# Patient Record
Sex: FEMALE | Race: WHITE | NOT HISPANIC OR LATINO | Employment: OTHER | URBAN - METROPOLITAN AREA
[De-identification: names, ages, dates, MRNs, and addresses within clinical notes are randomized per-mention and may not be internally consistent; named-entity substitution may affect disease eponyms.]

---

## 2017-01-07 ENCOUNTER — NURSE TRIAGE (OUTPATIENT)
Dept: ADMINISTRATIVE | Facility: CLINIC | Age: 82
End: 2017-01-07

## 2017-01-08 NOTE — TELEPHONE ENCOUNTER
Reason for Disposition   Caller has medication question only, adult not sick, and triager answers question    Protocols used: ST MEDICATION QUESTION CALL-A-  pt states she missed dose of coumadin and other meds last evening. When she woke up at 8am she took all her missed meds. Wants to know if she can return to regular schedule at this time. Advised yes. Advised to monitor for signs of bleeding . Next scheduled INR on 1/12/2017.

## 2017-01-08 NOTE — TELEPHONE ENCOUNTER
Did not take medication last night and she is on warfarin and took a dose this morning along with other medications. Wants to go back to regular routine and is wanting to know if she should. Advised yes to get back on regular schedule.

## 2017-01-11 ENCOUNTER — TELEPHONE (OUTPATIENT)
Dept: FAMILY MEDICINE | Facility: CLINIC | Age: 82
End: 2017-01-11

## 2017-01-11 NOTE — TELEPHONE ENCOUNTER
i spoke with pt she said she continues to have pain from her head to her neck and into shoulder. She will come in and see nakul tomorrow. This is just patricia

## 2017-01-11 NOTE — TELEPHONE ENCOUNTER
----- Message from Alondra Zavala sent at 1/11/2017 11:29 AM CST -----  Contact: self  Patient [phone] is calling to speak with Nurse about scheduling an appt with Dr Negro Finnegan/patient would not tell me what it was about and would not let me help her/wants to speak with Nurse/please call

## 2017-01-12 ENCOUNTER — ANTI-COAG VISIT (OUTPATIENT)
Dept: CARDIOLOGY | Facility: CLINIC | Age: 82
End: 2017-01-12
Payer: MEDICARE

## 2017-01-12 ENCOUNTER — OFFICE VISIT (OUTPATIENT)
Dept: FAMILY MEDICINE | Facility: CLINIC | Age: 82
End: 2017-01-12
Payer: MEDICARE

## 2017-01-12 VITALS
HEART RATE: 69 BPM | OXYGEN SATURATION: 97 % | SYSTOLIC BLOOD PRESSURE: 120 MMHG | DIASTOLIC BLOOD PRESSURE: 74 MMHG | WEIGHT: 170 LBS | HEIGHT: 65 IN | BODY MASS INDEX: 28.32 KG/M2

## 2017-01-12 DIAGNOSIS — Z79.01 LONG TERM (CURRENT) USE OF ANTICOAGULANTS: Primary | ICD-10-CM

## 2017-01-12 DIAGNOSIS — G45.9 TRANSIENT CEREBRAL ISCHEMIA, UNSPECIFIED TYPE: ICD-10-CM

## 2017-01-12 DIAGNOSIS — Z86.73 HISTORY OF TIA (TRANSIENT ISCHEMIC ATTACK): ICD-10-CM

## 2017-01-12 DIAGNOSIS — R42 LIGHTHEADEDNESS: Primary | ICD-10-CM

## 2017-01-12 DIAGNOSIS — M54.2 NECK PAIN: ICD-10-CM

## 2017-01-12 DIAGNOSIS — I48.0 PAROXYSMAL ATRIAL FIBRILLATION: ICD-10-CM

## 2017-01-12 LAB
CTP QC/QA: NORMAL
INR PPP: 2.3 (ref 2–3)

## 2017-01-12 PROCEDURE — 85610 PROTHROMBIN TIME: CPT | Mod: QW,S$GLB,, | Performed by: PHARMACIST

## 2017-01-12 PROCEDURE — 99999 PR PBB SHADOW E&M-EST. PATIENT-LVL IV: CPT | Mod: PBBFAC,,, | Performed by: NURSE PRACTITIONER

## 2017-01-12 PROCEDURE — 1159F MED LIST DOCD IN RCRD: CPT | Mod: S$GLB,,, | Performed by: NURSE PRACTITIONER

## 2017-01-12 PROCEDURE — 1157F ADVNC CARE PLAN IN RCRD: CPT | Mod: S$GLB,,, | Performed by: NURSE PRACTITIONER

## 2017-01-12 PROCEDURE — 1160F RVW MEDS BY RX/DR IN RCRD: CPT | Mod: S$GLB,,, | Performed by: NURSE PRACTITIONER

## 2017-01-12 PROCEDURE — 93005 ELECTROCARDIOGRAM TRACING: CPT | Mod: S$GLB,,, | Performed by: NURSE PRACTITIONER

## 2017-01-12 PROCEDURE — 99214 OFFICE O/P EST MOD 30 MIN: CPT | Mod: S$GLB,,, | Performed by: NURSE PRACTITIONER

## 2017-01-12 PROCEDURE — 3078F DIAST BP <80 MM HG: CPT | Mod: S$GLB,,, | Performed by: NURSE PRACTITIONER

## 2017-01-12 PROCEDURE — 1125F AMNT PAIN NOTED PAIN PRSNT: CPT | Mod: S$GLB,,, | Performed by: NURSE PRACTITIONER

## 2017-01-12 PROCEDURE — 3074F SYST BP LT 130 MM HG: CPT | Mod: S$GLB,,, | Performed by: NURSE PRACTITIONER

## 2017-01-12 PROCEDURE — 93010 ELECTROCARDIOGRAM REPORT: CPT | Mod: S$GLB,,, | Performed by: INTERNAL MEDICINE

## 2017-01-12 RX ORDER — RAMIPRIL 2.5 MG/1
CAPSULE ORAL
Refills: 4 | COMMUNITY
Start: 2016-11-07 | End: 2017-08-01

## 2017-01-12 NOTE — PROGRESS NOTES
Subjective:       Patient ID: Maggi Fontaine is a 84 y.o. female.    Chief Complaint: Shoulder Pain and Neck Pain    HPI Comments: Patient is having pain in her right neck radiating into her right should for a couple of weeks. Pain is waxing and waning, sometimes very painful. 4-8/10. Has not taken anything for it.  Episode of near syncope Monday, lasted for about 60 seconds, did not lose consciousness, but felt clammy and lightheaded and like she was about to pass out. Has not reoccurred.  Has been taking coumadin daily, Lab Results       Component                Value               Date                       INR                      3.3                 12/14/2016                 INR                      2.7                 10/17/2016                 INR                      2.7                 08/24/2016            Has INR today.     There are no preventive care reminders to display for this patient.    Past Medical History:  Past Medical History:    Allergy                                                       Arthritis                                                     Atrial fibrillation                                           Gout                                                          Hay fever                                                     Headache(784.0)                                               HEARING LOSS                                                  Hyperlipidemia                                                Hypertension                                                  Hypothyroidism                                                Polymyalgia rheumatica                                          Comment:remission now    Renal insufficiency                                           Sinusitis                                                     Stroke                                                          Comment:history of TIA    Urinary incontinence                                           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 , Disp: , Rfl:   furosemide (LASIX) 20 MG tablet, Take 20 mg by mouth daily as needed., Disp: , Rfl:   levothyroxine (SYNTHROID) 88 MCG tablet, TAKE 1 TABLET EVERY DAY BEFORE BREAKFAST, Disp: 90 tablet, Rfl: 1  meclizine (ANTIVERT) 25 mg tablet, Take 1 tablet (25 mg total) by mouth 3 (three) times daily as needed., Disp: 30 tablet, Rfl: 11  pravastatin (PRAVACHOL) 40 MG tablet, TAKE 1 TABLET EVERY DAY, Disp: 90 tablet, Rfl: 3  sotalol (BETAPACE) 80 MG tablet, TAKE 1 TABLET ONE TIME  DAILY., Disp: 90 tablet, Rfl: 3  VITAMIN D2 50,000 unit capsule, TAKE 1 CAPSULE EVERY 7 DAYS, Disp: 12 capsule, Rfl: 3  warfarin (COUMADIN) 5 MG tablet, TAKE 1 TABLET EVERY DAY, Disp: 90 tablet, Rfl: 1    No current facility-administered medications on file prior to visit.     Social History    Marital status:              Spouse name:                       Years of education:                 Number of children:               Occupational History  Occupation          Employer            Comment               homemaker                                   Social History Main Topics    Smoking status: Never Smoker                                                                Smokeless status: Never Used                        Alcohol use: Yes           0.5 oz/week       1 Standard drinks or equivalent per week       Comment: less than 1 drink weekly    Drug use: No               Sexual activity: Not on file          Other Topics            Concern  Financial Status: Other Yes  Home situation: lives * Yes  Leisure: Time with fam* Yes  Childhood History: [place]* Yes  Home situation: lives * Yes  Childhood History: Une* Yes  Leisure: Shopping       Yes    Social History Narrative    Born and raised in [place], [place].  HS graduate.  Came to [facility] at 17 to work.  Met and   at 19 - 3 children, 59 years .  He will probably be going to a  for alzheimer dx next week.  He is currently hospitalized for medical clearance.        Review of patient's family history indicates:    Anxiety disorder               Sister                      Comment: 1/2 sister with driving phobia onset in                60s    Diabetes                       Mother                          Review of Systems   Constitutional: Negative.  Negative for fatigue and fever.   Respiratory: Negative.  Negative for cough, choking, chest tightness and wheezing.    Cardiovascular: Negative.  Negative for chest pain, palpitations and leg swelling.   Musculoskeletal: Positive for arthralgias and neck pain.   Neurological: Negative for dizziness, tremors, syncope, weakness, light-headedness and headaches.       Objective:      Physical Exam   Constitutional: She is oriented to person, place, and time.   HENT:   Head: Normocephalic and atraumatic.   Eyes: Pupils are equal, round, and reactive to light.   Cardiovascular: Normal rate and regular rhythm.  Exam reveals no friction rub.    No murmur heard.  Pulmonary/Chest: Effort normal and breath sounds normal. No respiratory distress. She has no wheezes.   Abdominal: Soft. Bowel sounds are normal. She exhibits no distension. There is no tenderness.   Musculoskeletal:        Right shoulder: She exhibits tenderness and pain. She exhibits normal range of motion and no bony tenderness.        Cervical back: She exhibits tenderness and pain. She exhibits normal range of motion.         Arms:  Neurological: She is alert and oriented to person, place, and time. She displays normal reflexes. No cranial nerve deficit. She exhibits normal muscle tone. Coordination normal.   Skin: No rash noted. No erythema.   Psychiatric: She has a normal mood and affect. Her behavior is normal.       Assessment:       1. Lightheadedness    2. Neck pain    3. Transient cerebral ischemia, unspecified type         Plan:     Lightheadedness has completely resolved after one brief episode, neurological exam normal, patient does have a history of TIA in the past, she is on Coumadin, has not missed any doses. The pain sounds musculoskeletal, no known injury. Will evaluate, tylenol prn. Go to er immediately for any new or worsening symptoms.   1. Lightheadedness    - IN OFFICE EKG 12-LEAD (to [facility])  - X-Ray Cervical Spine AP And Lateral; Future  - X-Ray Thoracic Spine AP Lateral; Future  - US Carotid Bilateral; Future    2. Neck pain    - IN OFFICE EKG 12-LEAD (to [facility])  - X-Ray Cervical Spine AP And Lateral; Future  - X-Ray Thoracic Spine AP Lateral; Future  - US Carotid Bilateral; Future    3. Transient cerebral ischemia, unspecified type     - US Carotid Bilateral; Future

## 2017-01-12 NOTE — MR AVS SNAPSHOT
[facility]  [address]  [facility] [address]  Phone: [phone]  Fax: [phone]                  Maggi Fontaine   2017 9:40 AM   Office Visit    Description:  Female : 1932   Provider:  Petra Castle NP   Department:  [facility]           Reason for Visit     Shoulder Pain     Neck Pain           Diagnoses this Visit        Comments    Lightheadedness    -  Primary     Neck pain         Transient cerebral ischemia, unspecified type                To Do List           Future Appointments        Provider Department Dept Phone    2017 7:45 AM [facility] XR2 [facility] [phone]    2017 8:00 AM [facility] XR2 [facility] [phone]    2017 12:30 PM [facility] US1 [facility] [phone]    2017 10:20 AM Chance Reed DPM [place] [facility] [phone]    2017 11:00 AM Dinah Silver, PharmD [facility] [phone]      Goals (5 Years of Data)     None      [facility]     [facility] -  Assistance  Registered nurses in the [facility] provide clinical advisement, health education, appointment booking, and other advisory services.  Call for this free service at [phone].             Medications           Message regarding Medications     Verify the changes and/or additions to your medication regime listed below are the same as discussed with your clinician today.  If any of these changes or additions are incorrect, please notify your healthcare provider.             Verify that the below list of medications is an accurate representation of the medications you are currently taking.  If none reported, the list may be blank. If incorrect, please contact your healthcare provider. Carry this list with you in case of emergency.           Current Medications     aspirin (ENTERIC COATED ASPIRIN) 81 MG EC tablet Take 1 tablet by mouth  "once daily.     furosemide (LASIX) 20 MG tablet Take 20 mg by mouth daily as needed.    levothyroxine (SYNTHROID) 88 MCG tablet TAKE 1 TABLET EVERY DAY BEFORE BREAKFAST    meclizine (ANTIVERT) 25 mg tablet Take 1 tablet (25 mg total) by mouth 3 (three) times daily as needed.    pravastatin (PRAVACHOL) 40 MG tablet TAKE 1 TABLET EVERY DAY    ramipril (ALTACE) 2.5 MG capsule TK ONE C PO QD    sotalol (BETAPACE) 80 MG tablet TAKE 1 TABLET ONE TIME  DAILY.    VITAMIN D2 50,000 unit capsule TAKE 1 CAPSULE EVERY 7 DAYS    warfarin (COUMADIN) 5 MG tablet TAKE 1 TABLET EVERY DAY           Clinical Reference Information           Vital Signs - Last Recorded  Most recent update: 1/12/2017  9:48 AM by Diana Spicer LPN    BP Pulse Ht Wt SpO2 BMI    120/74 69 5' 5" (1.651 m) 77.1 kg (169 lb 15.6 oz) 97% 28.29 kg/m2      Blood Pressure          Most Recent Value    BP  120/74      Allergies as of 1/12/2017     Nsaids (Non-steroidal Anti-inflammatory Drug)    Penicillins      Immunizations Administered on Date of Encounter - 1/12/2017     None      Orders Placed During Today's Visit      Normal Orders This Visit    Ambulatory Referral to Physical/Occupational Therapy     IN OFFICE EKG 12-LEAD (to [place])     Future Labs/Procedures Expected by Expires    US Carotid Bilateral  1/12/2017 1/12/2018    X-Ray Cervical Spine AP And Lateral  1/12/2017 1/12/2018    X-Ray Thoracic Spine AP Lateral  1/12/2017 1/12/2018      "

## 2017-01-12 NOTE — TELEPHONE ENCOUNTER
Informed Marilu of pt's current sx(upper back and neck pain) and that Petra Castle NP would like for Dr Nguyen to review pt's EKG. EKG to be faxed today. Marilu verbalized understanding and stated Dr Nguyen is out of clinic today, but she will have him look at it tomorrow.

## 2017-01-12 NOTE — PROGRESS NOTES
"INR in range.  Pt denies any changes in meds/diet.  She does report she is having some neck pain, sounds possibly a pinched nerve.  She is seeing JARRED Castle for this and to have some tests run.  Will continue dose and recheck in 6 weeks "This note will not be shared with the patient."  "

## 2017-01-12 NOTE — MR AVS SNAPSHOT
[place] [facility]  [address]  [place] [address]  Phone: [phone]                  Maggi Fontaine   2017 10:30 AM   Anti-coag visit    Description:  Female : 1932   Provider:  Dinah Silver, Kyle   Department:  [place] [facility]           Diagnoses this Visit        Comments    Long term (current) use of anticoagulants    -  Primary     History of TIA (transient ischemic attack)         Paroxysmal atrial fibrillation                To Do List           Future Appointments        Provider Department Dept Phone    2017 10:20 AM Chance Reed DPM [place] [facility] [phone]    2017 11:00 AM [facility] [phone]      Goals (5 Years of Data)     None      [facility]     [facility] On Call Nurse Care Line -  Assistance  Registered nurses in the [facility] provide clinical advisement, health education, appointment booking, and other advisory services.  Call for this free service at [phone].             Medications           Message regarding Medications     Verify the changes and/or additions to your medication regime listed below are the same as discussed with your clinician today.  If any of these changes or additions are incorrect, please notify your healthcare provider.             Verify that the below list of medications is an accurate representation of the medications you are currently taking.  If none reported, the list may be blank. If incorrect, please contact your healthcare provider. Carry this list with you in case of emergency.           Current Medications     aspirin (ENTERIC COATED ASPIRIN) 81 MG EC tablet Take 1 tablet by mouth once daily.     furosemide (LASIX) 20 MG tablet Take 20 mg by mouth daily as needed.    levothyroxine (SYNTHROID) 88 MCG tablet TAKE 1 TABLET EVERY DAY BEFORE BREAKFAST    meclizine (ANTIVERT) 25 mg tablet Take 1 tablet (25 mg total) by mouth 3 (three) times  daily as needed.    pravastatin (PRAVACHOL) 40 MG tablet TAKE 1 TABLET EVERY DAY    ramipril (ALTACE) 2.5 MG capsule TK ONE C PO QD    sotalol (BETAPACE) 80 MG tablet TAKE 1 TABLET ONE TIME  DAILY.    VITAMIN D2 50,000 unit capsule TAKE 1 CAPSULE EVERY 7 DAYS    warfarin (COUMADIN) 5 MG tablet TAKE 1 TABLET EVERY DAY           Clinical Reference Information           Allergies as of 1/12/2017     Nsaids (Non-steroidal Anti-inflammatory Drug)    Penicillins      Immunizations Administered on Date of Encounter - 1/12/2017     None      Orders Placed During Today's Visit      Normal Orders This Visit    POCT PT/INR          1/12/2017 10:54 AM - Dinah Silver, PharmD      Component Results     Component    INR    2.3     Acceptable      December 2016 Details    Sun Mon Tue Wed Thu Fri Sat         1               2               3                 4               5               6               7               8               9               10                 11               12               13      5 mg         14   3.3   Hold   See details      15      5 mg         16      2.5 mg         17      5 mg           18      5 mg         19      2.5 mg         20      5 mg         21      2.5 mg         22      5 mg         23      2.5 mg         24      5 mg           25      5 mg         26      2.5 mg         27      5 mg         28      2.5 mg         29      5 mg         30      2.5 mg         31      5 mg          Date Details   12/14 Last INR check   INR: 3.3                 January 2017 Details    Sun Mon Tue Wed Thu Fri Sat     1      5 mg         2      2.5 mg         3      5 mg         4      2.5 mg         5      5 mg         6      2.5 mg         7      5 mg           8      5 mg         9      2.5 mg         10      5 mg         11      2.5 mg         12   2.3   5 mg   See details      13      2.5 mg         14      5 mg           15      5 mg         16      2.5 mg         17      5 mg          18      2.5 mg         19      5 mg         20      2.5 mg         21      5 mg           22      5 mg         23      2.5 mg         24      5 mg         25      2.5 mg         26      5 mg         27      2.5 mg         28      5 mg           29      5 mg         30      2.5 mg         31      5 mg              Date Details   01/12 This INR check   INR: 2.3                     How to take your warfarin dose     To take:  2.5 mg Take 0.5 of a 5 mg tablet.    To take:  5 mg Take 1 of the 5 mg tablets.           February 2017 Details    Sun Mon Tue Wed Thu Fri Sat        1      2.5 mg         2      5 mg         3      2.5 mg         4      5 mg           5      5 mg         6      2.5 mg         7      5 mg         8      2.5 mg         9      5 mg         10      2.5 mg         11      5 mg           12      5 mg         13      2.5 mg         14      5 mg         15      2.5 mg         16      5 mg         17      2.5 mg         18      5 mg           19      5 mg         20      2.5 mg         21      5 mg         22      2.5 mg         23            24               25                 26               27               28                    Date Details   No additional details    Date of next INR:  2/23/2017         How to take your warfarin dose     To take:  2.5 mg Take 0.5 of a 5 mg tablet.    To take:  5 mg Take 1 of the 5 mg tablets.           Anticoagulation Summary as of 1/12/2017     Maintenance plan 2.5 mg (5 mg x 0.5) on Mon, Wed, Fri; 5 mg (5 mg x 1) all other days    Full instructions 2.5 mg on Mon, Wed, Fri; 5 mg all other days    Next INR check 2/23/2017      Anticoagulation Episode Summary     Comments [facility] VITK 1X/WK

## 2017-01-25 ENCOUNTER — OFFICE VISIT (OUTPATIENT)
Dept: PODIATRY | Facility: CLINIC | Age: 82
End: 2017-01-25
Payer: MEDICARE

## 2017-01-25 VITALS — WEIGHT: 170.19 LBS | HEIGHT: 65 IN | BODY MASS INDEX: 28.36 KG/M2

## 2017-01-25 DIAGNOSIS — G60.9 IDIOPATHIC PERIPHERAL NEUROPATHY: Primary | ICD-10-CM

## 2017-01-25 DIAGNOSIS — L84 CORN OR CALLUS: ICD-10-CM

## 2017-01-25 DIAGNOSIS — B35.1 ONYCHOMYCOSIS DUE TO DERMATOPHYTE: ICD-10-CM

## 2017-01-25 PROCEDURE — 11055 PARING/CUTG B9 HYPRKER LES 1: CPT | Mod: Q9,S$GLB,, | Performed by: PODIATRIST

## 2017-01-25 PROCEDURE — 99499 UNLISTED E&M SERVICE: CPT | Mod: S$GLB,,, | Performed by: PODIATRIST

## 2017-01-25 PROCEDURE — 1160F RVW MEDS BY RX/DR IN RCRD: CPT | Mod: S$GLB,,, | Performed by: PODIATRIST

## 2017-01-25 PROCEDURE — 99999 PR PBB SHADOW E&M-EST. PATIENT-LVL II: CPT | Mod: PBBFAC,,, | Performed by: PODIATRIST

## 2017-01-25 PROCEDURE — 99214 OFFICE O/P EST MOD 30 MIN: CPT | Mod: 25,S$GLB,, | Performed by: PODIATRIST

## 2017-01-25 PROCEDURE — 1159F MED LIST DOCD IN RCRD: CPT | Mod: S$GLB,,, | Performed by: PODIATRIST

## 2017-01-25 PROCEDURE — 1126F AMNT PAIN NOTED NONE PRSNT: CPT | Mod: S$GLB,,, | Performed by: PODIATRIST

## 2017-01-25 PROCEDURE — 1157F ADVNC CARE PLAN IN RCRD: CPT | Mod: S$GLB,,, | Performed by: PODIATRIST

## 2017-01-25 PROCEDURE — 11721 DEBRIDE NAIL 6 OR MORE: CPT | Mod: Q9,59,S$GLB, | Performed by: PODIATRIST

## 2017-01-25 NOTE — MR AVS SNAPSHOT
[place] [facility]  [address]  [place] [address]  Phone: [phone]                  Maggi Fontaine   2017 10:20 AM   Office Visit    Description:  Female : 1932   Provider:  Chance Reed DPM   Department:  [place] [facility]           Reason for Visit     Peripheral Neuropathy     Callouses                To Do List           Future Appointments        Provider Department Dept Phone    2017 11:00 AM Dinah Silver, PharmD [place] [facility] [phone]    2017 10:20 AM Chance Reed DPM [facility] [phone]      Goals (5 Years of Data)     None      [facility]     [facility] On Call Nurse Care Line -  Assistance  Registered nurses in the [facility] provide clinical advisement, health education, appointment booking, and other advisory services.  Call for this free service at [phone].             Medications           Message regarding Medications     Verify the changes and/or additions to your medication regime listed below are the same as discussed with your clinician today.  If any of these changes or additions are incorrect, please notify your healthcare provider.             Verify that the below list of medications is an accurate representation of the medications you are currently taking.  If none reported, the list may be blank. If incorrect, please contact your healthcare provider. Carry this list with you in case of emergency.           Current Medications     aspirin (ENTERIC COATED ASPIRIN) 81 MG EC tablet Take 1 tablet by mouth once daily.     furosemide (LASIX) 20 MG tablet Take 20 mg by mouth daily as needed.    levothyroxine (SYNTHROID) 88 MCG tablet TAKE 1 TABLET EVERY DAY BEFORE BREAKFAST    pravastatin (PRAVACHOL) 40 MG tablet TAKE 1 TABLET EVERY DAY    sotalol (BETAPACE) 80 MG tablet TAKE 1 TABLET ONE TIME  DAILY.    VITAMIN D2 50,000 unit capsule TAKE 1 CAPSULE EVERY 7 DAYS    warfarin (COUMADIN) 5 MG  "tablet TAKE 1 TABLET EVERY DAY    meclizine (ANTIVERT) 25 mg tablet Take 1 tablet (25 mg total) by mouth 3 (three) times daily as needed.    ramipril (ALTACE) 2.5 MG capsule TK ONE C PO QD           Clinical Reference Information           Vital Signs - Last Recorded  Most recent update: 1/25/2017 10:55 AM by Ken Portillo MA    Ht Wt BMI          5' 5" (1.651 m) 77.2 kg (170 lb 3.1 oz) 28.32 kg/m2        Allergies as of 1/25/2017     Nsaids (Non-steroidal Anti-inflammatory Drug)    Penicillins      Immunizations Administered on Date of Encounter - 1/25/2017     None      "

## 2017-01-26 NOTE — PROGRESS NOTES
Subjective:      Patient ID: Maggi Fontaine is a 84 y.o. female.    Chief Complaint: Peripheral Neuropathy and Callouses (rt heel)    Maggi is a 84 y.o. female who presents to the clinic for evaluation and treatment of high risk feet. Maggi has a past medical history of Allergy; Arthritis; Atrial fibrillation; Gout; Hay fever; Headache(784.0); HEARING LOSS; Hyperlipidemia; Hypertension; Hypothyroidism; Polymyalgia rheumatica; Renal insufficiency; Sinusitis; Stroke; Urinary incontinence; and West Nile encephalitis. The patient's chief complaint concerns the recurrent formation of a callus of the Rt. Plantar heel.  Relates continued application of moisturizer to the lesion in conjunction with filing the site regularly.  States this seems to have reduced the amount of build up in comparison to the previous exam.  Has also been wearing a toe spacer on the Rt. 2nd digit to prevent a pressure sore.  Denies any additional pedal complaints.  This patient has documented high risk feet requiring routine maintenance secondary to peripheral neuropathy.    PCP: Negro Finnegan MD    Date Last Seen by PCP: 10/19/16    Last encounter in this department: 10/26/2016    No results found for: HGBA1C      Past Medical History   Diagnosis Date    Allergy     Arthritis     Atrial fibrillation     Gout     Hay fever     Headache(784.0)     HEARING LOSS     Hyperlipidemia     Hypertension     Hypothyroidism     Polymyalgia rheumatica      remission now    Renal insufficiency     Sinusitis     Stroke      history of TIA    Urinary incontinence     West Nile encephalitis      2002       Past Surgical History   Procedure Laterality Date    Knee surgery      Partial hysterectomy         Family History   Problem Relation Age of Onset    Anxiety disorder Sister      1/2 sister with driving phobia onset in 60s    Diabetes Mother        Social History     Social History    Marital status:      Spouse name: N/A     Number of children: N/A    Years of education: N/A     Occupational History    homemaker      Social History Main Topics    Smoking status: Never Smoker    Smokeless tobacco: Never Used    Alcohol use 0.5 oz/week     1 drink(s) per week      Comment: less than 1 drink weekly    Drug use: No    Sexual activity: Not Asked     Other Topics Concern    Financial Status: Other Yes    Home Situation: Lives With Family Yes    Leisure: Time With Family Yes    Childhood History: Raised By Parents Yes    Home Situation: Lives With Significant Other Yes    Childhood History: Uneventful Yes    Leisure: Shopping Yes     Social History Narrative    Born and raised in [place], [place].  HS graduate.  Came to [facility] at 17 to work.  Met and   at 19 - 3 children, 59 years .  He will probably be going to a  for alzheimer dx next week.  He is currently hospitalized for medical clearance.         Current Outpatient Prescriptions   Medication Sig Dispense Refill    aspirin (ENTERIC COATED ASPIRIN) 81 MG EC tablet Take 1 tablet by mouth once daily.       furosemide (LASIX) 20 MG tablet Take 20 mg by mouth daily as needed.      levothyroxine (SYNTHROID) 88 MCG tablet TAKE 1 TABLET EVERY DAY BEFORE BREAKFAST 90 tablet 1    pravastatin (PRAVACHOL) 40 MG tablet TAKE 1 TABLET EVERY DAY 90 tablet 3    sotalol (BETAPACE) 80 MG tablet TAKE 1 TABLET ONE TIME  DAILY. 90 tablet 3    VITAMIN D2 50,000 unit capsule TAKE 1 CAPSULE EVERY 7 DAYS 12 capsule 3    warfarin (COUMADIN) 5 MG tablet TAKE 1 TABLET EVERY DAY 90 tablet 1    meclizine (ANTIVERT) 25 mg tablet Take 1 tablet (25 mg total) by mouth 3 (three) times daily as needed. 30 tablet 11    ramipril (ALTACE) 2.5 MG capsule TK ONE C PO QD  4     No current facility-administered medications for this visit.        Review of patient's allergies indicates:   Allergen Reactions    Nsaids (non-steroidal anti-inflammatory drug) Other (See Comments)     Other  reaction(s): Chronic Renal Insufficiency    Penicillins Swelling         Review of Systems   Constitution: Negative for chills and fever.   Cardiovascular: Positive for leg swelling. Negative for claudication.   Skin: Positive for color change and nail changes.   Musculoskeletal: Positive for arthritis and joint swelling.   Neurological: Positive for numbness. Negative for paresthesias.   Psychiatric/Behavioral: Negative for altered mental status.           Objective:      Physical Exam   Constitutional: She is oriented to person, place, and time. She appears well-developed and well-nourished. No distress.   Cardiovascular:   Pulses:       Dorsalis pedis pulses are 2+ on the right side, and 2+ on the left side.        Posterior tibial pulses are 1+ on the right side, and 1+ on the left side.   CFT <3 seconds bilateral.  Pedal hair growth decreased bilateral.  Varicosities noted bilateral.  1+ pitting edema noted bilateral.  Toes are cool to touch bilateral.     Musculoskeletal: She exhibits edema. She exhibits no tenderness.   Muscle strength 5/5 in all muscle groups bilateral.  No tenderness nor crepitation with ROM of foot/ankle joints bilateral.  No pain with palpation of bilateral foot and ankle.  Bilateral pes planus foot type.  Bilateral hallux abducto valgus.  Bilateral semi-rigid contracture of toes 2-5.     Neurological: She is alert and oriented to person, place, and time. She has normal strength. A sensory deficit is present.   Protective sensation per Hamilton-Shannen monofilament absent bilateral.    Vibratory sensation absent bilateral.    Light touch intact bilateral.   Skin: Skin is warm, dry and intact. Lesion noted. No abrasion, no bruising, no burn, no ecchymosis, no laceration, no petechiae and no rash noted. She is not diaphoretic. No cyanosis or erythema. No pallor. Nails show no clubbing.   Pedal skin appears edematous bilateral.  Toenails x 10 appear thickened by 2 mm's, elongated by 2 mm's,  and discolored with subungual debris.  Focal hyperkeratotic lesion noted to the Rt. plantar central heel.  Examination of the skin reveals no evidence of significant maceration, rashes, open lesions, suspicious appearing nevi or other concerning lesions.              Assessment:       Encounter Diagnoses   Name Primary?    Idiopathic peripheral neuropathy Yes    Onychomycosis due to dermatophyte     Corn or callus          Plan:       Maggi was seen today for peripheral neuropathy and callouses.    Diagnoses and all orders for this visit:    Idiopathic peripheral neuropathy    Onychomycosis due to dermatophyte    Corn or callus      I counseled the patient on her conditions, their implications and medical management.    Shoe inspection. Diabetic Foot Education. Patient reminded of the importance of good nutrition and blood sugar control to help prevent podiatric complications of diabetes. Patient instructed on proper foot hygeine. We discussed wearing proper shoe gear, daily foot inspections, never walking without protective shoe gear, never putting sharp instruments to feet    With patient's permission, nails were aggressively reduced and debrided x 10 to their soft tissue attachment mechanically and with electric , removing all offending nail and debris.  Also, a sterile #15 scalpel was used to trim the lesion of the Rt. Plantar central heel down to smooth appearing skin without incident.  Patient relates relief following the procedure. She will continue to monitor the areas daily, inspect her feet, wear protective shoe gear when ambulatory, moisturizer to maintain skin integrity and follow in this office in approximately 2-3 months, sooner p.r.n.      Return in about 3 months (around 4/25/2017).    Chance Reed DPM

## 2017-01-31 DIAGNOSIS — I48.91 ATRIAL FIBRILLATION: ICD-10-CM

## 2017-01-31 RX ORDER — ERGOCALCIFEROL 1.25 MG/1
CAPSULE ORAL
Qty: 12 CAPSULE | Refills: 3 | Status: SHIPPED | OUTPATIENT
Start: 2017-01-31 | End: 2018-05-06 | Stop reason: SDUPTHER

## 2017-02-01 RX ORDER — SOTALOL HYDROCHLORIDE 80 MG/1
TABLET ORAL
Qty: 90 TABLET | Refills: 3 | Status: SHIPPED | OUTPATIENT
Start: 2017-02-01 | End: 2017-11-20 | Stop reason: SDUPTHER

## 2017-02-22 ENCOUNTER — ANTI-COAG VISIT (OUTPATIENT)
Dept: CARDIOLOGY | Facility: CLINIC | Age: 82
End: 2017-02-22
Payer: MEDICARE

## 2017-02-22 DIAGNOSIS — Z79.01 LONG TERM (CURRENT) USE OF ANTICOAGULANTS: Primary | ICD-10-CM

## 2017-02-22 DIAGNOSIS — I48.0 PAROXYSMAL ATRIAL FIBRILLATION: ICD-10-CM

## 2017-02-22 DIAGNOSIS — Z86.73 HISTORY OF TIA (TRANSIENT ISCHEMIC ATTACK): ICD-10-CM

## 2017-02-22 LAB — INR PPP: 2.3 (ref 2–3)

## 2017-02-22 PROCEDURE — 85610 PROTHROMBIN TIME: CPT | Mod: QW,S$GLB,,

## 2017-02-22 NOTE — MR AVS SNAPSHOT
[facility]  [address] [facility] [address]  [place] [address]  Phone: [phone]                  Maggi Fontaine   2017 1:30 PM   Anti-coag visit    Description:  Female : 1932   Provider:  Leslie Hensley PharmD   Department:  [facility]           Diagnoses this Visit        Comments    Long term (current) use of anticoagulants    -  Primary     History of TIA (transient ischemic attack)         Paroxysmal atrial fibrillation                To Do List           Future Appointments        Provider Department Dept Phone    2017 1:30 PM [facility] [phone]    2017 1:15 PM Dinah Silver, [facility] [place] [facility] [phone]    2017 10:20 AM Chance Reed DPM [facility] [phone]      Goals (5 Years of Data)     None      [facility]     [facility] -  Assistance  Registered nurses in the [facility] provide clinical advisement, health education, appointment booking, and other advisory services.  Call for this free service at [phone].             Medications           Message regarding Medications     Verify the changes and/or additions to your medication regime listed below are the same as discussed with your clinician today.  If any of these changes or additions are incorrect, please notify your healthcare provider.             Verify that the below list of medications is an accurate representation of the medications you are currently taking.  If none reported, the list may be blank. If incorrect, please contact your healthcare provider. Carry this list with you in case of emergency.           Current Medications     aspirin (ENTERIC COATED ASPIRIN) 81 MG EC tablet Take 1 tablet by mouth once daily.     furosemide (LASIX) 20 MG tablet Take 20 mg by mouth daily as needed.    levothyroxine (SYNTHROID) 88 MCG tablet TAKE 1 TABLET EVERY DAY BEFORE BREAKFAST    meclizine  (ANTIVERT) 25 mg tablet Take 1 tablet (25 mg total) by mouth 3 (three) times daily as needed.    pravastatin (PRAVACHOL) 40 MG tablet TAKE 1 TABLET EVERY DAY    ramipril (ALTACE) 2.5 MG capsule TK ONE C PO QD    sotalol (BETAPACE) 80 MG tablet TAKE 1 TABLET ONE TIME  DAILY.    VITAMIN D2 50,000 unit capsule TAKE 1 CAPSULE EVERY WEEK    warfarin (COUMADIN) 5 MG tablet TAKE 1 TABLET EVERY DAY           Clinical Reference Information           Allergies as of 2/22/2017     Nsaids (Non-steroidal Anti-inflammatory Drug)    Penicillins      Immunizations Administered on Date of Encounter - 2/22/2017     None      Orders Placed During Today's Visit      Normal Orders This Visit    POCT INR          2/22/2017  1:12 PM - Riki EnriquezD      Component Results     Component Value Flag Ref Range Units Status    INR 2.3  2.0 - 3.0  Final      January 2017 Details    Sun Mon Tue Wed Thu Fri Sat     1               2               3               4               5               6               7                 8               9               10               11               12   2.3   5 mg   See details      13      2.5 mg         14      5 mg           15      5 mg         16      2.5 mg         17      5 mg         18      2.5 mg         19      5 mg         20      2.5 mg         21      5 mg           22      5 mg         23      2.5 mg         24      5 mg         25      2.5 mg         26      5 mg         27      2.5 mg         28      5 mg           29      5 mg         30      2.5 mg         31      5 mg              Date Details   01/12 Last INR check   INR: 2.3                 February 2017 Details    Sun Mon Tue Wed Thu Fri Sat        1      2.5 mg         2      5 mg         3      2.5 mg         4      5 mg           5      5 mg         6      2.5 mg         7      5 mg         8      2.5 mg         9      5 mg         10      2.5 mg         11      5 mg           12      5 mg         13      2.5 mg          14      5 mg         15      2.5 mg         16      5 mg         17      2.5 mg         18      5 mg           19      5 mg         20      2.5 mg         21      5 mg         22   2.3   2.5 mg   See details      23      5 mg         24      2.5 mg         25      5 mg           26      5 mg         27      2.5 mg         28      5 mg              Date Details   02/22 This INR check   INR: 2.3                     How to take your warfarin dose     To take:  2.5 mg Take 0.5 of a 5 mg tablet.    To take:  5 mg Take 1 of the 5 mg tablets.           March 2017 Details    Sun Mon Tue Wed Thu Fri Sat        1      2.5 mg         2      5 mg         3      2.5 mg         4      5 mg           5      5 mg         6      2.5 mg         7      5 mg         8      2.5 mg         9      5 mg         10      2.5 mg         11      5 mg           12      5 mg         13      2.5 mg         14      5 mg         15      2.5 mg         16      5 mg         17      2.5 mg         18      5 mg           19      5 mg         20      2.5 mg         21      5 mg         22      2.5 mg         23      5 mg         24      2.5 mg         25      5 mg           26      5 mg         27      2.5 mg         28      5 mg         29      2.5 mg         30      5 mg         31      2.5 mg           Date Details   No additional details            How to take your warfarin dose     To take:  2.5 mg Take 0.5 of a 5 mg tablet.    To take:  5 mg Take 1 of the 5 mg tablets.           April 2017 Details    Sun Mon Tue Wed Thu Fri Sat           1      5 mg           2      5 mg         3      2.5 mg         4      5 mg         5      2.5 mg         6      5 mg         7      2.5 mg         8      5 mg           9      5 mg         10      2.5 mg         11      5 mg         12            13               14               15                 16               17               18               19               20               21               22                  23               24               25               26               27               28               29                 30                      Date Details   No additional details    Date of next INR:  4/12/2017         How to take your warfarin dose     To take:  2.5 mg Take 0.5 of a 5 mg tablet.    To take:  5 mg Take 1 of the 5 mg tablets.           Anticoagulation Summary as of 2/22/2017     Maintenance plan 2.5 mg (5 mg x 0.5) on Mon, Wed, Fri; 5 mg (5 mg x 1) all other days    Full instructions 2.5 mg on Mon, Wed, Fri; 5 mg all other days    Next INR check 4/12/2017      Anticoagulation Episode Summary     Comments [facility] VITK 1X/WK      Language Assistance Services     ATTENTION: Language assistance services are available, free of charge. Please call [phone].      ATENCIÓN: Si dereje chaudhry, tiene a flynn disposición servicios gratuitos de asistencia lingüística. Llame al [phone].     CHÚ Ý: N?u b?n nói Ti?ng Vi?t, có các d?ch v? h? tr? ngôn ng? mi?n phí dành cho b?n. G?i s? [phone].         [facility] complies with applicable Federal civil rights laws and does not discriminate on the basis of race, color, national origin, age, disability, or sex.

## 2017-02-22 NOTE — PROGRESS NOTES
Patient confirms dose and compliance. No changes/problems reported. INR is in range and stable, recheck INR in 7 weeks.

## 2017-04-05 ENCOUNTER — ANTI-COAG VISIT (OUTPATIENT)
Dept: CARDIOLOGY | Facility: CLINIC | Age: 82
End: 2017-04-05

## 2017-04-05 DIAGNOSIS — I48.0 PAROXYSMAL ATRIAL FIBRILLATION: ICD-10-CM

## 2017-04-05 DIAGNOSIS — Z92.29 HX: LONG TERM ANTICOAGULANT USE: Primary | ICD-10-CM

## 2017-04-05 DIAGNOSIS — Z79.01 LONG TERM (CURRENT) USE OF ANTICOAGULANTS: ICD-10-CM

## 2017-04-05 DIAGNOSIS — Z86.73 HISTORY OF TIA (TRANSIENT ISCHEMIC ATTACK): ICD-10-CM

## 2017-04-11 RX ORDER — WARFARIN SODIUM 5 MG/1
TABLET ORAL
Qty: 90 TABLET | Refills: 1 | Status: SHIPPED | OUTPATIENT
Start: 2017-04-11 | End: 2017-05-06 | Stop reason: SDUPTHER

## 2017-04-11 NOTE — TELEPHONE ENCOUNTER
----- Message from Petra Glover sent at 4/11/2017 10:35 AM CDT -----  Contact: self  Patient request a new Rx for warfarin      Please send to    [facility] - [place], [place] - [address]  [address]  [facility] [address]  Phone: [phone] Fax: [phone]

## 2017-05-08 RX ORDER — WARFARIN SODIUM 5 MG/1
TABLET ORAL
Qty: 90 TABLET | Refills: 1 | Status: SHIPPED | OUTPATIENT
Start: 2017-05-08 | End: 2017-09-18 | Stop reason: SDUPTHER

## 2017-05-31 ENCOUNTER — LAB VISIT (OUTPATIENT)
Dept: LAB | Facility: HOSPITAL | Age: 82
End: 2017-05-31
Attending: FAMILY MEDICINE
Payer: MEDICARE

## 2017-05-31 ENCOUNTER — OFFICE VISIT (OUTPATIENT)
Dept: ALLERGY | Facility: CLINIC | Age: 82
End: 2017-05-31
Payer: MEDICARE

## 2017-05-31 ENCOUNTER — OFFICE VISIT (OUTPATIENT)
Dept: FAMILY MEDICINE | Facility: CLINIC | Age: 82
End: 2017-05-31
Payer: MEDICARE

## 2017-05-31 ENCOUNTER — ANTI-COAG VISIT (OUTPATIENT)
Dept: CARDIOLOGY | Facility: CLINIC | Age: 82
End: 2017-05-31
Payer: MEDICARE

## 2017-05-31 VITALS
RESPIRATION RATE: 16 BRPM | HEIGHT: 65 IN | HEART RATE: 72 BPM | DIASTOLIC BLOOD PRESSURE: 64 MMHG | WEIGHT: 169.06 LBS | SYSTOLIC BLOOD PRESSURE: 112 MMHG | BODY MASS INDEX: 28.17 KG/M2

## 2017-05-31 VITALS
SYSTOLIC BLOOD PRESSURE: 108 MMHG | HEIGHT: 65 IN | DIASTOLIC BLOOD PRESSURE: 58 MMHG | BODY MASS INDEX: 28.69 KG/M2 | WEIGHT: 172.19 LBS | HEART RATE: 70 BPM

## 2017-05-31 DIAGNOSIS — J32.0 CHRONIC MAXILLARY SINUSITIS: ICD-10-CM

## 2017-05-31 DIAGNOSIS — Z91.09 ENVIRONMENTAL ALLERGIES: ICD-10-CM

## 2017-05-31 DIAGNOSIS — J32.9 SINUSITIS, UNSPECIFIED CHRONICITY, UNSPECIFIED LOCATION: Primary | ICD-10-CM

## 2017-05-31 DIAGNOSIS — Z86.73 HISTORY OF TIA (TRANSIENT ISCHEMIC ATTACK): ICD-10-CM

## 2017-05-31 DIAGNOSIS — I48.0 PAROXYSMAL ATRIAL FIBRILLATION: ICD-10-CM

## 2017-05-31 DIAGNOSIS — R51.9 ACUTE NONINTRACTABLE HEADACHE, UNSPECIFIED HEADACHE TYPE: ICD-10-CM

## 2017-05-31 DIAGNOSIS — Z79.01 LONG TERM (CURRENT) USE OF ANTICOAGULANTS: Primary | ICD-10-CM

## 2017-05-31 DIAGNOSIS — J31.0 CHRONIC RHINITIS: Primary | ICD-10-CM

## 2017-05-31 LAB
ERYTHROCYTE [SEDIMENTATION RATE] IN BLOOD BY WESTERGREN METHOD: 10 MM/HR
INR PPP: 2.2 (ref 2–3)

## 2017-05-31 PROCEDURE — 99214 OFFICE O/P EST MOD 30 MIN: CPT | Mod: S$GLB,,, | Performed by: FAMILY MEDICINE

## 2017-05-31 PROCEDURE — 99211 OFF/OP EST MAY X REQ PHY/QHP: CPT | Mod: 25,S$GLB,, | Performed by: PHARMACIST

## 2017-05-31 PROCEDURE — 99999 PR PBB SHADOW E&M-EST. PATIENT-LVL III: CPT | Mod: PBBFAC,,, | Performed by: FAMILY MEDICINE

## 2017-05-31 PROCEDURE — 99999 PR PBB SHADOW E&M-EST. PATIENT-LVL III: CPT | Mod: PBBFAC,,, | Performed by: ALLERGY & IMMUNOLOGY

## 2017-05-31 PROCEDURE — 1159F MED LIST DOCD IN RCRD: CPT | Mod: S$GLB,,, | Performed by: FAMILY MEDICINE

## 2017-05-31 PROCEDURE — 1126F AMNT PAIN NOTED NONE PRSNT: CPT | Mod: S$GLB,,, | Performed by: FAMILY MEDICINE

## 2017-05-31 PROCEDURE — 99499 UNLISTED E&M SERVICE: CPT | Mod: S$GLB,,, | Performed by: FAMILY MEDICINE

## 2017-05-31 PROCEDURE — 99204 OFFICE O/P NEW MOD 45 MIN: CPT | Mod: S$GLB,,, | Performed by: ALLERGY & IMMUNOLOGY

## 2017-05-31 PROCEDURE — 1159F MED LIST DOCD IN RCRD: CPT | Mod: S$GLB,,, | Performed by: ALLERGY & IMMUNOLOGY

## 2017-05-31 PROCEDURE — 85610 PROTHROMBIN TIME: CPT | Mod: QW,S$GLB,, | Performed by: PHARMACIST

## 2017-05-31 RX ORDER — DOXYCYCLINE 100 MG/1
100 CAPSULE ORAL 2 TIMES DAILY
Qty: 20 CAPSULE | Refills: 0 | Status: SHIPPED | OUTPATIENT
Start: 2017-05-31 | End: 2017-06-10

## 2017-05-31 RX ORDER — FLUTICASONE PROPIONATE 50 MCG
2 SPRAY, SUSPENSION (ML) NASAL DAILY
Qty: 16 G | Refills: 3 | Status: SHIPPED | OUTPATIENT
Start: 2017-05-31 | End: 2017-05-31 | Stop reason: SDUPTHER

## 2017-05-31 RX ORDER — AZELASTINE 1 MG/ML
2 SPRAY, METERED NASAL 2 TIMES DAILY
Qty: 30 ML | Refills: 12 | Status: SHIPPED | OUTPATIENT
Start: 2017-05-31 | End: 2017-07-24

## 2017-05-31 RX ORDER — FLUTICASONE PROPIONATE 50 MCG
2 SPRAY, SUSPENSION (ML) NASAL DAILY
Qty: 48 G | Refills: 3 | Status: SHIPPED | OUTPATIENT
Start: 2017-05-31 | End: 2017-07-24

## 2017-05-31 NOTE — PROGRESS NOTES
Subjective:     THIS DOCUMENT WAS MADE IN PART WITH [employer] DICTATION SOFTWARE.  OCCASIONALLY THIS SOFTWARE WILL MISINTERPRET WORDS OR PHRASES.     Patient ID: Maggi Fontaine is a 84 y.o. female.    Chief Complaint: Follow-up (sinus congestion ); Sinus congestion (bend over and nose drips ); Laryngitis (in the evening; feels like a lump in throat ); Headache; Leg Pain (both at night when resting ); Foot Pain (both at rest ); and dexa scan (requesting a scan. Last done in 2016)    HPI     Bad allergies, headache, neck pain,   Mucous in throat, worse at night, thick  Congestion, headache, frontal and over right eye    Went to UC, prednisone and zpac, mild help    HTN stable and well controlled    Afib, remains on warfarin    Active Ambulatory Problems     Diagnosis Date Noted    Hyperlipidemia     Hypothyroidism     Cardiac pacemaker in situ 01/22/2015    Sinoatrial node dysfunction 01/22/2015    Hyperuricemia 07/06/2015    Long term (current) use of anticoagulants [V58.61] 08/11/2015    Urinary incontinence 09/13/2016    Urinary complication 09/13/2016    Essential hypertension 09/13/2016    Paroxysmal atrial fibrillation 09/13/2016    Idiopathic gout 09/13/2016    Chronic kidney disease, stage III (moderate) 09/13/2016    History of TIA (transient ischemic attack) 09/13/2016    Osteopenia 09/13/2016    Benign paroxysmal positional vertigo of left ear 09/13/2016    SNHL (sensorineural hearing loss) 09/13/2016     Resolved Ambulatory Problems     Diagnosis Date Noted    Palpitations 08/27/2013     Past Medical History:   Diagnosis Date    Allergy     Arthritis     Atrial fibrillation     Gout     Hay fever     Headache     HEARING LOSS     Hyperlipidemia     Hypertension     Hypothyroidism     Polymyalgia rheumatica     Renal insufficiency     Sinusitis     Stroke     Urinary incontinence     West Nile encephalitis          Review of Systems   Constitutional: Positive for fatigue and  fever (subjective).   HENT: Positive for congestion, postnasal drip, rhinorrhea and sinus pressure.    Eyes: Negative for pain, discharge and visual disturbance.   Respiratory: Positive for cough. Negative for shortness of breath and wheezing.    Cardiovascular: Negative for chest pain and leg swelling.   Endocrine: Negative for polydipsia.   Genitourinary: Negative.    Musculoskeletal: Positive for arthralgias.       Objective:      Physical Exam   Constitutional: She is oriented to person, place, and time. She appears well-developed and well-nourished.   HENT:   Head: Normocephalic and atraumatic. Head is without right periorbital erythema and without left periorbital erythema.   Right Ear: Tympanic membrane, external ear and ear canal normal. No drainage.   Left Ear: Tympanic membrane, external ear and ear canal normal. No drainage.   Nose: No mucosal edema (Boggy swollen turbinates) or rhinorrhea (Clear).   Mouth/Throat: Uvula is midline and oropharynx is clear and moist. No oropharyngeal exudate or posterior oropharyngeal erythema.   Yellow/green drainage in left nostril  Not tender over temporal arteries   Eyes: Conjunctivae and EOM are normal. Pupils are equal, round, and reactive to light. Right eye exhibits no discharge. Left eye exhibits no discharge. No scleral icterus.   Neck: Normal range of motion. Neck supple. No tracheal deviation present. No thyromegaly present.   Cardiovascular: Normal rate, regular rhythm and normal heart sounds.  Exam reveals no gallop and no friction rub.    No murmur heard.  Pulmonary/Chest: Effort normal and breath sounds normal. No stridor. No respiratory distress. She has no wheezes. She has no rales.   Lymphadenopathy:     She has no cervical adenopathy.   Neurological: She is alert and oriented to person, place, and time. She has normal reflexes. No cranial nerve deficit.   Skin: Skin is warm and dry. She is not diaphoretic.   Psychiatric: She has a normal mood and affect.  Her behavior is normal.   Vitals reviewed.      Assessment:       1. Sinusitis, unspecified chronicity, unspecified location    2. Environmental allergies    3. Acute nonintractable headache, unspecified headache type        Plan:       Maggi was seen today for follow-up, sinus congestion, laryngitis, headache, leg pain, foot pain and dexa scan.    Diagnoses and all orders for this visit:    Sinusitis, unspecified chronicity, unspecified location  -     doxycycline (VIBRAMYCIN) 100 MG Cap; Take 1 capsule (100 mg total) by mouth 2 (two) times daily.  -     fluticasone (FLONASE) 50 mcg/actuation nasal spray; 2 sprays by Each Nare route once daily.  Notify coumadin clinic, seeing them today    Environmental allergies  -     Ambulatory referral to Allergy    Acute nonintractable headache, unspecified headache type  -     Sedimentation rate, manual; Future    Other orders

## 2017-05-31 NOTE — PROGRESS NOTES
Subjective:       Patient ID: Maggi Fontaine is a 84 y.o. female.    Chief Complaint:  Allergies (sinus pain, pnd, congestion, went to urgent care gave zpack that didn't help, pred gave temp relief)      83 yo woman presents for new patient evaluation of sinus pressure. She states for months she has pain in bridge of nose, congestion, and sinus pressure over her right eye and forehead with pulsating pain. She has runny nose if she leans over and PND causing sore throat and now hoarseness. She does not sneeze but has itchy nose. No itchy eyes but do water some. No chest tightness but has cough from drop. She feels like has something in her throat. She used to have more seasonal allergies but now wont go away. night is little worse. No triggers. She went to  last week and had z pack and steroid. She thinks steroid helped but does not feel infection cleared. She saw Dr Finnegan this morning who has added Flonase and doxycycline. She dd try Claritin and zyrtec and no help, never tried azelastine. She has no asthma or eczema. No known food, insect or latex allergy.         Environmental History: see history section for home environment  Review of Systems   Constitutional: Positive for fatigue. Negative for appetite change, chills and fever.   HENT: Positive for postnasal drip, rhinorrhea, sinus pressure, sore throat and voice change. Negative for congestion, ear discharge, ear pain, facial swelling, nosebleeds, sneezing and trouble swallowing.    Eyes: Positive for discharge. Negative for redness, itching and visual disturbance.   Respiratory: Positive for cough and shortness of breath. Negative for choking, chest tightness and wheezing.    Cardiovascular: Negative for chest pain, palpitations and leg swelling.   Gastrointestinal: Negative for abdominal distention, abdominal pain, constipation, diarrhea, nausea and vomiting.   Genitourinary: Negative for difficulty urinating.   Musculoskeletal: Negative for  arthralgias, gait problem, joint swelling and myalgias.   Skin: Negative for color change and rash.   Neurological: Positive for headaches. Negative for dizziness, syncope, weakness and light-headedness.   Hematological: Negative for adenopathy. Does not bruise/bleed easily.   Psychiatric/Behavioral: Negative for agitation, behavioral problems, confusion and sleep disturbance. The patient is not nervous/anxious.         Objective:    Physical Exam   Constitutional: She is oriented to person, place, and time. She appears well-developed and well-nourished. No distress.   HENT:   Head: Normocephalic and atraumatic.   Right Ear: Hearing, tympanic membrane, external ear and ear canal normal.   Left Ear: Hearing, tympanic membrane, external ear and ear canal normal.   Nose: No mucosal edema, rhinorrhea, sinus tenderness or septal deviation. No epistaxis. Right sinus exhibits no maxillary sinus tenderness and no frontal sinus tenderness. Left sinus exhibits no maxillary sinus tenderness and no frontal sinus tenderness.   Mouth/Throat: Uvula is midline, oropharynx is clear and moist and mucous membranes are normal. No uvula swelling.   Eyes: Conjunctivae are normal. Right eye exhibits no discharge. Left eye exhibits no discharge.   Neck: Normal range of motion. No thyromegaly present.   Cardiovascular: Normal rate, regular rhythm and normal heart sounds.    No murmur heard.  Pulmonary/Chest: Effort normal and breath sounds normal. No respiratory distress. She has no wheezes.   Abdominal: Soft. She exhibits no distension. There is no tenderness.   Musculoskeletal: Normal range of motion. She exhibits no edema or tenderness.   Lymphadenopathy:     She has no cervical adenopathy.   Neurological: She is alert and oriented to person, place, and time.   Skin: Skin is warm and dry. No rash noted. No erythema.   Psychiatric: She has a normal mood and affect. Her behavior is normal. Judgment and thought content normal.   Nursing  note and vitals reviewed.      Laboratory:   none performed   Assessment:       1. Chronic rhinitis    2. Chronic maxillary sinusitis         Plan:       1. Immunocaps today to identify any allergy triggers  2. Take doxycycline as prescribed by Dr Finnegan  3. Fluticasone 1 SEN BID and azelastine 2 SEN BID  4. Phone review

## 2017-05-31 NOTE — PROGRESS NOTES
"INr in range, stable. Pt reports she has had some allergy issues, seen by pcp today and started on doxy & flonase.  She has been running low grade fever.  Reducing dose and will recheck next week to determine appropriateness of dose with DDI. "This note will not be shared with the patient."  "

## 2017-05-31 NOTE — PATIENT INSTRUCTIONS
Tae the doxycycline antibiotic    Start Flonase 1 spray each nostril twice daily and azelastine nose spray 2 sprays each nostril twice daily

## 2017-05-31 NOTE — LETTER
May 31, 2017      Negro Finnegan MD  [address] [facility] [address] [place] [address]           [facility]  [address] [facility] [address] [place] [address]  Phone: [phone]          Patient: Maggi Fontaine   MR Number: [id number]   YOB: 1932   Date of Visit: 5/31/2017       Dear Dr. Negro Finnegan:    Thank you for referring Maggi Fontaine to me for evaluation. Attached you will find relevant portions of my assessment and plan of care.    If you have questions, please do not hesitate to call me. I look forward to following Maggi Fontaine along with you.    Sincerely,    Fabi Willard MD    Enclosure  CC:  No Recipients    If you would like to receive this communication electronically, please contact [email or web address] or [phone] to request more information on [facility] access.    For providers and/or their staff who would like to refer a patient to [facility], please contact us through our one-stop-shop provider referral line, [facility], at [phone].    If you feel you have received this communication in error or would no longer like to receive these types of communications, please e-mail [email or web address]

## 2017-06-02 ENCOUNTER — TELEPHONE (OUTPATIENT)
Dept: ALLERGY | Facility: CLINIC | Age: 82
End: 2017-06-02

## 2017-06-02 NOTE — TELEPHONE ENCOUNTER
Please let her know allergy tests are al negative so no specific triggers for her symptoms. She should finish the antibiotic and continue the 2 nasals prays daily

## 2017-06-07 ENCOUNTER — ANTI-COAG VISIT (OUTPATIENT)
Dept: CARDIOLOGY | Facility: CLINIC | Age: 82
End: 2017-06-07
Payer: MEDICARE

## 2017-06-07 DIAGNOSIS — Z86.73 HISTORY OF TIA (TRANSIENT ISCHEMIC ATTACK): ICD-10-CM

## 2017-06-07 DIAGNOSIS — I48.0 PAROXYSMAL ATRIAL FIBRILLATION: ICD-10-CM

## 2017-06-07 DIAGNOSIS — Z79.01 LONG TERM (CURRENT) USE OF ANTICOAGULANTS: Primary | ICD-10-CM

## 2017-06-07 LAB — INR PPP: 2.2 (ref 2–3)

## 2017-06-07 PROCEDURE — 85610 PROTHROMBIN TIME: CPT | Mod: QW,S$GLB,, | Performed by: PHARMACIST

## 2017-06-07 PROCEDURE — 99211 OFF/OP EST MAY X REQ PHY/QHP: CPT | Mod: 25,S$GLB,, | Performed by: PHARMACIST

## 2017-06-07 NOTE — PROGRESS NOTES
"INr in range with the lowered dose while on doxy.  She has 3 days left.  Will resume old dose and plan to recheck in 8 weeks "This note will not be shared with the patient."  "

## 2017-06-19 RX ORDER — LEVOTHYROXINE SODIUM 88 UG/1
TABLET ORAL
Qty: 90 TABLET | Refills: 1 | Status: SHIPPED | OUTPATIENT
Start: 2017-06-19 | End: 2017-08-01 | Stop reason: SDUPTHER

## 2017-06-19 RX ORDER — LEVOTHYROXINE SODIUM 88 UG/1
TABLET ORAL
Qty: 90 TABLET | Refills: 1 | Status: SHIPPED | OUTPATIENT
Start: 2017-06-19 | End: 2018-05-06 | Stop reason: SDUPTHER

## 2017-06-19 RX ORDER — LEVOTHYROXINE SODIUM 88 UG/1
88 TABLET ORAL
Qty: 14 TABLET | Refills: 1 | Status: SHIPPED | OUTPATIENT
Start: 2017-06-19 | End: 2017-06-19 | Stop reason: SDUPTHER

## 2017-06-19 NOTE — TELEPHONE ENCOUNTER
----- Message from Mick Soto sent at 6/19/2017  8:36 AM CDT -----  Contact: same  Patient called in and needs a Rx sent over to [facility] because mail orders is going to take another 10 days to receive it.    Rx is for Levothyroxine 88 mgs.  Patient needs 10-12 days worth and patient takes 1 x day.      [facility] [address] [place] [address] AT SEC of [facility] & [address] [address]  Phone: [phone] Fax: [phone]    Patient call back number is [phone]

## 2017-06-19 NOTE — TELEPHONE ENCOUNTER
Attempted to call pt no answer.  we have her request and will be sending 14 pills to her local pharmacy

## 2017-07-10 RX ORDER — PRAVASTATIN SODIUM 40 MG/1
TABLET ORAL
Qty: 90 TABLET | Refills: 3 | Status: SHIPPED | OUTPATIENT
Start: 2017-07-10 | End: 2018-04-13 | Stop reason: SDUPTHER

## 2017-07-12 ENCOUNTER — OFFICE VISIT (OUTPATIENT)
Dept: ALLERGY | Facility: CLINIC | Age: 82
End: 2017-07-12
Payer: MEDICARE

## 2017-07-12 VITALS
DIASTOLIC BLOOD PRESSURE: 80 MMHG | BODY MASS INDEX: 29.5 KG/M2 | SYSTOLIC BLOOD PRESSURE: 142 MMHG | OXYGEN SATURATION: 97 % | HEART RATE: 70 BPM | HEIGHT: 64 IN | WEIGHT: 172.81 LBS

## 2017-07-12 DIAGNOSIS — J31.0 OTHER CHRONIC RHINITIS: Primary | ICD-10-CM

## 2017-07-12 DIAGNOSIS — H10.423 SIMPLE CHRONIC CONJUNCTIVITIS OF BOTH EYES: ICD-10-CM

## 2017-07-12 PROCEDURE — 95004 PERQ TESTS W/ALRGNC XTRCS: CPT | Mod: S$GLB,,, | Performed by: ALLERGY & IMMUNOLOGY

## 2017-07-12 PROCEDURE — 99214 OFFICE O/P EST MOD 30 MIN: CPT | Mod: 25,S$GLB,, | Performed by: ALLERGY & IMMUNOLOGY

## 2017-07-12 PROCEDURE — 99999 PR PBB SHADOW E&M-EST. PATIENT-LVL III: CPT | Mod: PBBFAC,,, | Performed by: ALLERGY & IMMUNOLOGY

## 2017-07-12 PROCEDURE — 1159F MED LIST DOCD IN RCRD: CPT | Mod: S$GLB,,, | Performed by: ALLERGY & IMMUNOLOGY

## 2017-07-12 RX ORDER — IPRATROPIUM BROMIDE 42 UG/1
SPRAY, METERED NASAL
Qty: 135 ML | Refills: 2 | Status: SHIPPED | OUTPATIENT
Start: 2017-07-12 | End: 2017-07-24

## 2017-07-12 RX ORDER — IPRATROPIUM BROMIDE 42 UG/1
2 SPRAY, METERED NASAL 4 TIMES DAILY
Qty: 15 ML | Refills: 6 | Status: SHIPPED | OUTPATIENT
Start: 2017-07-12 | End: 2017-07-12 | Stop reason: SDUPTHER

## 2017-07-12 NOTE — PROGRESS NOTES
Subjective:       Patient ID: Maggi Fontaine is a 84 y.o. female.    Chief Complaint:  Follow-up      85 yo woman presents for continued evaluation of chronic rhinitis and conjunctivitis. She was last seen 5/21/17. She had immunocaps drawn then which were all negative. She was advised to anthony fluticasone 2 SEN daily and azelastine 2 SEN BID. She used both and did not help. she has clear runny nose if she bends over. She feels like she has PND, sore throat and hoarse voice. She feels like has glob on mucus in throat. She coughs some trying to get rid of mucus. She was headache after being at friend's home with mold but she feels like something in her home or yard is affecting her.     Prior History taken 5/31/17: new patient evaluation of sinus pressure. She states for months she has pain in bridge of nose, congestion, and sinus pressure over her right eye and forehead with pulsating pain. She has runny nose if she leans over and PND causing sore throat and now hoarseness. She does not sneeze but has itchy nose. No itchy eyes but do water some. No chest tightness but has cough from drop. She feels like has something in her throat. She used to have more seasonal allergies but now wont go away. night is little worse. No triggers. She went to  last week and had z pack and steroid. She thinks steroid helped but does not feel infection cleared. She saw Dr Finnegan this morning who has added Flonase and doxycycline. She dd try Claritin and zyrtec and no help, never tried azelastine. She has no asthma or eczema. No known food, insect or latex allergy.         Environmental History: see history section for home environment  Review of Systems   Constitutional: Positive for fatigue. Negative for appetite change, chills and fever.   HENT: Positive for postnasal drip, rhinorrhea, sinus pressure, sore throat and voice change. Negative for congestion, ear discharge, ear pain, facial swelling, nosebleeds, sneezing and trouble  swallowing.    Eyes: Positive for discharge. Negative for redness, itching and visual disturbance.   Respiratory: Positive for cough and shortness of breath. Negative for choking, chest tightness and wheezing.    Cardiovascular: Negative for chest pain, palpitations and leg swelling.   Gastrointestinal: Negative for abdominal distention, abdominal pain, constipation, diarrhea, nausea and vomiting.   Genitourinary: Negative for difficulty urinating.   Musculoskeletal: Negative for arthralgias, gait problem, joint swelling and myalgias.   Skin: Negative for color change and rash.   Neurological: Positive for headaches. Negative for dizziness, syncope, weakness and light-headedness.   Hematological: Negative for adenopathy. Does not bruise/bleed easily.   Psychiatric/Behavioral: Negative for agitation, behavioral problems, confusion and sleep disturbance. The patient is not nervous/anxious.         Objective:    Physical Exam   Constitutional: She is oriented to person, place, and time. She appears well-developed and well-nourished. No distress.   HENT:   Head: Normocephalic and atraumatic.   Right Ear: Hearing, tympanic membrane, external ear and ear canal normal.   Left Ear: Hearing, tympanic membrane, external ear and ear canal normal.   Nose: No mucosal edema, rhinorrhea, sinus tenderness or septal deviation. No epistaxis. Right sinus exhibits no maxillary sinus tenderness and no frontal sinus tenderness. Left sinus exhibits no maxillary sinus tenderness and no frontal sinus tenderness.   Mouth/Throat: Uvula is midline, oropharynx is clear and moist and mucous membranes are normal. No uvula swelling.   Eyes: Conjunctivae are normal. Right eye exhibits no discharge. Left eye exhibits no discharge.   Neck: Normal range of motion. No thyromegaly present.   Cardiovascular: Normal rate, regular rhythm and normal heart sounds.    No murmur heard.  Pulmonary/Chest: Effort normal and breath sounds normal. No respiratory  distress. She has no wheezes.   Abdominal: Soft. She exhibits no distension. There is no tenderness.   Musculoskeletal: Normal range of motion. She exhibits no edema or tenderness.   Lymphadenopathy:     She has no cervical adenopathy.   Neurological: She is alert and oriented to person, place, and time.   Skin: Skin is warm and dry. No rash noted. No erythema.   Psychiatric: She has a normal mood and affect. Her behavior is normal. Judgment and thought content normal.   Nursing note and vitals reviewed.      Laboratory:   Percutaneous Skin Testing: prick skin test #60, inhalants, 7/12/17:  2+ histamine control and remainder all negative, see flow sheet  Assessment:       1. Other chronic rhinitis    2. Simple chronic conjunctivitis of both eyes         Plan:       1. Advised she has no evidence of IgE mediated allergy on blood or skin test. Her symptoms could be from vasomotor rhinitis vs reflux vs other sinus issues. Will trial ipratropium 2 SEN BID and can increase to QID as needed and if not better needs to see ENT

## 2017-07-24 ENCOUNTER — OFFICE VISIT (OUTPATIENT)
Dept: OTOLARYNGOLOGY | Facility: CLINIC | Age: 82
End: 2017-07-24
Payer: MEDICARE

## 2017-07-24 ENCOUNTER — HOSPITAL ENCOUNTER (OUTPATIENT)
Dept: RADIOLOGY | Facility: HOSPITAL | Age: 82
Discharge: HOME | End: 2017-07-24
Attending: NURSE PRACTITIONER
Payer: MEDICARE

## 2017-07-24 ENCOUNTER — TELEPHONE (OUTPATIENT)
Dept: OTOLARYNGOLOGY | Facility: CLINIC | Age: 82
End: 2017-07-24

## 2017-07-24 VITALS
SYSTOLIC BLOOD PRESSURE: 139 MMHG | HEART RATE: 55 BPM | HEIGHT: 64 IN | DIASTOLIC BLOOD PRESSURE: 68 MMHG | WEIGHT: 170.88 LBS | BODY MASS INDEX: 29.17 KG/M2

## 2017-07-24 DIAGNOSIS — K21.9 LPRD (LARYNGOPHARYNGEAL REFLUX DISEASE): ICD-10-CM

## 2017-07-24 DIAGNOSIS — R09.A2 GLOBUS SENSATION: ICD-10-CM

## 2017-07-24 DIAGNOSIS — R13.10 DYSPHAGIA, UNSPECIFIED TYPE: ICD-10-CM

## 2017-07-24 DIAGNOSIS — R09.89 CHRONIC SINUS COMPLAINTS: Primary | ICD-10-CM

## 2017-07-24 DIAGNOSIS — R49.0 DYSPHONIA: ICD-10-CM

## 2017-07-24 DIAGNOSIS — R09.89 CHRONIC SINUS COMPLAINTS: ICD-10-CM

## 2017-07-24 DIAGNOSIS — J30.0 VASOMOTOR RHINITIS, UNSPECIFIED CHRONICITY: ICD-10-CM

## 2017-07-24 PROCEDURE — 1126F AMNT PAIN NOTED NONE PRSNT: CPT | Mod: S$GLB,,, | Performed by: NURSE PRACTITIONER

## 2017-07-24 PROCEDURE — 1159F MED LIST DOCD IN RCRD: CPT | Mod: S$GLB,,, | Performed by: NURSE PRACTITIONER

## 2017-07-24 PROCEDURE — 70220 X-RAY EXAM OF SINUSES: CPT | Mod: 26,,, | Performed by: RADIOLOGY

## 2017-07-24 PROCEDURE — 99999 PR PBB SHADOW E&M-EST. PATIENT-LVL IV: CPT | Mod: PBBFAC,,, | Performed by: NURSE PRACTITIONER

## 2017-07-24 PROCEDURE — 70220 X-RAY EXAM OF SINUSES: CPT | Mod: TC,PO

## 2017-07-24 PROCEDURE — 99499 UNLISTED E&M SERVICE: CPT | Mod: S$GLB,,, | Performed by: NURSE PRACTITIONER

## 2017-07-24 PROCEDURE — 99214 OFFICE O/P EST MOD 30 MIN: CPT | Mod: 25,S$GLB,, | Performed by: NURSE PRACTITIONER

## 2017-07-24 PROCEDURE — 31575 DIAGNOSTIC LARYNGOSCOPY: CPT | Mod: S$GLB,,, | Performed by: NURSE PRACTITIONER

## 2017-07-24 RX ORDER — IPRATROPIUM BROMIDE 42 UG/1
2 SPRAY, METERED NASAL 3 TIMES DAILY
Qty: 15 ML | Refills: 12 | Status: SHIPPED | OUTPATIENT
Start: 2017-07-24 | End: 2018-07-24

## 2017-07-24 NOTE — TELEPHONE ENCOUNTER
----- Message from Rita Parra NP sent at 7/24/2017 12:37 PM CDT -----  Sinuses are all clear. No infection.

## 2017-07-24 NOTE — PROGRESS NOTES
Subjective:       Patient ID: Maggi Fontaine is a 84 y.o. female.    Chief Complaint: Constant throat clearing; Hoarse; Raspy voice; feels like something in throat; and Cough    HPI   Patient returns for recurrent throat discomfort and phlegm on vocal cords. She states she saw allergist and was told no allergies. She continues to experience watery nasal drip like a faucet at inopportune times. She reports constant post-nasal drip causing excessive throat clearing and throat discomfort. She suspects she has a sinus infection.     Review of Systems   Constitutional: Negative.    HENT: Positive for congestion, postnasal drip, rhinorrhea (drips constant clear watery), sneezing, sore throat, trouble swallowing and voice change. Negative for facial swelling and sinus pressure.         Sensation of thick or too much mucus in the back of her throat  Sensation of lump in the back of her throat  Constant throat clearing   Eyes: Negative.    Respiratory: Positive for choking. Negative for cough.    Cardiovascular: Negative.    Gastrointestinal: Negative.    Musculoskeletal: Negative.    Skin: Negative.    Neurological: Negative.    Hematological: Negative.    Psychiatric/Behavioral: Negative.        Objective:      Physical Exam   Constitutional: She is oriented to person, place, and time. Vital signs are normal. She appears well-developed and well-nourished. She is cooperative. She does not appear ill. No distress.   HENT:   Head: Normocephalic and atraumatic.   Right Ear: Hearing, tympanic membrane, external ear and ear canal normal. Tympanic membrane is not erythematous. No middle ear effusion.   Left Ear: Hearing, tympanic membrane, external ear and ear canal normal. Tympanic membrane is not erythematous.  No middle ear effusion.   Nose: Nose normal. No mucosal edema or rhinorrhea. Right sinus exhibits no maxillary sinus tenderness and no frontal sinus tenderness. Left sinus exhibits no maxillary sinus tenderness and no  frontal sinus tenderness.   Mouth/Throat: Uvula is midline, oropharynx is clear and moist and mucous membranes are normal. Mucous membranes are not pale, not dry and not cyanotic. No oral lesions. No oropharyngeal exudate, posterior oropharyngeal edema or posterior oropharyngeal erythema.   Eyes: Conjunctivae, EOM and lids are normal. Pupils are equal, round, and reactive to light. Right eye exhibits no discharge. Left eye exhibits no discharge. No scleral icterus.   Neck: Trachea normal and normal range of motion. Neck supple. No tracheal deviation present. No thyroid mass and no thyromegaly present.   Cardiovascular: Normal rate.    Pulmonary/Chest: Effort normal. No stridor. No respiratory distress. She has no wheezes.   Musculoskeletal: Normal range of motion.   Lymphadenopathy:        Head (right side): No submental, no submandibular, no tonsillar, no preauricular and no posterior auricular adenopathy present.        Head (left side): No submental, no submandibular, no tonsillar, no preauricular and no posterior auricular adenopathy present.     She has no cervical adenopathy.        Right cervical: No superficial cervical and no posterior cervical adenopathy present.       Left cervical: No superficial cervical and no posterior cervical adenopathy present.   Neurological: She is alert and oriented to person, place, and time. She has normal strength. Coordination and gait normal.   Skin: Skin is warm, dry and intact. No lesion and no rash noted. She is not diaphoretic. No cyanosis. No pallor.   Psychiatric: She has a normal mood and affect. Her speech is normal and behavior is normal. Judgment and thought content normal. Cognition and memory are normal.   Nursing note and vitals reviewed.      Procedure: Flexible laryngoscopy    In order to fully examine the upper aerodigestive tract, including the larynx, in a patient with a hyperactive gag reflex, and suboptimal visualization with indirect mirror exam,   flexible endoscopy is required.   After explaining the procedure and obtaining verbal consent, a timeout was performed with the patient's participation according to the universal protocol. Both nasal cavities were anesthetized with 4% Xylocaine spray mixed with Robert-Synephrine. The flexible laryngoscope  was inserted into the nasal cavity and advanced to visualize the nasal cavity, nasopharynx, the posterior oropharynx, hypopharynx, and the endolarynx with the  findings noted. The scope was removed and the procedure terminated. The patient tolerated this procedure well without apparent complication.     OVERALL FINDINGS  Nasopharynx - the torus is clear. There are no lesions of the posterior wall.   Oropharynx - no lesions of the tongue base. There is no obvious fullness or asymmetry.  Hypopharynx - there are no lesions of the pyriform sinuses or postcricoid region   Larynx - there are no lesions of the supraglottic or glottic larynx.  Vocal fold mobility is normal.     SPECIFIC FINDINGS  Adenoid tissue - normal   Nasopharynx & eustachian tube orifices - normal   Posterior pharyngeal wall - normal   Base of tongue - normal   Epiglottis - normal   Valleculae - normal   Pyriform sinuses - normal   False vocal cords - normal   True vocal cords - normal  Arytenoids - normal   Interarytenoid space - erythema, edema   Larynx    Larynx    Assessment:     LPRD    VMR  Suspected sinus infection (per patient)  Plan:     Laryngoscope photos given and reviewed in detail with patient. Recommend omeprazole QAM on an empty stomach, ranitidine QHS, and follow up with GI for refractory symptoms and continued management. Handouts given on LPRD and GERD, and discussed at length with patient.     Atrovent nasal spray. All other nasal sprays discontinued.  Sinus x-rays today -- will call patient with results as soon as available

## 2017-07-24 NOTE — PATIENT INSTRUCTIONS
"DIFFERENT TYPES OF NASAL SPRAYS:  · Flonase / fluticasone (steroid spray) is best for stuffy, pressure, fullness.  · Astelin / azelastine (antihistamine spray) is best for itchy, drippy, sneezy.  · Atrovent / ipratropium is best for chronic watery nasal drip ("leaky faucet" nose), which may worsen with eating, for non-allergic rhinitis.     Nasal spray instructions:  Blow nose first gently to clean. Keep chin level with the floor (do not tilt head forward or back). Insert nasal spray taking caution to direct it AWAY from the middle wall inside the nose (septum) to avoid irritating nasal septum which could cause nosebleed.  Do not tilt spray up but rather flat and out along the roof of your mouth to spray. Angle the tip of the spray out slightly toward the direction of the ears; then spray. Do not take quick vigorous sniff but rather slow gentle inhalation while waiting for medication to absorb into nasal passages. Then administer second spray in same way.     Ponaris Nasal Emollient is used for the relief of: nasal congestion due to colds, nasal irritation, allergy exacerbations, nasal crusting. Specifically prepared iodized organic oils of pine, eucalyptus, peppermint, cajeput, and cottonseed. To order Ponaris: ask your pharmacist to order it for you or we carry it in our pharmacy downstairs on the first floor.       How Acid Reflux Affects Your Throat    Do you have to clear your throat or cough often? Are you hoarse? Do you have trouble swallowing? If you have these or other throat symptoms, you may have acid reflux. This occurs when stomach acid flows back up and irritates your throat.  Why you have throat symptoms  There are muscles (esophageal sphincters) at both ends of the tube that carries food to your stomach (the esophagus). These muscles relax to let food pass. Then they tighten to keep stomach acid down. When the lower esophageal sphincter (LES) doesnt tighten enough, acid can flow back (reflux) from " "your stomach into your esophagus. This may cause heartburn. In some cases the upper esophageal sphincter (UES) also doesnt work well. Then acid can travel higher and enter your throat (pharynx). In many cases, this causes throat symptoms.  Common throat symptoms  · Need to clear your throat often  · Feeling like youre choking  · Long-term (chronic) cough  · Hoarseness  · Trouble swallowing  · Feel like you have a lump in your throat  · Sour or acid taste  · Sore throat that keeps coming back     LARYNGOPHARYNGEAL REFLUX  (SILENT OR ATYPICAL REFLUX)    If you have any of the following symptoms you may have laryngopharyngeal reflux (LPR):  hoarseness, thick or too much mucus, chronic throat pain/irritation, chronic throat clearing, chronic cough, especially cough that wake you up from sleep, chronic "postnasal drip" without the need to blow your nose.     Many people with LPR do not have symptoms of heartburn. Compared to the esophagus, the voice box and the back of the throat are significantly more sensitive to the effects of acid on surrounding tissue. Acid passing quickly through the esophagus does not have a chance to irritate the area for too long.  However acid that pools in the throat or voice box can cause prolonged irritation resulting in the symptoms of LPR.    The symptoms of LPR can consist of a dry cough and the sensation of something being stuck in the throat.  Some people will complain of heartburn while others may have intermittent hoarseness or loss of voice.  Another major symptom of LPR is "postnasal drip."  Patients are often told symptoms are due to abnormal nasal drainage or sinus infection; however this is rarely the cause of chronic throat irritation. For post nasal drip to cause the complaints described, signs and symptoms of an active nasal infection should be present.     Treatments for LPR include:  postural changes, weight reduction, diet modification, medication to reduce stomach acid " and promote normal motility, and surgery to prevent reflux. Most patients will begin to notice some relief in her symptoms about 2 weeks after starting the medication; however it is generally recommended the medication should be continued for 2 months. If the symptoms completely resolve, the medication can then be tapered.  Some people will remain symptom free while others may have relapses which required treatment again.    Things you can do to prevent reflux include:  Do not smoke.  Smoking will cause reflux.  Avoid tight fitting clothes or belts around the waist.  Avoid eating at least 2 hours prior to bedtime.  In fact avoid eating your largest meal at night.  Weight loss.  For patient's with recent weight gain, shedding a few pounds is all that is required to improve reflux.  Avoid caffeine, cola beverages, citrus beverages, mints, alcoholic beverages, particularly at night, cheese, fried foods, spicy foods, eggs, and chocolate.  Sleep with the head of bed elevated at least 6 inches.    Recommendations:  Take Nexium / Prilosec (PPI) every morning on an empty stomach (30-60 minutes before eating) 20-40 MG. At bedtime take Zantac (H2-blocker) 150-300 mg.  All are available over-the-counter without a prescription. See a Gastro doctor (GI) for refractory symptoms and continued management.

## 2017-07-24 NOTE — TELEPHONE ENCOUNTER
----- Message from Tiffani Waddell sent at 7/24/2017  2:14 PM CDT -----  Contact: self    [phone]  Patient is calling for x-ray test results.Thanks!

## 2017-08-02 ENCOUNTER — ANTI-COAG VISIT (OUTPATIENT)
Dept: CARDIOLOGY | Facility: CLINIC | Age: 82
End: 2017-08-02
Payer: MEDICARE

## 2017-08-02 DIAGNOSIS — Z86.73 HISTORY OF TIA (TRANSIENT ISCHEMIC ATTACK): ICD-10-CM

## 2017-08-02 DIAGNOSIS — Z79.01 LONG TERM (CURRENT) USE OF ANTICOAGULANTS: Primary | ICD-10-CM

## 2017-08-02 DIAGNOSIS — I48.0 PAROXYSMAL ATRIAL FIBRILLATION: ICD-10-CM

## 2017-08-02 LAB — INR PPP: 1.7 (ref 2–3)

## 2017-08-02 PROCEDURE — 85610 PROTHROMBIN TIME: CPT | Mod: QW,S$GLB,, | Performed by: PHARMACIST

## 2017-08-02 PROCEDURE — 99211 OFF/OP EST MAY X REQ PHY/QHP: CPT | Mod: 25,S$GLB,, | Performed by: PHARMACIST

## 2017-08-02 NOTE — PROGRESS NOTES
"INR subtherapeutic today.  Pt missed dose on sat. Pt having angiogram with Dr Nguyen's on fri, she has been told to hold coumadin 2 days.  CC not notified of this.  Pt denies any med changes, but will call should nay take place after angio.  Will restart with bossted dose on fri and recheck after 10 days."This note will not be shared with the patient."  "

## 2017-08-03 ENCOUNTER — OFFICE VISIT (OUTPATIENT)
Dept: FAMILY MEDICINE | Facility: CLINIC | Age: 82
End: 2017-08-03
Payer: MEDICARE

## 2017-08-03 VITALS
HEART RATE: 55 BPM | BODY MASS INDEX: 29.24 KG/M2 | SYSTOLIC BLOOD PRESSURE: 153 MMHG | WEIGHT: 171.31 LBS | DIASTOLIC BLOOD PRESSURE: 66 MMHG | HEIGHT: 64 IN

## 2017-08-03 DIAGNOSIS — I70.0 AORTIC ATHEROSCLEROSIS: ICD-10-CM

## 2017-08-03 DIAGNOSIS — N18.30 CHRONIC KIDNEY DISEASE, STAGE III (MODERATE): ICD-10-CM

## 2017-08-03 DIAGNOSIS — E03.4 HYPOTHYROIDISM DUE TO ACQUIRED ATROPHY OF THYROID: ICD-10-CM

## 2017-08-03 DIAGNOSIS — H90.3 SENSORINEURAL HEARING LOSS (SNHL) OF BOTH EARS: ICD-10-CM

## 2017-08-03 DIAGNOSIS — K21.9 LARYNGOPHARYNGEAL REFLUX (LPR): ICD-10-CM

## 2017-08-03 DIAGNOSIS — Z00.00 ENCOUNTER FOR PREVENTIVE HEALTH EXAMINATION: Primary | ICD-10-CM

## 2017-08-03 DIAGNOSIS — I49.5 SINOATRIAL NODE DYSFUNCTION: ICD-10-CM

## 2017-08-03 DIAGNOSIS — R32 URINARY INCONTINENCE, UNSPECIFIED TYPE: ICD-10-CM

## 2017-08-03 DIAGNOSIS — Z95.0 CARDIAC PACEMAKER IN SITU: ICD-10-CM

## 2017-08-03 DIAGNOSIS — M85.80 OSTEOPENIA, UNSPECIFIED LOCATION: ICD-10-CM

## 2017-08-03 DIAGNOSIS — M10.00 IDIOPATHIC GOUT, UNSPECIFIED CHRONICITY, UNSPECIFIED SITE: ICD-10-CM

## 2017-08-03 DIAGNOSIS — E79.0 HYPERURICEMIA: ICD-10-CM

## 2017-08-03 DIAGNOSIS — Z79.01 LONG TERM (CURRENT) USE OF ANTICOAGULANTS: ICD-10-CM

## 2017-08-03 DIAGNOSIS — I10 ESSENTIAL HYPERTENSION: ICD-10-CM

## 2017-08-03 DIAGNOSIS — I77.819 ECTATIC AORTA: ICD-10-CM

## 2017-08-03 DIAGNOSIS — I48.0 PAROXYSMAL ATRIAL FIBRILLATION: ICD-10-CM

## 2017-08-03 DIAGNOSIS — E78.5 HYPERLIPIDEMIA, UNSPECIFIED HYPERLIPIDEMIA TYPE: ICD-10-CM

## 2017-08-03 DIAGNOSIS — Z86.73 HISTORY OF TIA (TRANSIENT ISCHEMIC ATTACK): ICD-10-CM

## 2017-08-03 PROCEDURE — G0439 PPPS, SUBSEQ VISIT: HCPCS | Mod: S$GLB,,, | Performed by: NURSE PRACTITIONER

## 2017-08-03 PROCEDURE — 99499 UNLISTED E&M SERVICE: CPT | Mod: S$GLB,,, | Performed by: NURSE PRACTITIONER

## 2017-08-03 PROCEDURE — 99999 PR PBB SHADOW E&M-EST. PATIENT-LVL IV: CPT | Mod: PBBFAC,,, | Performed by: NURSE PRACTITIONER

## 2017-08-03 NOTE — PATIENT INSTRUCTIONS
Counseling and Referral of Other Preventative  (Italic type indicates deductible and co-insurance are waived)    Patient Name: Maggi Fontaine  Today's Date: 8/3/2017      SERVICE LIMITATIONS RECOMMENDATION    Vaccines    · Pneumococcal (once after 65)    · Influenza (annually)    · Hepatitis B (if medium/high risk)    · Prevnar 13      Hepatitis B medium/high risk factors:       - End-stage renal disease       - Hemophiliacs who received Factor VII or         IX concentrates       - Clients of institutions for the mentally             retarded       - Persons who live in the same house as          a HepB carrier       - Homosexual men       - Illicit injectable drug abusers     Pneumococcal: Scheduled - see appointments     Influenza: N/A     Hepatitis B: N/A     Prevnar 13: Done, no repeat necessary    Mammogram (biennial age 50-74)  Annually (age 40 or over)  N/A    Pap (up to age 70 and after 70 if unknown history or abnormal study last 10 years)    N/A     The USPSTF recommends against screening for cervical cancer in women older than age 65 years who have had adequate prior screening and are not otherwise at high risk for cervical cancer.      Colorectal cancer screening (to age 75)    · Fecal occult blood test (annual)  · Flexible sigmoidoscopy (5y)  · Screening colonoscopy (10y)  · Barium enema   N/A    Diabetes self-management training (no USPSTF recommendations)  Requires referral by treating physician for patient with diabetes or renal disease. 10 hours of initial DSMT sessions of no less than 30 minutes each in a continuous 12-month period. 2 hours of follow-up DSMT in subsequent years.  N/A    Bone mass measurements (age 65 & older, biennial)  Requires diagnosis related to osteoporosis or estrogen deficiency. Biennial benefit unless patient has history of long-term glucocorticoid  Last done 2016, recommend to repeat every 3  years    Glaucoma screening (no USPSTF recommendation)  Diabetes mellitus, family  history   , age 50 or over    American, age 65 or over  Done this year, repeat every year    Medical nutrition therapy for diabetes or renal disease (no recommended schedule)  Requires referral by treating physician for patient with diabetes or renal disease or kidney transplant within the past 3 years.  Can be provided in same year as diabetes self-management training (DSMT), and CMS recommends medical nutrition therapy take place after DSMT. Up to 3 hours for initial year and 2 hours in subsequent years.  N/A    Cardiovascular screening blood tests (every 5 years)  · Fasting lipid panel  Order as a panel if possible  Scheduled, see appointments    Diabetes screening tests (at least every 3 years, Medicare covers annually or at 6-month intervals for prediabetic patients)  · Fasting blood sugar (FBS) or glucose tolerance test (GTT)  Patient must be diagnosed with one of the following:       - Hypertension       - Dyslipidemia       - Obesity (BMI 30kg/m2)       - Previous elevated impaired FBS or GTT       ... or any two of the following:       - Overweight (BMI 25 but <30)       - Family history of diabetes       - Age 65 or older       - History of gestational diabetes or birth of baby weighing more than 9 pounds  Done this year, repeat every year    HIV screening (annually for increased risk patients)  · HIV-1 and HIV-2 by EIA, or ROSS, rapid antibody test or oral mucosa transudate  Patients must be at increased risk for HIV infection per USPSTF guidelines or pregnant. Tests covered annually for patient at increased risk or as requested by the patient. Pregnant patients may receive up to 3 tests during pregnancy.  Risks discussed, screening is not recommended    Smoking cessation counseling (up to 8 sessions per year)  Patients must be asymptomatic of tobacco-related conditions to receive as a preventative service.  Non-smoker    Subsequent annual wellness visit  At least 12 months since  last AWV  Return in one year     The following information is provided to all patients.  This information is to help you find resources for any of the problems found today that may be affecting your health:                Living healthy guide: www.[facility].[place].[facility]      Understanding Diabetes: www.diabetes.org      Eating healthy: www.cdc.gov/healthyweight      CDC home safety checklist: www.cdc.gov/steadi/patient.html      Agency on Aging: www.goea.[place].[facility]      Alcoholics anonymous (AA): www.aa.org      Physical Activity: www.vy.nih.gov/fh4iqkh      Tobacco use: www.quitwithusla.org

## 2017-08-04 NOTE — PROGRESS NOTES
"Maggi Fontaine presented for a  Medicare AWV and comprehensive Health Risk Assessment today. The following components were reviewed and updated:    · Medical history  · Family History  · Social history  · Allergies and Current Medications  · Health Risk Assessment  · Health Maintenance  · Care Team     ** See Completed Assessments for Annual Wellness Visit within the encounter summary.**       The following assessments were completed:  · Living Situation  · CAGE  · Depression Screening  · Timed Get Up and Go  · Whisper Test  · Cognitive Function Screening  · Nutrition Screening  · ADL Screening  · PAQ Screening    Vitals:    08/03/17 1454   BP: (!) 153/66   BP Location: Left arm   Patient Position: Sitting   BP Method: Automatic   Pulse: (!) 55   Weight: 77.7 kg (171 lb 4.8 oz)   Height: 5' 4" (1.626 m)     Body mass index is 29.4 kg/m².  Physical Exam   Constitutional: She is oriented to person, place, and time. She appears well-developed and well-nourished. No distress.   HENT:   Head: Normocephalic and atraumatic.   Cardiovascular: Normal rate, regular rhythm and normal heart sounds.    Pulmonary/Chest: Effort normal and breath sounds normal. No respiratory distress. She has no wheezes.   Neurological: She is alert and oriented to person, place, and time.   Skin: Skin is warm and dry.   Psychiatric: She has a normal mood and affect. Her behavior is normal. Judgment and thought content normal.         Diagnoses and health risks identified today and associated recommendations/orders:    1. Encounter for preventive health examination  Recommend yearly HRA visit    2. Aortic atherosclerosis  Stable.     Followed by Kim.   CT chest 7/14/15    3. Ectatic aorta  Stable.     Followed by Kim.   CT chest 7/14/15    4. Paroxysmal atrial fibrillation  Stable.   Taking sotalol  Followed by Kim.       5. Sinoatrial node dysfunction  Stable.   Has pacemaker  Followed by Negro Finnegan MD .       6. Idiopathic gout, " unspecified chronicity, unspecified site  Stable.     Followed by MD Denise Angulo       7. Hyperuricemia  Stable.     Followed by MD Denise Angulo       8. Hyperlipidemia, unspecified hyperlipidemia type  Stable.   Continue to monitor   Followed by taking statin.       9. Hypothyroidism due to acquired atrophy of thyroid  Stable.   .  Followed by Negro Finnegan MD .       10. History of TIA (transient ischemic attack)  Stable.     Followed by Negro Finnegan MD .       11. Long term (current) use of anticoagulants [V58.61]  Stable.     Followed by Kim.       12. Osteopenia, unspecified location  Continue to monitor   Followed by Negro Finnegan MD .       13. Cardiac pacemaker in situ  Stable.     Followed by Kim.       14. Urinary incontinence, unspecified type  Stable.     Followed by Negro Finnegan MD .       15. Chronic kidney disease, stage III (moderate)  Stable.   Encourage adequate water intake  Followed by [place].       16. Essential hypertension  Stable.   Taking lasix  Followed by Negro Finnegan MD .       17. Sensorineural hearing loss (SNHL) of both ears  Stable.     Followed by MD Denise Angulo       18. Laryngopharyngeal reflux (LPR)  Stable.     Followed by michael hilliard.       Has heart cath scheduled for tomorrow.  Provided written rx for ppsv23  Provided Maggi with a 5-10 year written screening schedule and personal prevention plan. Recommendations were developed using the USPSTF age appropriate recommendations. Education, counseling, and referrals were provided as needed. After Visit Summary printed and given to patient which includes a list of additional screenings\tests needed.    Return in about 1 year (around 8/3/2018).    Elke Grey NP

## 2017-08-08 RX ORDER — FLUOXETINE 10 MG/1
10 CAPSULE ORAL DAILY
Qty: 90 CAPSULE | Refills: 1 | Status: SHIPPED | OUTPATIENT
Start: 2017-08-08 | End: 2018-08-31

## 2017-08-08 NOTE — PROGRESS NOTES
Patient call concerned that her INR may be off due to recent missed dose. She is having some head pain. I advised her to also call her physician as the pain she is having is likely not coumadin related.

## 2017-08-08 NOTE — TELEPHONE ENCOUNTER
----- Message from Gomez Ko sent at 8/8/2017  1:47 PM CDT -----  Contact: pt  Pt is requesting a refill on Fluoxetine   Call Back#[phone]  Thanks    [facility] [employer] [address] [place] [address] AT SEC of [facility] & [address] [address]  Phone: [phone] Fax: [phone]

## 2017-08-14 PROBLEM — R94.30 ELEVATED LEFT VENTRICULAR END-DIASTOLIC PRESSURE (LVEDP): Status: ACTIVE | Noted: 2017-08-14

## 2017-09-19 RX ORDER — WARFARIN SODIUM 5 MG/1
TABLET ORAL
Qty: 90 TABLET | Refills: 1 | Status: SHIPPED | OUTPATIENT
Start: 2017-09-19 | End: 2018-01-30 | Stop reason: SDUPTHER

## 2017-10-03 RX ORDER — LEVOTHYROXINE SODIUM 88 UG/1
TABLET ORAL
Qty: 90 TABLET | Refills: 2 | Status: SHIPPED | OUTPATIENT
Start: 2017-10-03 | End: 2017-11-15

## 2017-11-07 RX ORDER — FUROSEMIDE 20 MG/1
20 TABLET ORAL DAILY PRN
Qty: 30 TABLET | Refills: 5 | Status: SHIPPED | OUTPATIENT
Start: 2017-11-07 | End: 2017-11-08 | Stop reason: SDUPTHER

## 2017-11-07 NOTE — TELEPHONE ENCOUNTER
This refill request was sent by a different department. However I am not the prescribing physician, my name is not on the prescription. The patient should contact the correct physcian, this medication requires monitoring.

## 2017-11-11 ENCOUNTER — NURSE TRIAGE (OUTPATIENT)
Dept: ADMINISTRATIVE | Facility: CLINIC | Age: 82
End: 2017-11-11

## 2017-11-11 NOTE — TELEPHONE ENCOUNTER
Reason for Disposition   [1] Leg pain which occurs after walking a certain distance AND [2] disappears with rest AND [3] age > 50    Protocols used: ST LEG PAIN-A-    Patient started taking lasix on 11/8/17 and she is having leg cramps and back pain. She thinks this is the lasix and wants to stop it. Dr. Rhodes advised patient should stop and aggressively hydrate with water and half strength gatorade or powerade. If symptoms don't improve, or worsen, she should go to ED. If stopping the med and hydrating helps, she can wait and call Dr. Alvarez on Monday. Patient verbalized understanding of instructions given.

## 2017-11-20 DIAGNOSIS — I48.91 ATRIAL FIBRILLATION: ICD-10-CM

## 2017-11-22 RX ORDER — SOTALOL HYDROCHLORIDE 80 MG/1
TABLET ORAL
Qty: 90 TABLET | Refills: 3 | Status: SHIPPED | OUTPATIENT
Start: 2017-11-22 | End: 2018-08-31 | Stop reason: SDUPTHER

## 2017-11-30 ENCOUNTER — TELEPHONE (OUTPATIENT)
Dept: FAMILY MEDICINE | Facility: CLINIC | Age: 82
End: 2017-11-30

## 2017-11-30 RX ORDER — FLUTICASONE PROPIONATE 50 MCG
2 SPRAY, SUSPENSION (ML) NASAL DAILY
Qty: 48 G | Refills: 1 | Status: SHIPPED | OUTPATIENT
Start: 2017-11-30 | End: 2018-05-14 | Stop reason: SDUPTHER

## 2017-11-30 NOTE — TELEPHONE ENCOUNTER
Pt requesting refill on furosemide (lasix)    Did not see this medication on current medication list.  Please advise/send refill    [facility]

## 2017-11-30 NOTE — TELEPHONE ENCOUNTER
----- Message from Blossom Paulson sent at 11/30/2017  1:34 PM CST -----  Contact: Patient  Patient is requesting a refill on Furosemide.  The pharmacy tried sending the requests over several times and haven't heard back.  Call Back#[phone]  Thanks     [facility] - [facility] [address] [facility]  [address] [facility] [address]  Phone: [phone] Fax: [phone]

## 2017-11-30 NOTE — TELEPHONE ENCOUNTER
That medication is being prescribed by her cardiologist,   Dr. Cadet.  It looks like he filled this for 90 days just a few weeks ago.  If she wants it sent to a different pharmacy she should contact the doctor who prescribed it

## 2017-12-26 ENCOUNTER — NURSE TRIAGE (OUTPATIENT)
Dept: ADMINISTRATIVE | Facility: CLINIC | Age: 82
End: 2017-12-26

## 2017-12-26 NOTE — TELEPHONE ENCOUNTER
Reason for Disposition   General information question, no triage required and triager able to answer question    Protocols used: ST INFORMATION ONLY CALL-A-AH    Maggi is calling to ask if it is ok to use and electric toothbrush as she has a pacemaker.  Please contact Maggi to advise.  She can be reached at [phone]

## 2018-01-15 RX ORDER — LEVOTHYROXINE SODIUM 88 UG/1
TABLET ORAL
Qty: 90 TABLET | Refills: 0 | Status: SHIPPED | OUTPATIENT
Start: 2018-01-15 | End: 2018-04-03 | Stop reason: CLARIF

## 2018-01-15 RX ORDER — LEVOTHYROXINE SODIUM 88 UG/1
TABLET ORAL
Qty: 30 TABLET | Refills: 1 | Status: SHIPPED | OUTPATIENT
Start: 2018-01-15 | End: 2018-01-15 | Stop reason: SDUPTHER

## 2018-01-30 RX ORDER — WARFARIN SODIUM 5 MG/1
TABLET ORAL
Qty: 90 TABLET | Refills: 1 | Status: SHIPPED | OUTPATIENT
Start: 2018-01-30 | End: 2018-06-18 | Stop reason: SDUPTHER

## 2018-01-31 ENCOUNTER — LAB VISIT (OUTPATIENT)
Dept: LAB | Facility: HOSPITAL | Age: 83
End: 2018-01-31
Attending: INTERNAL MEDICINE
Payer: MEDICARE

## 2018-01-31 DIAGNOSIS — N18.30 CHRONIC KIDNEY DISEASE, STAGE III (MODERATE): Primary | ICD-10-CM

## 2018-01-31 DIAGNOSIS — M10.9 GOUT, UNSPECIFIED: ICD-10-CM

## 2018-01-31 DIAGNOSIS — I10 ESSENTIAL HYPERTENSION, MALIGNANT: ICD-10-CM

## 2018-01-31 DIAGNOSIS — M15.9 GENERALIZED OSTEOARTHROSIS, INVOLVING MULTIPLE SITES: ICD-10-CM

## 2018-01-31 DIAGNOSIS — R80.9 PROTEINURIA: ICD-10-CM

## 2018-01-31 LAB
ALBUMIN SERPL BCP-MCNC: 3.4 G/DL
ANION GAP SERPL CALC-SCNC: 7 MMOL/L
BILIRUB UR QL STRIP: NEGATIVE
BUN SERPL-MCNC: 24 MG/DL
CALCIUM SERPL-MCNC: 9.6 MG/DL
CHLORIDE SERPL-SCNC: 107 MMOL/L
CLARITY UR: CLEAR
CO2 SERPL-SCNC: 27 MMOL/L
COLOR UR: YELLOW
CREAT SERPL-MCNC: 1.3 MG/DL
CREAT UR-MCNC: 47 MG/DL
EST. GFR  (AFRICAN AMERICAN): 43.2 ML/MIN/1.73 M^2
EST. GFR  (NON AFRICAN AMERICAN): 37.5 ML/MIN/1.73 M^2
GLUCOSE SERPL-MCNC: 103 MG/DL
GLUCOSE UR QL STRIP: NEGATIVE
HGB UR QL STRIP: NEGATIVE
KETONES UR QL STRIP: NEGATIVE
LEUKOCYTE ESTERASE UR QL STRIP: NEGATIVE
MAGNESIUM SERPL-MCNC: 2 MG/DL
NITRITE UR QL STRIP: NEGATIVE
PH UR STRIP: 6 [PH] (ref 5–8)
PHOSPHATE SERPL-MCNC: 3.1 MG/DL
POTASSIUM SERPL-SCNC: 5.2 MMOL/L
PROT UR QL STRIP: NEGATIVE
PROT UR-MCNC: 25 MG/DL
PROT/CREAT RATIO, UR: 0.53
SODIUM SERPL-SCNC: 141 MMOL/L
SP GR UR STRIP: 1.02 (ref 1–1.03)
URATE SERPL-MCNC: 6.3 MG/DL
URN SPEC COLLECT METH UR: NORMAL

## 2018-01-31 PROCEDURE — 84156 ASSAY OF PROTEIN URINE: CPT

## 2018-01-31 PROCEDURE — 80069 RENAL FUNCTION PANEL: CPT

## 2018-01-31 PROCEDURE — 84550 ASSAY OF BLOOD/URIC ACID: CPT

## 2018-01-31 PROCEDURE — 36415 COLL VENOUS BLD VENIPUNCTURE: CPT | Mod: PO

## 2018-01-31 PROCEDURE — 81003 URINALYSIS AUTO W/O SCOPE: CPT | Mod: PO

## 2018-01-31 PROCEDURE — 83735 ASSAY OF MAGNESIUM: CPT

## 2018-02-20 ENCOUNTER — OFFICE VISIT (OUTPATIENT)
Dept: FAMILY MEDICINE | Facility: CLINIC | Age: 83
End: 2018-02-20
Payer: MEDICARE

## 2018-02-20 VITALS
SYSTOLIC BLOOD PRESSURE: 138 MMHG | HEIGHT: 64 IN | BODY MASS INDEX: 28.76 KG/M2 | WEIGHT: 168.44 LBS | DIASTOLIC BLOOD PRESSURE: 60 MMHG | HEART RATE: 60 BPM | TEMPERATURE: 99 F

## 2018-02-20 DIAGNOSIS — I48.0 PAROXYSMAL ATRIAL FIBRILLATION: ICD-10-CM

## 2018-02-20 DIAGNOSIS — I49.5 SINOATRIAL NODE DYSFUNCTION: ICD-10-CM

## 2018-02-20 DIAGNOSIS — N18.30 CHRONIC KIDNEY DISEASE, STAGE III (MODERATE): ICD-10-CM

## 2018-02-20 DIAGNOSIS — E03.4 HYPOTHYROIDISM DUE TO ACQUIRED ATROPHY OF THYROID: ICD-10-CM

## 2018-02-20 DIAGNOSIS — J32.9 SINUSITIS, UNSPECIFIED CHRONICITY, UNSPECIFIED LOCATION: Primary | ICD-10-CM

## 2018-02-20 DIAGNOSIS — I70.0 AORTIC ATHEROSCLEROSIS: ICD-10-CM

## 2018-02-20 DIAGNOSIS — M10.00 IDIOPATHIC GOUT, UNSPECIFIED CHRONICITY, UNSPECIFIED SITE: ICD-10-CM

## 2018-02-20 PROCEDURE — 3008F BODY MASS INDEX DOCD: CPT | Mod: S$GLB,,, | Performed by: FAMILY MEDICINE

## 2018-02-20 PROCEDURE — 99499 UNLISTED E&M SERVICE: CPT | Mod: S$GLB,,, | Performed by: FAMILY MEDICINE

## 2018-02-20 PROCEDURE — 1126F AMNT PAIN NOTED NONE PRSNT: CPT | Mod: S$GLB,,, | Performed by: FAMILY MEDICINE

## 2018-02-20 PROCEDURE — 99214 OFFICE O/P EST MOD 30 MIN: CPT | Mod: S$GLB,,, | Performed by: FAMILY MEDICINE

## 2018-02-20 PROCEDURE — 99999 PR PBB SHADOW E&M-EST. PATIENT-LVL III: CPT | Mod: PBBFAC,,, | Performed by: FAMILY MEDICINE

## 2018-02-20 PROCEDURE — 1159F MED LIST DOCD IN RCRD: CPT | Mod: S$GLB,,, | Performed by: FAMILY MEDICINE

## 2018-02-20 RX ORDER — DOXYCYCLINE 100 MG/1
100 CAPSULE ORAL 2 TIMES DAILY
Qty: 20 CAPSULE | Refills: 0 | Status: SHIPPED | OUTPATIENT
Start: 2018-02-20 | End: 2018-03-26

## 2018-02-20 NOTE — PROGRESS NOTES
"Subjective:     THIS DOCUMENT WAS MADE IN PART WITH [employer] DICTATION SOFTWARE. OCCASIONALLY THIS SOFTWARE MAY MISINTERPRET WORDS OR PHRASES.     Patient ID: Maggi Fontaine is a 85 y.o. female.    Chief Complaint: Influenza (pt states she has had the flu about a few weeks ago.  States she is having thick and heavy mucous that is even "in the back of eyes." )    HPI      She's here today because of a persistent cough. Although it took a while to get to this because she was quite perturbed by the fact that pediatrics is in the same building as "older folks" and that they had to share a similar location for check-in.     She states she richly had the flu about a month ago, she went to an urgent care clinic but I can't find any results. She said it was our urgent care but I just don't see that in the record. Regardless she was told she had the flu. She remembered being placed on prednisone. She doesn't recall being prescribed any antibiotics. She's doing a little better but complains of persistent pressure behind the eyes in the frontal region some pressure over the right maxillary region. No fever or chills but there is nasal congestion post nasal drainage and intermittent white and yellow sputum.   No wheezing or shortness of breath. Currently she's not taking any over-the-counter cough or cold medications.     She does have a history of chronic kidney disease and follows with nephrology.    Other pertinent past history significant for aortic atherosclerosis. She remains on pravastatin. She has a history of  Sinoatrial node dysfunction and atrial fibrillation. She is on sotalol and Coumadin and this is managed by cardiology.    Active Ambulatory Problems     Diagnosis Date Noted    Hyperlipidemia     Hypothyroidism     Cardiac pacemaker in situ 01/22/2015    Sinoatrial node dysfunction 01/22/2015    Hyperuricemia 07/06/2015    Long term current use of anticoagulant therapy 08/11/2015    Urinary incontinence " 09/13/2016    Essential hypertension 09/13/2016    Paroxysmal atrial fibrillation 09/13/2016    Idiopathic gout 09/13/2016    Chronic kidney disease, stage III (moderate) 09/13/2016    History of TIA (transient ischemic attack) 09/13/2016    Osteopenia 09/13/2016    SNHL (sensorineural hearing loss) 09/13/2016    Aortic atherosclerosis 08/03/2017    Ectatic aorta 08/03/2017    Laryngopharyngeal reflux (LPR) 08/03/2017    Elevated left ventricular end-diastolic pressure (LVEDP) 08/14/2017     Resolved Ambulatory Problems     Diagnosis Date Noted    Palpitations 08/27/2013    Benign paroxysmal positional vertigo of left ear 09/13/2016     Past Medical History:   Diagnosis Date    Acid reflux     Arthritis     Atrial fibrillation     Cardiac pacemaker     Gout     Headache(784.0)     HEARING LOSS     Hyperlipidemia     Hypertension     Hypothyroidism     Polymyalgia rheumatica     Renal insufficiency     Sinusitis     SOB (shortness of breath)     Stroke     Urinary incontinence     West Nile encephalitis          Review of Systems   Constitutional: Positive for fatigue. Negative for chills, fever and unexpected weight change.   HENT: Positive for congestion, postnasal drip, sinus pain and sinus pressure. Negative for ear pain, facial swelling, sneezing, sore throat and trouble swallowing.    Respiratory: Negative for shortness of breath.    Cardiovascular: Negative for chest pain and leg swelling.   Gastrointestinal: Negative.        Objective:      Physical Exam   Constitutional: She is oriented to person, place, and time. She appears well-developed and well-nourished. No distress.   HENT:   Head: Normocephalic and atraumatic.   Right Ear: Tympanic membrane, external ear and ear canal normal.   Left Ear: Tympanic membrane, external ear and ear canal normal.   Nose: Mucosal edema present.   Mouth/Throat: Oropharynx is clear and moist. No oropharyngeal exudate, posterior oropharyngeal  edema, posterior oropharyngeal erythema or tonsillar abscesses.   Eyes: Conjunctivae are normal. Pupils are equal, round, and reactive to light. Right eye exhibits no discharge. Left eye exhibits no discharge. No scleral icterus.   Neck: Normal range of motion. Neck supple.   Cardiovascular: Normal rate, regular rhythm and normal heart sounds.    No murmur heard.  Pulmonary/Chest: Effort normal and breath sounds normal. No stridor. No respiratory distress. She has no wheezes.   Lymphadenopathy:     She has no cervical adenopathy.   Neurological: She is alert and oriented to person, place, and time.   Skin: Skin is dry. No rash noted. She is not diaphoretic.   Psychiatric: She has a normal mood and affect. Her behavior is normal.   Vitals reviewed.      Assessment:       1. Sinusitis, unspecified chronicity, unspecified location    2. Aortic atherosclerosis    3. Sinoatrial node dysfunction    4. Paroxysmal atrial fibrillation    5. Idiopathic gout, unspecified chronicity, unspecified site    6. Chronic kidney disease, stage III (moderate)    7. Hypothyroidism due to acquired atrophy of thyroid        Plan:       Maggi was seen today for influenza.    Diagnoses and all orders for this visit:    Sinusitis, unspecified chronicity, unspecified location  -     doxycycline (MONODOX) 100 MG capsule; Take 1 capsule (100 mg total) by mouth 2 (two) times daily.    Aortic atherosclerosis   stable continue pravastatin.  Sinoatrial node dysfunction  Paroxysmal atrial fibrillation   Stable, rhythm today is normal, rate normal. She remains on Coumadin for now, per cardiology, but age and risk must be continued consideration  Idiopathic gout, unspecified chronicity, unspecified site   stable managed by her nephrologist no recent attacks  Chronic kidney disease, stage III (moderate)   stable  Hypothyroidism due to acquired atrophy of thyroid   stable unsecured

## 2018-03-07 ENCOUNTER — TELEPHONE (OUTPATIENT)
Dept: FAMILY MEDICINE | Facility: CLINIC | Age: 83
End: 2018-03-07

## 2018-03-07 NOTE — TELEPHONE ENCOUNTER
----- Message from Arely Yepez sent at 3/7/2018  8:54 AM CST -----  Contact: patietn   Patient calling to schedule an appointment. She states Dr. Finnegan advised her to schedule an appointment once she finished her antibiotics. No avail appt. Please advise.   Call back ; Leave a detailed message if no one answers.   Thanks!

## 2018-03-09 ENCOUNTER — TELEPHONE (OUTPATIENT)
Dept: FAMILY MEDICINE | Facility: CLINIC | Age: 83
End: 2018-03-09

## 2018-03-09 NOTE — TELEPHONE ENCOUNTER
Spoke with pt, pt was very apologetic that she had missed appt yesterday with Dr. Finnegan.  Scheduled a follow up for Thursday 3/15/18.  Pt verbalized appt confirmation.

## 2018-03-09 NOTE — TELEPHONE ENCOUNTER
----- Message from Antoinette Weston sent at 3/9/2018  9:32 AM CST -----  Contact: Pt  Pt is calling requesting appointment access. Pt is calling states she totally forgot about yesterday's appointment. And would like to be seen today after 4 if possible. Pt can be reached at [phone] (dczn)

## 2018-03-15 ENCOUNTER — OFFICE VISIT (OUTPATIENT)
Dept: FAMILY MEDICINE | Facility: CLINIC | Age: 83
End: 2018-03-15
Payer: MEDICARE

## 2018-03-15 VITALS
BODY MASS INDEX: 28.76 KG/M2 | HEART RATE: 75 BPM | DIASTOLIC BLOOD PRESSURE: 70 MMHG | WEIGHT: 168.44 LBS | SYSTOLIC BLOOD PRESSURE: 136 MMHG | OXYGEN SATURATION: 96 % | TEMPERATURE: 98 F | HEIGHT: 64 IN

## 2018-03-15 DIAGNOSIS — J32.9 SINUSITIS, UNSPECIFIED CHRONICITY, UNSPECIFIED LOCATION: Primary | ICD-10-CM

## 2018-03-15 DIAGNOSIS — R05.3 PERSISTENT COUGH FOR 3 WEEKS OR LONGER: ICD-10-CM

## 2018-03-15 DIAGNOSIS — R09.81 NASAL CONGESTION: ICD-10-CM

## 2018-03-15 PROCEDURE — 99999 PR PBB SHADOW E&M-EST. PATIENT-LVL IV: CPT | Mod: PBBFAC,,, | Performed by: FAMILY MEDICINE

## 2018-03-15 PROCEDURE — 99213 OFFICE O/P EST LOW 20 MIN: CPT | Mod: S$GLB,,, | Performed by: FAMILY MEDICINE

## 2018-03-15 PROCEDURE — 3075F SYST BP GE 130 - 139MM HG: CPT | Mod: CPTII,S$GLB,, | Performed by: FAMILY MEDICINE

## 2018-03-15 PROCEDURE — 3078F DIAST BP <80 MM HG: CPT | Mod: CPTII,S$GLB,, | Performed by: FAMILY MEDICINE

## 2018-03-15 RX ORDER — PREDNISONE 20 MG/1
20 TABLET ORAL DAILY
Qty: 10 TABLET | Refills: 0 | Status: SHIPPED | OUTPATIENT
Start: 2018-03-15 | End: 2018-03-25

## 2018-03-15 NOTE — PROGRESS NOTES
"Subjective:     THIS DOCUMENT WAS MADE IN PART WITH [employer] DICTATION SOFTWARE. OCCASIONALLY THIS SOFTWARE MAY MISINTERPRET WORDS OR PHRASES.     Patient ID: Maggi Fontaine is a 85 y.o. female.    Chief Complaint: Follow-up    HPI      She returns for follow-up of sinusitis and persistent cough. She was seen about three weeks ago. She missed her follow-up last week.  Still congested, states phlegm in throat is thick and difficult to clear.  Nose is clear and runny.  No fever or chills but she still convinced she has "infection in my sinuses".  She does have some pressure between the eyes and in the frontal region.  No blurred vision.  Diminished sense of smell for several months, she reports occasionally seems to return on days when she is less congested      Active Ambulatory Problems     Diagnosis Date Noted    Hyperlipidemia     Hypothyroidism     Cardiac pacemaker in situ 01/22/2015    Sinoatrial node dysfunction 01/22/2015    Hyperuricemia 07/06/2015    Long term current use of anticoagulant therapy 08/11/2015    Urinary incontinence 09/13/2016    Essential hypertension 09/13/2016    Paroxysmal atrial fibrillation 09/13/2016    Idiopathic gout 09/13/2016    Chronic kidney disease, stage III (moderate) 09/13/2016    History of TIA (transient ischemic attack) 09/13/2016    Osteopenia 09/13/2016    SNHL (sensorineural hearing loss) 09/13/2016    Aortic atherosclerosis 08/03/2017    Ectatic aorta 08/03/2017    Laryngopharyngeal reflux (LPR) 08/03/2017    Elevated left ventricular end-diastolic pressure (LVEDP) 08/14/2017     Resolved Ambulatory Problems     Diagnosis Date Noted    Palpitations 08/27/2013    Benign paroxysmal positional vertigo of left ear 09/13/2016     Past Medical History:   Diagnosis Date    Acid reflux     Arthritis     Atrial fibrillation     Cardiac pacemaker     Gout     Headache(784.0)     HEARING LOSS     Hyperlipidemia     Hypertension     Hypothyroidism     " "Polymyalgia rheumatica     Renal insufficiency     Sinusitis     SOB (shortness of breath)     Stroke     Urinary incontinence     West Nile encephalitis        Review of Systems    Objective:      Physical Exam   Constitutional: She is oriented to person, place, and time. She appears well-developed and well-nourished. No distress.   HENT:   Head: Normocephalic. Head is with raccoon's eyes.   Right Ear: Tympanic membrane, external ear and ear canal normal.   Left Ear: Tympanic membrane, external ear and ear canal normal.   Nose: Mucosal edema present.   Mouth/Throat: Oropharynx is clear and moist. No oropharyngeal exudate, posterior oropharyngeal edema, posterior oropharyngeal erythema or tonsillar abscesses.   Nasal mucosa edematous with areas that are boggy and other areas that are mildly erythematous.  Clear rhinorrhea   Eyes: Conjunctivae are normal. Pupils are equal, round, and reactive to light. Right eye exhibits no discharge. Left eye exhibits no discharge. No scleral icterus.   Neck: Normal range of motion. Neck supple.   Cardiovascular: Normal rate, regular rhythm and normal heart sounds.    No murmur heard.  Pulmonary/Chest: Effort normal and breath sounds normal. No stridor. No respiratory distress. She has no wheezes.   Lymphadenopathy:     She has no cervical adenopathy.   Neurological: She is alert and oriented to person, place, and time.   Skin: Skin is dry. No rash noted. She is not diaphoretic.   Psychiatric: She has a normal mood and affect. Her behavior is normal.   Vitals reviewed.      Vitals:    03/15/18 1002   BP: 136/70   BP Location: Left arm   Patient Position: Sitting   BP Method: Medium (Manual)   Pulse: 75   Temp: 97.7 °F (36.5 °C)   TempSrc: Oral   SpO2: 96%   Weight: 76.4 kg (168 lb 6.9 oz)   Height: 5' 4" (1.626 m)       Assessment:       1. Sinusitis, unspecified chronicity, unspecified location    2. Persistent cough for 3 weeks or longer    3. Nasal congestion        Plan: "       Maggi was seen today for follow-up.    Diagnoses and all orders for this visit:    Sinusitis, unspecified chronicity, unspecified location  -     predniSONE (DELTASONE) 20 MG tablet; Take 1 tablet (20 mg total) by mouth once daily.  -     Ambulatory referral to ENT    Persistent cough for 3 weeks or longer  Improved    Nasal congestion  -     predniSONE (DELTASONE) 20 MG tablet; Take 1 tablet (20 mg total) by mouth once daily.  -     Ambulatory referral to ENT

## 2018-03-19 RX ORDER — LEVOTHYROXINE SODIUM 88 UG/1
TABLET ORAL
Qty: 90 TABLET | Refills: 0 | Status: SHIPPED | OUTPATIENT
Start: 2018-03-19 | End: 2018-04-03 | Stop reason: CLARIF

## 2018-03-23 ENCOUNTER — TELEPHONE (OUTPATIENT)
Dept: FAMILY MEDICINE | Facility: CLINIC | Age: 83
End: 2018-03-23

## 2018-03-23 NOTE — TELEPHONE ENCOUNTER
----- Message from Antoinette Weston sent at 3/23/2018 10:56 AM CDT -----  Contact: Pt  Pt is retuning a call. Pt states she thinks it was to reschedule an appointment, but she's not sure which appointment. Please give pt a call back at [phone] (home)

## 2018-03-26 ENCOUNTER — OFFICE VISIT (OUTPATIENT)
Dept: OTOLARYNGOLOGY | Facility: CLINIC | Age: 83
End: 2018-03-26
Payer: MEDICARE

## 2018-03-26 VITALS
WEIGHT: 168.88 LBS | HEIGHT: 64 IN | BODY MASS INDEX: 28.83 KG/M2 | SYSTOLIC BLOOD PRESSURE: 140 MMHG | DIASTOLIC BLOOD PRESSURE: 72 MMHG

## 2018-03-26 DIAGNOSIS — J30.0 CHRONIC VASOMOTOR RHINITIS: ICD-10-CM

## 2018-03-26 DIAGNOSIS — J32.4 CHRONIC PANSINUSITIS: ICD-10-CM

## 2018-03-26 PROCEDURE — 99214 OFFICE O/P EST MOD 30 MIN: CPT | Mod: S$GLB,,, | Performed by: OTOLARYNGOLOGY

## 2018-03-26 PROCEDURE — 99999 PR PBB SHADOW E&M-EST. PATIENT-LVL III: CPT | Mod: PBBFAC,,, | Performed by: OTOLARYNGOLOGY

## 2018-03-26 PROCEDURE — 3078F DIAST BP <80 MM HG: CPT | Mod: CPTII,S$GLB,, | Performed by: OTOLARYNGOLOGY

## 2018-03-26 PROCEDURE — 3077F SYST BP >= 140 MM HG: CPT | Mod: CPTII,S$GLB,, | Performed by: OTOLARYNGOLOGY

## 2018-03-26 RX ORDER — GLUCOSAMINE/CHONDR SU A SOD 750-600 MG
1 TABLET ORAL DAILY
COMMUNITY
End: 2018-08-31

## 2018-03-26 NOTE — PROGRESS NOTES
Subjective:       Patient ID: Maggi Fontaine is a 85 y.o. female.    Chief Complaint: sinus blockage; Sinus Problem; Ear Fullness; and eye feels like its floating    Maggi is here for follow-up of nasal obstruction and sinus issues. She feels sinus issues have been much worse for 8 months. She feels this mainly because she has periorbital swelling when she lays down, and this is dependent in nature. She feels this is from congestion in the sinuses.  She has taken oral antibiotics, Prednisone, Flonase, Astelin, and Atrovent without improvement. She has a lot of nasal drainage and sprays have not helped.     Allergu testing negative  She has seen MB in the past.   SNOT-22 =45    Review of Systems   Constitutional: Negative for activity change and appetite change.   Respiratory: Negative for difficulty breathing and wheezing   Cardiovascular: Negative for chest pain.      Objective:        Constitutional:   She is oriented to person, place, and time. She appears well-developed and well-nourished. She appears alert. She is active. Normal speech.      Head:  Normocephalic and atraumatic. Head is without TMJ tenderness. No scars. Salivary glands normal.  Facial strength is normal.      Ears:    Right Ear: No drainage or swelling. No middle ear effusion.   Left Ear: No drainage or swelling.  No middle ear effusion.     Nose:  Septal deviation (mod high left) present. No mucosal edema, rhinorrhea (clear, thin bilateral) or sinus tenderness. No turbinate hypertrophy.      Mouth/Throat  Oropharynx clear and moist without lesions or asymmetry, normal uvula midline and mirror exam normal. Normal dentition. No uvula swelling, lacerations or trismus. No oropharyngeal exudate. Tonsillar erythema, tonsillar exudate.      Neck:  Full range of motion with neck supple and no adenopathy. Thyroid tenderness is present. No tracheal deviation, no edema, no erythema, normal range of motion, no stridor, no crepitus and no neck rigidity  present. No thyroid mass present.     Cardiovascular:   Intact distal pulses and normal pulses.      Pulmonary/Chest:   Effort normal and breath sounds normal. No stridor.     Psychiatric:   Her speech is normal and behavior is normal. Her mood appears not anxious. Her affect is not labile.     Neurological:   She is alert and oriented to person, place, and time. No sensory deficit.     Skin:   No abrasions, lacerations, lesions, or rashes. No abrasion and no bruising noted.         Tests / Results:  I personally reviewed the CT Sinu and my findings reveal:   Mild patchy ethmoid thickening bilaterally but no significant air fluid level and remainder of sinuses clear. Bilateral lynette, non obstruction R>L.     Assessment:       1. Chronic vasomotor rhinitis    2. Chronic pansinusitis          Plan:       Today her nose looks good other than evidence of vasomotor rhinitis.   She has tried (and failed) multiple medicines. She feels symptoms are worse than in years past. Because it has been a few years since her most recent CT imaging, we discussed repeating to make sure nothing has changed. If negative, can consider posterior nasal nerve cryoptherapy if she is still symptomatic.

## 2018-03-26 NOTE — LETTER
March 26, 2018      Negro Finnegan MD  [address] [facility] [address] [place] [address]           [facility]  [address] [facility] [address] [place] [address]  Phone: [phone]  Fax: [phone]          Patient: Maggi Fontaine   MR Number: [id number]   YOB: 1932   Date of Visit: 3/26/2018       Dear Dr. Negro Finnegan:    Thank you for referring Maggi Fontaine to me for evaluation. Attached you will find relevant portions of my assessment and plan of care.    If you have questions, please do not hesitate to call me. I look forward to following Maggi Fontaine along with you.    Sincerely,    Gabe Graahm MD    Enclosure  CC:  No Recipients    If you would like to receive this communication electronically, please contact [email or web address] or [phone] to request more information on [employer] Link access.    For providers and/or their staff who would like to refer a patient to [facility], please contact us through our one-stop-shop provider referral line, [facility], at [phone].    If you feel you have received this communication in error or would no longer like to receive these types of communications, please e-mail [email or web address]

## 2018-04-05 PROBLEM — I48.0 PAF (PAROXYSMAL ATRIAL FIBRILLATION): Status: ACTIVE | Noted: 2018-04-05

## 2018-04-09 ENCOUNTER — HOSPITAL ENCOUNTER (OUTPATIENT)
Dept: RADIOLOGY | Facility: HOSPITAL | Age: 83
Discharge: HOME | End: 2018-04-09
Attending: OTOLARYNGOLOGY
Payer: MEDICARE

## 2018-04-09 ENCOUNTER — OFFICE VISIT (OUTPATIENT)
Dept: OTOLARYNGOLOGY | Facility: CLINIC | Age: 83
End: 2018-04-09
Payer: MEDICARE

## 2018-04-09 VITALS
SYSTOLIC BLOOD PRESSURE: 162 MMHG | HEIGHT: 64 IN | DIASTOLIC BLOOD PRESSURE: 66 MMHG | BODY MASS INDEX: 28.86 KG/M2 | WEIGHT: 169.06 LBS

## 2018-04-09 DIAGNOSIS — J32.4 CHRONIC PANSINUSITIS: ICD-10-CM

## 2018-04-09 DIAGNOSIS — J30.0 CHRONIC VASOMOTOR RHINITIS: Primary | ICD-10-CM

## 2018-04-09 DIAGNOSIS — J30.0 CHRONIC VASOMOTOR RHINITIS: ICD-10-CM

## 2018-04-09 PROCEDURE — 3078F DIAST BP <80 MM HG: CPT | Mod: CPTII,S$GLB,, | Performed by: OTOLARYNGOLOGY

## 2018-04-09 PROCEDURE — 99999 PR PBB SHADOW E&M-EST. PATIENT-LVL III: CPT | Mod: PBBFAC,,, | Performed by: OTOLARYNGOLOGY

## 2018-04-09 PROCEDURE — 70486 CT MAXILLOFACIAL W/O DYE: CPT | Mod: TC,PO

## 2018-04-09 PROCEDURE — 70486 CT MAXILLOFACIAL W/O DYE: CPT | Mod: 26,,, | Performed by: RADIOLOGY

## 2018-04-09 PROCEDURE — 3077F SYST BP >= 140 MM HG: CPT | Mod: CPTII,S$GLB,, | Performed by: OTOLARYNGOLOGY

## 2018-04-09 PROCEDURE — 99213 OFFICE O/P EST LOW 20 MIN: CPT | Mod: S$GLB,,, | Performed by: OTOLARYNGOLOGY

## 2018-04-09 NOTE — PROGRESS NOTES
Subjective:       Patient ID: Maggi Fontaine is a 85 y.o. female.    Chief Complaint: CT results    Maggi is here for follow-up of chronic rhinitis. Still c/o PND and mucous from eyes. Feels pocket of mucous behind eye.  Denies heartburn issues.It has been a while she has tried nasal sprays.   Breathing Ok.     Review of Systems   Constitutional: Negative for activity change and appetite change.   Respiratory: Negative for difficulty breathing and wheezing   Cardiovascular: Negative for chest pain.      Objective:        Constitutional:   Vital signs are normal. She appears well-developed and well-nourished.     Head:  Normocephalic and atraumatic.     Ears:  Hearing normal to normal and whispered voice; external ear normal without scars, lesions, or masses; ear canal, tympanic membrane, and middle ear normal..     Nose:  Rhinorrhea (thin, clear, scant) and septal deviation present. No mucosal edema. Turbinate hypertrophy.          Tests / Results:  I personally reviewed the CT Sinus and my findings reveal: normal sinuses with no air fluid or mucosal thickening.     Assessment:       1. Chronic vasomotor rhinitis          Plan:         CT clear, suspect chronic rhinitis as a the issue. Discussed re-trial of Atrovent  Instructions given on mucous management for throat.   Low suspicion for infection or chronic throat issue. Defer scope today but can consider in the future if persistent.  FU 6-8 wk

## 2018-04-09 NOTE — PATIENT INSTRUCTIONS
Ipratropium spray    Atrovent (ipratropium bromide)   This is a nasal spray that primarily treats nasal drainage (rhinorrhea).    You may use this medication 2-3 times per day depending on leakage. This works fairly quickly after onset and can be used as needed (does not have to be used as a scheduled medication)    Spray the nose with the opposite hand    Please use caution when spraying nose if on anticoagulants (coumadin, aspirin, plavix) as a common side effect is nasal dryness and possible nosebleed.      Mucous Management    Several factors can cause the sensation of increased mucous in the throat, including dryness, acid reflux, and increased mucous production from allergies or chronic sinus drainage. There is also some evidence that added sugars or processed sugars in the diet (not the kind that occur naturally in honey or ripe fruit) can increase mucus, as well as too much dairy. To avoid these refined carbohydrates, on food labels, watch out for wheat flour (also called white, refined or enriched flour) on the ingredients list. The below website has several good options for mucous management.    Http://[email or web address].pdf    Some recommendations:    -Water, water, water  -Cut back on caffeine  -Steam treatments during the day (Personal Steam Inhalers are available at most pharmacies and drugstores. [employer] sells one called [employer]'s steam inhaler that people like. A cheaper alternative is a bowl of warm water with a towel over your head. You can breathe in the steam for a couple of minutes, especially if you are about to use your voice a lot, or when you are feeling particularly dry in your throat or mouth.)  -Humidifier at night. Put this right next to your head so that the mist falls on your face.  -Prevention of acid reflux by avoiding late-night eating (nothing 3 hours before laying down at night), greasy and spicy foods, and acidic foods  -Mucinex (over-the- counter,  "generic name guaifenesin. Buy the kind without a cough  suppressant or decongestant added). This medicine doesn't work well if you are not well  hydrated, so drink plenty of water on days when you take it.  -Nasal saline irrigations with NeilMed rinse kit, Neti Pot, Active Sinus, or Nasopure irrigation bottles (available in any pharmacy or grocery store, and all available on amazon)  -Avoid the things you are allergic to if you have allergies (use dust mite covers on your bed, wash your hair at night instead of morning if you have pollen allergies)  -Holder's Breezers (sugar-free), Grether's black currant pastilles, and Entertainer's Secret Throat Relief can all help dry mouth and thickened mucous. See website above for details.  -If you smoke, stop.  -Low- sugar diet. The FDA is changing the way they label foods soon so that it's easier to tell when sugars occur naturally in the food (which is not harmful to our health) vs when processed sugars or syrups have been added to the food (which may increase inflammation in the body).  In the meantime, you can google the "Playhem diet" to get an idea of foods that you might want to avoid.  -Dairy - some patients find that eating a lot of dairy products worsens their mucus.  -Sleep apnea - if you have sleep apnea and don';t treat it, consider starting up your CPAP again (and be sure to use the humidification). Snoring and struggling to keep your airway open all night long is traumatizing to the lining of your throat and can increase irritation.    "

## 2018-04-13 RX ORDER — PRAVASTATIN SODIUM 40 MG/1
TABLET ORAL
Qty: 90 TABLET | Refills: 3 | Status: SHIPPED | OUTPATIENT
Start: 2018-04-13 | End: 2019-06-15 | Stop reason: SDUPTHER

## 2018-04-16 ENCOUNTER — OFFICE VISIT (OUTPATIENT)
Dept: FAMILY MEDICINE | Facility: CLINIC | Age: 83
End: 2018-04-16
Payer: MEDICARE

## 2018-04-16 VITALS
OXYGEN SATURATION: 98 % | DIASTOLIC BLOOD PRESSURE: 64 MMHG | SYSTOLIC BLOOD PRESSURE: 122 MMHG | BODY MASS INDEX: 28.24 KG/M2 | WEIGHT: 165.38 LBS | HEIGHT: 64 IN | HEART RATE: 74 BPM

## 2018-04-16 DIAGNOSIS — Z79.01 LONG TERM CURRENT USE OF ANTICOAGULANT THERAPY: ICD-10-CM

## 2018-04-16 DIAGNOSIS — I48.0 PAF (PAROXYSMAL ATRIAL FIBRILLATION): ICD-10-CM

## 2018-04-16 DIAGNOSIS — I49.5 SINOATRIAL NODE DYSFUNCTION: ICD-10-CM

## 2018-04-16 DIAGNOSIS — I10 ESSENTIAL HYPERTENSION: ICD-10-CM

## 2018-04-16 DIAGNOSIS — Z00.00 ENCOUNTER FOR PREVENTIVE HEALTH EXAMINATION: Primary | ICD-10-CM

## 2018-04-16 DIAGNOSIS — I48.0 PAROXYSMAL ATRIAL FIBRILLATION: ICD-10-CM

## 2018-04-16 DIAGNOSIS — Z95.0 CARDIAC PACEMAKER IN SITU: ICD-10-CM

## 2018-04-16 DIAGNOSIS — N18.30 CHRONIC KIDNEY DISEASE, STAGE III (MODERATE): ICD-10-CM

## 2018-04-16 DIAGNOSIS — I70.0 AORTIC ATHEROSCLEROSIS: ICD-10-CM

## 2018-04-16 DIAGNOSIS — I77.819 ECTATIC AORTA: ICD-10-CM

## 2018-04-16 DIAGNOSIS — E03.4 HYPOTHYROIDISM DUE TO ACQUIRED ATROPHY OF THYROID: ICD-10-CM

## 2018-04-16 DIAGNOSIS — R94.30 ELEVATED LEFT VENTRICULAR END-DIASTOLIC PRESSURE (LVEDP): ICD-10-CM

## 2018-04-16 DIAGNOSIS — E78.2 MIXED HYPERLIPIDEMIA: ICD-10-CM

## 2018-04-16 PROCEDURE — 99999 PR PBB SHADOW E&M-EST. PATIENT-LVL V: CPT | Mod: PBBFAC,,, | Performed by: NURSE PRACTITIONER

## 2018-04-16 PROCEDURE — 3074F SYST BP LT 130 MM HG: CPT | Mod: CPTII,S$GLB,, | Performed by: NURSE PRACTITIONER

## 2018-04-16 PROCEDURE — G0439 PPPS, SUBSEQ VISIT: HCPCS | Mod: S$GLB,,, | Performed by: NURSE PRACTITIONER

## 2018-04-16 PROCEDURE — 3078F DIAST BP <80 MM HG: CPT | Mod: CPTII,S$GLB,, | Performed by: NURSE PRACTITIONER

## 2018-04-16 PROCEDURE — 99499 UNLISTED E&M SERVICE: CPT | Mod: S$GLB,,, | Performed by: NURSE PRACTITIONER

## 2018-04-16 NOTE — PROGRESS NOTES
"Maggi Fontaine presented for a  Medicare AWV and comprehensive Health Risk Assessment today. The following components were reviewed and updated:    · Medical history  · Family History  · Social history  · Allergies and Current Medications  · Health Risk Assessment  · Health Maintenance  · Care Team     ** See Completed Assessments for Annual Wellness Visit within the encounter summary.**     The following assessments were completed:  · Living Situation  · CAGE  · Depression Screening  · Timed Get Up and Go  · Whisper Test  · Cognitive Function Screening      · Nutrition Screening  · ADL Screening  · PAQ Screening    Vitals:    04/16/18 1140   BP: 122/64   BP Location: Left arm   Patient Position: Sitting   BP Method: Medium (Manual)   Pulse: 74   SpO2: 98%   Weight: 75 kg (165 lb 5.5 oz)   Height: 5' 4" (1.626 m)     Body mass index is 28.38 kg/m².  Physical Exam   Constitutional: She is oriented to person, place, and time. She appears well-nourished.   Cardiovascular: Normal rate, regular rhythm, normal heart sounds and intact distal pulses.    Pulmonary/Chest: Effort normal and breath sounds normal. She has no wheezes. She has no rales.   Neurological: She is alert and oriented to person, place, and time.   Skin: Skin is warm and dry. No rash noted.   Vitals reviewed.        Diagnoses and health risks identified today and associated recommendations/orders:    1. Encounter for preventive health examination  Reviewed and discussed health maintenance.    Written rx given to patient to update PPSV-23 today    2. Mixed hyperlipidemia  Stable- continue current treatment and follow up routinely with PCP and cardiology ()    3. Sinoatrial node dysfunction  Stable- continue current treatment and follow up routinely with PCP and cardiology ()    4. Cardiac pacemaker in situ  Stable- continue current treatment and follow up routinely with PCP and cardiology ()    5. PAF (paroxysmal atrial " fibrillation)  Stable- continue current treatment and follow up routinely with PCP and cardiology ()    6. Elevated left ventricular end-diastolic pressure (LVEDP)  Stable- continue current treatment and follow up routinely with PCP and cardiology ()    7. Ectatic aorta  Stable- continue current treatment and follow up routinely with PCP and cardiology ()    8. Aortic atherosclerosis  Stable- continue current treatment and follow up routinely with PCP and cardiology ()    9. Paroxysmal atrial fibrillation  Stable- continue current treatment and follow up routinely with PCP and cardiology ()    10. Essential hypertension  Stable- continue current treatment and follow up routinely with PCP and cardiology ()    11. Hypothyroidism due to acquired atrophy of thyroid  Stable- continue current treatment and follow up routinely with PCP     12. Long term current use of anticoagulant therapy  Stable- continue current treatment and follow up routinely with PCP and cardiology ()    13. Chronic kidney disease, stage III (moderate)  Stable- continue current treatment and follow up routinely with PCP and nephrology ()    I offered to discuss end of life issues, including information on how to make advance directives that the patient could use to name someone who would make medical decisions on their behalf if they became too ill to make themselves.    ___Patient declined  _X_Patient is interested, I provided paper work and offered to discuss.    Provided Maggi with a 5-10 year written screening schedule and personal prevention plan. Recommendations were developed using the USPSTF age appropriate recommendations. Education, counseling, and referrals were provided as needed. After Visit Summary printed and given to patient which includes a list of additional screenings\tests needed.    Nay Allison NP

## 2018-04-16 NOTE — PATIENT INSTRUCTIONS
Counseling and Referral of Other Preventative  (Italic type indicates deductible and co-insurance are waived)    Patient Name: Maggi Fontaine  Today's Date: 4/16/2018    Health Maintenance       Date Due Completion Date    Pneumococcal (65+) (2 of 2 - PPSV23) 05/11/2017 5/11/2016    Influenza Vaccine 08/01/2017 9/13/2016    Lipid Panel 08/04/2018 8/4/2017    DEXA SCAN 06/09/2019 6/9/2016    TETANUS VACCINE 06/13/2026 6/13/2016        No orders of the defined types were placed in this encounter.    The following information is provided to all patients.  This information is to help you find resources for any of the problems found today that may be affecting your health:                Living healthy guide: www.[facility].[place].gov      Understanding Diabetes: www.diabetes.org      Eating healthy: www.cdc.gov/healthyweight      [facility] home safety checklist: www.cdc.gov/steadi/patient.html      Agency on Aging: www.goea.[place].gov      Alcoholics anonymous (AA): www.aa.org      Physical Activity: www.vy.nih.gov/qa2mmum      Tobacco use: www.quitwithusla.org

## 2018-04-21 ENCOUNTER — NURSE TRIAGE (OUTPATIENT)
Dept: ADMINISTRATIVE | Facility: CLINIC | Age: 83
End: 2018-04-21

## 2018-04-21 RX ORDER — LIDOCAINE 50 MG/G
1 PATCH TOPICAL ONCE
Qty: 5 PATCH | Refills: 0 | Status: SHIPPED | OUTPATIENT
Start: 2018-04-21 | End: 2018-04-21

## 2018-04-21 NOTE — TELEPHONE ENCOUNTER
"have hives was given meds for it ..but having additional break out lips seems to be swelling  On R arm too   Seen at  in [place] today   Reason for Disposition   [1] Shingles rash AND [2] spots start appearing other places on body    Answer Assessment - Initial Assessment Questions  1. APPEARANCE of RASH: "Describe the rash."       Hives? , no difficulty breathing or swallowing. had [facility]/5 at [facility] had CON[facility], left scars on back- started hurting. Seen in  yest 4/20- dx'd with hives. Given dalacyclovir  2. LOCATION: "Where is the rash located?"      Upper back on R side going under R breast - more came out this am   3. ONSET: "When did the rash start?"      4/7-8  4. ITCHING: "Does the rash itch?" If so, ask: "How bad is the itch?"  (Scale 1-10; or mild, moderate, severe)     Yes   5. PAIN: "Does the rash hurt?" If so, ask: "How bad is the pain?"  (Scale 1-10; or mild, moderate, severe)     8-10   6. OTHER SYMPTOMS: "Do you have any other symptoms?" (e.g., fever)    afeb    Protocols used:  [facility]  saw dr jacquie malin at   Pharm: on file   [phone]     Pt requesting cream lodicaine ? Hx pacer.  Spoke with Demetria at [facility] - MD already spoke with pt re treatment. MD already called in meds. Pt notified. Pt wants PCP called about whether pt needs additional meds. Spoke with Dr Villagran. Ok for lidoderm patch #5 apply once for 12 hours then leave off for 12 hours. Apply to places that hurt. call office on Monday.  LM on pharm VM at 153pm. Pt notified. Call back with questions.   "

## 2018-04-23 NOTE — TELEPHONE ENCOUNTER
Pt states she feels a little bit better. Pt was shocked that she had shingles again. Medication given by UC was valcycovir and it made pt feel better. Pt states the pain is better and will stay out of sun until she feels 100 %. Pt has had this for 3 weeks. Pt thought it was an allergic reaction, from the electrodes from the cardioversion on 4-5-18. Pt has gotten treatment started and wanted provider to know as an FYI

## 2018-05-06 DIAGNOSIS — E55.9 HYPOVITAMINOSIS D: ICD-10-CM

## 2018-05-06 DIAGNOSIS — E03.9 HYPOTHYROIDISM, UNSPECIFIED TYPE: Primary | ICD-10-CM

## 2018-05-08 ENCOUNTER — CLINICAL SUPPORT (OUTPATIENT)
Dept: FAMILY MEDICINE | Facility: CLINIC | Age: 83
End: 2018-05-08
Payer: MEDICARE

## 2018-05-08 ENCOUNTER — TELEPHONE (OUTPATIENT)
Dept: FAMILY MEDICINE | Facility: CLINIC | Age: 83
End: 2018-05-08

## 2018-05-08 DIAGNOSIS — J02.9 SORE THROAT: ICD-10-CM

## 2018-05-08 DIAGNOSIS — J02.9 SORE THROAT: Primary | ICD-10-CM

## 2018-05-08 LAB
CTP QC/QA: YES
S PYO RRNA THROAT QL PROBE: NEGATIVE

## 2018-05-08 PROCEDURE — 87880 STREP A ASSAY W/OPTIC: CPT | Mod: QW,S$GLB,, | Performed by: FAMILY MEDICINE

## 2018-05-08 PROCEDURE — 99999 PR PBB SHADOW E&M-EST. PATIENT-LVL II: CPT | Mod: PBBFAC,,,

## 2018-05-08 PROCEDURE — 87081 CULTURE SCREEN ONLY: CPT

## 2018-05-08 RX ORDER — LEVOTHYROXINE SODIUM 88 UG/1
TABLET ORAL
Qty: 90 TABLET | Refills: 0 | Status: SHIPPED | OUTPATIENT
Start: 2018-05-08 | End: 2018-07-24 | Stop reason: SDUPTHER

## 2018-05-08 RX ORDER — ERGOCALCIFEROL 1.25 MG/1
CAPSULE ORAL
Qty: 12 CAPSULE | Refills: 0 | Status: SHIPPED | OUTPATIENT
Start: 2018-05-08 | End: 2018-07-06 | Stop reason: SDUPTHER

## 2018-05-08 NOTE — PROGRESS NOTES
Refill Authorization Note     is requesting a refill authorization.    Brief assessment and rationale for refill: APPROVE; needs labs  Amount/Quantity of medication ordered: 90d         Refills Authorized: Yes  If authorized number of refills: 0        Medication-related problems identified: Requires labs  Medication Therapy Plan: Will order vitamin D; approve both for 3 more mo  Name and strength of medication: LEVOTHYROXINE SODIUM 88 MCG Tablet / ergocal  How patient will take medication: UTD   Medication reconciliation completed: No  Comments:   Lab Results   Component Value Date    TSH 1.180 08/04/2017    FREET4 1.48 12/12/2008      Lab Results   Component Value Date    RNOHFXTR26NG 26 01/05/2007     Lab Results   Component Value Date    CALCIUM 9.5 04/24/2018

## 2018-05-08 NOTE — TELEPHONE ENCOUNTER
Tried to reach pt. No answer, left msg to call back.    [facility]

## 2018-05-09 ENCOUNTER — TELEPHONE (OUTPATIENT)
Dept: FAMILY MEDICINE | Facility: CLINIC | Age: 83
End: 2018-05-09

## 2018-05-09 NOTE — TELEPHONE ENCOUNTER
----- Message from Antoinette Weston sent at 5/9/2018  4:30 PM CDT -----  Contact: Pt  Name of Who is Calling: Maggi      What is the request in detail: pt is calling requesting to speak with nurse Gabriela.      Can the clinic reply by [facility]: no      [facility]: [phone] (home)

## 2018-05-09 NOTE — TELEPHONE ENCOUNTER
LM with pt to return our call.  Need to ask pt, per Dr. Finnegan, how she is feeling today.  Also to advise that he has not yet received culture, but we will be in touch when results come in.

## 2018-05-10 NOTE — TELEPHONE ENCOUNTER
Pt is feeling better. Spoke w/ pt. Informed pt about results and recommendations per provider. pt verbalized understanding.

## 2018-05-10 NOTE — TELEPHONE ENCOUNTER
The strep culture was negative, if feeling better then she should continue to improve.  If worsening please let me know

## 2018-05-10 NOTE — TELEPHONE ENCOUNTER
----- Message from Lynda Myers sent at 5/10/2018  8:10 AM CDT -----  Contact: self [phone]  Call placed to pod. Patient returned your call, please call back before 10 am.  Thank you!

## 2018-05-10 NOTE — TELEPHONE ENCOUNTER
Spoke with pt, states she has been doing alright.  Pt staes she is anxious.  Pt states she has congestion, is coughing up yellow sputum.  Pt states she is glad she is able to get sputum up.  Advised pt qwe wanted tp see how she was doing, pt denies fever or any worsening symptoms.  Advised pt we will call her with results of culture when we get them, anticipating results sometime this morning, if not, this afternoon.  Pt verbalized understanding and states she will not be home from 10-1 and would like a call before or after that time.

## 2018-05-11 LAB — BACTERIA THROAT CULT: NORMAL

## 2018-05-11 NOTE — TELEPHONE ENCOUNTER
Tried to reach pt. No answer, left msg to call back.    [facility]

## 2018-05-14 ENCOUNTER — TELEPHONE (OUTPATIENT)
Dept: FAMILY MEDICINE | Facility: CLINIC | Age: 83
End: 2018-05-14

## 2018-05-14 NOTE — TELEPHONE ENCOUNTER
Attempted to contact pt to schedule labs prior to her follow up on 05/28/2018.    No answer; left message to return call to schedule.

## 2018-05-14 NOTE — TELEPHONE ENCOUNTER
----- Message from Yina Aguilera sent at 5/14/2018  2:21 PM CDT -----  Contact: self  Returning missed call. Please call back [phone]

## 2018-05-14 NOTE — TELEPHONE ENCOUNTER
Spoke with pt informed of normal INS lab results. Informed pt that she needed labs prior to her follow up appt and scheduled the labs that were ordered.    Pt verbally understood.

## 2018-05-16 RX ORDER — FLUTICASONE PROPIONATE 50 MCG
SPRAY, SUSPENSION (ML) NASAL
Qty: 48 G | Refills: 2 | Status: SHIPPED | OUTPATIENT
Start: 2018-05-16 | End: 2019-01-28

## 2018-05-16 NOTE — PROGRESS NOTES
Refill Authorization Note     is requesting a refill authorization.    Brief assessment and rationale for refill: APPROVE: prr  Amount/Quantity of medication ordered: 90d         Refills Authorized: Yes  If authorized number of refills: 2           Medication Therapy Plan: Chronic hx of rhinitis; alexus 9 more  Name and strength of medication: fluticasone nasal   How patient will take medication: UTD   Medication reconciliation completed: No  Comments: .

## 2018-05-23 ENCOUNTER — LAB VISIT (OUTPATIENT)
Dept: LAB | Facility: HOSPITAL | Age: 83
End: 2018-05-23
Attending: FAMILY MEDICINE
Payer: MEDICARE

## 2018-05-23 DIAGNOSIS — E03.9 HYPOTHYROIDISM, UNSPECIFIED TYPE: ICD-10-CM

## 2018-05-23 DIAGNOSIS — E55.9 HYPOVITAMINOSIS D: ICD-10-CM

## 2018-05-23 LAB
25(OH)D3+25(OH)D2 SERPL-MCNC: 41 NG/ML
TSH SERPL DL<=0.005 MIU/L-ACNC: 0.74 UIU/ML

## 2018-05-23 PROCEDURE — 82306 VITAMIN D 25 HYDROXY: CPT

## 2018-05-23 PROCEDURE — 36415 COLL VENOUS BLD VENIPUNCTURE: CPT | Mod: PN

## 2018-05-23 PROCEDURE — 84443 ASSAY THYROID STIM HORMONE: CPT

## 2018-05-28 ENCOUNTER — OFFICE VISIT (OUTPATIENT)
Dept: FAMILY MEDICINE | Facility: CLINIC | Age: 83
End: 2018-05-28
Payer: MEDICARE

## 2018-05-28 VITALS
SYSTOLIC BLOOD PRESSURE: 110 MMHG | TEMPERATURE: 99 F | HEART RATE: 79 BPM | BODY MASS INDEX: 27.88 KG/M2 | DIASTOLIC BLOOD PRESSURE: 78 MMHG | OXYGEN SATURATION: 98 % | HEIGHT: 65 IN | WEIGHT: 167.31 LBS

## 2018-05-28 DIAGNOSIS — E03.9 HYPOTHYROIDISM, UNSPECIFIED TYPE: ICD-10-CM

## 2018-05-28 DIAGNOSIS — B02.9 HERPES ZOSTER WITHOUT COMPLICATION: ICD-10-CM

## 2018-05-28 DIAGNOSIS — I10 ESSENTIAL HYPERTENSION: Primary | ICD-10-CM

## 2018-05-28 DIAGNOSIS — Z88.9 HISTORY OF SEASONAL ALLERGIES: ICD-10-CM

## 2018-05-28 DIAGNOSIS — E55.9 VITAMIN D DEFICIENCY: ICD-10-CM

## 2018-05-28 PROCEDURE — 3074F SYST BP LT 130 MM HG: CPT | Mod: CPTII,S$GLB,, | Performed by: FAMILY MEDICINE

## 2018-05-28 PROCEDURE — 99499 UNLISTED E&M SERVICE: CPT | Mod: S$GLB,,, | Performed by: FAMILY MEDICINE

## 2018-05-28 PROCEDURE — 99214 OFFICE O/P EST MOD 30 MIN: CPT | Mod: S$GLB,,, | Performed by: FAMILY MEDICINE

## 2018-05-28 PROCEDURE — 3078F DIAST BP <80 MM HG: CPT | Mod: CPTII,S$GLB,, | Performed by: FAMILY MEDICINE

## 2018-05-28 PROCEDURE — 99999 PR PBB SHADOW E&M-EST. PATIENT-LVL III: CPT | Mod: PBBFAC,,, | Performed by: FAMILY MEDICINE

## 2018-05-28 NOTE — PROGRESS NOTES
"Subjective:       Patient ID: Maggi Fontaine is a 85 y.o. female.    Chief Complaint: Follow-up (3 month follow up )    HPI     States episode of shingles.  "I traced it back to my shock", referring to cardioversion.  Did go to , does not recall what she was given, but states she was given a shot.  On April 20th  Wants new vaccine  Worried next 'shock' would trigger an episodes    Fall and hit back on head on door, , she did not report this to anybody at the time.  She states that was a lump on the back of her head for a few days but better.  No neck pain, she reports no change in mental status no headaches no blurred vision.    Congestion, sore throat, somewhat better but now she is convinced she had  Not taking flonase, states she felt she was too congested for the medicine to get an at work.  Allergies.    Hypertension this is chronic stable and very well controlled.    Atrial fibrillation, stable mildly irregular rhythm today.  Rate is controlled.  She is on Coumadin.    Hypothyroidism improved and satisfactory, TSH normal    Vitamin-D deficiency resolved, vitamin-D greater than 40, remains on supplementation    Active Ambulatory Problems     Diagnosis Date Noted    Hyperlipidemia     Hypothyroidism     Cardiac pacemaker in situ 01/22/2015    Sinoatrial node dysfunction 01/22/2015    Hyperuricemia 07/06/2015    Long term current use of anticoagulant therapy 08/11/2015    Urinary incontinence 09/13/2016    Essential hypertension 09/13/2016    Paroxysmal atrial fibrillation 09/13/2016    Idiopathic gout 09/13/2016    Chronic kidney disease, stage III (moderate) 09/13/2016    History of TIA (transient ischemic attack) 09/13/2016    Osteopenia 09/13/2016    SNHL (sensorineural hearing loss) 09/13/2016    Aortic atherosclerosis 08/03/2017    Ectatic aorta 08/03/2017    Laryngopharyngeal reflux (LPR) 08/03/2017    Elevated left ventricular end-diastolic pressure (LVEDP) 08/14/2017    Chronic " vasomotor rhinitis 03/26/2018    Chronic pansinusitis 03/26/2018    PAF (paroxysmal atrial fibrillation) 04/05/2018    Vitamin D deficiency 05/28/2018     Resolved Ambulatory Problems     Diagnosis Date Noted    Palpitations 08/27/2013    Benign paroxysmal positional vertigo of left ear 09/13/2016     Past Medical History:   Diagnosis Date    Acid reflux     Arthritis     Atrial fibrillation     Cardiac pacemaker     Gout     Headache(784.0)     HEARING LOSS     Hyperlipidemia     Hypertension     Hypothyroidism     Polymyalgia rheumatica     Renal insufficiency     Sinusitis     SOB (shortness of breath)     Stroke     Urinary incontinence     West Nile encephalitis            Review of Systems   Constitutional: Positive for fatigue. Negative for chills, fever and unexpected weight change.   HENT: Positive for congestion, postnasal drip, sinus pain and sinus pressure. Negative for ear pain, facial swelling, sneezing, sore throat and trouble swallowing.    Respiratory: Negative for shortness of breath.    Cardiovascular: Negative for chest pain and leg swelling.   Gastrointestinal: Negative.    Musculoskeletal: Positive for arthralgias.   Skin: Positive for rash (resolved now).   Psychiatric/Behavioral: Negative for dysphoric mood and sleep disturbance.       Objective:      Physical Exam   Constitutional: She is oriented to person, place, and time. She appears well-developed and well-nourished. No distress.   HENT:   Head: Normocephalic. Head is with raccoon's eyes.   Right Ear: Tympanic membrane, external ear and ear canal normal.   Left Ear: Tympanic membrane, external ear and ear canal normal.   Nose: Mucosal edema present.   Mouth/Throat: Oropharynx is clear and moist. No oropharyngeal exudate, posterior oropharyngeal edema, posterior oropharyngeal erythema or tonsillar abscesses.   Nasal edema, boggy turbinates.  Tympanic membranes are normal   Eyes: Conjunctivae are normal. Pupils are  equal, round, and reactive to light. Right eye exhibits no discharge. Left eye exhibits no discharge. No scleral icterus.   Neck: Normal range of motion. Neck supple.   Cardiovascular: Normal rate, regular rhythm and normal heart sounds.    No murmur heard.  Pulmonary/Chest: Effort normal and breath sounds normal. No stridor. No respiratory distress. She has no wheezes.   Lymphadenopathy:     She has no cervical adenopathy.   Neurological: She is alert and oriented to person, place, and time.   Skin: Skin is dry. No rash noted. She is not diaphoretic.   Mild hyperpigmentation on the back right T8 dermatome possibly consistent with recent or subacute herpes zoster.   Psychiatric: She has a normal mood and affect. Her behavior is normal.   Vitals reviewed.      Assessment:       1. Essential hypertension    2. Herpes zoster without complication    3. History of seasonal allergies    4. Hypothyroidism, unspecified type    5. Vitamin D deficiency        Plan:       Maggi was seen today for follow-up.    Diagnoses and all orders for this visit:    Essential hypertension  Stable and controlled  Herpes zoster without complication  Presumed by history.  She inquired about the vaccination.  I advised her that there really is no evidence that having the vaccination so soon after an episode will provide any additional benefit.  It is optional if she wants it that my preference would be to wait a few years.    History of seasonal allergies  Try Flonase.  Recommend other techniques to relieve congestion prior to using such as Vicks, or nasal saline, if not helping consider Singulair and rescheduling with ENT    Hypothyroidism, unspecified type  Stable and well controlled  Vitamin D deficiency  Resolved continue supplementation           Try flonase, ? singulair ot ENT,

## 2018-06-19 RX ORDER — WARFARIN SODIUM 5 MG/1
TABLET ORAL
Qty: 90 TABLET | Refills: 1 | Status: SHIPPED | OUTPATIENT
Start: 2018-06-19 | End: 2018-11-05 | Stop reason: SDUPTHER

## 2018-07-06 DIAGNOSIS — E55.9 HYPOVITAMINOSIS D: ICD-10-CM

## 2018-07-09 ENCOUNTER — LAB VISIT (OUTPATIENT)
Dept: LAB | Facility: HOSPITAL | Age: 83
End: 2018-07-09
Attending: INTERNAL MEDICINE
Payer: MEDICARE

## 2018-07-09 DIAGNOSIS — E11.29 TYPE II DIABETES MELLITUS WITH RENAL MANIFESTATIONS: ICD-10-CM

## 2018-07-09 DIAGNOSIS — N18.30 CHRONIC KIDNEY DISEASE, STAGE III (MODERATE): Primary | ICD-10-CM

## 2018-07-09 DIAGNOSIS — R80.9 PROTEINURIA: ICD-10-CM

## 2018-07-09 DIAGNOSIS — E87.5 HYPERPOTASSEMIA: ICD-10-CM

## 2018-07-09 DIAGNOSIS — I10 ESSENTIAL HYPERTENSION, MALIGNANT: ICD-10-CM

## 2018-07-09 LAB
ALBUMIN SERPL BCP-MCNC: 3.4 G/DL
ANION GAP SERPL CALC-SCNC: 7 MMOL/L
BACTERIA #/AREA URNS HPF: ABNORMAL /HPF
BASOPHILS # BLD AUTO: 0.06 K/UL
BASOPHILS NFR BLD: 1.4 %
BILIRUB UR QL STRIP: NEGATIVE
BUN SERPL-MCNC: 24 MG/DL
CALCIUM SERPL-MCNC: 9.7 MG/DL
CHLORIDE SERPL-SCNC: 108 MMOL/L
CLARITY UR: CLEAR
CO2 SERPL-SCNC: 25 MMOL/L
COLOR UR: YELLOW
CREAT SERPL-MCNC: 1.5 MG/DL
CREAT UR-MCNC: 177 MG/DL
DIFFERENTIAL METHOD: ABNORMAL
EOSINOPHIL # BLD AUTO: 0.3 K/UL
EOSINOPHIL NFR BLD: 6.5 %
ERYTHROCYTE [DISTWIDTH] IN BLOOD BY AUTOMATED COUNT: 15.9 %
EST. GFR  (AFRICAN AMERICAN): 36.4 ML/MIN/1.73 M^2
EST. GFR  (NON AFRICAN AMERICAN): 31.5 ML/MIN/1.73 M^2
GLUCOSE SERPL-MCNC: 84 MG/DL
GLUCOSE UR QL STRIP: NEGATIVE
HCT VFR BLD AUTO: 39.1 %
HGB BLD-MCNC: 13.3 G/DL
HGB UR QL STRIP: NEGATIVE
HYALINE CASTS #/AREA URNS LPF: 4 /LPF
IMM GRANULOCYTES # BLD AUTO: 0.01 K/UL
IMM GRANULOCYTES NFR BLD AUTO: 0.2 %
KETONES UR QL STRIP: ABNORMAL
LEUKOCYTE ESTERASE UR QL STRIP: NEGATIVE
LYMPHOCYTES # BLD AUTO: 2.2 K/UL
LYMPHOCYTES NFR BLD: 51.4 %
MAGNESIUM SERPL-MCNC: 2 MG/DL
MCH RBC QN AUTO: 27.4 PG
MCHC RBC AUTO-ENTMCNC: 34 G/DL
MCV RBC AUTO: 81 FL
MICROSCOPIC COMMENT: ABNORMAL
MONOCYTES # BLD AUTO: 0.6 K/UL
MONOCYTES NFR BLD: 14.3 %
NEUTROPHILS # BLD AUTO: 1.1 K/UL
NEUTROPHILS NFR BLD: 26.2 %
NITRITE UR QL STRIP: NEGATIVE
NRBC BLD-RTO: 0 /100 WBC
PH UR STRIP: 6 [PH] (ref 5–8)
PHOSPHATE SERPL-MCNC: 3.3 MG/DL
PLATELET # BLD AUTO: 212 K/UL
PMV BLD AUTO: 11.8 FL
POTASSIUM SERPL-SCNC: 5.1 MMOL/L
PROT UR QL STRIP: ABNORMAL
PROT UR-MCNC: 42 MG/DL
PROT/CREAT RATIO, UR: 0.24
RBC # BLD AUTO: 4.86 M/UL
SODIUM SERPL-SCNC: 140 MMOL/L
SP GR UR STRIP: 1.01 (ref 1–1.03)
SQUAMOUS #/AREA URNS HPF: 5 /HPF
URATE SERPL-MCNC: 7.5 MG/DL
URN SPEC COLLECT METH UR: ABNORMAL
WBC # BLD AUTO: 4.34 K/UL
WBC #/AREA URNS HPF: 4 /HPF (ref 0–5)

## 2018-07-09 PROCEDURE — 87086 URINE CULTURE/COLONY COUNT: CPT

## 2018-07-09 PROCEDURE — 80069 RENAL FUNCTION PANEL: CPT

## 2018-07-09 PROCEDURE — 36415 COLL VENOUS BLD VENIPUNCTURE: CPT | Mod: PO

## 2018-07-09 PROCEDURE — 82570 ASSAY OF URINE CREATININE: CPT

## 2018-07-09 PROCEDURE — 83735 ASSAY OF MAGNESIUM: CPT

## 2018-07-09 PROCEDURE — 81000 URINALYSIS NONAUTO W/SCOPE: CPT | Mod: PO

## 2018-07-09 PROCEDURE — 84550 ASSAY OF BLOOD/URIC ACID: CPT

## 2018-07-09 PROCEDURE — 85025 COMPLETE CBC W/AUTO DIFF WBC: CPT

## 2018-07-09 RX ORDER — ERGOCALCIFEROL 1.25 MG/1
CAPSULE ORAL
Qty: 12 CAPSULE | Refills: 2 | Status: SHIPPED | OUTPATIENT
Start: 2018-07-09 | End: 2019-02-15 | Stop reason: SDUPTHER

## 2018-07-09 NOTE — PROGRESS NOTES
Refill Authorization Note     is requesting a refill authorization.    Brief assessment and rationale for refill: APPROVE; prr  Amount/Quantity of medication ordered: 90d        Refills Authorized: Yes  If authorized number of refills: 2           Medication Therapy Plan: Labs WNL; approve 9 more  Name and strength of medication: VITAMIN D 98309 UNIT Capsule  How patient will take medication: t1t po q week  Medication reconciliation completed: No  Comments:   Lab Results   Component Value Date    YYTJLYJI30RN 41 05/23/2018     Lab Results   Component Value Date    CALCIUM 9.5 04/24/2018

## 2018-07-10 LAB — BACTERIA UR CULT: NO GROWTH

## 2018-07-24 DIAGNOSIS — E03.9 HYPOTHYROIDISM, UNSPECIFIED TYPE: ICD-10-CM

## 2018-07-25 RX ORDER — LEVOTHYROXINE SODIUM 88 UG/1
TABLET ORAL
Qty: 90 TABLET | Refills: 2 | Status: SHIPPED | OUTPATIENT
Start: 2018-07-25 | End: 2019-06-15 | Stop reason: SDUPTHER

## 2018-07-25 NOTE — PROGRESS NOTES
Refill Authorization Note     is requesting a refill authorization.    Brief assessment and rationale for refill: APPROVE; prr  Amount/Quantity of medication ordered: 90  Date of last appointment: 5/28/2018     Refills Authorized: Yes  If authorized number of refills: 2           Medication Therapy Plan: Labs-wnl; nicolas commented as improved and satisfactory at lov; Approve 9 more months   Name and strength of medication: levothyroxine (SYNTHROID) 88 MCG tablet  How patient will take medication: t1t po qd  Medication reconciliation completed: No  Comments:     Lab Results   Component Value Date    TSH 0.739 05/23/2018    FREET4 1.48 12/12/2008   \

## 2018-08-10 RX ORDER — IPRATROPIUM BROMIDE 42 UG/1
2 SPRAY, METERED NASAL EVERY 6 HOURS PRN
Qty: 15 ML | Refills: 1 | Status: SHIPPED | OUTPATIENT
Start: 2018-08-10 | End: 2018-08-31

## 2018-08-10 NOTE — TELEPHONE ENCOUNTER
----- Message from Abeba Miramontes sent at 8/10/2018 10:35 AM CDT -----  Contact: self  Type:  RX Refill Request    Who Called:  patient  Refill or New Rx:  New RX  RX Name and Strength:  Sinus congestion and would like Ifratropium promide spray .06% (Atrovent)  How is the patient currently taking it? (ex. 1XDay): as directed  Is this a 30 day or 90 day RX:  90  Preferred Pharmacy with phone number:    [facility] [address] [place] [address] AT [facility] & [address] [address]  Phone: [phone] Fax: [phone]  Local or Mail Order:  local  Ordering Provider:  Dr Kain Bustamante Call Back Number:  [phone]   Additional Information: Please advise the patient if you think this has any effect on her kidneys. Please leave a message!  Thank you!

## 2018-08-10 NOTE — TELEPHONE ENCOUNTER
Patient requesting refill of medication. Also, patient would like to know if this would effect her kidney function. Med pended. Please advise.

## 2018-08-31 ENCOUNTER — OFFICE VISIT (OUTPATIENT)
Dept: FAMILY MEDICINE | Facility: CLINIC | Age: 83
End: 2018-08-31
Payer: MEDICARE

## 2018-08-31 VITALS
HEIGHT: 65 IN | WEIGHT: 166.31 LBS | BODY MASS INDEX: 27.71 KG/M2 | DIASTOLIC BLOOD PRESSURE: 58 MMHG | HEART RATE: 62 BPM | SYSTOLIC BLOOD PRESSURE: 120 MMHG | TEMPERATURE: 98 F

## 2018-08-31 DIAGNOSIS — I48.91 ATRIAL FIBRILLATION: ICD-10-CM

## 2018-08-31 DIAGNOSIS — I10 ESSENTIAL HYPERTENSION: ICD-10-CM

## 2018-08-31 DIAGNOSIS — E78.2 MIXED HYPERLIPIDEMIA: Primary | ICD-10-CM

## 2018-08-31 DIAGNOSIS — L98.9 SKIN LESIONS: ICD-10-CM

## 2018-08-31 DIAGNOSIS — Z23 NEED FOR VACCINATION: ICD-10-CM

## 2018-08-31 DIAGNOSIS — H57.89 REDNESS AND DISCHARGE OF EYE: ICD-10-CM

## 2018-08-31 DIAGNOSIS — E03.9 ACQUIRED HYPOTHYROIDISM: ICD-10-CM

## 2018-08-31 DIAGNOSIS — N18.30 CHRONIC KIDNEY DISEASE, STAGE III (MODERATE): ICD-10-CM

## 2018-08-31 DIAGNOSIS — I48.0 PAF (PAROXYSMAL ATRIAL FIBRILLATION): ICD-10-CM

## 2018-08-31 PROCEDURE — 99214 OFFICE O/P EST MOD 30 MIN: CPT | Mod: 25,S$GLB,, | Performed by: INTERNAL MEDICINE

## 2018-08-31 PROCEDURE — 99999 PR PBB SHADOW E&M-EST. PATIENT-LVL III: CPT | Mod: PBBFAC,,, | Performed by: INTERNAL MEDICINE

## 2018-08-31 PROCEDURE — 3078F DIAST BP <80 MM HG: CPT | Mod: CPTII,S$GLB,, | Performed by: INTERNAL MEDICINE

## 2018-08-31 PROCEDURE — 3074F SYST BP LT 130 MM HG: CPT | Mod: CPTII,S$GLB,, | Performed by: INTERNAL MEDICINE

## 2018-08-31 PROCEDURE — 90662 IIV NO PRSV INCREASED AG IM: CPT | Mod: S$GLB,,, | Performed by: INTERNAL MEDICINE

## 2018-08-31 PROCEDURE — G0008 ADMIN INFLUENZA VIRUS VAC: HCPCS | Mod: S$GLB,,, | Performed by: INTERNAL MEDICINE

## 2018-08-31 NOTE — PROGRESS NOTES
Assessment and Plan:    1. Mixed hyperlipidemia  Continue pravastatin, would repeat lipids with next labs.    2. Essential hypertension  Controlled BP without medications specifically prescribed for HTN.     3. PAF (paroxysmal atrial fibrillation)  Continue management per Cardiology.     4. Chronic kidney disease, stage III (moderate)  Slightly worsened renal function on recent labs. Advised avoiding NSAIDs, staying well hydrated.     5. Acquired hypothyroidism  Last TSH WNL, continue same dose of levothyroxine.     6. Redness and discharge of eye  Suspect some degree of possible allergic conjunctivitis vs chronic dry eye. She is scheduled to see her Ophthalmologist (Dr. Chávez) in 1 month. Trial of ketotifen eye drops until then.     7. Skin lesions  Several lesions on face with benign appearance, concerning to patient. She is interested in seeing Dermatology for a full skin check.   - Ambulatory referral to Dermatology    8. Need for vaccination  - Influenza - High Dose (65+) (PF) (IM)        ______________________________________________________________________  Subjective:    Chief Complaint:  Follow up chronic medical conditions    HPI:  Maggi is a 85 y.o. year old woman here to follow up chronic medical conditions. She is new to me, she is an established patient of Dr. Finnegan.     HTN- Well controlled historically. Not currently on medications specifically for this, but takes sotalol for A-fib.     Hypothyroidism- Takes levothyroxine 88 mcg daily. TSH from May within normal limits.     HLD and history of TIA- Takes pravastatin 40 mg daily.     Vitamin D deficiency- Currently using high dose supplementation.     A-fib- On AC with warfarin, sotalol per cardiology.     CKD- Cr 1.5 on last labs with eGFR 31.5, this is slightly worse than typical baseline.    She notes that in the morning she has had some difficulty opening her eyes in the morning. She notes that his will alternate which eye is stuck. She has  some thick mucus occasionally from her eyes. Eyes are itchy. She does have a history of seasonal allergies.     Medications:  Current Outpatient Medications on File Prior to Visit   Medication Sig Dispense Refill    aspirin (ENTERIC COATED ASPIRIN) 81 MG EC tablet Take 1 tablet by mouth nightly.       fluticasone (FLONASE) 50 mcg/actuation nasal spray USE 2 SPRAYS IN EACH NOSTRIL ONE TIME DAILY 48 g 2    levothyroxine (SYNTHROID) 88 MCG tablet TAKE 1 TABLET EVERY DAY BEFORE BREAKFAST 90 tablet 2    pravastatin (PRAVACHOL) 40 MG tablet TAKE 1 TABLET EVERY DAY 90 tablet 3    sotalol (BETAPACE) 80 MG tablet TAKE 1 TABLET EVERY DAY 90 tablet 3    VITAMIN D2 50,000 unit capsule TAKE 1 CAPSULE EVERY WEEK 12 capsule 2    warfarin (COUMADIN) 2.5 MG tablet Take 2.5-5 mg by mouth. This medication is managed by [facility]. Please contact [phone]. See most recent anticoag visit for current coumadin dosing. Current dose:  2.5 mg Mon Wed Fri, 5 mg all other days or as directed by Coumadin Clinic      warfarin (COUMADIN) 5 MG tablet TAKE 1 TABLET EVERY DAY 90 tablet 1    [DISCONTINUED] biotin 2,500 mcg Cap Take 1 tablet by mouth once daily.       [DISCONTINUED] fluoxetine (PROZAC) 10 MG capsule Take 1 capsule (10 mg total) by mouth once daily. 90 capsule 1    [DISCONTINUED] ipratropium (ATROVENT) 42 mcg (0.06 %) nasal spray 2 sprays by Nasal route every 6 (six) hours as needed for Rhinitis. 15 mL 1     No current facility-administered medications on file prior to visit.        Review of Systems:  Review of Systems   Constitutional: Negative for chills and fever.   Respiratory: Negative for shortness of breath and wheezing.    Cardiovascular: Negative for chest pain and leg swelling.   Gastrointestinal: Negative for constipation and diarrhea.   Skin: Negative for rash and wound.        several spots she is concerned about   Neurological: Negative for seizures and syncope.       Past Medical History:  Past  "Medical History:   Diagnosis Date    Acid reflux     Arthritis     Atrial fibrillation     Cardiac pacemaker     Gout     Headache(784.0)     HEARING LOSS     Hyperlipidemia     Hypertension     Hypothyroidism     Polymyalgia rheumatica     remission now    Renal insufficiency     Sinusitis     SOB (shortness of breath)     Stroke     history of TIA    Urinary incontinence     West Nile encephalitis     2002       Objective:    Vitals:  Vitals:    08/31/18 1542   BP: (!) 120/58   Pulse: 62   Temp: 97.9 °F (36.6 °C)   TempSrc: Oral   Weight: 75.4 kg (166 lb 5.4 oz)   Height: 5' 5" (1.651 m)       Physical Exam   Constitutional: She is oriented to person, place, and time. She appears well-developed and well-nourished. No distress.   HENT:   Head:       Mouth/Throat: Oropharynx is clear and moist.   Eyes: No scleral icterus.   Cardiovascular: Normal rate and regular rhythm.   No murmur heard.  regular rhythm today with one irregular beat   Pulmonary/Chest: Effort normal and breath sounds normal. No respiratory distress. She has no wheezes.   Musculoskeletal: She exhibits no edema.   Neurological: She is alert and oriented to person, place, and time.   Skin: Skin is warm and dry.   Psychiatric: She has a normal mood and affect. Her behavior is normal.   Vitals reviewed.      Data:  Previous labs reviewed and pertinent for Cr 1.5.      Neli Perdomo MD  Internal Medicine  "

## 2018-09-03 RX ORDER — SOTALOL HYDROCHLORIDE 80 MG/1
TABLET ORAL
Qty: 90 TABLET | Refills: 3 | Status: ON HOLD | OUTPATIENT
Start: 2018-09-03 | End: 2019-01-04 | Stop reason: HOSPADM

## 2018-10-22 ENCOUNTER — TELEPHONE (OUTPATIENT)
Dept: FAMILY MEDICINE | Facility: CLINIC | Age: 83
End: 2018-10-22

## 2018-10-22 NOTE — TELEPHONE ENCOUNTER
Spoke to patient and scheduled for 11/1/18 at 10 am as patient requested to see only Dr. Finnegan. Patient would also like Dr. Finnegan to be aware as she is on coumadin and states she has poor circulation in legs. Patient has two circular bruises on front of right leg which has been present for over 2 months. Patient is concerned she may need to be seen sooner. Please advise.

## 2018-10-22 NOTE — TELEPHONE ENCOUNTER
----- Message from Lavinia Oh sent at 10/22/2018  9:49 AM CDT -----  Contact: pt  Pt is requesting to speak with a nurse in regards to scheduling an ppt to be seen ASAP.  Pt did not want first available appt on 11-30-18, want to be seen sooner.  Please call to advise  Call back  [phone]  Thanks

## 2018-10-24 ENCOUNTER — OFFICE VISIT (OUTPATIENT)
Dept: FAMILY MEDICINE | Facility: CLINIC | Age: 83
End: 2018-10-24
Payer: MEDICARE

## 2018-10-24 VITALS
DIASTOLIC BLOOD PRESSURE: 70 MMHG | HEIGHT: 65 IN | WEIGHT: 167.44 LBS | BODY MASS INDEX: 27.9 KG/M2 | SYSTOLIC BLOOD PRESSURE: 120 MMHG | HEART RATE: 76 BPM

## 2018-10-24 DIAGNOSIS — I87.2 VENOUS INSUFFICIENCY: Primary | ICD-10-CM

## 2018-10-24 DIAGNOSIS — D48.5 NEOPLASM OF UNCERTAIN BEHAVIOR OF SKIN OF FACE: ICD-10-CM

## 2018-10-24 DIAGNOSIS — R23.3 EASY BRUISING: ICD-10-CM

## 2018-10-24 DIAGNOSIS — Z79.01 CHRONIC ANTICOAGULATION: ICD-10-CM

## 2018-10-24 PROCEDURE — 99213 OFFICE O/P EST LOW 20 MIN: CPT | Mod: PBBFAC,HCWC,PN | Performed by: FAMILY MEDICINE

## 2018-10-24 PROCEDURE — 1101F PT FALLS ASSESS-DOCD LE1/YR: CPT | Mod: CPTII,HCWC,, | Performed by: FAMILY MEDICINE

## 2018-10-24 PROCEDURE — 99999 PR PBB SHADOW E&M-EST. PATIENT-LVL III: CPT | Mod: PBBFAC,HCWC,, | Performed by: FAMILY MEDICINE

## 2018-10-24 PROCEDURE — 99214 OFFICE O/P EST MOD 30 MIN: CPT | Mod: S$PBB,HCWC,, | Performed by: FAMILY MEDICINE

## 2018-10-24 NOTE — PROGRESS NOTES
THIS DOCUMENT WAS MADE IN PART WITH VOICE RECOGNITION SOFTWARE.  OCCASIONALLY THIS SOFTWARE WILL MISINTERPRET WORDS OR PHRASES.      Maggi Fontaine  9/24/1932    Maggi was seen today for bleeding/bruising.    Diagnoses and all orders for this visit:    Venous insufficiency  Recommend compression stockings, low-salt diet, regular light activity.  The patient remains on Coumadin and I have looked to her levels which have been normal.  So there is no acute concern for DVT.    Easy bruising  Again she is on Coumadin which can explain this.  But she should monitor for excessive bleeding or bruising    Chronic anticoagulation  .  As above    Neoplasm of uncertain behavior of skin of face  -     Ambulatory referral to Dermatology    HTN  Stable and controlled      Subjective     Chief Complaint   Patient presents with    Bleeding/Bruising     2 spots on her legs that she has had for 2 months that will not heal.        HPI    Two bruises on her shins, 1 on each side.  About 4 weeks seemed to be slow to healing which is her concern.  She does not remember any trauma but thinks she must have bumped them on something.  Not painful currently.  She is concerned about circulation.  She does have occasional bruising elsewhere but does not have any known bruises elsewhere right now.  She remains on Coumadin for atrial fibrillation    Hypertension is chronic stable and well controlled    Few lesions on her face that seem to be getting bigger.    HPI elements addressed above in the assessment and plan including problems, diagnosis, stability/instability,  improving/worsening, and chronicity will not be duplicated in this section. Any important additional HPI topics will be discussed here if needed.    Active Ambulatory Problems     Diagnosis Date Noted    Hyperlipidemia     Hypothyroidism     Cardiac pacemaker in situ 01/22/2015    Sinoatrial node dysfunction 01/22/2015    Hyperuricemia 07/06/2015    Long term current use of  anticoagulant therapy 08/11/2015    Urinary incontinence 09/13/2016    Essential hypertension 09/13/2016    Paroxysmal atrial fibrillation 09/13/2016    Idiopathic gout 09/13/2016    Chronic kidney disease, stage III (moderate) 09/13/2016    History of TIA (transient ischemic attack) 09/13/2016    Osteopenia 09/13/2016    SNHL (sensorineural hearing loss) 09/13/2016    Aortic atherosclerosis 08/03/2017    Ectatic aorta 08/03/2017    Laryngopharyngeal reflux (LPR) 08/03/2017    Elevated left ventricular end-diastolic pressure (LVEDP) 08/14/2017    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/2018    Chronic pansinusitis 03/26/2018    PAF (paroxysmal atrial fibrillation) 04/05/2018    Vitamin D deficiency 05/28/2018     Resolved Ambulatory Problems     Diagnosis Date Noted    Palpitations 08/27/2013    Benign paroxysmal positional vertigo of left ear 09/13/2016     Past Medical History:   Diagnosis Date    Acid reflux     Arthritis     Atrial fibrillation     Cardiac pacemaker     Gout     Headache(784.0)     HEARING LOSS     Hyperlipidemia     Hypertension     Hypothyroidism     Polymyalgia rheumatica     Renal insufficiency     Sinusitis     SOB (shortness of breath)     Stroke     Urinary incontinence     West Nile encephalitis          Review of Systems   Constitutional: Negative for chills and fever.   Respiratory: Negative for shortness of breath and wheezing.    Cardiovascular: Positive for leg swelling. Negative for chest pain.   Gastrointestinal: Negative for constipation and diarrhea.   Skin: Negative for rash and wound.   Neurological: Negative for seizures and syncope.   Hematological: Bruises/bleeds easily.       Objective     Physical Exam   Constitutional: She is oriented to person, place, and time. She appears well-developed and well-nourished.   HENT:   Head: Normocephalic and atraumatic.   Eyes: No scleral icterus.   Cardiovascular: Normal rate, regular rhythm and normal heart  "sounds.   No murmur heard.  Pulmonary/Chest: Effort normal and breath sounds normal. No respiratory distress.   Neurological: She is alert and oriented to person, place, and time.   Skin: Skin is dry. No rash noted. She is not diaphoretic.   Psychiatric: She has a normal mood and affect. Her behavior is normal.   Vitals reviewed.   Few lesions on her face, 2 were discussed last time, there is also developing lesion on her the left side just lateral to the eyebrow that is non pigmented scaly/stuck on appearance, consistent with an inflamed SK.    There is a bruise on her left shin that looks about 1 to 2-week-old.  Similar 1 on the right shin at about the same height.  There is no visible bruising elsewhere.  No petechiae.  Skin color and tone look normal.  There are mild venous insufficiency changes and she has about 1+ edema of both ankles.  Vitals:    10/24/18 1337   BP: 120/70   Pulse: 76   Weight: 76 kg (167 lb 7 oz)   Height: 5' 5" (1.651 m)       MOST RECENT LABS IN OUR ELECTRONIC MEDICAL RECORD:     Results for orders placed or performed in visit on 09/17/18   POCT INR ([facility] ONLY)   Result Value Ref Range    INR 2.5 (A) 2.0 - 3.0     Acceptable Yes     Protime  2 - 3         "

## 2018-11-05 RX ORDER — WARFARIN SODIUM 5 MG/1
TABLET ORAL
Qty: 90 TABLET | Refills: 1 | Status: SHIPPED | OUTPATIENT
Start: 2018-11-05 | End: 2019-03-25 | Stop reason: SDUPTHER

## 2018-11-30 ENCOUNTER — LAB VISIT (OUTPATIENT)
Dept: LAB | Facility: HOSPITAL | Age: 83
End: 2018-11-30
Attending: INTERNAL MEDICINE
Payer: MEDICARE

## 2018-11-30 DIAGNOSIS — I12.9 PARENCHYMAL RENAL HYPERTENSION: ICD-10-CM

## 2018-11-30 DIAGNOSIS — M15.9 GENERALIZED OSTEOARTHROSIS, INVOLVING MULTIPLE SITES: ICD-10-CM

## 2018-11-30 DIAGNOSIS — M35.00 SJOGREN'S DISEASE: ICD-10-CM

## 2018-11-30 DIAGNOSIS — N18.30 CHRONIC KIDNEY DISEASE, STAGE III (MODERATE): Primary | ICD-10-CM

## 2018-11-30 DIAGNOSIS — R80.9 PROTEINURIA: ICD-10-CM

## 2018-11-30 LAB
ALBUMIN SERPL BCP-MCNC: 3.4 G/DL
ANION GAP SERPL CALC-SCNC: 5 MMOL/L
BACTERIA #/AREA URNS HPF: ABNORMAL /HPF
BASOPHILS # BLD AUTO: 0.03 K/UL
BASOPHILS NFR BLD: 0.6 %
BILIRUB UR QL STRIP: NEGATIVE
BUN SERPL-MCNC: 30 MG/DL
CALCIUM SERPL-MCNC: 9.3 MG/DL
CHLORIDE SERPL-SCNC: 103 MMOL/L
CLARITY UR: CLEAR
CO2 SERPL-SCNC: 24 MMOL/L
COLOR UR: YELLOW
CREAT SERPL-MCNC: 1.4 MG/DL
CREAT UR-MCNC: 204 MG/DL
DIFFERENTIAL METHOD: ABNORMAL
EOSINOPHIL # BLD AUTO: 0.2 K/UL
EOSINOPHIL NFR BLD: 4.7 %
ERYTHROCYTE [DISTWIDTH] IN BLOOD BY AUTOMATED COUNT: 15.7 %
EST. GFR  (AFRICAN AMERICAN): 39.2 ML/MIN/1.73 M^2
EST. GFR  (NON AFRICAN AMERICAN): 34 ML/MIN/1.73 M^2
GLUCOSE SERPL-MCNC: 109 MG/DL
GLUCOSE UR QL STRIP: NEGATIVE
HCT VFR BLD AUTO: 37.2 %
HGB BLD-MCNC: 12.9 G/DL
HGB UR QL STRIP: NEGATIVE
HYALINE CASTS #/AREA URNS LPF: 2 /LPF
IMM GRANULOCYTES # BLD AUTO: 0 K/UL
IMM GRANULOCYTES NFR BLD AUTO: 0 %
KETONES UR QL STRIP: NEGATIVE
LEUKOCYTE ESTERASE UR QL STRIP: NEGATIVE
LYMPHOCYTES # BLD AUTO: 2.5 K/UL
LYMPHOCYTES NFR BLD: 48.9 %
MAGNESIUM SERPL-MCNC: 2.1 MG/DL
MCH RBC QN AUTO: 27.6 PG
MCHC RBC AUTO-ENTMCNC: 34.7 G/DL
MCV RBC AUTO: 80 FL
MICROSCOPIC COMMENT: ABNORMAL
MONOCYTES # BLD AUTO: 0.7 K/UL
MONOCYTES NFR BLD: 13.2 %
NEUTROPHILS # BLD AUTO: 1.7 K/UL
NEUTROPHILS NFR BLD: 32.6 %
NITRITE UR QL STRIP: NEGATIVE
NRBC BLD-RTO: 0 /100 WBC
PH UR STRIP: 6 [PH] (ref 5–8)
PHOSPHATE SERPL-MCNC: 3.4 MG/DL
PLATELET # BLD AUTO: 230 K/UL
PMV BLD AUTO: 12.3 FL
POTASSIUM SERPL-SCNC: 4.4 MMOL/L
PROT UR QL STRIP: ABNORMAL
PROT UR-MCNC: 47 MG/DL
PROT/CREAT UR: 0.23 MG/G{CREAT}
RBC # BLD AUTO: 4.68 M/UL
RBC #/AREA URNS HPF: 0 /HPF (ref 0–4)
SODIUM SERPL-SCNC: 132 MMOL/L
SP GR UR STRIP: >=1.03 (ref 1–1.03)
SQUAMOUS #/AREA URNS HPF: 4 /HPF
URATE SERPL-MCNC: 8.3 MG/DL
URN SPEC COLLECT METH UR: ABNORMAL
WBC # BLD AUTO: 5.09 K/UL
WBC #/AREA URNS HPF: 2 /HPF (ref 0–5)

## 2018-11-30 PROCEDURE — 81000 URINALYSIS NONAUTO W/SCOPE: CPT | Mod: HCWC,PO

## 2018-11-30 PROCEDURE — 84550 ASSAY OF BLOOD/URIC ACID: CPT | Mod: HCWC

## 2018-11-30 PROCEDURE — 84156 ASSAY OF PROTEIN URINE: CPT | Mod: HCWC

## 2018-11-30 PROCEDURE — 85025 COMPLETE CBC W/AUTO DIFF WBC: CPT | Mod: HCWC

## 2018-11-30 PROCEDURE — 80069 RENAL FUNCTION PANEL: CPT | Mod: HCWC

## 2018-11-30 PROCEDURE — 36415 COLL VENOUS BLD VENIPUNCTURE: CPT | Mod: HCWC,PO

## 2018-11-30 PROCEDURE — 83735 ASSAY OF MAGNESIUM: CPT | Mod: HCWC

## 2018-12-13 ENCOUNTER — NURSE TRIAGE (OUTPATIENT)
Dept: ADMINISTRATIVE | Facility: CLINIC | Age: 83
End: 2018-12-13

## 2018-12-13 NOTE — TELEPHONE ENCOUNTER
Spoke with pt says that after 1 hour of cutting her finger with scissors she is still bleeding. I advised for pt to go to ER or urgent care. She says she will go to urgent care.  Pt verbally understood.

## 2018-12-13 NOTE — TELEPHONE ENCOUNTER
Reason for Disposition   Skin is split open or gaping  (or length > 1/2 inch or 12 mm on the skin, 1/4 inch or 6 mm on the face)    Protocols used: ST CUTS AND [id number]    Pt states she cut her L 5th finger with a siccsor and finger will not stop bleeding. Pt states that she has held direct pressure and has raised finger up. Pt states finger is currently wrapped and cannot tell if finger is continuing to bleed. Pt unwrapped finger and states bleeding has stopped. Pt advised per protocol and pt verbalizes understanding.

## 2019-01-14 ENCOUNTER — PATIENT OUTREACH (OUTPATIENT)
Dept: ADMINISTRATIVE | Facility: HOSPITAL | Age: 84
End: 2019-01-14

## 2019-01-14 NOTE — LETTER
January 22, 2019    Maggi Fontaine  [address] [facility] [address]             [facility]  [address]  [facility] [address]  Phone: [phone] Dear Ms. Fontaine:    We have tried to reach you by [facility] email unsuccessfully.      [facility] is committed to your overall health.  To help you get the most out of each of your visits, we will review your information to make sure you are up to date on all of your recommended tests and or procedures.       Dr. Negro Finnegan MD has found that you may be due for:     Lipid panel     If you have had any of the above done at another facility, please bring the records or information with you so that your record at [facility] will be complete and up to date.     If you have not had any of these tests or procedures done recently and would like to complete this testing before your appointment on 1/28/19 at 11:20 am with Negro Finnegan MD please call [phone] or send a message through your [facility] portal to your provider's office.     If you are currently taking medication, please bring it with you to your appointment for review.     Also, if you have any type of Advanced Directives, please bring them with you to your office visit so we may scan them into your chart.     Thanks,     Boy Cabrera LPN Clinical Care Coordinator   [facility]   [address] [facility] [address]   [place] [id number]   [phone] (p)   [phone] (f)

## 2019-01-22 NOTE — PROGRESS NOTES
Portal outreach un-read by patient.  Outreach mailed today  Health Maintenance Due   Topic Date Due    Lipid Panel  08/04/2018

## 2019-01-28 ENCOUNTER — OFFICE VISIT (OUTPATIENT)
Dept: FAMILY MEDICINE | Facility: CLINIC | Age: 84
End: 2019-01-28
Payer: MEDICARE

## 2019-01-28 VITALS
DIASTOLIC BLOOD PRESSURE: 66 MMHG | WEIGHT: 167.44 LBS | HEART RATE: 68 BPM | TEMPERATURE: 98 F | HEIGHT: 65 IN | RESPIRATION RATE: 16 BRPM | BODY MASS INDEX: 27.9 KG/M2 | SYSTOLIC BLOOD PRESSURE: 136 MMHG

## 2019-01-28 DIAGNOSIS — I70.0 AORTIC ATHEROSCLEROSIS: ICD-10-CM

## 2019-01-28 DIAGNOSIS — N18.30 CHRONIC KIDNEY DISEASE, STAGE III (MODERATE): ICD-10-CM

## 2019-01-28 DIAGNOSIS — E78.2 MIXED HYPERLIPIDEMIA: ICD-10-CM

## 2019-01-28 DIAGNOSIS — K59.00 CONSTIPATION, UNSPECIFIED CONSTIPATION TYPE: ICD-10-CM

## 2019-01-28 DIAGNOSIS — I48.0 PAF (PAROXYSMAL ATRIAL FIBRILLATION): ICD-10-CM

## 2019-01-28 DIAGNOSIS — I77.819 ECTATIC AORTA: ICD-10-CM

## 2019-01-28 DIAGNOSIS — E03.9 ACQUIRED HYPOTHYROIDISM: ICD-10-CM

## 2019-01-28 DIAGNOSIS — I10 ESSENTIAL HYPERTENSION: Primary | ICD-10-CM

## 2019-01-28 PROCEDURE — 99999 PR PBB SHADOW E&M-EST. PATIENT-LVL III: ICD-10-PCS | Mod: PBBFAC,HCNC,, | Performed by: FAMILY MEDICINE

## 2019-01-28 PROCEDURE — 99214 OFFICE O/P EST MOD 30 MIN: CPT | Mod: HCNC,S$GLB,, | Performed by: FAMILY MEDICINE

## 2019-01-28 PROCEDURE — 99999 PR PBB SHADOW E&M-EST. PATIENT-LVL III: CPT | Mod: PBBFAC,HCNC,, | Performed by: FAMILY MEDICINE

## 2019-01-28 PROCEDURE — 99214 PR OFFICE/OUTPT VISIT, EST, LEVL IV, 30-39 MIN: ICD-10-PCS | Mod: HCNC,S$GLB,, | Performed by: FAMILY MEDICINE

## 2019-01-28 PROCEDURE — 1101F PR PT FALLS ASSESS DOC 0-1 FALLS W/OUT INJ PAST YR: ICD-10-PCS | Mod: HCNC,CPTII,S$GLB, | Performed by: FAMILY MEDICINE

## 2019-01-28 PROCEDURE — 1101F PT FALLS ASSESS-DOCD LE1/YR: CPT | Mod: HCNC,CPTII,S$GLB, | Performed by: FAMILY MEDICINE

## 2019-01-28 NOTE — PROGRESS NOTES
THIS DOCUMENT WAS MADE IN PART WITH VOICE RECOGNITION SOFTWARE.  OCCASIONALLY THIS SOFTWARE WILL MISINTERPRET WORDS OR PHRASES.      Maggi Fontaine  9/24/1932    Maggi was seen today for venous insufficiency.    Diagnoses and all orders for this visit:    Essential hypertension  -     Comprehensive metabolic panel; Future  Chronic stable controlled.  Medications reviewed and discussed with patient    Mixed hyperlipidemia  -     Lipid panel; Future  This chronic condition, condition has been reviewed and discussed, it is currently stable.    Chronic kidney disease, stage III (moderate)  Stable she will continue to follow with Nephrology    Acquired hypothyroidism  -     TSH; Future  Due for labs, this will be arranged.    PAF (paroxysmal atrial fibrillation)  -     CBC auto differential; Future  We did discuss this.  She asked me what the next step would be, i.e. defer this to Cardiology but informed her that if medications are not working she may have to reconsider an ablation.  She is convinced this caused shingles.  I can't prove this in the of a chance of that happening again after procedure is low but since I can't tell her the risk is 0 she likely will not want to proceed.  Ectatic aorta  Aortic atherosclerosis  Stable continue current medications and statin medicine  Constipation, unspecified constipation type  add miralax daily, dulcolax spp, qod x 2 - 3 initially    Also education regarding the shingles vaccination.  It is not clear that having a vaccination within a few years of an episode will provide any benefit but it is not contraindicated either and since she is very concerned about I think it would be best for her to proceed.  I advised her she would have to get this at a pharmacy her Medicare.  And advised her there is a shortage so she may have to wait for this    Subjective     Chief Complaint   Patient presents with    Venous Insufficiency     follow up        HPI    Concerned about shingles,  'again',  States not willing to do ablation, fearing shingles again    Constipation, 'from amiodarone'    HPI elements addressed above in the assessment and plan including problems, diagnosis, stability/instability,  improving/worsening, and chronicity will not be duplicated in this section. Any important additional HPI topics will be discussed here if needed.    Active Ambulatory Problems     Diagnosis Date Noted    Hyperlipidemia     Hypothyroidism     Cardiac pacemaker in situ 01/22/2015    Sinoatrial node dysfunction 01/22/2015    Hyperuricemia 07/06/2015    Long term current use of anticoagulant therapy 08/11/2015    Urinary incontinence 09/13/2016    Essential hypertension 09/13/2016    Paroxysmal atrial fibrillation 09/13/2016    Idiopathic gout 09/13/2016    Chronic kidney disease, stage III (moderate) 09/13/2016    History of TIA (transient ischemic attack) 09/13/2016    Osteopenia 09/13/2016    SNHL (sensorineural hearing loss) 09/13/2016    Aortic atherosclerosis 08/03/2017    Ectatic aorta 08/03/2017    Laryngopharyngeal reflux (LPR) 08/03/2017    Elevated left ventricular end-diastolic pressure (LVEDP) 08/14/2017    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/2018    Chronic pansinusitis 03/26/2018    PAF (paroxysmal atrial fibrillation) 04/05/2018    Vitamin D deficiency 05/28/2018     Resolved Ambulatory Problems     Diagnosis Date Noted    Palpitations 08/27/2013    Benign paroxysmal positional vertigo of left ear 09/13/2016     Past Medical History:   Diagnosis Date    Acid reflux     Arthritis     Atrial fibrillation     Cardiac pacemaker     Gout     Headache(784.0)     HEARING LOSS     Hyperlipidemia     Hypertension     Hypothyroidism     Polymyalgia rheumatica     Renal insufficiency     Sinusitis     SOB (shortness of breath)     Stroke     Urinary incontinence     West Nile encephalitis          Review of Systems   Constitutional: Negative for unexpected weight  "change.   Eyes: Negative for visual disturbance.   Respiratory: Negative for shortness of breath.    Cardiovascular: Negative for chest pain and leg swelling.   Gastrointestinal: Positive for constipation. Negative for abdominal pain, blood in stool, diarrhea and vomiting.   Musculoskeletal: Positive for arthralgias.       Objective     Physical Exam   Constitutional: She is oriented to person, place, and time. She appears well-developed and well-nourished.   HENT:   Head: Normocephalic and atraumatic.   Eyes: No scleral icterus.   Cardiovascular: Normal rate, regular rhythm and normal heart sounds.   No murmur heard.  Regular today   Pulmonary/Chest: Effort normal and breath sounds normal. No respiratory distress.   Abdominal: Soft. Bowel sounds are normal. She exhibits no distension. There is no tenderness.   Neurological: She is alert and oriented to person, place, and time.   Skin: Skin is dry. No rash noted. She is not diaphoretic.   Psychiatric: She has a normal mood and affect. Her behavior is normal.   Vitals reviewed.    Vitals:    01/28/19 1120   BP: 136/66   BP Location: Left arm   Patient Position: Sitting   BP Method: X-Large (Automatic)   Pulse: 68   Resp: 16   Temp: 98.2 °F (36.8 °C)   TempSrc: Oral   Weight: 76 kg (167 lb 7 oz)   Height: 5' 5" (1.651 m)       MOST RECENT LABS IN OUR ELECTRONIC MEDICAL RECORD:     Results for orders placed or performed during the hospital encounter of 01/03/19   Protime-INR   Result Value Ref Range    PT 23.6 (H) 11.8 - 14.7 sec    INR 2.2    Basic metabolic panel   Result Value Ref Range    Sodium 139 136 - 145 mmol/L    Potassium 5.0 3.5 - 5.1 mmol/L    Chloride 113 (H) 95 - 110 mmol/L    CO2 20 (L) 22 - 31 mmol/L    Glucose 85 70 - 110 mg/dL    BUN, Bld 25 (H) 7 - 18 mg/dL    Creatinine 1.20 0.50 - 1.40 mg/dL    Calcium 8.9 8.4 - 10.2 mg/dL    Anion Gap 6 (L) 8 - 16 mmol/L    eGFR if African American 47 (A) >60 mL/min/1.73 m^2    eGFR if non  41 (A) " >60 mL/min/1.73 m^2   Magnesium   Result Value Ref Range    Magnesium 2.2 1.6 - 2.6 mg/dL   Protime-INR   Result Value Ref Range    PT 24.8 (H) 11.8 - 14.7 sec    INR 2.3    Transesophageal echo (CONY) with possible cardioversion   Result Value Ref Range    BSA 1.85 m2

## 2019-01-31 ENCOUNTER — TELEPHONE (OUTPATIENT)
Dept: FAMILY MEDICINE | Facility: CLINIC | Age: 84
End: 2019-01-31

## 2019-02-05 ENCOUNTER — LAB VISIT (OUTPATIENT)
Dept: LAB | Facility: HOSPITAL | Age: 84
End: 2019-02-05
Attending: FAMILY MEDICINE
Payer: MEDICARE

## 2019-02-05 ENCOUNTER — TELEPHONE (OUTPATIENT)
Dept: FAMILY MEDICINE | Facility: CLINIC | Age: 84
End: 2019-02-05

## 2019-02-05 DIAGNOSIS — E03.9 ACQUIRED HYPOTHYROIDISM: ICD-10-CM

## 2019-02-05 DIAGNOSIS — E78.2 MIXED HYPERLIPIDEMIA: ICD-10-CM

## 2019-02-05 DIAGNOSIS — I10 ESSENTIAL HYPERTENSION: ICD-10-CM

## 2019-02-05 DIAGNOSIS — I48.0 PAF (PAROXYSMAL ATRIAL FIBRILLATION): ICD-10-CM

## 2019-02-05 LAB
ALBUMIN SERPL BCP-MCNC: 3.3 G/DL
ALP SERPL-CCNC: 78 U/L
ALT SERPL W/O P-5'-P-CCNC: 16 U/L
ANION GAP SERPL CALC-SCNC: 9 MMOL/L
AST SERPL-CCNC: 21 U/L
BASOPHILS # BLD AUTO: 0.04 K/UL
BASOPHILS NFR BLD: 0.9 %
BILIRUB SERPL-MCNC: 0.8 MG/DL
BUN SERPL-MCNC: 25 MG/DL
CALCIUM SERPL-MCNC: 9.2 MG/DL
CHLORIDE SERPL-SCNC: 107 MMOL/L
CHOLEST SERPL-MCNC: 139 MG/DL
CHOLEST/HDLC SERPL: 2.4 {RATIO}
CO2 SERPL-SCNC: 24 MMOL/L
CREAT SERPL-MCNC: 1.6 MG/DL
DIFFERENTIAL METHOD: ABNORMAL
EOSINOPHIL # BLD AUTO: 0.2 K/UL
EOSINOPHIL NFR BLD: 3.8 %
ERYTHROCYTE [DISTWIDTH] IN BLOOD BY AUTOMATED COUNT: 15.3 %
EST. GFR  (AFRICAN AMERICAN): 33.4 ML/MIN/1.73 M^2
EST. GFR  (NON AFRICAN AMERICAN): 29 ML/MIN/1.73 M^2
GLUCOSE SERPL-MCNC: 102 MG/DL
HCT VFR BLD AUTO: 35.6 %
HDLC SERPL-MCNC: 59 MG/DL
HDLC SERPL: 42.4 %
HGB BLD-MCNC: 12 G/DL
IMM GRANULOCYTES # BLD AUTO: 0 K/UL
IMM GRANULOCYTES NFR BLD AUTO: 0 %
LDLC SERPL CALC-MCNC: 64 MG/DL
LYMPHOCYTES # BLD AUTO: 2.1 K/UL
LYMPHOCYTES NFR BLD: 45.3 %
MCH RBC QN AUTO: 26.5 PG
MCHC RBC AUTO-ENTMCNC: 33.7 G/DL
MCV RBC AUTO: 79 FL
MONOCYTES # BLD AUTO: 0.6 K/UL
MONOCYTES NFR BLD: 13.7 %
NEUTROPHILS # BLD AUTO: 1.7 K/UL
NEUTROPHILS NFR BLD: 36.3 %
NONHDLC SERPL-MCNC: 80 MG/DL
NRBC BLD-RTO: 0 /100 WBC
PLATELET # BLD AUTO: 235 K/UL
PMV BLD AUTO: 11.3 FL
POTASSIUM SERPL-SCNC: 4.3 MMOL/L
PROT SERPL-MCNC: 6.5 G/DL
RBC # BLD AUTO: 4.52 M/UL
SODIUM SERPL-SCNC: 140 MMOL/L
T4 FREE SERPL-MCNC: 1.38 NG/DL
TRIGL SERPL-MCNC: 80 MG/DL
TSH SERPL DL<=0.005 MIU/L-ACNC: 4.45 UIU/ML
WBC # BLD AUTO: 4.68 K/UL

## 2019-02-05 PROCEDURE — 84439 ASSAY OF FREE THYROXINE: CPT | Mod: HCNC

## 2019-02-05 PROCEDURE — 85025 COMPLETE CBC W/AUTO DIFF WBC: CPT | Mod: HCNC

## 2019-02-05 PROCEDURE — 36415 COLL VENOUS BLD VENIPUNCTURE: CPT | Mod: HCNC,PO

## 2019-02-05 PROCEDURE — 80053 COMPREHEN METABOLIC PANEL: CPT | Mod: HCNC

## 2019-02-05 PROCEDURE — 80061 LIPID PANEL: CPT | Mod: HCNC

## 2019-02-05 PROCEDURE — 84443 ASSAY THYROID STIM HORMONE: CPT | Mod: HCNC

## 2019-02-15 DIAGNOSIS — E55.9 HYPOVITAMINOSIS D: ICD-10-CM

## 2019-02-15 RX ORDER — ERGOCALCIFEROL 1.25 MG/1
CAPSULE ORAL
Qty: 12 CAPSULE | Refills: 2 | Status: SHIPPED | OUTPATIENT
Start: 2019-02-15 | End: 2019-09-06 | Stop reason: SDUPTHER

## 2019-03-19 ENCOUNTER — TELEPHONE (OUTPATIENT)
Dept: FAMILY MEDICINE | Facility: CLINIC | Age: 84
End: 2019-03-19

## 2019-03-19 NOTE — TELEPHONE ENCOUNTER
----- Message from Toribio Solomon sent at 3/19/2019  4:47 PM CDT -----  Contact: Patient  Type: Needs Medical Advice    Who Called:  Patient  Best Call Back Number: [phone]  Additional Information: Patient would like to discuss active medications. Please call to advise. Thanks!

## 2019-03-19 NOTE — TELEPHONE ENCOUNTER
Medication List with Changes/Refills   Current Medications    AMIODARONE (PACERONE) 100 MG TAB    Take 50 mg by mouth every evening.     ASPIRIN (ENTERIC COATED ASPIRIN) 81 MG EC TABLET    Take 1 tablet by mouth nightly.     LEVOTHYROXINE (SYNTHROID) 88 MCG TABLET    TAKE 1 TABLET EVERY DAY BEFORE BREAKFAST    PRAVASTATIN (PRAVACHOL) 40 MG TABLET    TAKE 1 TABLET EVERY DAY    VITAMIN D2 50,000 UNIT CAPSULE    TAKE 1 CAPSULE EVERY WEEK    WARFARIN (COUMADIN) 2.5 MG TABLET    Take 2.5-5 mg by mouth. This medication is managed by [facility]. Please contact [phone]. See most recent anticoag visit for current coumadin dosing. Current dose:  2.5 mg Mon Wed Fri, 5 mg all other days or as directed by Coumadin Clinic    WARFARIN (COUMADIN) 5 MG TABLET    TAKE 1 TABLET EVERY DAY     Reviewed list with pt. Pt states understanding.

## 2019-03-26 RX ORDER — WARFARIN SODIUM 5 MG/1
TABLET ORAL
Qty: 90 TABLET | Refills: 1 | Status: SHIPPED | OUTPATIENT
Start: 2019-03-26 | End: 2019-04-24

## 2019-04-08 ENCOUNTER — TELEPHONE (OUTPATIENT)
Dept: FAMILY MEDICINE | Facility: CLINIC | Age: 84
End: 2019-04-08

## 2019-04-08 ENCOUNTER — OFFICE VISIT (OUTPATIENT)
Dept: FAMILY MEDICINE | Facility: CLINIC | Age: 84
End: 2019-04-08
Payer: MEDICARE

## 2019-04-08 VITALS
WEIGHT: 164 LBS | SYSTOLIC BLOOD PRESSURE: 134 MMHG | DIASTOLIC BLOOD PRESSURE: 82 MMHG | HEIGHT: 65 IN | HEART RATE: 78 BPM | OXYGEN SATURATION: 99 % | BODY MASS INDEX: 27.32 KG/M2

## 2019-04-08 DIAGNOSIS — E03.9 ACQUIRED HYPOTHYROIDISM: ICD-10-CM

## 2019-04-08 DIAGNOSIS — I49.5 SINOATRIAL NODE DYSFUNCTION: ICD-10-CM

## 2019-04-08 DIAGNOSIS — E78.2 MIXED HYPERLIPIDEMIA: ICD-10-CM

## 2019-04-08 DIAGNOSIS — Z00.00 ENCOUNTER FOR PREVENTIVE HEALTH EXAMINATION: Primary | ICD-10-CM

## 2019-04-08 DIAGNOSIS — I48.0 PAROXYSMAL ATRIAL FIBRILLATION: ICD-10-CM

## 2019-04-08 DIAGNOSIS — R94.30 ELEVATED LEFT VENTRICULAR END-DIASTOLIC PRESSURE (LVEDP): ICD-10-CM

## 2019-04-08 DIAGNOSIS — R29.6 FREQUENT FALLS: ICD-10-CM

## 2019-04-08 DIAGNOSIS — Z79.01 LONG TERM CURRENT USE OF ANTICOAGULANT THERAPY: ICD-10-CM

## 2019-04-08 DIAGNOSIS — I70.0 AORTIC ATHEROSCLEROSIS: ICD-10-CM

## 2019-04-08 DIAGNOSIS — Z95.0 CARDIAC PACEMAKER IN SITU: ICD-10-CM

## 2019-04-08 DIAGNOSIS — N18.30 CHRONIC KIDNEY DISEASE, STAGE III (MODERATE): ICD-10-CM

## 2019-04-08 DIAGNOSIS — I10 ESSENTIAL HYPERTENSION: ICD-10-CM

## 2019-04-08 DIAGNOSIS — I77.819 ECTATIC AORTA: ICD-10-CM

## 2019-04-08 DIAGNOSIS — Z79.899 ON AMIODARONE THERAPY: ICD-10-CM

## 2019-04-08 PROBLEM — N18.4 CHRONIC KIDNEY DISEASE, STAGE IV (SEVERE): Status: ACTIVE | Noted: 2019-04-08

## 2019-04-08 PROCEDURE — 99499 UNLISTED E&M SERVICE: CPT | Mod: HCNC,S$GLB,, | Performed by: NURSE PRACTITIONER

## 2019-04-08 PROCEDURE — G0439 PPPS, SUBSEQ VISIT: HCPCS | Mod: HCNC,S$GLB,, | Performed by: NURSE PRACTITIONER

## 2019-04-08 PROCEDURE — 99499 RISK ADDL DX/OHS AUDIT: ICD-10-PCS | Mod: HCNC,S$GLB,, | Performed by: NURSE PRACTITIONER

## 2019-04-08 PROCEDURE — G0439 PR MEDICARE ANNUAL WELLNESS SUBSEQUENT VISIT: ICD-10-PCS | Mod: HCNC,S$GLB,, | Performed by: NURSE PRACTITIONER

## 2019-04-08 PROCEDURE — 99999 PR PBB SHADOW E&M-EST. PATIENT-LVL IV: ICD-10-PCS | Mod: PBBFAC,HCNC,, | Performed by: NURSE PRACTITIONER

## 2019-04-08 PROCEDURE — 99999 PR PBB SHADOW E&M-EST. PATIENT-LVL IV: CPT | Mod: PBBFAC,HCNC,, | Performed by: NURSE PRACTITIONER

## 2019-04-08 NOTE — PATIENT INSTRUCTIONS
Counseling and Referral of Other Preventative  (Italic type indicates deductible and co-insurance are waived)    Patient Name: Maggi Fontaine  Today's Date: 4/8/2019    Health Maintenance       Date Due Completion Date    Lipid Panel 02/05/2020 2/5/2019    TETANUS VACCINE 02/23/2029 2/23/2019        No orders of the defined types were placed in this encounter.    The following information is provided to all patients.  This information is to help you find resources for any of the problems found today that may be affecting your health:                Living healthy guide: www.[facility].[place].[facility]      Understanding Diabetes: www.diabetes.org      Eating healthy: www.cdc.gov/healthyweight      CDC home safety checklist: www.cdc.gov/steadi/patient.html      Agency on Aging: www.goea.[place].[facility]      Alcoholics anonymous (AA): www.aa.org      Physical Activity: www.vy.nih.gov/jn9hjkr      Tobacco use: www.quitwithusla.org

## 2019-04-08 NOTE — TELEPHONE ENCOUNTER
Patient needs medication clarification on  Amiodarone.     She is taking a lowered dose but in the chart it states to discontinue the medication (3/11/19).   She wants to stop. Can you please clarify if she is to stop the use of it or continue the medication? And if she is suppose to continue, what is the dose.   Thank you,   Nay Allison, NP

## 2019-04-08 NOTE — PROGRESS NOTES
"Maggi Fontaine presented for a  Medicare AWV and comprehensive Health Risk Assessment today. The following components were reviewed and updated:    · Medical history  · Family History  · Social history  · Allergies and Current Medications  · Health Risk Assessment  · Health Maintenance  · Care Team     ** See Completed Assessments for Annual Wellness Visit within the encounter summary.**     The following assessments were completed:  · Living Situation  · CAGE  · Depression Screening  · Timed Get Up and Go  · Whisper Test  · Cognitive Function Screening      · Nutrition Screening  · ADL Screening  · PAQ Screening    Vitals:    04/08/19 1115   BP: 134/82   BP Location: Left arm   Patient Position: Sitting   BP Method: Medium (Manual)   Pulse: 78   SpO2: 99%   Weight: 74.4 kg (164 lb 0.4 oz)   Height: 5' 5" (1.651 m)     Body mass index is 27.29 kg/m².  Physical Exam   Constitutional: She is oriented to person, place, and time. She appears well-nourished.   Cardiovascular: Normal rate, regular rhythm, normal heart sounds and intact distal pulses.   Pulmonary/Chest: Effort normal and breath sounds normal.   Neurological: She is alert and oriented to person, place, and time.   Skin: Skin is warm and dry.   Vitals reviewed.        Diagnoses and health risks identified today and associated recommendations/orders:    1. Encounter for preventive health examination  Reviewed and discussed health maintenance.      2. Frequent falls  Safety issues discussed and reviewed    3. On amiodarone therapy  Message sent to cardiology. Was discontinued in chart, but patient reports taking decreased dose. Pt would like to stop. Reports not tolerating side effects    4. Mixed hyperlipidemia  Stable- continue current treatment and follow up routinely with PCP and cardiology ()    5. Sinoatrial node dysfunction  Stable- continue current treatment and follow up routinely with PCP and cardiology ()    6. Cardiac pacemaker in " situ  Stable- continue current treatment and follow up routinely with PCP and cardiology ()    7. Elevated left ventricular end-diastolic pressure (LVEDP)  Stable- continue current treatment and follow up routinely with PCP and cardiology ()    8. Ectatic aorta  Stable- continue current treatment and follow up routinely with PCP and cardiology ()    9. Aortic atherosclerosis  Stable- continue current treatment and follow up routinely with PCP and cardiology ()    10. Paroxysmal atrial fibrillation  Stable- continue current treatment and follow up routinely with PCP and cardiology ()    11. Essential hypertension  Stable- continue current treatment and follow up routinely with PCP and cardiology ()    12. Long term current use of anticoagulant therapy  Stable- continue current treatment and follow up routinely with PCP and cardiology ()    13. Acquired hypothyroidism  Stable- continue current treatment and follow up routinely with PCP     14. Chronic kidney disease, stage III (moderate)  Stable- continue current treatment and follow up routinely with PCP   Avoid NSAIDs and increase fluids    Provided Maggi with a 5-10 year written screening schedule and personal prevention plan. Recommendations were developed using the USPSTF age appropriate recommendations. Education, counseling, and referrals were provided as needed. After Visit Summary printed and given to patient which includes a list of additional screenings\tests needed.    Nay Allison NP

## 2019-04-08 NOTE — TELEPHONE ENCOUNTER
Please call patient and let her Know that 's office responded to the message.(see below message)   Ok to STOP Amiodarone. Follow up with PCP and cardiology as planned

## 2019-04-08 NOTE — TELEPHONE ENCOUNTER
Amiodarone was discontinued on 3/11/2019, per patient request.  Medication list updated and message from PCP has been responded to as well.      - LILA Mata, APRN

## 2019-04-16 ENCOUNTER — TELEPHONE (OUTPATIENT)
Dept: FAMILY MEDICINE | Facility: CLINIC | Age: 84
End: 2019-04-16

## 2019-04-16 DIAGNOSIS — S42.412S CLOSED SUPRACONDYLAR FRACTURE OF LEFT HUMERUS, SEQUELA: Primary | ICD-10-CM

## 2019-04-16 NOTE — TELEPHONE ENCOUNTER
Patient states that she broke her arm. Asking to have order for HH. States that she is immobile and needs assistance. Patient states that you can read the chart to see what exactly happened to her. States that you have many patients that are [employer] so pick a HH that is good that will take her insurance. Advised that she will need to contact her insurance for that information. She states that you will know and this needs to happen quickly.

## 2019-04-16 NOTE — TELEPHONE ENCOUNTER
Order entered.    Explain that Medicare requires me to see valentina within 30 days.  This is their rule and not mine.  So please help her schedule a follow-up.  If she does not schedule follow-up please let her know that Medicare may refuse to pay home health company

## 2019-04-16 NOTE — TELEPHONE ENCOUNTER
----- Message from Juventino Lujan sent at 4/16/2019  8:49 AM CDT -----  Contact: patient  Type: Needs Medical Advice    Who Called:  patient   Symptoms (please be specific):    How long has patient had these symptoms:    Pharmacy name and phone #:    Best Call Back Number: [phone]  Additional Information: requesting order home healthcare patient has broken her left arm and it is in placed where it can't be casted near the shoulder. Requesting a call back

## 2019-04-29 ENCOUNTER — OFFICE VISIT (OUTPATIENT)
Dept: FAMILY MEDICINE | Facility: CLINIC | Age: 84
End: 2019-04-29
Payer: MEDICARE

## 2019-04-29 VITALS
HEIGHT: 63 IN | BODY MASS INDEX: 28.8 KG/M2 | OXYGEN SATURATION: 98 % | DIASTOLIC BLOOD PRESSURE: 68 MMHG | HEART RATE: 72 BPM | WEIGHT: 162.56 LBS | SYSTOLIC BLOOD PRESSURE: 150 MMHG | TEMPERATURE: 99 F

## 2019-04-29 DIAGNOSIS — I48.0 PAROXYSMAL ATRIAL FIBRILLATION: ICD-10-CM

## 2019-04-29 DIAGNOSIS — E79.0 HYPERURICEMIA: ICD-10-CM

## 2019-04-29 DIAGNOSIS — E03.9 ACQUIRED HYPOTHYROIDISM: ICD-10-CM

## 2019-04-29 DIAGNOSIS — Z79.01 CHRONIC ANTICOAGULATION: ICD-10-CM

## 2019-04-29 DIAGNOSIS — N18.30 CHRONIC KIDNEY DISEASE, STAGE III (MODERATE): ICD-10-CM

## 2019-04-29 DIAGNOSIS — I10 ESSENTIAL HYPERTENSION: Primary | ICD-10-CM

## 2019-04-29 DIAGNOSIS — M10.00 IDIOPATHIC GOUT, UNSPECIFIED CHRONICITY, UNSPECIFIED SITE: ICD-10-CM

## 2019-04-29 PROCEDURE — 99214 OFFICE O/P EST MOD 30 MIN: CPT | Mod: S$GLB,,, | Performed by: FAMILY MEDICINE

## 2019-04-29 PROCEDURE — 1101F PT FALLS ASSESS-DOCD LE1/YR: CPT | Mod: CPTII,S$GLB,, | Performed by: FAMILY MEDICINE

## 2019-04-29 PROCEDURE — 99999 PR PBB SHADOW E&M-EST. PATIENT-LVL III: ICD-10-PCS | Mod: PBBFAC,,, | Performed by: FAMILY MEDICINE

## 2019-04-29 PROCEDURE — 99499 RISK ADDL DX/OHS AUDIT: ICD-10-PCS | Mod: HCNC,S$GLB,, | Performed by: FAMILY MEDICINE

## 2019-04-29 PROCEDURE — 99214 PR OFFICE/OUTPT VISIT, EST, LEVL IV, 30-39 MIN: ICD-10-PCS | Mod: S$GLB,,, | Performed by: FAMILY MEDICINE

## 2019-04-29 PROCEDURE — 99999 PR PBB SHADOW E&M-EST. PATIENT-LVL III: CPT | Mod: PBBFAC,,, | Performed by: FAMILY MEDICINE

## 2019-04-29 PROCEDURE — 1101F PR PT FALLS ASSESS DOC 0-1 FALLS W/OUT INJ PAST YR: ICD-10-PCS | Mod: CPTII,S$GLB,, | Performed by: FAMILY MEDICINE

## 2019-04-29 PROCEDURE — 99499 UNLISTED E&M SERVICE: CPT | Mod: HCNC,S$GLB,, | Performed by: FAMILY MEDICINE

## 2019-04-29 NOTE — PROGRESS NOTES
THIS DOCUMENT WAS MADE IN PART WITH VOICE RECOGNITION SOFTWARE.  OCCASIONALLY THIS SOFTWARE WILL MISINTERPRET WORDS OR PHRASES.      Maggi Fontaine  9/24/1932    Maggi was seen today for follow-up.    Diagnoses and all orders for this visit:    Essential hypertension  Elevated today but patient is having significant pain because of her injuries.  There for no medication adjustments but will repeat at her next appointment in 2 months  And ask home health nurse to monitor as well.    Chronic kidney disease, stage III (moderate)  Stable    Acquired hypothyroidism  Stable  Hyperuricemia  Idiopathic gout, unspecified chronicity, unspecified site  Both have been stable    Paroxysmal atrial fibrillation  Chronic anticoagulation  She will continue to work with home health and therapy to reduce risk of falls.  She will discussed medication concerns regarding amiodarone with her cardiologist.  She remains on Coumadin under Cardiology direction for atrial fibrillation    Left humerus fracture, per Orthopedics.  Right ankle sprain, will see if home health/therapy can help with this as well as strength, conditioning, fall risk reduction     HH/ PT to look at right ankle, gait training, balance      Subjective     Chief Complaint   Patient presents with    Follow-up       HPI    Follow-up multiple topics including recent fall.  She tripped.  Multiple injuries most significant left humeral head/neck fracture.  She has seen orthopedics.  She also suffered a right ankle injury and a left hand injury, no apparent fractures.  Also addressed by Orthopedics.    On tramadol, limiting b/c constipation    She has atrial fibrillation and remains on anticoagulation with Coumadin.  She reports she tripped, she did not have a syncopal episode.   She feels related to amiodarone, she contacted cardiology and this was stopped.        HPI elements addressed above in the assessment and plan including problems, diagnosis, stability/instability,   improving/worsening, and chronicity will not be duplicated in this section. Any important additional HPI topics will be discussed here if needed.    Active Ambulatory Problems     Diagnosis Date Noted    Hyperlipidemia     Hypothyroidism     Cardiac pacemaker in situ 01/22/2015    Sinoatrial node dysfunction 01/22/2015    Hyperuricemia 07/06/2015    Long term current use of anticoagulant therapy 08/11/2015    Urinary incontinence 09/13/2016    Essential hypertension 09/13/2016    Paroxysmal atrial fibrillation 09/13/2016    Idiopathic gout 09/13/2016    Chronic kidney disease, stage III (moderate) 09/13/2016    History of TIA (transient ischemic attack) 09/13/2016    Osteopenia 09/13/2016    SNHL (sensorineural hearing loss) 09/13/2016    Aortic atherosclerosis 08/03/2017    Ectatic aorta 08/03/2017    Laryngopharyngeal reflux (LPR) 08/03/2017    Elevated left ventricular end-diastolic pressure (LVEDP) 08/14/2017    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/2018    Chronic pansinusitis 03/26/2018    Vitamin D deficiency 05/28/2018     Resolved Ambulatory Problems     Diagnosis Date Noted    Palpitations 08/27/2013    Benign paroxysmal positional vertigo of left ear 09/13/2016     Past Medical History:   Diagnosis Date    Acid reflux     Arthritis     Atrial fibrillation     Cardiac pacemaker     Gout     Headache(784.0)     HEARING LOSS     Hyperlipidemia     Hypertension     Hypothyroidism     Polymyalgia rheumatica     Renal insufficiency     Sinusitis     SOB (shortness of breath)     Stroke     Urinary incontinence     West Nile encephalitis          Review of Systems   Constitutional: Negative for unexpected weight change.   Eyes: Negative for visual disturbance.   Respiratory: Negative for shortness of breath.    Cardiovascular: Positive for leg swelling. Negative for chest pain.   Gastrointestinal: Positive for constipation. Negative for abdominal pain, blood in stool, diarrhea  "and vomiting.   Musculoskeletal: Positive for arthralgias and gait problem.   Neurological: Positive for weakness.       Objective     Physical Exam   Constitutional: She is oriented to person, place, and time. She appears well-developed and well-nourished.   HENT:   Head: Normocephalic and atraumatic.   Right Ear: External ear normal.   Left Ear: External ear normal.   Bruising lateral to left eye, no swelling now   Eyes: No scleral icterus.   Cardiovascular: Normal rate and normal heart sounds. An irregular rhythm present.   No murmur heard.  Trace ankle edema on the left.  Right ankle with 1+ edema     Pulmonary/Chest: Effort normal and breath sounds normal. No respiratory distress.   Abdominal: Soft. Bowel sounds are normal. She exhibits no distension. There is no tenderness.   Musculoskeletal:   She has good range of motion within the right ankle.  Does not appear unstable.  I did not test her gait   Neurological: She is alert and oriented to person, place, and time.   Skin: Skin is dry. No rash noted. She is not diaphoretic.   Psychiatric: She has a normal mood and affect. Her behavior is normal.   Vitals reviewed.    Vitals:    04/29/19 1329   BP: (!) 150/68   BP Location: Right arm   Patient Position: Sitting   BP Method: Medium (Manual)   Pulse: 72   Temp: 98.9 °F (37.2 °C)   TempSrc: Oral   SpO2: 98%   Weight: 73.8 kg (162 lb 9.4 oz)   Height: 5' 3" (1.6 m)       MOST RECENT LABS IN OUR ELECTRONIC MEDICAL RECORD:     Results for orders placed or performed in visit on 04/29/19   Prothrombin w/ INR and PTT   Result Value Ref Range    INR 2.3 2.0 - 3.0         "

## 2019-05-06 ENCOUNTER — TELEPHONE (OUTPATIENT)
Dept: FAMILY MEDICINE | Facility: CLINIC | Age: 84
End: 2019-05-06

## 2019-05-06 NOTE — TELEPHONE ENCOUNTER
----- Message from Camille Haynes sent at 5/6/2019  2:43 PM CDT -----  Contact: Jono from [facility]  Calling in to see if it's ok for the patient to have a physical assessment.    Please contact to advise [phone] (Jono)

## 2019-05-08 ENCOUNTER — TELEPHONE (OUTPATIENT)
Dept: FAMILY MEDICINE | Facility: CLINIC | Age: 84
End: 2019-05-08

## 2019-05-08 DIAGNOSIS — M10.00 IDIOPATHIC GOUT, UNSPECIFIED CHRONICITY, UNSPECIFIED SITE: Primary | ICD-10-CM

## 2019-05-08 DIAGNOSIS — E79.0 HYPERURICEMIA: ICD-10-CM

## 2019-05-08 NOTE — TELEPHONE ENCOUNTER
Spoke w/ pt. Informed pt about results and recommendations per provider. pt verbalized understanding.    Lab appt made for 5/9/2019, pt wants to wait before starting any meds. Advised Negro Finnegan MD of pts decision.

## 2019-05-08 NOTE — TELEPHONE ENCOUNTER
I signed the order for the uric acid.  But regardless of that result, she may still be having a gout attack.  Our options are limited because of her medications and her kidney function.  Really the safest option would be a few days of prednisone if her symptoms are severe.  But we cannot use an NSAID.  We could consider colchicine for a very short dosage but we have to be careful that and kidney function

## 2019-05-08 NOTE — TELEPHONE ENCOUNTER
----- Message from Urvashi Ferris sent at 5/8/2019  4:00 PM CDT -----  Contact: Patient  Type: Needs Medical Advice    Who Called:  Patient  Symptoms (please be specific):  Toe pain  How long has patient had these symptoms:  na  Pharmacy name and phone #:    [facility] [address] [address] AT [facility] & [address] [address]  Phone: [phone] Fax: [phone]  Best Call Back Number: [phone] (home)    Additional Information: Patient requesting a call to discuss Gout

## 2019-05-08 NOTE — TELEPHONE ENCOUNTER
Pended order for uric acid.    Pt has pain in big toe, pt states she has had gout in past and thinks this is that same pain as last time.     Pt would like to know how to proceed. Pt aware it may take till tomorrow for f/u.

## 2019-05-09 ENCOUNTER — LAB VISIT (OUTPATIENT)
Dept: LAB | Facility: HOSPITAL | Age: 84
End: 2019-05-09
Attending: FAMILY MEDICINE
Payer: MEDICARE

## 2019-05-09 DIAGNOSIS — M10.00 IDIOPATHIC GOUT, UNSPECIFIED CHRONICITY, UNSPECIFIED SITE: ICD-10-CM

## 2019-05-09 DIAGNOSIS — E79.0 HYPERURICEMIA: ICD-10-CM

## 2019-05-09 LAB — URATE SERPL-MCNC: 7.1 MG/DL (ref 2.4–5.7)

## 2019-05-09 PROCEDURE — 84550 ASSAY OF BLOOD/URIC ACID: CPT | Mod: HCNC

## 2019-05-09 PROCEDURE — 36415 COLL VENOUS BLD VENIPUNCTURE: CPT | Mod: HCNC,PN

## 2019-05-31 ENCOUNTER — TELEPHONE (OUTPATIENT)
Dept: FAMILY MEDICINE | Facility: CLINIC | Age: 84
End: 2019-05-31

## 2019-05-31 DIAGNOSIS — K59.00 CONSTIPATION, UNSPECIFIED CONSTIPATION TYPE: Primary | ICD-10-CM

## 2019-05-31 NOTE — TELEPHONE ENCOUNTER
----- Message from Sandra Starks sent at 5/31/2019  8:06 AM CDT -----  Contact: self  Type: Needs Medical Advice    Who Called:  self  Symptoms (please be specific):  constipation  How long has patient had these symptoms:  2 months  Pharmacy name and phone #:  [facility]  Best Call Back Number: [phone]  Additional Information: Patient has a broken arm and will needs someone to bring her to the pharmacy today. She would like a prescription called in that she can  today before 10:00 am. Patient has tried over the counter medication but it is not working. Please call patient to advise.Thanks!   [facility] [address] [place] [address] AT SEC OF [facility] & [address] [address]  Phone: [phone] Fax: [phone]

## 2019-05-31 NOTE — TELEPHONE ENCOUNTER
Patient complains of constipation, intermittent for 1 week. Patient states she has tried bisacodyl (Ex-Lax) and Miralax OTC, with no help. Patient would like to know what else she can take, preferably a prescription (per patient)

## 2019-06-15 DIAGNOSIS — E03.9 HYPOTHYROIDISM, UNSPECIFIED TYPE: ICD-10-CM

## 2019-06-15 RX ORDER — PRAVASTATIN SODIUM 40 MG/1
TABLET ORAL
Qty: 90 TABLET | Refills: 3 | Status: SHIPPED | OUTPATIENT
Start: 2019-06-15 | End: 2020-03-28

## 2019-06-15 RX ORDER — LEVOTHYROXINE SODIUM 88 UG/1
TABLET ORAL
Qty: 90 TABLET | Refills: 2 | Status: SHIPPED | OUTPATIENT
Start: 2019-06-15 | End: 2020-03-16

## 2019-06-21 ENCOUNTER — LAB VISIT (OUTPATIENT)
Dept: LAB | Facility: HOSPITAL | Age: 84
End: 2019-06-21
Attending: INTERNAL MEDICINE
Payer: MEDICARE

## 2019-06-21 DIAGNOSIS — I10 ESSENTIAL HYPERTENSION, MALIGNANT: ICD-10-CM

## 2019-06-21 DIAGNOSIS — D63.1 ANEMIA OF CHRONIC RENAL FAILURE: Primary | ICD-10-CM

## 2019-06-21 DIAGNOSIS — I48.0 PAROXYSMAL ATRIAL FIBRILLATION: ICD-10-CM

## 2019-06-21 DIAGNOSIS — I48.0 PAF (PAROXYSMAL ATRIAL FIBRILLATION): ICD-10-CM

## 2019-06-21 DIAGNOSIS — N18.30 CHRONIC KIDNEY DISEASE, STAGE III (MODERATE): ICD-10-CM

## 2019-06-21 DIAGNOSIS — N18.9 ANEMIA OF CHRONIC RENAL FAILURE: Primary | ICD-10-CM

## 2019-06-21 DIAGNOSIS — I25.10 CORONARY ATHEROSCLEROSIS OF NATIVE CORONARY ARTERY: ICD-10-CM

## 2019-06-21 DIAGNOSIS — Z79.01 LONG TERM CURRENT USE OF ANTICOAGULANT THERAPY: ICD-10-CM

## 2019-06-21 DIAGNOSIS — R80.9 PROTEINURIA: ICD-10-CM

## 2019-06-21 DIAGNOSIS — M15.9 GENERALIZED OSTEOARTHROSIS, INVOLVING MULTIPLE SITES: ICD-10-CM

## 2019-06-21 LAB
ALBUMIN SERPL BCP-MCNC: 3.4 G/DL (ref 3.5–5.2)
ANION GAP SERPL CALC-SCNC: 8 MMOL/L (ref 8–16)
ANION GAP SERPL CALC-SCNC: 8 MMOL/L (ref 8–16)
BUN SERPL-MCNC: 22 MG/DL (ref 8–23)
BUN SERPL-MCNC: 22 MG/DL (ref 8–23)
CALCIUM SERPL-MCNC: 9.3 MG/DL (ref 8.7–10.5)
CALCIUM SERPL-MCNC: 9.3 MG/DL (ref 8.7–10.5)
CHLORIDE SERPL-SCNC: 109 MMOL/L (ref 95–110)
CHLORIDE SERPL-SCNC: 109 MMOL/L (ref 95–110)
CO2 SERPL-SCNC: 24 MMOL/L (ref 23–29)
CO2 SERPL-SCNC: 24 MMOL/L (ref 23–29)
CREAT SERPL-MCNC: 1.7 MG/DL (ref 0.5–1.4)
CREAT SERPL-MCNC: 1.7 MG/DL (ref 0.5–1.4)
ERYTHROCYTE [DISTWIDTH] IN BLOOD BY AUTOMATED COUNT: 17.5 % (ref 11.5–14.5)
EST. GFR  (AFRICAN AMERICAN): 31 ML/MIN/1.73 M^2
EST. GFR  (AFRICAN AMERICAN): 31 ML/MIN/1.73 M^2
EST. GFR  (NON AFRICAN AMERICAN): 27 ML/MIN/1.73 M^2
EST. GFR  (NON AFRICAN AMERICAN): 27 ML/MIN/1.73 M^2
FERRITIN SERPL-MCNC: 19 NG/ML (ref 20–300)
GLUCOSE SERPL-MCNC: 111 MG/DL (ref 70–110)
GLUCOSE SERPL-MCNC: 111 MG/DL (ref 70–110)
HCT VFR BLD AUTO: 36.7 % (ref 37–48.5)
HGB BLD-MCNC: 13.1 G/DL (ref 12–16)
IRON SERPL-MCNC: 47 UG/DL (ref 30–160)
MAGNESIUM SERPL-MCNC: 2.2 MG/DL (ref 1.6–2.6)
MCH RBC QN AUTO: 26.4 PG (ref 27–31)
MCHC RBC AUTO-ENTMCNC: 35.7 G/DL (ref 32–36)
MCV RBC AUTO: 74 FL (ref 82–98)
PHOSPHATE SERPL-MCNC: 2.9 MG/DL (ref 2.7–4.5)
PHOSPHATE SERPL-MCNC: 2.9 MG/DL (ref 2.7–4.5)
PLATELET # BLD AUTO: 216 K/UL (ref 150–350)
PMV BLD AUTO: 10.5 FL (ref 9.2–12.9)
POTASSIUM SERPL-SCNC: 4.7 MMOL/L (ref 3.5–5.1)
POTASSIUM SERPL-SCNC: 4.7 MMOL/L (ref 3.5–5.1)
RBC # BLD AUTO: 4.97 M/UL (ref 4–5.4)
RETICS/RBC NFR AUTO: 1 % (ref 0.5–2.5)
SATURATED IRON: 11 % (ref 20–50)
SODIUM SERPL-SCNC: 141 MMOL/L (ref 136–145)
SODIUM SERPL-SCNC: 141 MMOL/L (ref 136–145)
TOTAL IRON BINDING CAPACITY: 419 UG/DL (ref 250–450)
TRANSFERRIN SERPL-MCNC: 283 MG/DL (ref 200–375)
URATE SERPL-MCNC: 7.4 MG/DL (ref 2.4–5.7)
WBC # BLD AUTO: 4.21 K/UL (ref 3.9–12.7)

## 2019-06-21 PROCEDURE — 84550 ASSAY OF BLOOD/URIC ACID: CPT | Mod: HCNC,PO

## 2019-06-21 PROCEDURE — 80069 RENAL FUNCTION PANEL: CPT | Mod: HCNC,PO

## 2019-06-21 PROCEDURE — 85045 AUTOMATED RETICULOCYTE COUNT: CPT | Mod: HCNC

## 2019-06-21 PROCEDURE — 82728 ASSAY OF FERRITIN: CPT | Mod: HCNC

## 2019-06-21 PROCEDURE — 36415 COLL VENOUS BLD VENIPUNCTURE: CPT | Mod: HCNC,PO

## 2019-06-21 PROCEDURE — 85027 COMPLETE CBC AUTOMATED: CPT | Mod: HCNC,PO

## 2019-06-21 PROCEDURE — 83735 ASSAY OF MAGNESIUM: CPT | Mod: HCNC,PO

## 2019-06-21 PROCEDURE — 83540 ASSAY OF IRON: CPT | Mod: HCNC

## 2019-07-08 PROBLEM — I48.0 PAF (PAROXYSMAL ATRIAL FIBRILLATION): Status: ACTIVE | Noted: 2019-07-08

## 2019-07-10 ENCOUNTER — OFFICE VISIT (OUTPATIENT)
Dept: FAMILY MEDICINE | Facility: CLINIC | Age: 84
End: 2019-07-10
Payer: MEDICARE

## 2019-07-10 VITALS
WEIGHT: 159.38 LBS | OXYGEN SATURATION: 99 % | HEIGHT: 63 IN | BODY MASS INDEX: 28.24 KG/M2 | HEART RATE: 80 BPM | DIASTOLIC BLOOD PRESSURE: 74 MMHG | SYSTOLIC BLOOD PRESSURE: 130 MMHG

## 2019-07-10 DIAGNOSIS — I10 ESSENTIAL HYPERTENSION: ICD-10-CM

## 2019-07-10 DIAGNOSIS — K59.00 CONSTIPATION, UNSPECIFIED CONSTIPATION TYPE: ICD-10-CM

## 2019-07-10 DIAGNOSIS — Z95.0 CARDIAC PACEMAKER IN SITU: ICD-10-CM

## 2019-07-10 DIAGNOSIS — I48.0 PAROXYSMAL ATRIAL FIBRILLATION: ICD-10-CM

## 2019-07-10 DIAGNOSIS — Z79.01 LONG TERM CURRENT USE OF ANTICOAGULANT THERAPY: ICD-10-CM

## 2019-07-10 DIAGNOSIS — L23.1 ALLERGIC CONTACT DERMATITIS DUE TO ADHESIVES: Primary | ICD-10-CM

## 2019-07-10 DIAGNOSIS — I48.91 ATRIAL FIBRILLATION STATUS POST CARDIOVERSION: ICD-10-CM

## 2019-07-10 PROCEDURE — 99999 PR PBB SHADOW E&M-EST. PATIENT-LVL III: ICD-10-PCS | Mod: PBBFAC,HCNC,, | Performed by: INTERNAL MEDICINE

## 2019-07-10 PROCEDURE — 1101F PR PT FALLS ASSESS DOC 0-1 FALLS W/OUT INJ PAST YR: ICD-10-PCS | Mod: HCNC,CPTII,S$GLB, | Performed by: INTERNAL MEDICINE

## 2019-07-10 PROCEDURE — 99999 PR PBB SHADOW E&M-EST. PATIENT-LVL III: CPT | Mod: PBBFAC,HCNC,, | Performed by: INTERNAL MEDICINE

## 2019-07-10 PROCEDURE — 99214 PR OFFICE/OUTPT VISIT, EST, LEVL IV, 30-39 MIN: ICD-10-PCS | Mod: HCNC,S$GLB,, | Performed by: INTERNAL MEDICINE

## 2019-07-10 PROCEDURE — 1101F PT FALLS ASSESS-DOCD LE1/YR: CPT | Mod: HCNC,CPTII,S$GLB, | Performed by: INTERNAL MEDICINE

## 2019-07-10 PROCEDURE — 99214 OFFICE O/P EST MOD 30 MIN: CPT | Mod: HCNC,S$GLB,, | Performed by: INTERNAL MEDICINE

## 2019-07-10 RX ORDER — HYDROCORTISONE VALERATE 2 MG/G
OINTMENT TOPICAL
Qty: 45 G | Refills: 1 | Status: SHIPPED | OUTPATIENT
Start: 2019-07-10 | End: 2019-11-21 | Stop reason: CLARIF

## 2019-07-10 RX ORDER — FERROUS SULFATE 325(65) MG
TABLET, DELAYED RELEASE (ENTERIC COATED) ORAL
Refills: 3 | COMMUNITY
Start: 2019-06-24 | End: 2019-09-17

## 2019-07-10 RX ORDER — METHYLPREDNISOLONE 4 MG/1
TABLET ORAL
Qty: 1 PACKAGE | Refills: 0 | Status: SHIPPED | OUTPATIENT
Start: 2019-07-10 | End: 2019-07-24

## 2019-07-10 NOTE — PATIENT INSTRUCTIONS
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 Tylenol otc for pain.   -     hydrocortisone valerate (WEST-BITA) 0.2 % ointment; Apply topically as needed up to 2-3 times a day for 10 days max to rash.  Dispense: 45 g; Refill: 1  -     methylPREDNISolone (MEDROL DOSEPACK) 4 mg tablet; use as directed over 6 day wean  Dispense: 1 Package; Refill: 0    Atrial fibrillation status post cardioversion; keep her f/u w Dr Alvarez as directed; call coumadin clinic about checking her INR between f/u's due to steroid pack.     Paroxysmal atrial fibrillation    Cardiac pacemaker in situ    Essential hypertension.Maintain < 2 Gm Na a day diet, and monitor BP at home; keep a log. Same treatment.     Long term current use of anticoagulant therapy; on coumadin per cardiology.     Constipation: BM's q 3-4 days; keep well hydrated; increase softener to 2x a day; add citrucel 1-2x a day. Metamucil no help. Glycolax 17 gm daily as needed for constipation.

## 2019-07-10 NOTE — PROGRESS NOTES
Subjective:       Patient ID: Maggi Fontaine is a 86 y.o. female.    Chief Complaint: Follow-up (pt of dr. finnegan); Hypertension; and Herpes Zoster (per pt in back bra section on left side and wrapping around to chest)    HPI  Seeing pt today for Dr Finnegan. Pt w recent CONY then cardioversion just on Monday; then Tuesday noted small blisters on red base, along boder where her pads were for cardioversion anteriorly and posterior on her left hemithorax. Painful as well as pruritic. Has been applying triple ab'c cream. Pt in AF and converted successfully; this is her 4th attempted cardioversion for AF. Has been on amiodarone tx as well; stopped due to side effects after 14 days; repeat falls noted x4,  w hair loss. Stopped around April this year. Cardiologist Dr Alvarez. No chest pain, SOOB, or palpitations.     Review of Systems   Constitutional: Negative for appetite change, fever and unexpected weight change.   HENT: Positive for postnasal drip. Negative for congestion, rhinorrhea and sinus pressure.         Seasonal allergies   Eyes: Negative for discharge and itching.   Respiratory: Negative for cough, chest tightness, shortness of breath and wheezing.    Cardiovascular: Positive for leg swelling. Negative for chest pain and palpitations.        Right ankle swelling from fall 2 mos ago.   Gastrointestinal: Positive for constipation. Negative for abdominal distention, abdominal pain, blood in stool, diarrhea, nausea and vomiting.   Endocrine: Negative for polydipsia, polyphagia and polyuria.   Genitourinary: Negative for dysuria and hematuria.   Musculoskeletal: Negative for arthralgias and myalgias.   Skin: Positive for rash.   Allergic/Immunologic: Positive for environmental allergies. Negative for food allergies.   Neurological: Negative for tremors, seizures and syncope.   Hematological: Negative for adenopathy. Does not bruise/bleed easily.   Psychiatric/Behavioral:        Denies anxiety or depression  "      Objective:      Vitals:    07/10/19 1046   BP: 130/74   Pulse: 80   SpO2: 99%   Weight: 72.3 kg (159 lb 6.3 oz)   Height: 5' 3" (1.6 m)     Body mass index is 28.24 kg/m².    Physical Exam   Constitutional: She is oriented to person, place, and time. She appears well-developed and well-nourished.   HENT:   Head: Normocephalic and atraumatic.   Eyes: EOM are normal.   Neck: Normal range of motion. Neck supple. No thyromegaly present.   No carotid bruits heard.    Cardiovascular: Normal rate, regular rhythm and normal heart sounds. Exam reveals no gallop.   No murmur heard.  Pulmonary/Chest: Effort normal and breath sounds normal. No respiratory distress. She has no wheezes. She has no rales.   Abdominal: Soft. Bowel sounds are normal. She exhibits no distension. There is no tenderness. There is no rebound and no guarding.   Musculoskeletal: Normal range of motion. She exhibits edema.   2+-3+ anupam LE edema; spider veins and varicose noted. No calf tenderness to palp. Obese LE's of note.    Lymphadenopathy:     She has no cervical adenopathy.   Neurological: She is alert and oriented to person, place, and time.   Moves all 4 extremities fine.   Skin: No rash noted.   Psychiatric: She has a normal mood and affect. Her behavior is normal. Thought content normal.   Vitals reviewed.      Assessment:       1. 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 Atrial fibrillation status post cardioversion    3. Paroxysmal atrial fibrillation    4. Long term current use of anticoagulant therapy    5. Cardiac pacemaker in situ    6. Essential hypertension    7. Constipation, unspecified constipation type        Plan:       Allergic contact dermatitis due to adhesives; take zantac 150 mg 2x a day as needed w rash persisiting. Tylenol otc for pain  -     hydrocortisone valerate (WEST-BITA) 0.2 % ointment; Apply topically as needed up to 2-3 times a day for 10 days max to rash.  Dispense: 45 g; Refill: 1  -     " methylPREDNISolone (MEDROL DOSEPACK) 4 mg tablet; use as directed over 6 day wean  Dispense: 1 Package; Refill: 0    Atrial fibrillation status post cardioversion; keep her f/u w Dr Alvarez as directed; call coumadin clinic about checking her INR between f/u's due to steroid pack.     Paroxysmal atrial fibrillation    Cardiac pacemaker in situ    Essential hypertension.Maintain < 2 Gm Na a day diet, and monitor BP at home; keep a log. Same treatment.     Long term current use of anticoagulant therapy; on coumadin per cardiology.     Constipation: BM's q 3-4 days; keep well hydrated; increase softener to 2x a day; add citrucel 1-2x a day. Metamucil no help. Glycolax 17 gm daily as needed for constipation.

## 2019-07-24 ENCOUNTER — OFFICE VISIT (OUTPATIENT)
Dept: FAMILY MEDICINE | Facility: CLINIC | Age: 84
End: 2019-07-24
Payer: MEDICARE

## 2019-07-24 VITALS
RESPIRATION RATE: 16 BRPM | BODY MASS INDEX: 25.56 KG/M2 | HEART RATE: 72 BPM | WEIGHT: 149.69 LBS | SYSTOLIC BLOOD PRESSURE: 96 MMHG | DIASTOLIC BLOOD PRESSURE: 40 MMHG | HEIGHT: 64 IN

## 2019-07-24 DIAGNOSIS — L30.9 DERMATITIS: ICD-10-CM

## 2019-07-24 DIAGNOSIS — K59.09 CHRONIC CONSTIPATION: Primary | ICD-10-CM

## 2019-07-24 DIAGNOSIS — E03.9 ACQUIRED HYPOTHYROIDISM: ICD-10-CM

## 2019-07-24 DIAGNOSIS — K29.70 GASTRITIS, PRESENCE OF BLEEDING UNSPECIFIED, UNSPECIFIED CHRONICITY, UNSPECIFIED GASTRITIS TYPE: ICD-10-CM

## 2019-07-24 PROCEDURE — 99999 PR PBB SHADOW E&M-EST. PATIENT-LVL III: CPT | Mod: PBBFAC,HCNC,, | Performed by: FAMILY MEDICINE

## 2019-07-24 PROCEDURE — 1101F PR PT FALLS ASSESS DOC 0-1 FALLS W/OUT INJ PAST YR: ICD-10-PCS | Mod: HCNC,CPTII,S$GLB, | Performed by: FAMILY MEDICINE

## 2019-07-24 PROCEDURE — 99214 OFFICE O/P EST MOD 30 MIN: CPT | Mod: HCNC,S$GLB,, | Performed by: FAMILY MEDICINE

## 2019-07-24 PROCEDURE — 99214 PR OFFICE/OUTPT VISIT, EST, LEVL IV, 30-39 MIN: ICD-10-PCS | Mod: HCNC,S$GLB,, | Performed by: FAMILY MEDICINE

## 2019-07-24 PROCEDURE — 99999 PR PBB SHADOW E&M-EST. PATIENT-LVL III: ICD-10-PCS | Mod: PBBFAC,HCNC,, | Performed by: FAMILY MEDICINE

## 2019-07-24 PROCEDURE — 1101F PT FALLS ASSESS-DOCD LE1/YR: CPT | Mod: HCNC,CPTII,S$GLB, | Performed by: FAMILY MEDICINE

## 2019-07-24 RX ORDER — LACTULOSE 10 G/15ML
SOLUTION ORAL
Qty: 600 ML | Refills: 5 | Status: SHIPPED | OUTPATIENT
Start: 2019-07-24 | End: 2020-09-28

## 2019-07-24 NOTE — PROGRESS NOTES
THIS DOCUMENT WAS MADE IN PART WITH VOICE RECOGNITION SOFTWARE.  OCCASIONALLY THIS SOFTWARE WILL MISINTERPRET WORDS OR PHRASES.      Maggi Fontaine  9/24/1932    Maggi was seen today for hypertension.    Diagnoses and all orders for this visit:    Chronic constipation  -     lactulose (CHRONULAC) 20 gram/30 mL Soln; 1-2 tablespoons daily for constipation    Gastritis, presence of bleeding unspecified, unspecified chronicity, unspecified gastritis type    Dermatitis    Acquired hypothyroidism        Subjective     Chief Complaint   Patient presents with    Hypertension     follow up, patient went to the [facility] ER on 7/20/2019 for abdominal pain, [place] reports pain is better today        HPI  ER follow up, gastritis, after medrol dose pack    Prior seen for contact dermatitis, placed on medrol and topical steroid  Upper GI sx better, no blood, no melena  Resolved now, appetite normal    Constipation, ongoing, has tried miralax and stool softener    Hypothyroid, chronic, has been fairly stable, due to repeat    Dermatitis, contacted, resolved. Patient still seems convinced it was shingles    HPI elements addressed above in the assessment and plan including problems, diagnosis, stability/instability,  improving/worsening, and chronicity will not be duplicated in this section. Any important additional HPI topics will be discussed here if needed.     Active Ambulatory Problems     Diagnosis Date Noted    Hyperlipidemia     Hypothyroidism     Cardiac pacemaker in situ 01/22/2015    Sinoatrial node dysfunction 01/22/2015    Hyperuricemia 07/06/2015    Long term current use of anticoagulant therapy 08/11/2015    Urinary incontinence 09/13/2016    Essential hypertension 09/13/2016    Paroxysmal atrial fibrillation 09/13/2016    Idiopathic gout 09/13/2016    Chronic kidney disease, stage III (moderate) 09/13/2016    History of TIA (transient ischemic attack) 09/13/2016    Osteopenia 09/13/2016    SNHL  (sensorineural hearing loss) 09/13/2016    Aortic atherosclerosis 08/03/2017    Ectatic aorta 08/03/2017    Laryngopharyngeal reflux (LPR) 08/03/2017    Elevated left ventricular end-diastolic pressure (LVEDP) 08/14/2017    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/2018    Chronic pansinusitis 03/26/2018    Vitamin D deficiency 05/28/2018    PAF (paroxysmal atrial fibrillation) 07/08/2019     Resolved Ambulatory Problems     Diagnosis Date Noted    Palpitations 08/27/2013    Benign paroxysmal positional vertigo of left ear 09/13/2016     Past Medical History:   Diagnosis Date    Acid reflux     Arthritis     Atrial fibrillation     Cardiac pacemaker     Gout     Headache(784.0)     HEARING LOSS     Hyperlipidemia     Hypertension     Hypothyroidism     Polymyalgia rheumatica     Renal insufficiency     Sinusitis     SOB (shortness of breath)     Stroke     Urinary incontinence     West Nile encephalitis          Review of Systems   Constitutional: Positive for activity change. Negative for appetite change, fatigue and fever.   Respiratory: Negative for shortness of breath.    Cardiovascular: Negative for chest pain.   Gastrointestinal: Positive for constipation. Negative for blood in stool and nausea.   Genitourinary: Negative.    Skin: Positive for rash (resolved).       Objective     Physical Exam   Constitutional: She is oriented to person, place, and time. She appears well-developed and well-nourished.   HENT:   Head: Normocephalic and atraumatic.   Right Ear: External ear normal.   Left Ear: External ear normal.   Bruising lateral to left eye, no swelling now   Eyes: Conjunctivae are normal. Right eye exhibits no discharge. Left eye exhibits no discharge. No scleral icterus.   Cardiovascular: Normal rate and normal heart sounds. An irregular rhythm present.   No murmur heard.  Trace ankle edema on the left.  Right ankle with 1+ edema     Pulmonary/Chest: Effort normal and breath sounds normal.  "No respiratory distress.   Abdominal: Soft. Bowel sounds are normal. She exhibits no distension and no mass. There is no tenderness. There is no rebound and no guarding.   Musculoskeletal:   She has good range of motion within the right ankle.  Does not appear unstable.  I did not test her gait   Neurological: She is alert and oriented to person, place, and time.   Skin: Skin is dry. No rash noted. She is not diaphoretic.   Psychiatric: She has a normal mood and affect. Her behavior is normal.   Vitals reviewed.    Vitals:    07/24/19 1400   BP: (!) 96/40   BP Location: Right arm   Patient Position: Sitting   BP Method: Large (Manual)   Pulse: 72   Resp: 16   Weight: 67.9 kg (149 lb 11.1 oz)   Height: 5' 4" (1.626 m)       MOST RECENT LABS IN OUR ELECTRONIC MEDICAL RECORD:     Results for orders placed or performed in visit on 07/24/19   POCT INR ([facility] ONLY)   Result Value Ref Range    INR 2.2 (A) 2.0 - 3.0     Acceptable Yes     Protime  2 - 3         "

## 2019-08-15 ENCOUNTER — TELEPHONE (OUTPATIENT)
Dept: DERMATOLOGY | Facility: CLINIC | Age: 84
End: 2019-08-15

## 2019-08-15 NOTE — TELEPHONE ENCOUNTER
----- Message from Wally Floyd sent at 8/15/2019  4:47 PM CDT -----  Contact: pt friend Diann  Type:  Sooner Apoointment Request    Caller is requesting a sooner appointment.     Name of Caller:  Diann    Symptoms:  Diagnosis of basil cell carcinoma  Best Call Back Number:  [phone]  Additional Information:  Needing to schedule with the provider.

## 2019-08-15 NOTE — TELEPHONE ENCOUNTER
Returned patient call and explained to her that we can not see her until the  That did the bx referrs her to us. I told her that we see a lot of Dr. Chinchilla patients.

## 2019-08-16 ENCOUNTER — TELEPHONE (OUTPATIENT)
Dept: FAMILY MEDICINE | Facility: CLINIC | Age: 84
End: 2019-08-16

## 2019-08-16 DIAGNOSIS — C44.91 BASAL CELL CARCINOMA (BCC), UNSPECIFIED SITE: ICD-10-CM

## 2019-08-16 NOTE — TELEPHONE ENCOUNTER
Call placed to home, and work, messages left to call back to office.     Does patient want internal or external referral? Wait for internal may not be desirable for treatment.

## 2019-08-16 NOTE — TELEPHONE ENCOUNTER
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 Call back   Best Call Back Number:[phone]  Additional Information:

## 2019-08-19 ENCOUNTER — TELEPHONE (OUTPATIENT)
Dept: DERMATOLOGY | Facility: CLINIC | Age: 84
End: 2019-08-19

## 2019-08-19 ENCOUNTER — TELEPHONE (OUTPATIENT)
Dept: FAMILY MEDICINE | Facility: CLINIC | Age: 84
End: 2019-08-19

## 2019-08-19 DIAGNOSIS — D48.5 NEOPLASM OF UNCERTAIN BEHAVIOR OF SKIN: Primary | ICD-10-CM

## 2019-08-19 NOTE — TELEPHONE ENCOUNTER
----- Message from Idania Eason sent at 8/19/2019 12:41 PM CDT -----  Type:   Appointment Request    Caller is requesting a soon appointment.      Name of Caller:  Maggi Fontaine  When is the first available appointment?  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

## 2019-08-19 NOTE — TELEPHONE ENCOUNTER
----- Message from Sulma Michael sent at 8/19/2019  9:15 AM CDT -----  Contact: Maggi pt  Type:  Patient Requesting Referral    Who Called:  Maggi  Does the patient already have the specialty appointment scheduled?:  no  If yes, what is the date of that appointment?:  n/a  Referral to What Specialty:  Derm Surgery  Reason for Referral:  Skin cancer  Does the patient want the referral with a specific physician?:  Yes/ Jose  Is the specialist an [facility]?:  yes  Patient Requesting a Call Back?:  yes  Best Call Back Number:  [phone]  Additional Information:   Pls call pt regarding a referral

## 2019-08-19 NOTE — TELEPHONE ENCOUNTER
returned patient's call. informed her she needs to have Dr. Thapa send over her path report and a referal. Also cancelled her appt with Dr. Barraza as patient states it was made in error.

## 2019-08-22 ENCOUNTER — INITIAL CONSULT (OUTPATIENT)
Dept: DERMATOLOGY | Facility: CLINIC | Age: 84
End: 2019-08-22
Payer: MEDICARE

## 2019-08-22 VITALS
DIASTOLIC BLOOD PRESSURE: 64 MMHG | WEIGHT: 150 LBS | HEART RATE: 67 BPM | HEIGHT: 64 IN | SYSTOLIC BLOOD PRESSURE: 140 MMHG | BODY MASS INDEX: 25.61 KG/M2

## 2019-08-22 DIAGNOSIS — D04.10: Primary | ICD-10-CM

## 2019-08-22 PROCEDURE — 1101F PT FALLS ASSESS-DOCD LE1/YR: CPT | Mod: HCNC,CPTII,S$GLB, | Performed by: DERMATOLOGY

## 2019-08-22 PROCEDURE — 99202 PR OFFICE/OUTPT VISIT, NEW, LEVL II, 15-29 MIN: ICD-10-PCS | Mod: HCNC,S$GLB,, | Performed by: DERMATOLOGY

## 2019-08-22 PROCEDURE — 1101F PR PT FALLS ASSESS DOC 0-1 FALLS W/OUT INJ PAST YR: ICD-10-PCS | Mod: HCNC,CPTII,S$GLB, | Performed by: DERMATOLOGY

## 2019-08-22 PROCEDURE — 99999 PR PBB SHADOW E&M-EST. PATIENT-LVL III: CPT | Mod: PBBFAC,HCNC,, | Performed by: DERMATOLOGY

## 2019-08-22 PROCEDURE — 99202 OFFICE O/P NEW SF 15 MIN: CPT | Mod: HCNC,S$GLB,, | Performed by: DERMATOLOGY

## 2019-08-22 PROCEDURE — 99999 PR PBB SHADOW E&M-EST. PATIENT-LVL III: ICD-10-PCS | Mod: PBBFAC,HCNC,, | Performed by: DERMATOLOGY

## 2019-08-22 NOTE — LETTER
August 24, 2019      [address] [address]           [facility]  [address] [facility] [address] [place] [address]  Phone: [phone]          Patient: Maggi Fontaine   MR Number: [id number]   YOB: 1932   Date of Visit: 8/22/2019       Dear [facility]:    Thank you for referring Maggi Fontaine to me for evaluation. Attached you will find relevant portions of my assessment and plan of care.    If you have questions, please do not hesitate to call me. I look forward to following Maggi Fontaine along with you.    Sincerely,    Cosmo Garcia MD    Enclosure  CC:  No Recipients    If you would like to receive this communication electronically, please contact [email or web address] or [phone] to request more information on [facility] Link access.    For providers and/or their staff who would like to refer a patient to [facility], please contact us through our one-stop-shop provider referral line, [facility] , at [phone].    If you feel you have received this communication in error or would no longer like to receive these types of communications, please e-mail [email or web address]

## 2019-08-22 NOTE — PROGRESS NOTES
ALLERGIES:  Amiodarone analogues; Nsaids (non-steroidal anti-inflammatory drug); and Penicillins    CHIEF COMPLAINT:  This 86 y.o. 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 Consultation requested by INGRID Reese.    HISTORY OF PRESENT ILLNESS:   Location: left lateral eyebrow  Duration: 6 months or more  Quality: persistent  Context: status post biopsy by DAVID Chinchilla M.D.; path = squamous cell carcinoma; pathology accession # [id number], [facility]   Prior Treatment: none    See also the handwritten notes/diagrams scanned to chart for additional details.    Defibrillator: No  Pacemaker: Yes  Artificial heart valves: No   Artificial joints: Yes    REVIEW OF SYSTEMS:   General: general health good  Skin: previous skin cancer(s)    If yes, details:   Relevant other:     Cardiovascular:   High Blood Pressure: Yes  Chest Pain:  No   Defibrillator: as above  Pacemaker: as above  Artificial heart valves: as above  Prior Endocarditis: No   Prior Heart Attack/MI: No     If yes, when:   Prior Cardiac Bypass or Stents:  No   If yes, when:   Mitral Valve Prolapse: No   Relevant other:  AFIB  Respiratory:   Shortness of breath:  Yes   Relevant other:  No   Endocrine:   Diabetes:  No   Relevant other:  No   Hem/Lymph:   Taking Prescribed Blood Thinners:  Yes Coumadin  Easy Bleeding:  Yes   Relevant other:  No   Allergy/Immuno: as noted above  Relevant other:  No   GI:   Prior Hepatitis:  No     If yes, details:   Relevant other:  No   Musculoskeletal:   Artificial joints: as above  Relevant other:  No   Neurologic:   Prior Stroke:  No     If yes, details:   Relevant other:  No   Relevant other info:  No      PAST MEDICAL HISTORY:  Past Medical History:   Diagnosis Date    Acid reflux     Arthritis     Atrial fibrillation     Cardiac pacemaker     Gout     Headache(784.0)     HEARING LOSS      Hyperlipidemia     Hypertension     Hypothyroidism     Polymyalgia rheumatica     remission now    Renal insufficiency     Sinusitis     SOB (shortness of breath)     Stroke     history of TIA    Urinary incontinence     West Nile encephalitis     2002       PAST SURGICAL HISTORY:  Past Surgical History:   Procedure Laterality Date    CARDIAC PACEMAKER PLACEMENT      CARDIOVERSION N/A 7/8/2019    [facility]    CARDIOVERSION N/A 4/5/2018    [facility]    CATARACT EXTRACTION EXTRACAPSULAR W/ INTRAOCULAR LENS IMPLANTATION      EYE SURGERY      HEART CATH-LEFT Left 8/4/2017    [facility]    HYSTERECTOMY      JOINT REPLACEMENT      anupam knees    KNEE SURGERY      PARTIAL HYSTERECTOMY      Transesophageal Echocardiogram (CONY) N/A 4/5/2018    [facility]    TRANSESOPHAGEAL ECHOCARDIOGRAM WITH POSSIBLE CARDIOVERSION (CONY W/ POSS CARDIOVERSION) N/A 7/8/2019    Performed by [facility] at [facility]        SOCIAL HISTORY:  Dependencies: smoking status as noted below  Social History     Tobacco Use    Smoking status: Never Smoker    Smokeless tobacco: Never Used   Substance Use Topics    Alcohol use: Yes     Alcohol/week: 0.5 oz     Types: 1 drink(s) per week     Comment: less than 1 drink weekly    Drug use: No       PERTINENT MEDICATIONS:  See medications list.    Current Outpatient Medications:     aspirin (ENTERIC COATED ASPIRIN) 81 MG EC tablet, Take 1 tablet by mouth nightly. , Disp: , Rfl:     ferrous sulfate 325 (65 FE) MG EC tablet, TK 1 T PO BID, Disp: , Rfl: 3    FLUoxetine 10 MG capsule, Take 10 mg by mouth daily as needed (PANIC ATTACKS)., Disp: , Rfl:     furosemide (LASIX) 40 MG tablet, Take 40 mg by mouth daily as needed (EDEMA)., Disp: , Rfl:     hydrocortisone valerate (WEST-BITA) 0.2 % ointment, Apply topically as  needed up to 2-3 times a day for 10 days max to rash., Disp: 45 g, Rfl: 1    lactulose (CHRONULAC) 20 gram/30 mL Soln, 1-2 tablespoons daily for constipation, Disp: 600 mL, Rfl: 5    levothyroxine (SYNTHROID) 88 MCG tablet, TAKE 1 TABLET EVERY DAY BEFORE BREAKFAST, Disp: 90 tablet, Rfl: 2    meclizine (ANTIVERT) 25 mg tablet, Take 25 mg by mouth 3 (three) times daily as needed for Dizziness., Disp: , Rfl:     polyethylene glycol (GLYCOLAX) 17 gram/dose powder, Take 17 g by mouth daily as needed (constipation)., Disp: , Rfl:     pravastatin (PRAVACHOL) 40 MG tablet, TAKE 1 TABLET EVERY DAY, Disp: 90 tablet, Rfl: 3    traMADol (ULTRAM) 50 mg tablet, Take 1 tablet (50 mg total) by mouth every 6 (six) hours as needed for Pain., Disp: 14 tablet, Rfl: 0    VITAMIN D2 50,000 unit capsule, TAKE 1 CAPSULE EVERY WEEK, Disp: 12 capsule, Rfl: 2    warfarin (COUMADIN) 5 MG tablet, Take 2.5 mg by mouth Daily. Current dose:  Take 5mg Mon, 2.5mg all other days or as directed by coumadin, Disp: , Rfl:     ALLERGIES:  Amiodarone analogues; Nsaids (non-steroidal anti-inflammatory drug); and Penicillins    EXAM:  See also the handwritten notes/diagrams scanned to chart for additional details.  Constitutional  General appearance: well-developed, well-nourished, well-kempt older white female    Eyes  Inspection of conjunctivae and lids reveals no abnormalities; sclerae anicteric  Neurologic/Psychiatric  Alert,  normal orientation to time, place, person  Normal mood and affect with no evidence of depression, anxiety, agitation  Skin: see photo(s)  Head: background marked solar damage to exposed areas of skin; in addition, inspection/palpation reveals an ill-defined, approximately 2 cm pink/tan scaly plaque on the left lateral brow which feels freely movable over the underlying tissues on palpation; site(s) confirmed by reference to the photograph(s) attached below, taken at the time of the biopsy/biopsies by the referring  physician and she confirmed this as the site of the prior biopsy  Neck: examination reveals marked chronic solar damage  Right upper extremity: examination reveals marked chronic solar damage  Left upper extremity: examination reveals marked chronic solar damage    Photo(s) this visit:          ASSESSMENT: 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 In particular, the discussion included, but was not limited to, the following:    One alternative at this point would be to defer further treatment and observe the lesion. With small skin cancers of this kind, it is possible that a biopsy can be sufficient to definitively treat a small skin cancer of this kind. Alternatively, some skin cancers are slow growing and do not require immediate treatment. The potential advantage of this choice would be to avoid the need for possibly unnecessary additional surgery. Among the potential disadvantages of this would be the possibility of enlargement of the lesion, more extensive spread of the lesion or recurrence at a later date, which might necessitate a larger and more complex surgery.    Radiation treatment can be an effective treatment for this type of skin cancer. The usual course of treatment is every weekday for several weeks. Local irritation will result from treatment, although no systemic side effects are expected. The potential advantage of radiation treatment is that it avoids the need for surgery. Among the disadvantages of radiation treatment are the length of treatment, the  local inflammatory response, the absence of pathologic confirmation of the removal of the skin cancer, a possible increased risk of additional skin cancer in the treated area in later years, and a somewhat increased risk of recurrence at a later date.     Excisional surgery can be an effective treatment for this type of skin cancer. This would involve excision of the lesion with margin evaluation by submitting the specimen to a pathologist for either immediate marginal assessment via frozen section processing, or delayed marginal assessment by fixed-tissue processing. The potential advantage of this technique is that it offers a way of treating the lesion with some degree of histologic confirmation of tumor removal. Among the disadvantages of this treatment are the possible need for re-excision if marginal involvement is identified, a somewhat greater likelihood of recurrence as compared to Mohs' surgery because of the less comprehensive margin evaluation inherent in the technique, and the general potential risks of surgery, including allergic reactions to the anesthetic and other materials used, infection, injury to nerves in the area with consequent loss of sensation or muscle function, and scarring or distortion of surrounding structures.    Mohs' surgery is a very effective treatment for this type of skin cancer. The potential advantage of Mohs' surgery is that this technique offers the greatest possible certainty of knowing that the skin cancer has been completely removed, with the removal of the least amount of normal tissue. The potential disadvantages of Mohs' surgery include the duration of the surgery, the possible need for a separate surgery for reconstruction following tumor removal, and scarring as a result. In addition, general potential risks of surgery as noted above also apply to treatment via Mohs' surgery.    In light of the size and nature of this tumor and the location on the face in an area of  increased risk of recurrence,  Mohs' micrographic surgery was thought to be the most appropriate management choice, and this diagnosis is appropriate for treatment by Mohs' micrographic surgery.     We also discussed options for repair of the site to follow tumor removal via Mohs' surgery, including the participation of a reconstructive surgeon to reconstruct the resultant wound. Given the location, the anticipated size and potential complexity of the defect to follow tumor removal via Mohs' surgery, reconstruction will be coordinated with Dr. Gabe Morales.  I will contact Dr. Morales to arrange for the patient to be seen in consultation, and we will coordinate the surgeries thereafter.    Sufficient time was available for questions, and all questions were answered to her satisfaction. She fully understands the aims, risks, alternatives, and possible complications, and has elected to proceed with the surgery, and verbally consented to do so. The procedure will be scheduled in the near future.    Routine pre-op instructions were given to her.    The patient was instructed to discuss her coumadin use with Dr. Morales.    A consultation report will be sent to Dr. Chinchilla.  --------------------------------------  Note: Some or all of this note may have been generated using voice recognition software. There may be voice recognition errors including grammatical and/or spelling errors found in the text. Attempts were made to correct these errors prior to signature.

## 2019-08-28 ENCOUNTER — TELEPHONE (OUTPATIENT)
Dept: DERMATOLOGY | Facility: CLINIC | Age: 84
End: 2019-08-28

## 2019-08-28 NOTE — TELEPHONE ENCOUNTER
----- Message from Lynda Piedad sent at 8/28/2019  8:37 AM CDT -----  Contact: Maggi   tel:  at home until   10:30am    today  tel: [phone]   Pt.says she has not heard from you and you told her to call you.  Pls call

## 2019-08-28 NOTE — TELEPHONE ENCOUNTER
retured patient call, told her that I would call Dr. Morales office today and have them call her. Berta

## 2019-09-03 ENCOUNTER — TELEPHONE (OUTPATIENT)
Dept: DERMATOLOGY | Facility: CLINIC | Age: 84
End: 2019-09-03

## 2019-09-03 NOTE — TELEPHONE ENCOUNTER
Returned patient call and gave her the surgery date of 10-30-19 and Dr. Morales to follow after we finish. Berta

## 2019-09-03 NOTE — TELEPHONE ENCOUNTER
----- Message from Juventino Lujan sent at 8/30/2019  3:18 PM CDT -----  Contact: patient  Type: Needs Medical Advice    Who Called:  patient  Symptoms (please be specific):    How long has patient had these symptoms:    Pharmacy name and phone #:    Best Call Back Number: [phone]  Additional Information: requesting a call back regarding surgery

## 2019-09-05 ENCOUNTER — TELEPHONE (OUTPATIENT)
Dept: DERMATOLOGY | Facility: CLINIC | Age: 84
End: 2019-09-05

## 2019-09-05 NOTE — TELEPHONE ENCOUNTER
Called patient and told her that she missed her appointment with Dr. Morales, I gave her Dr. Morales phone number.

## 2019-09-05 NOTE — TELEPHONE ENCOUNTER
nurse called. patient had consult appt. today and did not show. nurse left message but phone says not recieving calls at this time.

## 2019-09-05 NOTE — TELEPHONE ENCOUNTER
----- Message from Tamika Bustillo sent at 9/5/2019  3:30 PM CDT -----  Kerry at Dr. Morales's office called. patient had consult appt. today and did not show. nurse left message but phone says not recieving calls at this time.

## 2019-09-06 DIAGNOSIS — E55.9 HYPOVITAMINOSIS D: ICD-10-CM

## 2019-09-06 RX ORDER — ERGOCALCIFEROL 1.25 MG/1
CAPSULE ORAL
Qty: 12 CAPSULE | Refills: 2 | Status: SHIPPED | OUTPATIENT
Start: 2019-09-06 | End: 2020-03-23

## 2019-09-12 ENCOUNTER — TELEPHONE (OUTPATIENT)
Dept: DERMATOLOGY | Facility: CLINIC | Age: 84
End: 2019-09-12

## 2019-09-12 ENCOUNTER — OFFICE VISIT (OUTPATIENT)
Dept: DERMATOLOGY | Facility: CLINIC | Age: 84
End: 2019-09-12
Payer: MEDICARE

## 2019-09-12 DIAGNOSIS — H57.8A9 SENSATION OF FOREIGN BODY IN EYE: Primary | ICD-10-CM

## 2019-09-12 PROCEDURE — 1101F PR PT FALLS ASSESS DOC 0-1 FALLS W/OUT INJ PAST YR: ICD-10-PCS | Mod: HCNC,CPTII,S$GLB, | Performed by: DERMATOLOGY

## 2019-09-12 PROCEDURE — 99999 PR PBB SHADOW E&M-EST. PATIENT-LVL II: ICD-10-PCS | Mod: PBBFAC,HCNC,, | Performed by: DERMATOLOGY

## 2019-09-12 PROCEDURE — 99212 OFFICE O/P EST SF 10 MIN: CPT | Mod: HCNC,S$GLB,, | Performed by: DERMATOLOGY

## 2019-09-12 PROCEDURE — 1101F PT FALLS ASSESS-DOCD LE1/YR: CPT | Mod: HCNC,CPTII,S$GLB, | Performed by: DERMATOLOGY

## 2019-09-12 PROCEDURE — 99999 PR PBB SHADOW E&M-EST. PATIENT-LVL II: CPT | Mod: PBBFAC,HCNC,, | Performed by: DERMATOLOGY

## 2019-09-12 PROCEDURE — 99212 PR OFFICE/OUTPT VISIT, EST, LEVL II, 10-19 MIN: ICD-10-PCS | Mod: HCNC,S$GLB,, | Performed by: DERMATOLOGY

## 2019-09-12 NOTE — TELEPHONE ENCOUNTER
----- Message from Wally Floyd sent at 9/12/2019  2:01 PM CDT -----  Contact: pt  Type: Needs Medical Advice    Who Called:  pt    Best Call Back Number: [phone]  Additional Information: pt states she is having surgery on 10.30.19. Pt is having pain in and around the left eye. Please call to advise and leave voicemail if no answer

## 2019-09-12 NOTE — PROGRESS NOTES
ALLERGIES:  Amiodarone analogues; Nsaids (non-steroidal anti-inflammatory drug); and Penicillins    CHIEF COMPLAINT: irritation to left eye    HISTORY OF PRESENT ILLNESS:  Location: left eye  Duration: 1-2 days  Quality: bothersome; feels like eyelash in the eye  Context: see previous notes  Has not been able to identify a foreign body in the eye  status post biopsy of a squamous cell carcinoma in situ on the left lateral upper lid/brow area  She is concerned that the symptoms in the eye are related in some way to the squamous cell carcinoma in situ     PAST MEDICAL HISTORY:  Past Medical History:   Diagnosis Date    Acid reflux     Arthritis     Atrial fibrillation     Cardiac pacemaker     Gout     Headache(784.0)     HEARING LOSS     Hyperlipidemia     Hypertension     Hypothyroidism     Polymyalgia rheumatica     remission now    Renal insufficiency     Sinusitis     SOB (shortness of breath)     Stroke     history of TIA    Urinary incontinence     West Nile encephalitis     2002       PAST SURGICAL HISTORY:  Past Surgical History:   Procedure Laterality Date    CARDIAC PACEMAKER PLACEMENT      CARDIOVERSION N/A 7/8/2019    [facility]    CARDIOVERSION N/A 4/5/2018    [facility]    CATARACT EXTRACTION EXTRACAPSULAR W/ INTRAOCULAR LENS IMPLANTATION      EYE SURGERY      HEART CATH-LEFT Left 8/4/2017    [facility]    HYSTERECTOMY      JOINT REPLACEMENT      anupam knees    KNEE SURGERY      PARTIAL HYSTERECTOMY      Transesophageal Echocardiogram (CONY) N/A 4/5/2018    [facility]    TRANSESOPHAGEAL ECHOCARDIOGRAM WITH POSSIBLE CARDIOVERSION (CONY W/ POSS CARDIOVERSION) N/A 7/8/2019    Per[facility] by [facility] at [facility]        SOCIAL HISTORY:  Social History     Tobacco Use    Smoking status: Never Smoker    Smokeless tobacco:  Never Used   Substance Use Topics    Alcohol use: Yes     Alcohol/week: 0.5 oz     Types: 1 drink(s) per week     Comment: less than 1 drink weekly    Drug use: No       PERTINENT MEDICATIONS:    Current Outpatient Medications:     aspirin (ENTERIC COATED ASPIRIN) 81 MG EC tablet, Take 1 tablet by mouth nightly. , Disp: , Rfl:     ergocalciferol (ERGOCALCIFEROL) 50,000 unit Cap, TAKE 1 CAPSULE EVERY WEEK, Disp: 12 capsule, Rfl: 2    ferrous sulfate 325 (65 FE) MG EC tablet, TK 1 T PO BID, Disp: , Rfl: 3    FLUoxetine 10 MG capsule, Take 10 mg by mouth daily as needed (PANIC ATTACKS)., Disp: , Rfl:     furosemide (LASIX) 40 MG tablet, Take 40 mg by mouth daily as needed (EDEMA)., Disp: , Rfl:     hydrocortisone valerate (WEST-BITA) 0.2 % ointment, Apply topically as needed up to 2-3 times a day for 10 days max to rash., Disp: 45 g, Rfl: 1    lactulose (CHRONULAC) 20 gram/30 mL Soln, 1-2 tablespoons daily for constipation, Disp: 600 mL, Rfl: 5    levothyroxine (SYNTHROID) 88 MCG tablet, TAKE 1 TABLET EVERY DAY BEFORE BREAKFAST, Disp: 90 tablet, Rfl: 2    meclizine (ANTIVERT) 25 mg tablet, Take 25 mg by mouth 3 (three) times daily as needed for Dizziness., Disp: , Rfl:     polyethylene glycol (GLYCOLAX) 17 gram/dose powder, Take 17 g by mouth daily as needed (constipation)., Disp: , Rfl:     pravastatin (PRAVACHOL) 40 MG tablet, TAKE 1 TABLET EVERY DAY, Disp: 90 tablet, Rfl: 3    traMADol (ULTRAM) 50 mg tablet, Take 1 tablet (50 mg total) by mouth every 6 (six) hours as needed for Pain., Disp: 14 tablet, Rfl: 0    warfarin (COUMADIN) 5 MG tablet, Take 2.5 mg by mouth Daily. Current dose:  Take 5mg Mon, 2.5mg all other days or as directed by coumadin, Disp: , Rfl:     ALLERGIES:  Amiodarone analogues; Nsaids (non-steroidal anti-inflammatory drug); and Penicillins    EXAM:  Constitutional  - General appearance: well-developed, well-nourished, well-kempt older white female    Eyes  - Conjunctivae and lids -  the site of the prior biopsy on her left upper lateral eyelid/brow is unchanged  No abnormalities of the lashes noted  Examination of her conjunctivae and sclera reveals no notable injection and no foreign bodies are identified, such as an eyelash  There is what appears to be a 2-3 mm cyst-like structure to the conjunctiva in the lateral fornix, which might potentially be the source of her symptoms  Neurologic/Psychiatric  - Orientation - Alert,  normal orientation to time, place, person  - Mood and affect - somewhat anxious mood and affect with no evidence of depression, agitation  Skin:   - Head/Face: marked chronic solar damage              ASSESSMENT:   Foreign body sensation, left eye  Possible conjunctival cyst  Pending treatment of squamous cell carcinoma in situ, left lateral upper lid    PLAN:    I discussed my findings with the patient.   I reassured her that there was no reason to believe that her symptoms are related to the recently-biopsied lesion on her left lateral upper eyelid  I recommended that she follow up with her general ophthalmologist for evaluation  --------------------------------------  Note: Some or all of this note may have been generated using voice recognition software. There may be voice recognition errors including grammatical and/or spelling errors found in the text. Attempts were made to correct these errors prior to signature. .

## 2019-09-26 ENCOUNTER — TELEPHONE (OUTPATIENT)
Dept: FAMILY MEDICINE | Facility: CLINIC | Age: 84
End: 2019-09-26

## 2019-09-26 NOTE — TELEPHONE ENCOUNTER
----- Message from Lucy Montgomery sent at 9/26/2019  9:42 AM CDT -----  Contact: patient   Patient is wanting to reschedule her upcoming appt, she is requesting to be seen on 10/3 or 10/4, please call back at [phone] (home)  After 1 or within 30 minutes. Thanks

## 2019-09-26 NOTE — TELEPHONE ENCOUNTER
----- Message from Derek Jones sent at 9/26/2019 11:21 AM CDT -----  Contact: self   Patient miss call from your office please call back at [phone] ([place])

## 2019-09-27 ENCOUNTER — TELEPHONE (OUTPATIENT)
Dept: FAMILY MEDICINE | Facility: CLINIC | Age: 84
End: 2019-09-27

## 2019-09-30 ENCOUNTER — TELEPHONE (OUTPATIENT)
Dept: FAMILY MEDICINE | Facility: CLINIC | Age: 84
End: 2019-09-30

## 2019-09-30 NOTE — TELEPHONE ENCOUNTER
----- Message from Amparo Baker sent at 9/30/2019  3:52 PM CDT -----  Contact: patient  Type: Needs Medical Advice    Who Called:  patient  Symptoms (please be specific):  Patient currently scheduled this Friday 10/3 want to know if possible to change to Thurs 10/2 for 1;40pm  Best Call Back Number: [phone]  Additional Information: please call to advise if appt can be changed

## 2019-09-30 NOTE — TELEPHONE ENCOUNTER
Attempted to contact patient, no answer. Unable to leave voicemail, will try again later.    No available appointment Thursday, please advise upon patient calling back

## 2019-10-10 ENCOUNTER — OFFICE VISIT (OUTPATIENT)
Dept: FAMILY MEDICINE | Facility: CLINIC | Age: 84
End: 2019-10-10
Payer: MEDICARE

## 2019-10-10 VITALS
BODY MASS INDEX: 25.45 KG/M2 | HEART RATE: 65 BPM | DIASTOLIC BLOOD PRESSURE: 58 MMHG | SYSTOLIC BLOOD PRESSURE: 132 MMHG | WEIGHT: 149.06 LBS | HEIGHT: 64 IN | TEMPERATURE: 98 F

## 2019-10-10 DIAGNOSIS — F41.9 ANXIETY: Primary | ICD-10-CM

## 2019-10-10 PROCEDURE — 99214 PR OFFICE/OUTPT VISIT, EST, LEVL IV, 30-39 MIN: ICD-10-PCS | Mod: HCNC,25,S$GLB, | Performed by: FAMILY MEDICINE

## 2019-10-10 PROCEDURE — G0008 ADMIN INFLUENZA VIRUS VAC: HCPCS | Mod: HCNC,59,S$GLB, | Performed by: FAMILY MEDICINE

## 2019-10-10 PROCEDURE — 99214 OFFICE O/P EST MOD 30 MIN: CPT | Mod: HCNC,25,S$GLB, | Performed by: FAMILY MEDICINE

## 2019-10-10 PROCEDURE — 99999 PR PBB SHADOW E&M-EST. PATIENT-LVL III: ICD-10-PCS | Mod: PBBFAC,HCNC,, | Performed by: FAMILY MEDICINE

## 2019-10-10 PROCEDURE — 90662 FLU VACCINE - HIGH DOSE (65+) PRESERVATIVE FREE IM: ICD-10-PCS | Mod: HCNC,S$GLB,, | Performed by: FAMILY MEDICINE

## 2019-10-10 PROCEDURE — 1100F PTFALLS ASSESS-DOCD GE2>/YR: CPT | Mod: HCNC,CPTII,S$GLB, | Performed by: FAMILY MEDICINE

## 2019-10-10 PROCEDURE — 3288F PR FALLS RISK ASSESSMENT DOCUMENTED: ICD-10-PCS | Mod: HCNC,CPTII,S$GLB, | Performed by: FAMILY MEDICINE

## 2019-10-10 PROCEDURE — 99999 PR PBB SHADOW E&M-EST. PATIENT-LVL III: CPT | Mod: PBBFAC,HCNC,, | Performed by: FAMILY MEDICINE

## 2019-10-10 PROCEDURE — 1100F PR PT FALLS ASSESS DOC 2+ FALLS/FALL W/INJURY/YR: ICD-10-PCS | Mod: HCNC,CPTII,S$GLB, | Performed by: FAMILY MEDICINE

## 2019-10-10 PROCEDURE — 3288F FALL RISK ASSESSMENT DOCD: CPT | Mod: HCNC,CPTII,S$GLB, | Performed by: FAMILY MEDICINE

## 2019-10-10 PROCEDURE — G0008 FLU VACCINE - HIGH DOSE (65+) PRESERVATIVE FREE IM: ICD-10-PCS | Mod: HCNC,59,S$GLB, | Performed by: FAMILY MEDICINE

## 2019-10-10 PROCEDURE — 90662 IIV NO PRSV INCREASED AG IM: CPT | Mod: HCNC,S$GLB,, | Performed by: FAMILY MEDICINE

## 2019-10-10 RX ORDER — LORAZEPAM 0.5 MG/1
TABLET ORAL
Qty: 20 TABLET | Refills: 1 | Status: SHIPPED | OUTPATIENT
Start: 2019-10-10 | End: 2020-03-03

## 2019-10-10 NOTE — PROGRESS NOTES
THIS DOCUMENT WAS MADE IN PART WITH VOICE RECOGNITION SOFTWARE.  OCCASIONALLY THIS SOFTWARE WILL MISINTERPRET WORDS OR PHRASES.      Maggi Fontaine  9/24/1932    Maggi was seen today for follow-up.    Diagnoses and all orders for this visit:    Anxiety    Other orders  -     LORazepam (ATIVAN) 0.5 MG tablet; Take 1/2 po in evening as needed for anxiety.  Do not drive a motor vehicle for at least 8 hours after taking this medication.     Patient reports significant increase in anxiety and describes panic attacks since her diagnosis of cancer referring to a lesion near her left eye.  She is scheduled for Mohs surgery.  She has used this medication in the past.  She states she still has several pills left from the last prescription I gave her which was in 2012.  I think is having a few on hand is probably therapeutic.  I did discuss concerns about sedation and fall risk.  If she needs to have a tablet in the evening to help settle down and avoid panic attack it is reasonable once in a while before her surgery that is reasonable.  But she must take precaution and limit.    Appointment duration greater than 25 min., more than 50% face-to-face counseling        Subjective     Chief Complaint   Patient presents with    Follow-up       HPI      HPI elements addressed above in the assessment and plan including problems, diagnosis, stability/instability,  improving/worsening, and chronicity will not be duplicated in this section. Any important additional HPI topics will be discussed here if needed.    Active Ambulatory Problems     Diagnosis Date Noted    Hyperlipidemia     Hypothyroidism     Cardiac pacemaker in situ 01/22/2015    Sinoatrial node dysfunction 01/22/2015    Hyperuricemia 07/06/2015    Long term current use of anticoagulant therapy 08/11/2015    Urinary incontinence 09/13/2016    Paroxysmal atrial fibrillation 09/13/2016    Idiopathic gout 09/13/2016    Chronic kidney disease, stage III (moderate)  "09/13/2016    History of TIA (transient ischemic attack) 09/13/2016    Osteopenia 09/13/2016    SNHL (sensorineural hearing loss) 09/13/2016    Aortic atherosclerosis 08/03/2017    Ectatic aorta 08/03/2017    Laryngopharyngeal reflux (LPR) 08/03/2017    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/2018    Chronic pansinusitis 03/26/2018    Vitamin D deficiency 05/28/2018    PAF (paroxysmal atrial fibrillation) 07/08/2019     Resolved Ambulatory Problems     Diagnosis Date Noted    Palpitations 08/27/2013    Benign paroxysmal positional vertigo of left ear 09/13/2016     Past Medical History:   Diagnosis Date    Acid reflux     Arthritis     Atrial fibrillation     Cardiac pacemaker     Gout     Headache(784.0)     HEARING LOSS     Hypertension     Polymyalgia rheumatica     Renal insufficiency     Sinusitis     SOB (shortness of breath)     Stroke     West Nile encephalitis          Review of Systems    Objective     Physical Exam  Vitals:    10/10/19 0818   BP: (!) 132/58   BP Location: Right arm   Patient Position: Sitting   BP Method: Medium (Manual)   Pulse: 65   Temp: 98.1 °F (36.7 °C)   TempSrc: Oral   Weight: 67.6 kg (149 lb 0.5 oz)   Height: 5' 4" (1.626 m)       MOST RECENT LABS IN OUR ELECTRONIC MEDICAL RECORD:     Results for orders placed or performed in visit on 10/01/19   POCT PT/INR   Result Value Ref Range    INR 2.6 (A) 2.0 - 3.0     Acceptable Yes     Protime           "

## 2019-10-11 RX ORDER — FLUOXETINE 10 MG/1
10 CAPSULE ORAL DAILY PRN
Qty: 90 CAPSULE | Refills: 1 | Status: SHIPPED | OUTPATIENT
Start: 2019-10-11 | End: 2020-02-20

## 2019-10-11 NOTE — TELEPHONE ENCOUNTER
----- Message from Lynda Myers sent at 10/11/2019  8:04 AM CDT -----  Contact: self [phone]  Please call her regarding the lorazepam.  She said she needs to speak to you before 9:00 am. She is calling to let you know that she found the old bottle of medication that agreed with her. It is fluoxetine 10 mg capsule, take one by mouth once daily. It was prescribed on 8/8/17. She is not going to fill the lorazepam prescription. She would like for you to call to confirm receipt of this message and that you will order the fluoxetine. Thank you!

## 2019-10-11 NOTE — TELEPHONE ENCOUNTER
Tell her thanks for the update.  The fluoxetine is a much safer choice.  Although she has to take every day for really to work well.  Once in the morning.  This one is not sedating and does not come with the same precautions as we discussed previously.

## 2019-10-28 ENCOUNTER — TELEPHONE (OUTPATIENT)
Dept: DERMATOLOGY | Facility: CLINIC | Age: 84
End: 2019-10-28

## 2019-10-28 NOTE — TELEPHONE ENCOUNTER
----- Message from Noe Pulido sent at 10/26/2019 11:10 AM CDT -----  Contact: pt  Type: Needs Medical Advice    Who Called:  pt    Best Call Back Number: [phone]  Additional Information: pt needs to discuss her appointment on 10/30/19. Please call to advise.

## 2019-10-29 NOTE — PROGRESS NOTES
Prior photo(s) of site(s) to confirm location(s):    ALLERGIES:   Amiodarone analogues; Nsaids (non-steroidal anti-inflammatory drug); and Penicillins      Current Outpatient Medications:     aspirin (ENTERIC COATED ASPIRIN) 81 MG EC tablet, Take 1 tablet by mouth nightly. , Disp: , Rfl:     biotin 1 mg Cap, Take by mouth., Disp: , Rfl:     ergocalciferol (ERGOCALCIFEROL) 50,000 unit Cap, TAKE 1 CAPSULE EVERY WEEK, Disp: 12 capsule, Rfl: 2    FLUoxetine 10 MG capsule, Take 1 capsule (10 mg total) by mouth daily as needed (PANIC ATTACKS)., Disp: 90 capsule, Rfl: 1    furosemide (LASIX) 40 MG tablet, Take 40 mg by mouth daily as needed (EDEMA)., Disp: , Rfl:     hydrocortisone valerate (WEST-BITA) 0.2 % ointment, Apply topically as needed up to 2-3 times a day for 10 days max to rash. (Patient not taking: Reported on 10/10/2019), Disp: 45 g, Rfl: 1    lactulose (CHRONULAC) 20 gram/30 mL Soln, 1-2 tablespoons daily for constipation, Disp: 600 mL, Rfl: 5    levothyroxine (SYNTHROID) 88 MCG tablet, TAKE 1 TABLET EVERY DAY BEFORE BREAKFAST, Disp: 90 tablet, Rfl: 2    LORazepam (ATIVAN) 0.5 MG tablet, Take 1/2 po in evening as needed for anxiety.  Do not drive a motor vehicle for at least 8 hours after taking this medication., Disp: 20 tablet, Rfl: 1    meclizine (ANTIVERT) 25 mg tablet, Take 25 mg by mouth 3 (three) times daily as needed for Dizziness., Disp: , Rfl:     polyethylene glycol (GLYCOLAX) 17 gram/dose powder, Take 17 g by mouth daily as needed (constipation)., Disp: , Rfl:     pravastatin (PRAVACHOL) 40 MG tablet, TAKE 1 TABLET EVERY DAY, Disp: 90 tablet, Rfl: 3    traMADol (ULTRAM) 50 mg tablet, Take 1 tablet (50 mg total) by mouth every 6 (six) hours as needed for Pain. (Patient not taking: Reported on 10/10/2019), Disp: 14 tablet, Rfl: 0    warfarin (COUMADIN) 5 MG tablet, Take 2.5 mg by mouth Daily. Current dose:  Take 5mg MWF Sun, 2.5mg all other days or as directed by coumadin, Disp: , Rfl:    -------------------------------------------------------------  PROCEDURE: Mohs' Micrographic Surgery    SITE: left lateral eyebrow    INDICATION: squamous cell carcinoma, at least in situ, in an area at increased risk of recurrence    CASE NUMBER: [id number]      ANESTHETIC: 4.5 mL 1% Lidocaine with Epinephrine 1:100,000    SURGICAL PREP: Ethanol and ophthalmic Betadine    SURGEON: Cosmo Garcia MD    ASSISTANTS: Dillon Newby CST     PREOPERATIVE DIAGNOSIS: squamous cell carcinoma     POSTOPERATIVE DIAGNOSIS: squamous cell carcinoma     PATHOLOGIC DIAGNOSIS: squamous cell carcinoma, at least in situ    STAGES OF MOHS' SURGERY PERFORMED: two    TUMOR-FREE PLANE ACHIEVED: yes    HEMOSTASIS: Hyfrecation    SPECIMENS: two (one in stage A, one in stage B)    INITIAL LESION SIZE: 1.6 x 2.1 cm    FINAL DEFECT SIZE: 1.9 x 2.1 cm    WOUND REPAIR/DISPOSITION: see below    NARRATIVE:    The patient is a 87 y.o.female referred by Angela Chinchilla DO with a history of cancer on the left lateral eyebrow/upper eyelid which was biopsied - pathology accession # [id number], [facility]. Findings revealed squamous cell carcinoma, at least in situ. Examination revealed an ill-defined area of irregular hyperkeratosis and erythema on the left lateral upper eyelid and eyebrow area at the site of prior biopsy, which was confirmed by reference to the photograph taken at the previous patient visit, as attached above. In light of the nature of this tumor and the location on the brow in the periorbital area, Mohs' micrographic surgery was thought to be the most appropriate management choice, and this diagnosis is appropriate for treatment by Mohs' micrographic surgery.  I discussed it with the patient and she fully understands the aims, risks, alternatives, and possible complications, and elects to proceed.  There are no medical or surgical contraindications to the procedure.     A signed informed consent was  "obtained.    PROCEDURE:  The patient was placed in the semi-recumbent position on the operating table in the Mohs' Surgery Suite. The area in question was thoroughly prepped with ethanol and ophthalmic Betadine. A sterile surgical marker was used to outline the clinically apparent margins of the involved area, and a narrow margin of normal-appearing skin. Reference marks were made at the periphery of the outlined area with the surgical marker. The proposed area of excision was measured and photographed. Local anesthesia as noted above was administered.  The total volume of anesthetic used throughout this portion of the procedure was as documented above. The area was prepared and draped in the standard manner. All of the grossly identifiable area of clinically abnormal tissue and an underlying/peripheral layer was taken and processed by the Mohs' technique.  Hemostasis was obtained with the hyfrecator. Tissue was taken from any areas of residual marginal involvement (if present) and processed by the Mohs' technique in as many stages as needed until a tumor-free plane was achieved.    Colors of inks used in the reference nicks at epidermal margins (if present) and/or inking of non-epithelial edges, if applicable, is represented on the Mohs map as follows: solid lines represent red ink, dots represent blue ink, jagged lines represent black ink, curlicues represent green ink, "xxx" represents yellow ink.    The first Mohs' layer consisted of one section(s) with 4 slide(s) evaluated. Some residual tumor was noted at the margins of the first Mohs' layer. Histology of the specimen(s) showed a residual focus of squamous cell carcinoma in situ, manifested as full-thickness atypia and disorganization and nuclear pleomorphism of the keratinocytes in the epidermis with involvement of adnexal structures, at the epidermal margin between the red and blue nicks, as noted on the Mohs map. In addition, on later cuts, a focus of " residual squamous cell carcinoma in situ, manifested as full-thickness atypia and disorganization and nuclear pleomorphism of the keratinocytes in the epidermis with involvement of adnexal structures was noted at the epidermal margin between the green and red nick, as noted on the Mohs map; multiple earlier sections were free of these changes in this area, and the actual surgical margin in this area was assessed as clear. Also noted, on later cuts only, was an area of lymphocytic inflammatory infiltrates at the deep margin, also as noted on the Mohs map.      The second Mohs' layer consisted of one section(s) with 2 slide(s) evaluated. No residual tumor was noted at the margins of the second Mohs' layer. Histology of the specimen(s) showed chronic solar damage.    A total of two section(s) and 6 slide(s) were examined under the microscope via the Mohs technique.  A cancer free plane was reached after layer number two. Defect final size was as noted above.      The wound was covered with a nonadherent dressing between stages, and the patient allowed to wait in the waiting area during these periods. The final defect was photographed at the completion of the Mohs' procedure.    The patient was returned to the procedure room following completion of the Mohs' procedure and final slide review. She is scheduled to see Gabe Morales MD for closure of the defect this afternoon as previously arranged.    Final dressing consisted of Telfa, gauze and tape over the wound, and an eye patch and tape to cover the eye.    Estimated blood loss for the total procedure was less than 5 mL.    Total operative time including tissue processing in the Mohs' laboratory and microscopic Mohs' frozen section slide review was 2 hour(s). Verbal and written wound care instructions were given to the patient, and she expressed understanding of these instructions. The patient tolerated the procedure well and left the operating room in good condition;  she is to return in 4-6 weeks if possible for followup.     Dr. Garcia's cell phone number was given to the patient with instructions to call prn with any problems.

## 2019-10-30 ENCOUNTER — PROCEDURE VISIT (OUTPATIENT)
Dept: DERMATOLOGY | Facility: CLINIC | Age: 84
End: 2019-10-30
Payer: MEDICARE

## 2019-10-30 VITALS
BODY MASS INDEX: 25.61 KG/M2 | HEART RATE: 63 BPM | HEIGHT: 64 IN | WEIGHT: 150 LBS | DIASTOLIC BLOOD PRESSURE: 66 MMHG | SYSTOLIC BLOOD PRESSURE: 148 MMHG

## 2019-10-30 DIAGNOSIS — D04.39: Primary | ICD-10-CM

## 2019-10-30 PROCEDURE — 17311 MOHS 1 STAGE H/N/HF/G: CPT | Mod: HCNC,S$GLB,, | Performed by: DERMATOLOGY

## 2019-10-30 PROCEDURE — 17311: ICD-10-PCS | Mod: HCNC,S$GLB,, | Performed by: DERMATOLOGY

## 2019-10-30 PROCEDURE — 17312: ICD-10-PCS | Mod: HCNC,S$GLB,, | Performed by: DERMATOLOGY

## 2019-10-30 PROCEDURE — 17312 MOHS ADDL STAGE: CPT | Mod: HCNC,S$GLB,, | Performed by: DERMATOLOGY

## 2019-11-21 ENCOUNTER — TELEPHONE (OUTPATIENT)
Dept: PODIATRY | Facility: CLINIC | Age: 84
End: 2019-11-21

## 2019-11-21 PROBLEM — G45.9 TIA (TRANSIENT ISCHEMIC ATTACK): Status: ACTIVE | Noted: 2019-11-21

## 2019-11-21 NOTE — TELEPHONE ENCOUNTER
Left message on machine for patient to please call and reschedule her appointment with Dr Reed on 11/22/19.

## 2019-11-21 NOTE — TELEPHONE ENCOUNTER
Called and left message on machine to please call and reschedule her appointment with Dr Reed on 11/22/19.

## 2019-11-22 ENCOUNTER — TELEPHONE (OUTPATIENT)
Dept: PODIATRY | Facility: CLINIC | Age: 84
End: 2019-11-22

## 2019-11-26 ENCOUNTER — TELEPHONE (OUTPATIENT)
Dept: NEUROLOGY | Facility: CLINIC | Age: 84
End: 2019-11-26

## 2019-11-29 ENCOUNTER — TELEPHONE (OUTPATIENT)
Dept: PODIATRY | Facility: CLINIC | Age: 84
End: 2019-11-29

## 2019-11-29 NOTE — TELEPHONE ENCOUNTER
----- Message from Noe Pulido sent at 11/29/2019  3:23 PM CST -----  Contact: pt  Type:  Sooner Apoointment Request    Caller is requesting a sooner appointment.  Caller declined first available appointment listed below.  Caller will not accept being placed on the waitlist and is requesting a message be sent to doctor.    Name of Caller:  pt  When is the first available appointment?  12/09/19  Symptoms:  right big toe /nail care  Best Call Back Number:  [phone]  Additional Information:  Pt states that she had an appointment on 11/22/19 but that the provider canceled due to an emergency. Would like to know if she can come on Monday 12/02/19. Please call to advise.

## 2019-11-29 NOTE — TELEPHONE ENCOUNTER
I spoke with the patient and let her know Dr. Reed doesn't have any sooner appointments and offered her an appointment on 12-9-19, but she refused because she can't wait that long. I told her I would have Graciela call her on Monday if there was any where she could be worked in, she indicated understanding.

## 2019-12-02 ENCOUNTER — TELEPHONE (OUTPATIENT)
Dept: PODIATRY | Facility: CLINIC | Age: 84
End: 2019-12-02

## 2019-12-02 NOTE — TELEPHONE ENCOUNTER
----- Message from Steffanie Sutton sent at 12/2/2019  9:33 AM CST -----  Contact: Maggi  Type:  Sooner Apoointment Request    Caller is requesting a sooner appointment.  Caller declined first available appointment listed below.  Caller will not accept being placed on the waitlist and is requesting a message be sent to doctor.    Name of Caller:  Maggi  When is the first available appointment?  12/10 with Barrett  Symptoms:  R Big Toenail pain/nail cracked  Best Call Back Number:  [phone] (home)--please leave a message with date & time of appt as she will be unable to answer phone  Additional Information:  Please advise--thank you

## 2019-12-06 ENCOUNTER — TELEPHONE (OUTPATIENT)
Dept: FAMILY MEDICINE | Facility: CLINIC | Age: 84
End: 2019-12-06

## 2019-12-06 NOTE — TELEPHONE ENCOUNTER
----- Message from Toribio Solomon sent at 12/6/2019 12:23 PM CST -----  Contact: Patient  Type: Needs Medical Advice    Who Called: Patient  Best Call Back Number: [phone]  Additional Information: Patient states they would like to discuss physical therapy for balance. Patient states that their neurologist is unable to sign off on it at this. Please call to advise. Thanks!

## 2019-12-06 NOTE — TELEPHONE ENCOUNTER
I have no objection to her proceeding with outpatient physical therapy.  Though I do not know her condition and I cannot authorize her to drive on her own.  But if somebody can bring her there I certainly have no objection.

## 2019-12-06 NOTE — TELEPHONE ENCOUNTER
Pt would be driving herself to these appts. I scheduled for for follow-up for 12/16 before ok is going to resume PT

## 2019-12-06 NOTE — TELEPHONE ENCOUNTER
Pt states she had a TIA 2 weeks ago(records in Epic). She has been in PT for weakness and falls prior to this. She needs a note to her PT to clear her to return to PT at [facility].

## 2019-12-09 ENCOUNTER — LAB VISIT (OUTPATIENT)
Dept: LAB | Facility: HOSPITAL | Age: 84
End: 2019-12-09
Attending: INTERNAL MEDICINE
Payer: MEDICARE

## 2019-12-09 DIAGNOSIS — I10 ESSENTIAL HYPERTENSION, MALIGNANT: ICD-10-CM

## 2019-12-09 DIAGNOSIS — N39.0 URINARY TRACT INFECTION, SITE NOT SPECIFIED: ICD-10-CM

## 2019-12-09 DIAGNOSIS — N18.9 ANEMIA OF CHRONIC RENAL FAILURE: ICD-10-CM

## 2019-12-09 DIAGNOSIS — D63.1 ANEMIA OF CHRONIC RENAL FAILURE: ICD-10-CM

## 2019-12-09 DIAGNOSIS — I25.10 CORONARY ATHEROSCLEROSIS OF NATIVE CORONARY ARTERY: Primary | ICD-10-CM

## 2019-12-09 DIAGNOSIS — M15.9 GENERALIZED OSTEOARTHROSIS, INVOLVING MULTIPLE SITES: ICD-10-CM

## 2019-12-09 DIAGNOSIS — N18.30 CHRONIC KIDNEY DISEASE, STAGE III (MODERATE): ICD-10-CM

## 2019-12-09 LAB
ALBUMIN SERPL BCP-MCNC: 3.6 G/DL (ref 3.5–5.2)
ANION GAP SERPL CALC-SCNC: 5 MMOL/L (ref 8–16)
BACTERIA #/AREA URNS HPF: NORMAL /HPF
BILIRUB UR QL STRIP: NEGATIVE
BUN SERPL-MCNC: 23 MG/DL (ref 8–23)
CALCIUM SERPL-MCNC: 9.6 MG/DL (ref 8.7–10.5)
CHLORIDE SERPL-SCNC: 107 MMOL/L (ref 95–110)
CLARITY UR: CLEAR
CO2 SERPL-SCNC: 28 MMOL/L (ref 23–29)
COLOR UR: YELLOW
CREAT SERPL-MCNC: 1.3 MG/DL (ref 0.5–1.4)
CREAT UR-MCNC: 134 MG/DL (ref 15–325)
EST. GFR  (AFRICAN AMERICAN): 42.6 ML/MIN/1.73 M^2
EST. GFR  (NON AFRICAN AMERICAN): 37 ML/MIN/1.73 M^2
FERRITIN SERPL-MCNC: 62 NG/ML (ref 20–300)
GLUCOSE SERPL-MCNC: 88 MG/DL (ref 70–110)
GLUCOSE UR QL STRIP: NEGATIVE
HGB UR QL STRIP: NEGATIVE
HYALINE CASTS #/AREA URNS LPF: 1 /LPF
IRON SERPL-MCNC: 118 UG/DL (ref 30–160)
KETONES UR QL STRIP: NEGATIVE
LEUKOCYTE ESTERASE UR QL STRIP: NEGATIVE
MAGNESIUM SERPL-MCNC: 2.1 MG/DL (ref 1.6–2.6)
MICROSCOPIC COMMENT: NORMAL
NITRITE UR QL STRIP: NEGATIVE
PH UR STRIP: 6 [PH] (ref 5–8)
PHOSPHATE SERPL-MCNC: 3 MG/DL (ref 2.7–4.5)
POTASSIUM SERPL-SCNC: 4.8 MMOL/L (ref 3.5–5.1)
PROT UR QL STRIP: ABNORMAL
PROT UR-MCNC: 73 MG/DL (ref 0–15)
PROT/CREAT UR: 0.54 MG/G{CREAT} (ref 0–0.2)
RBC #/AREA URNS HPF: 0 /HPF (ref 0–4)
SATURATED IRON: 34 % (ref 20–50)
SODIUM SERPL-SCNC: 140 MMOL/L (ref 136–145)
SP GR UR STRIP: 1.02 (ref 1–1.03)
SQUAMOUS #/AREA URNS HPF: 7 /HPF
TOTAL IRON BINDING CAPACITY: 346 UG/DL (ref 250–450)
TRANSFERRIN SERPL-MCNC: 234 MG/DL (ref 200–375)
URATE SERPL-MCNC: 6.7 MG/DL (ref 2.4–5.7)
URN SPEC COLLECT METH UR: ABNORMAL
WBC #/AREA URNS HPF: 1 /HPF (ref 0–5)

## 2019-12-09 PROCEDURE — 83735 ASSAY OF MAGNESIUM: CPT | Mod: HCNC

## 2019-12-09 PROCEDURE — 80069 RENAL FUNCTION PANEL: CPT | Mod: HCNC

## 2019-12-09 PROCEDURE — 87086 URINE CULTURE/COLONY COUNT: CPT | Mod: HCNC

## 2019-12-09 PROCEDURE — 83540 ASSAY OF IRON: CPT | Mod: HCNC

## 2019-12-09 PROCEDURE — 84550 ASSAY OF BLOOD/URIC ACID: CPT | Mod: HCNC

## 2019-12-09 PROCEDURE — 81000 URINALYSIS NONAUTO W/SCOPE: CPT | Mod: HCNC,PO

## 2019-12-09 PROCEDURE — 82570 ASSAY OF URINE CREATININE: CPT | Mod: HCNC

## 2019-12-09 PROCEDURE — 82728 ASSAY OF FERRITIN: CPT | Mod: HCNC

## 2019-12-09 PROCEDURE — 36415 COLL VENOUS BLD VENIPUNCTURE: CPT | Mod: HCNC,PO

## 2019-12-10 ENCOUNTER — OFFICE VISIT (OUTPATIENT)
Dept: PODIATRY | Facility: CLINIC | Age: 84
End: 2019-12-10
Payer: MEDICARE

## 2019-12-10 VITALS
SYSTOLIC BLOOD PRESSURE: 131 MMHG | HEART RATE: 70 BPM | BODY MASS INDEX: 24.77 KG/M2 | DIASTOLIC BLOOD PRESSURE: 64 MMHG | WEIGHT: 145.06 LBS | HEIGHT: 64 IN

## 2019-12-10 DIAGNOSIS — I73.9 PERIPHERAL VASCULAR DISEASE: Primary | ICD-10-CM

## 2019-12-10 DIAGNOSIS — L60.0 INGROWN NAIL OF GREAT TOE OF RIGHT FOOT: ICD-10-CM

## 2019-12-10 LAB — BACTERIA UR CULT: NO GROWTH

## 2019-12-10 PROCEDURE — 99999 PR PBB SHADOW E&M-EST. PATIENT-LVL III: ICD-10-PCS | Mod: PBBFAC,HCNC,, | Performed by: PODIATRIST

## 2019-12-10 PROCEDURE — 1159F MED LIST DOCD IN RCRD: CPT | Mod: HCNC,S$GLB,, | Performed by: PODIATRIST

## 2019-12-10 PROCEDURE — 1101F PT FALLS ASSESS-DOCD LE1/YR: CPT | Mod: HCNC,CPTII,S$GLB, | Performed by: PODIATRIST

## 2019-12-10 PROCEDURE — 1159F PR MEDICATION LIST DOCUMENTED IN MEDICAL RECORD: ICD-10-PCS | Mod: HCNC,S$GLB,, | Performed by: PODIATRIST

## 2019-12-10 PROCEDURE — 99213 PR OFFICE/OUTPT VISIT, EST, LEVL III, 20-29 MIN: ICD-10-PCS | Mod: HCNC,S$GLB,, | Performed by: PODIATRIST

## 2019-12-10 PROCEDURE — 99999 PR PBB SHADOW E&M-EST. PATIENT-LVL III: CPT | Mod: PBBFAC,HCNC,, | Performed by: PODIATRIST

## 2019-12-10 PROCEDURE — 1125F PR PAIN SEVERITY QUANTIFIED, PAIN PRESENT: ICD-10-PCS | Mod: HCNC,S$GLB,, | Performed by: PODIATRIST

## 2019-12-10 PROCEDURE — 99213 OFFICE O/P EST LOW 20 MIN: CPT | Mod: HCNC,S$GLB,, | Performed by: PODIATRIST

## 2019-12-10 PROCEDURE — 1101F PR PT FALLS ASSESS DOC 0-1 FALLS W/OUT INJ PAST YR: ICD-10-PCS | Mod: HCNC,CPTII,S$GLB, | Performed by: PODIATRIST

## 2019-12-10 PROCEDURE — 1125F AMNT PAIN NOTED PAIN PRSNT: CPT | Mod: HCNC,S$GLB,, | Performed by: PODIATRIST

## 2019-12-10 NOTE — LETTER
December 24, 2019      Negro Finnegan MD  [address]  [place] [address]           [facility] [address] [place] [address]  Phone: [phone]          Patient: Maggi Fontaine   MR Number: [id number]   YOB: 1932   Date of Visit: 12/10/2019       Dear Dr. Negro Finnegan:    Thank you for referring Maggi Fontaine to me for evaluation. Attached you will find relevant portions of my assessment and plan of care.    If you have questions, please do not hesitate to call me. I look forward to following Maggi Fontaine along with you.    Sincerely,    Justin Barrett, DPTAMIR    Enclosure  CC:  No Recipients    If you would like to receive this communication electronically, please contact [email or web address] or [phone] to request more information on [employer] access.    For providers and/or their staff who would like to refer a patient to [facility], please contact us through our one-stop-shop provider referral line, [facility], at [phone].    If you feel you have received this communication in error or would no longer like to receive these types of communications, please e-mail [email or web address]

## 2019-12-16 ENCOUNTER — TELEPHONE (OUTPATIENT)
Dept: FAMILY MEDICINE | Facility: CLINIC | Age: 84
End: 2019-12-16

## 2019-12-16 NOTE — TELEPHONE ENCOUNTER
----- Message from Isha Borrego sent at 12/16/2019  9:53 AM CST -----  Contact: Patient  Type:  Sooner Apoointment Request    Caller is requesting a sooner appointment.  Caller declined first available appointment listed below.  Caller will not accept being placed on the waitlist and is requesting a message be sent to doctor.    Name of Caller:  Patient  When is the first available appointment?  3/2020  Symptoms: n/a  Best Call Back Number:   Additional Information:  Patient calling to reschedule for a sooner appointment. Was scheduled for today but overslept.

## 2019-12-24 NOTE — PROGRESS NOTES
Subjective:      Patient ID: Maggi Fontaine is a 87 y.o. female.    Chief Complaint: Ingrown Toenail (Right great toenail, PCP--10/10/2019)    Maggi is a 87 y.o. female who presents to the clinic for evaluation and treatment of high risk feet. Maggi has a past medical history of Acid reflux, Arthritis, Atrial fibrillation, Cardiac pacemaker, Gout, Headache(784.0), HEARING LOSS, Hyperlipidemia, Hypertension, Hypothyroidism, Polymyalgia rheumatica, Renal insufficiency, Sinusitis, SOB (shortness of breath), Stroke, Urinary incontinence, and West Nile encephalitis. The patient's chief complaint concerns right great toe with an ingrowing nail causing pain with pressure and shoe wear. Debridement has helped the symptoms in the past    PCP: Negro Finnegan MD    Date Last Seen by PCP: 10/10/19        Hemoglobin A1C   Date Value Ref Range Status   11/21/2019 5.9 (H) 0.0 - 5.6 % Final     Comment:     Reference Interval:  5.0 - 5.6 Normal   5.7 - 6.4 High Risk   > 6.5 Diabetic    Hgb A1c results are standardized based on the (NGSP) National   Glycohemoglobin Standardization Program.    Hemoglobin A1C levels are related to mean serum/plasma glucose   during the preceding 2-3 months.              Past Medical History:   Diagnosis Date    Acid reflux     Arthritis     Atrial fibrillation     Cardiac pacemaker     Gout     Headache(784.0)     HEARING LOSS     Hyperlipidemia     Hypertension     Hypothyroidism     Polymyalgia rheumatica     remission now    Renal insufficiency     Sinusitis     SOB (shortness of breath)     Stroke     history of TIA    Urinary incontinence     West Nile encephalitis     2002       Past Surgical History:   Procedure Laterality Date    CARDIAC PACEMAKER PLACEMENT      CATARACT EXTRACTION EXTRACAPSULAR W/ INTRAOCULAR LENS IMPLANTATION      EYE SURGERY      HYSTERECTOMY      JOINT REPLACEMENT      anupam knees    KNEE SURGERY      PARTIAL HYSTERECTOMY       TREATMENT OF CARDIAC ARRHYTHMIA N/A 7/8/2019    Procedure: CARDIOVERSION;  Surgeon: Eriberto Alvarez III, MD;  Location: [facility];  Service: Cardiology;  Laterality: N/A;       Family History   Problem Relation Age of Onset    Diabetes Mother     Anxiety disorder Sister         1/2 sister with driving phobia onset in 60s    Allergic rhinitis Neg Hx     Allergies Neg Hx     Angioedema Neg Hx     Rhinitis Neg Hx     Urticaria Neg Hx     Immunodeficiency Neg Hx     Eczema Neg Hx     Atopy Neg Hx     Asthma Neg Hx        Social History     Socioeconomic History    Marital status:      Spouse name: Not on file    Number of children: Not on file    Years of education: Not on file    Highest education level: Not on file   Occupational History    Occupation: homemaker   Social Needs    Financial resource strain: Not on file    Food insecurity:     Worry: Not on file     Inability: Not on file    Transportation needs:     Medical: Not on file     Non-medical: Not on file   Tobacco Use    Smoking status: Never Smoker    Smokeless tobacco: Never Used   Substance and Sexual Activity    Alcohol use: Yes     Alcohol/week: 0.8 standard drinks     Types: 1 drink(s) per week     Comment: less than 1 drink weekly    Drug use: No    Sexual activity: Not on file   Lifestyle    Physical activity:     Days per week: Not on file     Minutes per session: Not on file    Stress: Not on file   Relationships    Social connections:     Talks on phone: Not on file     Gets together: Not on file     Attends Alevism service: Not on file     Active member of club or organization: Not on file     Attends meetings of clubs or organizations: Not on file     Relationship status: Not on file   Other Topics Concern    Caffeine Use Not Asked    Financial Status: Employed Not Asked    Home situation: homeless Not Asked    Caffeine Use: Frequent Not Asked    Financial Status: Unemployed Not Asked    Legal: Arrest  history Not Asked    Caffeine Use: Moderate Not Asked    Financial Status: Disabled Not Asked    Legal: Involved in civil litigation Not Asked    Caffeine Use: Substantial Not Asked    Financial Status: Other Yes    Legal: Involved in criminal litigation Not Asked    Patient feels they ought to cut down on drinking/drug use Not Asked    Firearms: Does patient have access to a firearm? Not Asked    Legal: Other Not Asked    Patient annoyed by others criticizing their drinking/drug use Not Asked    Home situation: lives with family Yes    Leisure: Time with family Yes    Patient has felt bad or guilty about drinking/drug use Not Asked    Home situation: lives alone Not Asked    Leisure: Exercise Not Asked    Patient has had a drink/used drugs as an eye opener in the AM Not Asked    Home situation: lives with friends Not Asked    Leisure: Sports Not Asked    Childhood History: Raised by parents Yes    Home situation: lives with significant other Yes    Leisure: Fishing Not Asked    Childhood History: Uneventful Yes    Home situation: lives with spouse Not Asked    Leisure: Hunting Not Asked    Childhood History: Adopted Not Asked    Home situation: lives in group home Not Asked    Leisure: Movie Watching Not Asked    Childhood History: Early trauma Not Asked    Home situation: lives in shelter Not Asked    Leisure: Shopping Yes    Childhood History: Other Not Asked    Home situation: lives in nursing home Not Asked     Service Not Asked    Legal consequences of chemical use Not Asked   Social History Narrative    Born and raised in [place], [place].  HS graduate.  Came to [facility] at 17 to work.  Met and   at 19 - 3 children, 59 years .  He will probably be going to a  for alzheimer dx next week.  He is currently hospitalized for medical clearance.         Current Outpatient Medications   Medication Sig Dispense Refill    apixaban (ELIQUIS) 5 mg Tab Take 5 mg by  mouth 2 (two) times daily.      aspirin (ENTERIC COATED ASPIRIN) 81 MG EC tablet Take 1 tablet by mouth nightly.       ergocalciferol (ERGOCALCIFEROL) 50,000 unit Cap TAKE 1 CAPSULE EVERY WEEK (Patient taking differently: Take 50,000 Units by mouth every Sunday. ) 12 capsule 2    FLUoxetine 10 MG capsule Take 1 capsule (10 mg total) by mouth daily as needed (PANIC ATTACKS). 90 capsule 1    furosemide (LASIX) 40 MG tablet Take 40 mg by mouth daily as needed (EDEMA).      lactulose (CHRONULAC) 20 gram/30 mL Soln 1-2 tablespoons daily for constipation 600 mL 5    levothyroxine (SYNTHROID) 88 MCG tablet TAKE 1 TABLET EVERY DAY BEFORE BREAKFAST (Patient taking differently: Take 88 mcg by mouth before breakfast. ) 90 tablet 2    LORazepam (ATIVAN) 0.5 MG tablet Take 1/2 po in evening as needed for anxiety.  Do not drive a motor vehicle for at least 8 hours after taking this medication. (Patient taking differently: Take 0.25 mg by mouth nightly as needed for Anxiety (Do not drive a motor vehicle for at least 8 hours after taking this medication.). ) 20 tablet 1    meclizine (ANTIVERT) 25 mg tablet Take 25 mg by mouth 3 (three) times daily as needed for Dizziness.      mv,Ca,min-folic acid-vit K1 (ONE-A-DAY WOMEN'S 50 PLUS) 400-20 mcg Tab Take 1 tablet by mouth every evening.      pravastatin (PRAVACHOL) 40 MG tablet TAKE 1 TABLET EVERY DAY (Patient taking differently: Take 40 mg by mouth every evening. ) 90 tablet 3     No current facility-administered medications for this visit.        Review of patient's allergies indicates:   Allergen Reactions    Nsaids (non-steroidal anti-inflammatory drug) Other (See Comments)     Other reaction(s): Chronic Renal Insufficiency    Penicillins Swelling         Review of Systems   Constitution: Negative for chills and fever.   Cardiovascular: Positive for leg swelling. Negative for claudication.   Skin: Positive for color change and nail changes.   Musculoskeletal: Positive  for arthritis and joint swelling.   Neurological: Positive for numbness. Negative for paresthesias.   Psychiatric/Behavioral: Negative for altered mental status.           Objective:      Physical Exam   Constitutional: She is oriented to person, place, and time. She appears well-developed and well-nourished. No distress.   Cardiovascular:   Pulses:       Dorsalis pedis pulses are 2+ on the right side, and 2+ on the left side.        Posterior tibial pulses are 1+ on the right side, and 1+ on the left side.   CFT <3 seconds bilateral.  Pedal hair growth decreased bilateral.  Varicosities noted bilateral.  1+ pitting edema noted bilateral.  Toes are cool to touch bilateral.     Musculoskeletal: She exhibits edema. She exhibits no tenderness.   Muscle strength 5/5 in all muscle groups bilateral.  No tenderness nor crepitation with ROM of foot/ankle joints bilateral.  No pain with palpation of bilateral foot and ankle.  Bilateral pes planus foot type.  Bilateral hallux abducto valgus.  Bilateral semi-rigid contracture of toes 2-5.     Neurological: She is alert and oriented to person, place, and time. She has normal strength. A sensory deficit is present.   Protective sensation per [place] monofilament absent bilateral.    Vibratory sensation absent bilateral.    Light touch intact bilateral.   Skin: Skin is warm, dry and intact. Lesion noted. No abrasion, no bruising, no burn, no ecchymosis, no laceration, no petechiae and no rash noted. She is not diaphoretic. No cyanosis or erythema. No pallor. Nails show no clubbing.   Pedal skin appears edematous bilateral.  Toenails x 10 appear thickened by 2 mm's, elongated by 2 mm's, and discolored with subungual debris.     medial hallux nail margin of the right foot with ingrown nail plate. Surrounding erythema and minimal edema is noted there is no granuloma formation noted. No drainage or malodor                Assessment:       Encounter Diagnoses   Name Primary?     Peripheral vascular disease Yes    Ingrown nail of great toe of right foot          Plan:       Maggi was seen today for ingrown toenail.    Diagnoses and all orders for this visit:    Peripheral vascular disease    Ingrown nail of great toe of right foot      I counseled the patient on her conditions, their implications and medical management.    Utilizing sterile toenail clippers I aggressively debrided the offending nail border approximately 3 mm from its edge and carried the nail plate incision down at an angle in order to wedge out the offending cryptotic portion of the nail plate. The offending border was then removed in toto. The remaining nail was grinded down with an electric  down to nail bed.  No blood was drawn. Patient tolerated the procedure well and related significant relief.      Discussed doing a more permanent procedure PNA with phenol of this becomes a recurrent issue    Return LAVERNE Barrett DPM

## 2020-01-12 ENCOUNTER — NURSE TRIAGE (OUTPATIENT)
Dept: ADMINISTRATIVE | Facility: CLINIC | Age: 85
End: 2020-01-12

## 2020-01-13 NOTE — TELEPHONE ENCOUNTER
"  Reason for Disposition   Cold with no complications (all triage questions negative)    Additional Information   Negative: Severe difficulty breathing (e.g., struggling for each breath, speaks in single words)   Negative: Sounds like a life-threatening emergency to the triager   Negative: Runny nose is caused by pollen or other allergies   Negative: Cough is main symptom   Negative: Severe sore throat   Negative: Fever > 104 F (40 C)   Negative: [1] Difficulty breathing AND [2] not severe AND [3] not from stuffy nose (e.g., not relieved by cleaning out the nose)   Negative: Patient sounds very sick or weak to the triager   Negative: [1] Fever > 101 F (38.3 C) AND [2] age > 60   Negative: [1] Fever > 101 F (38.3 C) AND [2] bedridden (e.g., nursing home patient, CVA, chronic illness, recovering from surgery)   Negative: [1] Fever > 100.5 F (38.1 C) AND [2] diabetes mellitus or weak immune system (e.g., HIV positive, cancer chemo, splenectomy, organ transplant, chronic steroids)   Negative: Fever present > 3 days (72 hours)   Negative: [1] Fever returns after gone for over 24 hours AND [2] symptoms worse or not improved   Negative: [1] Sinus pain (not just congestion) AND [2] fever   Negative: Earache   Negative: [1] SEVERE sore throat AND [2] present > 24 hours   Negative: [1] Sinus congestion (pressure, fullness) AND [2] present > 10 days   Negative: [1] Nasal discharge AND [2] present > 10 days   Negative: [1] Using nasal washes and pain medicine > 24 hours AND [2] sinus pain (lower forehead, cheekbone, or eye) persists   Negative: Sores with yellow scabs around the nasal opening    Answer Assessment - Initial Assessment Questions  1. ONSET: "When did the nasal discharge start?"      Cold sx started today   2. AMOUNT: "How much discharge is there?"     Nothing coming out   3. COUGH: "Do you have a cough?" If yes, ask: "Describe the color of your sputum" (clear, white, yellow, green)     No   4. " "RESPIRATORY DISTRESS: "Describe your breathing."      No SOB  5. FEVER: "Do you have a fever?" If so, ask: "What is your temperature, how was it measured, and when did it start?"     afeb   6. SEVERITY: "Overall, how bad are you feeling right now?" (e.g., doesn't interfere with normal activities, staying home from school/work, staying in bed)      Eating and drinking   7. OTHER SYMPTOMS: "Do you have any other symptoms?" (e.g., sore throat, earache, wheezing, vomiting)     ST    Protocols used: ST COMMON COLD-A-  Pt called re very bad cold started fri pm. Now congested dried out mouth, throat and nose after using nasal spray. Drinking fluids. Cant breathe thru nose after taking three sprays of flonase. Denies resp distress. Afeb. Offered protocol advice. Call back with questions.   "

## 2020-02-05 ENCOUNTER — PES CALL (OUTPATIENT)
Dept: ADMINISTRATIVE | Facility: CLINIC | Age: 85
End: 2020-02-05

## 2020-02-11 ENCOUNTER — OFFICE VISIT (OUTPATIENT)
Dept: FAMILY MEDICINE | Facility: CLINIC | Age: 85
End: 2020-02-11
Payer: MEDICARE

## 2020-02-11 VITALS
RESPIRATION RATE: 18 BRPM | HEIGHT: 64 IN | DIASTOLIC BLOOD PRESSURE: 58 MMHG | WEIGHT: 143.63 LBS | HEART RATE: 70 BPM | TEMPERATURE: 97 F | BODY MASS INDEX: 24.52 KG/M2 | SYSTOLIC BLOOD PRESSURE: 110 MMHG

## 2020-02-11 DIAGNOSIS — E61.1 IRON DEFICIENCY: ICD-10-CM

## 2020-02-11 DIAGNOSIS — E03.9 ACQUIRED HYPOTHYROIDISM: ICD-10-CM

## 2020-02-11 DIAGNOSIS — F41.9 ANXIETY: Primary | ICD-10-CM

## 2020-02-11 PROCEDURE — 1101F PR PT FALLS ASSESS DOC 0-1 FALLS W/OUT INJ PAST YR: ICD-10-PCS | Mod: HCNC,CPTII,S$GLB, | Performed by: FAMILY MEDICINE

## 2020-02-11 PROCEDURE — 1159F PR MEDICATION LIST DOCUMENTED IN MEDICAL RECORD: ICD-10-PCS | Mod: HCNC,S$GLB,, | Performed by: FAMILY MEDICINE

## 2020-02-11 PROCEDURE — 1126F AMNT PAIN NOTED NONE PRSNT: CPT | Mod: HCNC,S$GLB,, | Performed by: FAMILY MEDICINE

## 2020-02-11 PROCEDURE — 99214 OFFICE O/P EST MOD 30 MIN: CPT | Mod: HCNC,S$GLB,, | Performed by: FAMILY MEDICINE

## 2020-02-11 PROCEDURE — 99999 PR PBB SHADOW E&M-EST. PATIENT-LVL IV: CPT | Mod: PBBFAC,HCNC,, | Performed by: FAMILY MEDICINE

## 2020-02-11 PROCEDURE — 99999 PR PBB SHADOW E&M-EST. PATIENT-LVL IV: ICD-10-PCS | Mod: PBBFAC,HCNC,, | Performed by: FAMILY MEDICINE

## 2020-02-11 PROCEDURE — 99214 PR OFFICE/OUTPT VISIT, EST, LEVL IV, 30-39 MIN: ICD-10-PCS | Mod: HCNC,S$GLB,, | Performed by: FAMILY MEDICINE

## 2020-02-11 PROCEDURE — 1126F PR PAIN SEVERITY QUANTIFIED, NO PAIN PRESENT: ICD-10-PCS | Mod: HCNC,S$GLB,, | Performed by: FAMILY MEDICINE

## 2020-02-11 PROCEDURE — 1159F MED LIST DOCD IN RCRD: CPT | Mod: HCNC,S$GLB,, | Performed by: FAMILY MEDICINE

## 2020-02-11 PROCEDURE — 1101F PT FALLS ASSESS-DOCD LE1/YR: CPT | Mod: HCNC,CPTII,S$GLB, | Performed by: FAMILY MEDICINE

## 2020-02-11 NOTE — PROGRESS NOTES
THIS DOCUMENT WAS MADE IN PART WITH VOICE RECOGNITION SOFTWARE.  OCCASIONALLY THIS SOFTWARE WILL MISINTERPRET WORDS OR PHRASES.      Maggi Fontaine  9/24/1932    Maggi was seen today for follow-up.    Diagnoses and all orders for this visit:    Anxiety  -     Ambulatory referral/consult to Psychology; Future  Today's visit was spent mostly counseling.  I recommend she start the fluoxetine 10 mg every morning.  Advise this is a maintenance medication not a rescue medication.  After few weeks can increase the dosage depending on response.  She recognizes that she may need additional help and suggested consulting with psychology /psychiatry.  She denies significant depression or suicidal thoughts.  She is concerned about memory loss him like to have that evaluated as well.  I will bring her back in 6 weeks, will re-evaluate, see how she is doing and consider Neurology consultation as well    Acquired hypothyroidism  -     TSH; Future    Iron deficiency  -     CBC auto differential; Future  -     Iron and TIBC; Future        Subjective     Chief Complaint   Patient presents with    Follow-up     Patient reported having worsening anxiety        HPI  Patient presents alone.  She request help with anxiety and panic attacks.  'since last shock treatment', she feels this is when this started.  Since then she has had multiple other stressors:  Multiple falls, skin cancer, possible assisted living, etc    I recommended fluoxetine back in the fall.  She did not start this right away.  In the last several weeks apparently she has been taking p.r.n..  She seems think this helps but it takes awhile and only last few hours she describes the episode as feeling anxious sometimes palpitations sometimes burning sensation on the skin in face.  It often settle down within a few minutes to an hour.  Talking to her family or son can help.  She denies any active chest pain or shortness of breath.    She states family has been concerned  about Alzheimer's.  She states I can draw a clock with hands in the right place.  I can answer all those questions.  So I did not push the issue today.  She inquired about possibly moving into assisted living versus higher in private sitters.  She recognizes that she may need more assistance in the future.    She reports no difficulty with driving.  No getting lost, no accidents.    She request follow-up with her thyroid and history of anemia.    Active Ambulatory Problems     Diagnosis Date Noted    Hyperlipidemia     Hypothyroidism     Cardiac pacemaker in situ 01/22/2015    Sinoatrial node dysfunction 01/22/2015    Hyperuricemia 07/06/2015    Long term current use of anticoagulant therapy 08/11/2015    Urinary incontinence 09/13/2016    Paroxysmal atrial fibrillation 09/13/2016    Idiopathic gout 09/13/2016    Chronic kidney disease, stage III (moderate) 09/13/2016    History of TIA (transient ischemic attack) 09/13/2016    Osteopenia 09/13/2016    SNHL (sensorineural hearing loss) 09/13/2016    Aortic atherosclerosis 08/03/2017    Ectatic aorta 08/03/2017    Laryngopharyngeal reflux (LPR) 08/03/2017    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/2018    Chronic pansinusitis 03/26/2018    Vitamin D deficiency 05/28/2018    PAF (paroxysmal atrial fibrillation) 07/08/2019    TIA (transient ischemic attack) 11/21/2019     Resolved Ambulatory Problems     Diagnosis Date Noted    Palpitations 08/27/2013    Benign paroxysmal positional vertigo of left ear 09/13/2016     Past Medical History:   Diagnosis Date    Acid reflux     Arthritis     Atrial fibrillation     Cardiac pacemaker     Gout     Headache(784.0)     HEARING LOSS     Hypertension     Polymyalgia rheumatica     Renal insufficiency     Sinusitis     SOB (shortness of breath)     Stroke     West Nile encephalitis          Review of Systems   Constitutional: Positive for activity change. Negative for appetite change and  "unexpected weight change.   Respiratory: Negative for shortness of breath and wheezing.    Cardiovascular: Positive for palpitations. Negative for chest pain.   Gastrointestinal: Negative.    Psychiatric/Behavioral: Negative for sleep disturbance and suicidal ideas. The patient is nervous/anxious.        Objective     Physical Exam   Constitutional: She is oriented to person, place, and time. She appears well-developed and well-nourished. No distress.   She is well kept well groomed and appears in no distress   HENT:   Head: Normocephalic and atraumatic.   Eyes: No scleral icterus.   Cardiovascular: Normal rate and regular rhythm.   Her rhythm is regular today   Pulmonary/Chest: Effort normal and breath sounds normal. No respiratory distress.   Neurological: She is alert and oriented to person, place, and time.   Skin: Skin is dry. No rash noted. She is not diaphoretic.   Psychiatric:   she is mildly anxious, less so than often.  She asked very insightful questions.   Vitals reviewed.    Vitals:    02/11/20 1528   BP: (!) 110/58   BP Location: Right arm   Patient Position: Sitting   BP Method: Medium (Manual)   Pulse: 70   Resp: 18   Temp: 97.4 °F (36.3 °C)   TempSrc: Oral   Weight: 65.2 kg (143 lb 10.1 oz)   Height: 5' 4" (1.626 m)       MOST RECENT LABS IN OUR ELECTRONIC MEDICAL RECORD:     Results for orders placed or performed in visit on 12/09/19   CULTURE, URINE   Result Value Ref Range    Urine Culture, Routine No growth    Urinalysis   Result Value Ref Range    Specimen UA Urine, Clean Catch     Color, UA Yellow Yellow, Straw, Carol    Appearance, UA Clear Clear    pH, UA 6.0 5.0 - 8.0    Specific Gravity, UA 1.020 1.005 - 1.030    Protein, UA 2+ (A) Negative    Glucose, UA Negative Negative    Ketones, UA Negative Negative    Bilirubin (UA) Negative Negative    Occult Blood UA Negative Negative    Nitrite, UA Negative Negative    Leukocytes, UA Negative Negative   Urinalysis Microscopic   Result Value Ref " Range    RBC, UA 0 0 - 4 /hpf    WBC, UA 1 0 - 5 /hpf    Bacteria Rare None-Occ /hpf    Squam Epithel, UA 7 /hpf    Hyaline Casts, UA 1 0-1/lpf /lpf    Microscopic Comment SEE COMMENT    Protein / creatinine ratio, urine   Result Value Ref Range    Protein, Urine Random 73 (H) 0 - 15 mg/dL    Creatinine, Random Ur 134.0 15.0 - 325.0 mg/dL    Prot/Creat Ratio, Ur 0.54 (H) 0.00 - 0.20   RENAL FUNCTION PANEL   Result Value Ref Range    Glucose 88 70 - 110 mg/dL    Sodium 140 136 - 145 mmol/L    Potassium 4.8 3.5 - 5.1 mmol/L    Chloride 107 95 - 110 mmol/L    CO2 28 23 - 29 mmol/L    BUN, Bld 23 8 - 23 mg/dL    Calcium 9.6 8.7 - 10.5 mg/dL    Creatinine 1.3 0.5 - 1.4 mg/dL    Albumin 3.6 3.5 - 5.2 g/dL    Phosphorus 3.0 2.7 - 4.5 mg/dL    eGFR if  42.6 (A) >60 mL/min/1.73 m^2    eGFR if non  37.0 (A) >60 mL/min/1.73 m^2    Anion Gap 5 (L) 8 - 16 mmol/L   Magnesium   Result Value Ref Range    Magnesium 2.1 1.6 - 2.6 mg/dL   FERRITIN   Result Value Ref Range    Ferritin 62 20.0 - 300.0 ng/mL   URIC ACID   Result Value Ref Range    Uric Acid 6.7 (H) 2.4 - 5.7 mg/dL   IRON AND TIBC   Result Value Ref Range    Iron 118 30 - 160 ug/dL    Transferrin 234 200 - 375 mg/dL    TIBC 346 250 - 450 ug/dL    Saturated Iron 34 20 - 50 %

## 2020-02-20 ENCOUNTER — TELEPHONE (OUTPATIENT)
Dept: FAMILY MEDICINE | Facility: CLINIC | Age: 85
End: 2020-02-20

## 2020-02-20 RX ORDER — FLUOXETINE HYDROCHLORIDE 20 MG/1
20 CAPSULE ORAL DAILY
Qty: 90 CAPSULE | Refills: 1 | Status: SHIPPED | OUTPATIENT
Start: 2020-02-20 | End: 2020-09-28

## 2020-02-20 NOTE — TELEPHONE ENCOUNTER
Spoke with pt, she is not experiencing any negative side effects for the 10mg of prozac. If she takes it in the morning, it does not last into the evening. She would like an increase in the dose as discussed.

## 2020-03-05 ENCOUNTER — TELEPHONE (OUTPATIENT)
Dept: FAMILY MEDICINE | Facility: CLINIC | Age: 85
End: 2020-03-05

## 2020-03-05 NOTE — TELEPHONE ENCOUNTER
----- Message from Leslie Gillespie sent at 3/5/2020  4:12 PM CST -----  Contact: Pt  Type:  Patient Returning Call    Who Called: pt  Who Left Message for Patient:  Pt is unsure stated that the message was unclear. No message left in chart  Does the patient know what this is regarding?:  No  Best Call Back Number:    Additional Information:  Please give pt a call back as soon as possible. Thank you

## 2020-03-06 ENCOUNTER — OFFICE VISIT (OUTPATIENT)
Dept: FAMILY MEDICINE | Facility: CLINIC | Age: 85
End: 2020-03-06
Payer: MEDICARE

## 2020-03-06 VITALS
DIASTOLIC BLOOD PRESSURE: 66 MMHG | HEIGHT: 64 IN | BODY MASS INDEX: 24.24 KG/M2 | WEIGHT: 142 LBS | OXYGEN SATURATION: 98 % | HEART RATE: 71 BPM | SYSTOLIC BLOOD PRESSURE: 120 MMHG

## 2020-03-06 DIAGNOSIS — I48.0 PAF (PAROXYSMAL ATRIAL FIBRILLATION): ICD-10-CM

## 2020-03-06 DIAGNOSIS — Z95.0 CARDIAC PACEMAKER IN SITU: ICD-10-CM

## 2020-03-06 DIAGNOSIS — I48.0 PAROXYSMAL ATRIAL FIBRILLATION: ICD-10-CM

## 2020-03-06 DIAGNOSIS — I49.5 SINOATRIAL NODE DYSFUNCTION: ICD-10-CM

## 2020-03-06 DIAGNOSIS — I77.819 ECTATIC AORTA: ICD-10-CM

## 2020-03-06 DIAGNOSIS — Z00.00 ENCOUNTER FOR PREVENTIVE HEALTH EXAMINATION: Primary | ICD-10-CM

## 2020-03-06 DIAGNOSIS — E78.5 HYPERLIPIDEMIA, UNSPECIFIED HYPERLIPIDEMIA TYPE: ICD-10-CM

## 2020-03-06 DIAGNOSIS — I70.0 AORTIC ATHEROSCLEROSIS: ICD-10-CM

## 2020-03-06 PROCEDURE — 99499 RISK ADDL DX/OHS AUDIT: ICD-10-PCS | Mod: HCNC,S$GLB,, | Performed by: NURSE PRACTITIONER

## 2020-03-06 PROCEDURE — G0439 PPPS, SUBSEQ VISIT: HCPCS | Mod: HCNC,S$GLB,, | Performed by: NURSE PRACTITIONER

## 2020-03-06 PROCEDURE — 99999 PR PBB SHADOW E&M-EST. PATIENT-LVL IV: ICD-10-PCS | Mod: PBBFAC,HCNC,, | Performed by: NURSE PRACTITIONER

## 2020-03-06 PROCEDURE — 99999 PR PBB SHADOW E&M-EST. PATIENT-LVL IV: CPT | Mod: PBBFAC,HCNC,, | Performed by: NURSE PRACTITIONER

## 2020-03-06 PROCEDURE — G0439 PR MEDICARE ANNUAL WELLNESS SUBSEQUENT VISIT: ICD-10-PCS | Mod: HCNC,S$GLB,, | Performed by: NURSE PRACTITIONER

## 2020-03-06 PROCEDURE — 99499 UNLISTED E&M SERVICE: CPT | Mod: HCNC,S$GLB,, | Performed by: NURSE PRACTITIONER

## 2020-03-06 NOTE — PROGRESS NOTES
"Maggi Fontaine presented for a  Medicare AWV and comprehensive Health Risk Assessment today. The following components were reviewed and updated:    · Medical history  · Family History  · Social history  · Allergies and Current Medications  · Health Risk Assessment  · Health Maintenance  · Care Team     ** See Completed Assessments for Annual Wellness Visit within the encounter summary.**     The following assessments were completed:  · Living Situation  · CAGE  · Depression Screening  · Timed Get Up and Go  · Whisper Test  · Cognitive Function Screening      · Nutrition Screening  · ADL Screening  · PAQ Screening    Vitals:    03/06/20 1343   BP: 120/66   BP Location: Left arm   Patient Position: Sitting   BP Method: Medium (Manual)   Pulse: 71   SpO2: 98%   Weight: 64.4 kg (141 lb 15.6 oz)   Height: 5' 4" (1.626 m)     Body mass index is 24.37 kg/m².  Physical Exam   Constitutional: She is oriented to person, place, and time. She appears well-nourished.   Cardiovascular: Normal rate, regular rhythm, normal heart sounds and intact distal pulses.   Pulmonary/Chest: Effort normal and breath sounds normal.   Neurological: She is alert and oriented to person, place, and time.   Skin: Skin is warm and dry.   Vitals reviewed.      Diagnoses and health risks identified today and associated recommendations/orders:    1. Encounter for preventive health examination  Reviewed and discussed health maintenance.      2. Paroxysmal atrial fibrillation  Stable- continue current treatment and follow up routinely with PCP and cardiology ()    3. Ectatic aorta  Stable- continue current treatment and follow up routinely with PCP and cardiology ()    4. Aortic atherosclerosis  Stable- continue current treatment and follow up routinely with PCP and cardiology ()    5. Hyperlipidemia, unspecified hyperlipidemia type  Stable- continue current treatment and follow up routinely with PCP and cardiology " ()    6. Sinoatrial node dysfunction  Stable- continue current treatment and follow up routinely with PCP and cardiology ()    7. Cardiac pacemaker in situ  Stable- continue current treatment and follow up routinely with PCP and cardiology ()    8. PAF (paroxysmal atrial fibrillation)  Stable- continue current treatment and follow up routinely with PCP and cardiology ()    Provided Maggi with a 5-10 year written screening schedule and personal prevention plan. Recommendations were developed using the USPSTF age appropriate recommendations. Education, counseling, and referrals were provided as needed. After Visit Summary printed and given to patient which includes a list of additional screenings\tests needed.    Nay Allison, NP

## 2020-03-06 NOTE — PATIENT INSTRUCTIONS
Counseling and Referral of Other Preventative  (Italic type indicates deductible and co-insurance are waived)    Patient Name: Maggi Fontaine  Today's Date: 3/6/2020    Health Maintenance       Date Due Completion Date    Shingles Vaccine (1 of 2) 09/24/1982 ---    Lipid Panel 11/22/2020 11/22/2019    TETANUS VACCINE 02/23/2029 2/23/2019        No orders of the defined types were placed in this encounter.    The following information is provided to all patients.  This information is to help you find resources for any of the problems found today that may be affecting your health:                Living healthy guide: www.[facility].[place].[facility]      Understanding Diabetes: www.diabetes.org      Eating healthy: www.cdc.gov/healthyweight      CDC home safety checklist: www.cdc.gov/steadi/patient.html      Agency on Aging: www.goea.[place].[facility]      Alcoholics anonymous (AA): www.aa.org      Physical Activity: www.vy.nih.gov/ej6seac      Tobacco use: www.quitwithusla.org

## 2020-03-16 DIAGNOSIS — E03.9 HYPOTHYROIDISM, UNSPECIFIED TYPE: ICD-10-CM

## 2020-03-16 RX ORDER — LEVOTHYROXINE SODIUM 88 UG/1
TABLET ORAL
Qty: 90 TABLET | Refills: 2 | Status: SHIPPED | OUTPATIENT
Start: 2020-03-16 | End: 2020-10-29

## 2020-03-23 DIAGNOSIS — E55.9 HYPOVITAMINOSIS D: ICD-10-CM

## 2020-03-23 RX ORDER — ERGOCALCIFEROL 1.25 MG/1
CAPSULE ORAL
Qty: 12 CAPSULE | Refills: 2 | Status: SHIPPED | OUTPATIENT
Start: 2020-03-23 | End: 2021-01-06

## 2020-03-28 RX ORDER — PRAVASTATIN SODIUM 40 MG/1
TABLET ORAL
Qty: 90 TABLET | Refills: 3 | Status: SHIPPED | OUTPATIENT
Start: 2020-03-28 | End: 2021-02-11

## 2020-04-24 ENCOUNTER — OFFICE VISIT (OUTPATIENT)
Dept: FAMILY MEDICINE | Facility: CLINIC | Age: 85
End: 2020-04-24
Payer: MEDICARE

## 2020-04-24 DIAGNOSIS — F41.9 ANXIETY: Primary | ICD-10-CM

## 2020-04-24 PROCEDURE — 99442 PR PHYSICIAN TELEPHONE EVALUATION 11-20 MIN: ICD-10-PCS | Mod: HCNC,95,, | Performed by: FAMILY MEDICINE

## 2020-04-24 PROCEDURE — 99442 PR PHYSICIAN TELEPHONE EVALUATION 11-20 MIN: CPT | Mod: HCNC,95,, | Performed by: FAMILY MEDICINE

## 2020-04-24 NOTE — PROGRESS NOTES
Established Patient - Audio Only Telehealth Visit     The patient location is: home, [place]  The chief complaint leading to consultation is: anxiety, and other  Visit type: Virtual visit with audio only (telephone)     The reason for the audio only service rather than synchronous audio and video virtual visit was related to technical difficulties or patient preference/necessity.     Each patient to whom I provide medical services by telemedicine is:  (1) informed of the relationship between the physician and patient and the respective role of any other health care provider with respect to management of the patient; and (2) notified that they may decline to receive medical services by telemedicine and may withdraw from such care at any time. Patient verbally consented to receive this service via voice-only telephone call.       HPI:   She does report increase in anxiety exacerbated by COVID19.  This did trigger 1 urgent care visit and another night she had to stay with a friend but since then has been back at home and doing well.  She has been taking the fluoxetine daily and does report some benefit.  She feels that she is improving though guarded.  She does report some difficulty with loneliness and isolation but no severe depression and no suicidal thoughts      Has not been monitoring BP, because of her anxiety, no CP, no HA      Assessment and plan:  Diagnoses and all orders for this visit:    Anxiety      Continue fluoxetine.  Encouraged light exercise, maintain socialization through phone/video, etc..  Will schedule her to follow-up in 2 months but sooner if any problem.    Total time today 13 min the       This service was not originating from a related E/M service provided within the previous 7 days nor will  to an E/M service or procedure within the next 24 hours or my soonest available appointment.  Prevailing standard of care was able to be met in this audio-only visit.

## 2020-05-06 ENCOUNTER — PATIENT MESSAGE (OUTPATIENT)
Dept: ADMINISTRATIVE | Facility: HOSPITAL | Age: 85
End: 2020-05-06

## 2020-05-08 ENCOUNTER — TELEPHONE (OUTPATIENT)
Dept: FAMILY MEDICINE | Facility: CLINIC | Age: 85
End: 2020-05-08

## 2020-05-08 NOTE — TELEPHONE ENCOUNTER
----- Message from Sandra Starks sent at 5/8/2020 10:26 AM CDT -----  Contact: self  Type: Needs Medical Advice  Who Called:  mirella  Symptoms (please be specific):  Anxiety attacks   How long has patient had these symptoms:  daily  Pharmacy name and phone #:    [facility] #[id number] - [place] [address] AT [address] [address]  Phone: [phone] Fax: [phone]  Best Call Back Number: [phone] (home)   Additional Information: Patient states she is having anxiety attack in the afternoon daily. patient states Valium is not working. She would like a prescription for anxiety. Please call patient to advise. Thanks!

## 2020-05-11 ENCOUNTER — TELEPHONE (OUTPATIENT)
Dept: FAMILY MEDICINE | Facility: CLINIC | Age: 85
End: 2020-05-11

## 2020-05-11 NOTE — TELEPHONE ENCOUNTER
----- Message from Kevin Napier sent at 5/11/2020  2:03 PM CDT -----  Contact: Ptnt   [phone]  Type: Needs Medical Advice    Who Called:  Ptnt   [phone]    Symptoms (please be specific):  Anxiety    Pharmacy name and phone #:      [facility] #[id number] - [place] [address] AT [address] [address]  Phone: [phone] Fax: [phone]    Best Call Back Number: Ptnt   [phone]    Additional Information: Advised returning a call to Michelle, was unable to understand previous phone call.

## 2020-05-11 NOTE — TELEPHONE ENCOUNTER
Spoke with pt, she states she is taking prozac 20mg and it is making her jittery, she is having anxiety attacks in the afternoon, she shakes, feels flushed. She is asking for something to help with the anxiety.    She is aware that he is out of the office this afternoon

## 2020-05-11 NOTE — TELEPHONE ENCOUNTER
----- Message from Derek Jones sent at 5/11/2020 10:29 AM CDT -----  Contact: self   Patient miss call from your office please call back at [phone] (home)     Case number [id number]

## 2020-05-12 NOTE — TELEPHONE ENCOUNTER
If she feels worse since increasing the Prozac we should reduce to the previous dosage or consider changing to an alternative.  I do not want add a 2nd medication to treat side effects of 1st medication.  The Prozac is intended to help with anxiety as well as depression

## 2020-05-13 NOTE — TELEPHONE ENCOUNTER
Pt states she was only taking prozac as needed instead of daily. She will try taking it daily and let us know if she has improvement.

## 2020-06-25 ENCOUNTER — OFFICE VISIT (OUTPATIENT)
Dept: FAMILY MEDICINE | Facility: CLINIC | Age: 85
End: 2020-06-25
Payer: MEDICARE

## 2020-06-25 VITALS
HEART RATE: 78 BPM | TEMPERATURE: 98 F | DIASTOLIC BLOOD PRESSURE: 50 MMHG | HEIGHT: 64 IN | RESPIRATION RATE: 18 BRPM | OXYGEN SATURATION: 99 % | WEIGHT: 133.19 LBS | SYSTOLIC BLOOD PRESSURE: 120 MMHG | BODY MASS INDEX: 22.74 KG/M2

## 2020-06-25 DIAGNOSIS — F41.9 ANXIETY: ICD-10-CM

## 2020-06-25 DIAGNOSIS — I10 ESSENTIAL HYPERTENSION: ICD-10-CM

## 2020-06-25 DIAGNOSIS — R29.898 WEAKNESS OF BOTH LEGS: Primary | ICD-10-CM

## 2020-06-25 DIAGNOSIS — I48.0 PAROXYSMAL ATRIAL FIBRILLATION: ICD-10-CM

## 2020-06-25 DIAGNOSIS — Z91.81 AT RISK FOR FALLS: ICD-10-CM

## 2020-06-25 PROCEDURE — 1101F PR PT FALLS ASSESS DOC 0-1 FALLS W/OUT INJ PAST YR: ICD-10-PCS | Mod: HCNC,CPTII,S$GLB, | Performed by: FAMILY MEDICINE

## 2020-06-25 PROCEDURE — 1126F PR PAIN SEVERITY QUANTIFIED, NO PAIN PRESENT: ICD-10-PCS | Mod: HCNC,S$GLB,, | Performed by: FAMILY MEDICINE

## 2020-06-25 PROCEDURE — 99214 OFFICE O/P EST MOD 30 MIN: CPT | Mod: HCNC,S$GLB,, | Performed by: FAMILY MEDICINE

## 2020-06-25 PROCEDURE — 1101F PT FALLS ASSESS-DOCD LE1/YR: CPT | Mod: HCNC,CPTII,S$GLB, | Performed by: FAMILY MEDICINE

## 2020-06-25 PROCEDURE — 1159F PR MEDICATION LIST DOCUMENTED IN MEDICAL RECORD: ICD-10-PCS | Mod: HCNC,S$GLB,, | Performed by: FAMILY MEDICINE

## 2020-06-25 PROCEDURE — 1159F MED LIST DOCD IN RCRD: CPT | Mod: HCNC,S$GLB,, | Performed by: FAMILY MEDICINE

## 2020-06-25 PROCEDURE — 99214 PR OFFICE/OUTPT VISIT, EST, LEVL IV, 30-39 MIN: ICD-10-PCS | Mod: HCNC,S$GLB,, | Performed by: FAMILY MEDICINE

## 2020-06-25 PROCEDURE — 1126F AMNT PAIN NOTED NONE PRSNT: CPT | Mod: HCNC,S$GLB,, | Performed by: FAMILY MEDICINE

## 2020-06-25 PROCEDURE — 99999 PR PBB SHADOW E&M-EST. PATIENT-LVL V: CPT | Mod: PBBFAC,HCNC,, | Performed by: FAMILY MEDICINE

## 2020-06-25 PROCEDURE — 99999 PR PBB SHADOW E&M-EST. PATIENT-LVL V: ICD-10-PCS | Mod: PBBFAC,HCNC,, | Performed by: FAMILY MEDICINE

## 2020-06-25 NOTE — PROGRESS NOTES
THIS DOCUMENT WAS MADE IN PART WITH VOICE RECOGNITION SOFTWARE.  OCCASIONALLY THIS SOFTWARE WILL MISINTERPRET WORDS OR PHRASES.      Maggi Fontaine  9/24/1932    Maggi was seen today for follow-up.    Diagnoses and all orders for this visit:    Weakness of both legs  -     Cancel: US Lower Extremity Arteries Bilateral; Future  -     US Lower Extrem Arteries Bilat with CARLOS EDUARDO (xpd); Future    At risk for falls  -     Ambulatory referral/consult to Physical/Occupational Therapy; Future    Anxiety  Improved continue fluoxetine    Essential hypertension  Stable    Paroxysmal atrial fibrillation  Stable        Subjective     Chief Complaint   Patient presents with    Follow-up     2 month follow up visit        HPI    Legs feel weak, sometimes instead.  Sometimes when she 1st stands up but also sometimes after walking for while.  She does not perceive symptoms suggestive of claudication but symptoms are day.  She does have mild dyspnea with exertion but no acute changes.  No chest pain.  Activity level has been diminished especially during this pandemic  No pain, no back pain , no cyanosis, no wounds.    Anxiety, also exacerbated by pandemic..  We initiated fluoxetine after her last appointment.  She reports this has helped significantly.  She does not recall any side effects.          Active Ambulatory Problems     Diagnosis Date Noted    Hyperlipidemia     Hypothyroidism     Cardiac pacemaker in situ 01/22/2015    Sinoatrial node dysfunction 01/22/2015    Hyperuricemia 07/06/2015    Long term current use of anticoagulant therapy 08/11/2015    Urinary incontinence 09/13/2016    Paroxysmal atrial fibrillation 09/13/2016    Idiopathic gout 09/13/2016    Chronic kidney disease, stage III (moderate) 09/13/2016    History of TIA (transient ischemic attack) 09/13/2016    Osteopenia 09/13/2016    SNHL (sensorineural hearing loss) 09/13/2016    Aortic atherosclerosis 08/03/2017    Ectatic aorta 08/03/2017     Laryngopharyngeal reflux (LPR) 08/03/2017    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/2018    Chronic pansinusitis 03/26/2018    Vitamin D deficiency 05/28/2018    PAF (paroxysmal atrial fibrillation) 07/08/2019    TIA (transient ischemic attack) 11/21/2019     Resolved Ambulatory Problems     Diagnosis Date Noted    Palpitations 08/27/2013    Benign paroxysmal positional vertigo of left ear 09/13/2016     Past Medical History:   Diagnosis Date    Acid reflux     Arthritis     Atrial fibrillation     Cardiac pacemaker     Gout     Headache(784.0)     HEARING LOSS     Hypertension     Polymyalgia rheumatica     Renal insufficiency     Sinusitis     SOB (shortness of breath)     Stroke     West Nile encephalitis          Review of Systems   HENT: Negative.    Cardiovascular: Negative for chest pain and leg swelling.   Gastrointestinal: Negative.    Genitourinary: Negative.    Musculoskeletal: Negative for back pain.   Neurological: Positive for weakness.       Objective     Physical Exam  Vitals signs reviewed.   Constitutional:       General: She is not in acute distress.     Appearance: She is well-developed. She is not diaphoretic.   HENT:      Head: Normocephalic and atraumatic.   Eyes:      General: No scleral icterus.  Cardiovascular:      Rate and Rhythm: Normal rate and regular rhythm.      Heart sounds: No murmur.      Comments: Pedal pulses are palpable though mildly diminished.  There is no cyanosis.  There is no edema  Pulmonary:      Effort: Pulmonary effort is normal. No respiratory distress.   Neurological:      General: No focal deficit present.      Mental Status: She is alert and oriented to person, place, and time.      Cranial Nerves: No cranial nerve deficit.      Motor: No weakness.      Coordination: Coordination normal.      Gait: Gait normal.      Deep Tendon Reflexes:      Reflex Scores:       Patellar reflexes are 2+ on the right side and 2+ on the left side.       Achilles  "reflexes are 2+ on the right side and 2+ on the left side.  Psychiatric:         Behavior: Behavior normal.       Vitals:    06/25/20 0949   BP: (!) 120/50   BP Location: Right arm   Patient Position: Sitting   BP Method: Medium (Manual)   Pulse: 78   Resp: 18   Temp: 98.2 °F (36.8 °C)   TempSrc: Temporal   SpO2: 99%   Weight: 60.4 kg (133 lb 2.5 oz)   Height: 5' 4" (1.626 m)       MOST RECENT LABS IN OUR ELECTRONIC MEDICAL RECORD:     Results for orders placed or performed in visit on 12/09/19   CULTURE, URINE    Specimen: Urine   Result Value Ref Range    Urine Culture, Routine No growth    Urinalysis   Result Value Ref Range    Specimen UA Urine, Clean Catch     Color, UA Yellow Yellow, Straw, Carol    Appearance, UA Clear Clear    pH, UA 6.0 5.0 - 8.0    Specific Gravity, UA 1.020 1.005 - 1.030    Protein, UA 2+ (A) Negative    Glucose, UA Negative Negative    Ketones, UA Negative Negative    Bilirubin (UA) Negative Negative    Occult Blood UA Negative Negative    Nitrite, UA Negative Negative    Leukocytes, UA Negative Negative   Urinalysis Microscopic   Result Value Ref Range    RBC, UA 0 0 - 4 /hpf    WBC, UA 1 0 - 5 /hpf    Bacteria Rare None-Occ /hpf    Squam Epithel, UA 7 /hpf    Hyaline Casts, UA 1 0-1/lpf /lpf    Microscopic Comment SEE COMMENT    Protein / creatinine ratio, urine   Result Value Ref Range    Protein, Urine Random 73 (H) 0 - 15 mg/dL    Creatinine, Random Ur 134.0 15.0 - 325.0 mg/dL    Prot/Creat Ratio, Ur 0.54 (H) 0.00 - 0.20   RENAL FUNCTION PANEL   Result Value Ref Range    Glucose 88 70 - 110 mg/dL    Sodium 140 136 - 145 mmol/L    Potassium 4.8 3.5 - 5.1 mmol/L    Chloride 107 95 - 110 mmol/L    CO2 28 23 - 29 mmol/L    BUN, Bld 23 8 - 23 mg/dL    Calcium 9.6 8.7 - 10.5 mg/dL    Creatinine 1.3 0.5 - 1.4 mg/dL    Albumin 3.6 3.5 - 5.2 g/dL    Phosphorus 3.0 2.7 - 4.5 mg/dL    eGFR if  42.6 (A) >60 mL/min/1.73 m^2    eGFR if non  37.0 (A) >60 mL/min/1.73 " m^2    Anion Gap 5 (L) 8 - 16 mmol/L   Magnesium   Result Value Ref Range    Magnesium 2.1 1.6 - 2.6 mg/dL   FERRITIN   Result Value Ref Range    Ferritin 62 20.0 - 300.0 ng/mL   URIC ACID   Result Value Ref Range    Uric Acid 6.7 (H) 2.4 - 5.7 mg/dL   IRON AND TIBC   Result Value Ref Range    Iron 118 30 - 160 ug/dL    Transferrin 234 200 - 375 mg/dL    TIBC 346 250 - 450 ug/dL    Saturated Iron 34 20 - 50 %

## 2020-06-26 ENCOUNTER — HOSPITAL ENCOUNTER (OUTPATIENT)
Dept: RADIOLOGY | Facility: HOSPITAL | Age: 85
Discharge: HOME | End: 2020-06-26
Attending: FAMILY MEDICINE
Payer: MEDICARE

## 2020-06-26 DIAGNOSIS — R29.898 WEAKNESS OF BOTH LEGS: ICD-10-CM

## 2020-06-26 PROCEDURE — 93922 US ARTERIAL LOWER EXTREMITY BILAT WITH ABI (XPD): ICD-10-PCS | Mod: 26,HCNC,, | Performed by: RADIOLOGY

## 2020-06-26 PROCEDURE — 93922 UPR/L XTREMITY ART 2 LEVELS: CPT | Mod: 26,HCNC,, | Performed by: RADIOLOGY

## 2020-06-26 PROCEDURE — 93922 UPR/L XTREMITY ART 2 LEVELS: CPT | Mod: TC,HCNC,PO

## 2020-06-26 PROCEDURE — 93925 LOWER EXTREMITY STUDY: CPT | Mod: 26,HCNC,, | Performed by: RADIOLOGY

## 2020-06-26 PROCEDURE — 93925 US ARTERIAL LOWER EXTREMITY BILAT WITH ABI (XPD): ICD-10-PCS | Mod: 26,HCNC,, | Performed by: RADIOLOGY

## 2020-07-02 ENCOUNTER — CLINICAL SUPPORT (OUTPATIENT)
Dept: REHABILITATION | Facility: HOSPITAL | Age: 85
End: 2020-07-02
Attending: FAMILY MEDICINE
Payer: MEDICARE

## 2020-07-02 DIAGNOSIS — R26.81 GAIT INSTABILITY: Primary | ICD-10-CM

## 2020-07-02 DIAGNOSIS — M62.81 MUSCLE WEAKNESS: ICD-10-CM

## 2020-07-02 DIAGNOSIS — Z91.81 AT RISK FOR FALLS: ICD-10-CM

## 2020-07-02 PROCEDURE — 97162 PT EVAL MOD COMPLEX 30 MIN: CPT | Mod: HCNC,PN | Performed by: PHYSICAL THERAPIST

## 2020-07-02 PROCEDURE — 97110 THERAPEUTIC EXERCISES: CPT | Mod: HCNC,PN | Performed by: PHYSICAL THERAPIST

## 2020-07-02 NOTE — PLAN OF CARE
[facility] OUTPATIENT THERAP[facility] AND [facility]LLNESS  Physical Therapy Initial Evaluation    Name: Maggi Fontaien  Clinic Number: [id number]    Therapy Diagnosis:   Encounter Diagnoses   Name Primary?    At risk for falls     Gait instability Yes    Muscle weakness      Physician: Negro Finnegan, *    Physician Orders: PT Eval and Treat   Medical Diagnosis from Referral: Z91.81 (ICD-10-CM) - At risk for falls  Evaluation Date: 7/2/2020  Authorization Period Expiration: 8-1-20  Plan of Care Expiration: 9-24-20  Visit # / Visits authorized: 1/ 1  MD Follow up appointment: 9-28-20    Time In: 3:00   Time Out: 3:35  Total Billable Time: 35 minutes    Precautions: B TKR  atrial fib and pacemaker    Subjective   Date of onset: 4 months ago  History of current condition - JEROME reports: pt had her last fall.  Over the past year 5 falls.  Previous fall about a year ago resulted in L proximal humerus fracture and had PT for that.  Pt has been isolated and has not been out of house.  Pt has not ventured out and has not been exercising and feels she is getting weaker     Medical History:   Past Medical History:   Diagnosis Date    Acid reflux     Arthritis     Atrial fibrillation     Cardiac pacemaker     Gout     Headache(784.0)     HEARING LOSS     Hyperlipidemia     Hypertension     Hypothyroidism     Polymyalgia rheumatica     remission now    Renal insufficiency     Sinusitis     SOB (shortness of breath)     Stroke     history of TIA    Urinary incontinence     West Nile encephalitis     2002       Surgical History:   Maggi Fontaine  has a past surgical history that includes Knee surgery; Partial hysterectomy; Joint replacement; Hysterectomy; Eye surgery; Cataract extraction, extracapsular w/ intraocular lens implant; Cardiac pacemaker placement; and Treatment of cardiac arrhythmia (N/A, 7/8/2019).    Medications:   Maggi has a current medication list which includes the following prescription(s): apixaban,  aspirin, biotin, ergocalciferol, fluoxetine, furosemide, lactulose, levothyroxine, mv,ca,min-folic acid-vit k1, and pravastatin.    Allergies:   Review of patient's allergies indicates:   Allergen Reactions    Amiodarone analogues      Repeated falls x4; and hair loss; so stopped.     Nsaids (non-steroidal anti-inflammatory drug) Other (See Comments)     Other reaction(s): Chronic Renal Insufficiency    Penicillins Swelling        Imaging, US:1. Normal ankle-brachial indices of 0.95 on the right and 0.93 on the left. 2. No Doppler evidence of hemodynamically significant bilateral lower extremity arterial stenosis     Prior Therapy: [facility] prior to COVID and did not have HEP she states  Social History: lives alone in a house that is 2 stories and does not go upstairs often.  Pt has someone come in for housework and not much cooking Pt does gardening  Occupation: several years retired  retired    Prior Level of Function: prior to a year ago no falls  Current Level of Function: increasing unsteadiness with walking and standing with walking will get dizzy, unable to walk for exercise  Exercise for Wellness: walk for exercise    Pain:  No pain issue    Pts goals: get steady on feet and walk for exercise    Objective     Postural examination in standing:  - forward head  - forward shoulders  - increased thoracic kyphosis    Postural examination in sitting:   - forward head  - forward shoulders      Functional assessment:   - walking/gait:slightly unsteady with cautious steps   - sit to stand: min difficulty  - sit to supine: min difficulty       - supine to sit: min difficulty  - supine to prone: min difficulty     Knee ROM WFL with 0 degrees ext B    Flexibility testing:  - hamstrings:     90/90 test R 30 L 30           - gastrocnemius:   DF ankle R 0 degrees L 0 degrees               - hip flexors: + tightness    Muscle Strength  MMT R L   Hip flexion 4-/5 3+/5   Hip abduction 3+/5 3+/5   Hip  extension able to do small bridge but leads to pain     Knee extension 4/5 4/5   Knee flexion 4-/5 4-/5   Ankle dorsiflexion 5/5 5/5   Ankle plantar flexion 4/5 4/5   Ankle inversion 5/5 5/5   Ankle eversion 5/5 5/5     Endurance is poor.    Special tests: TUG 14:36  Plan for Eastman Balance test next session    Sensation: Intact to light touch      CMS Impairment/Limitation/Restriction for FOTO upper leg Survey    Therapist reviewed FOTO scores for Maggi Fontaine on 7/2/2020.   FOTO documents entered into [employer] - see Media section.    Limitation Score: 69%       TREATMENT   Treatment Time In: 3:20  Treatment Time Out: 3:35  Total Treatment time separate from Evaluation: 15 minutes    JEROME received therapeutic exercises to develop strength, endurance and core stabilization for 15 minutes including:  Glut sets x 10  Attempted bridge led to pain  Quad set x 10  Attempted SLR led to cramping in opp leg HS  Ankle pumps x 10  Hip abd/add supine x 10    Instructed pt to use heat and/or ice for soreness that may occur as result of eval and ex    Home Exercises and Patient Education Provided    Education provided:   - HEP  - need for strength in order to improve balance     Written Home Exercises Provided: Yes   Exercises were reviewed and JEROME was able to demonstrate them prior to the end of the session.  JEROME demonstrated good  understanding of the education provided.     See EMR under Patient Instructions for exercises provided 7/2/2020.    Assessment   Maggi is a 87 y.o. female referred to outpatient Physical Therapy with a medical diagnosis of Z91.81 (ICD-10-CM) - At risk for falls. Pt presents with severe muscle weakness of hips and core Pt with fair results with TUG test where 13 sec or less is low risk for fall.  Will perform Eastman Balance test next visit and progress with balance and strength activities as tolerated     Pt prognosis is Good.   Pt will benefit from skilled outpatient Physical Therapy to address the  deficits stated above and in the chart below, provide pt/family education, and to maximize pt's level of independence.     Plan of care discussed with patient: Yes  Pt's spiritual, cultural and educational needs considered and patient is agreeable to the plan of care and goals as stated below:     Anticipated Barriers for therapy: none    Medical Necessity is demonstrated by the following  History  Co-morbidities and personal factors that may impact the plan of care Co-morbidities:   B TKR, atrial fib and pacemaker and advanced age    Personal Factors:   no deficits     high   Examination  Body Structures and Functions, activity limitations and participation restrictions that may impact the plan of care Body Regions:   back  lower extremities  trunk    Body Systems:    ROM  strength  balance  gait    Participation Restrictions:   Walking for exercise    Activity limitations:   Learning and applying knowledge  no deficits    General Tasks and Commands  no deficits    Communication  no deficits    Mobility  walking    Self care  dressing  looking after one's health    Domestic Life  shopping  cooking  doing house work (cleaning house, washing dishes, laundry)  assisting others    Interactions/Relationships  no deficits    Life Areas  no deficits    Community and Social Life  no deficits         moderate   Clinical Presentation evolving clinical presentation with changing clinical characteristics moderate   Decision Making/ Complexity Score: moderate     GOALS:   Short Term Goals:  4-6 weeks  Increase strength 1/2 muscle grade  Be able to perform HEP with minimal cueing required    Long Term Goals: 10-12 weeks  Improve muscle strength 1 muscle grade  Improve TUG to 13 sec or less  Restore ability to ambulate with normal gait pattern  Walking for ADL and exercise will be restored without unsteadiness  Restore ability to stand for ADL without unsteadiness  Restore ability to perform ADL's and household activities  independently and without unsteadiness    Plan   Plan of care Certification: 7/2/2020 to 9-24-20.    Outpatient Physical Therapy 2 times weekly for 12 weeks to include the following interventions: Therapeutic exercises, manual therapy techniques neuromuscular re-education, therapeutic activities, modalities such as moist heat, ice, ultrasound and electrical stimulation as needed for soreness, and temporary orthotics will be considered and utilized as needed.      Alondra Luna, PT     I certify the need for these services furnished under this plan of treatment and while under my care.    ____________________________________ Physician/Referring Practitioner                                  Date of Signature

## 2020-07-09 ENCOUNTER — CLINICAL SUPPORT (OUTPATIENT)
Dept: REHABILITATION | Facility: HOSPITAL | Age: 85
End: 2020-07-09
Attending: FAMILY MEDICINE
Payer: MEDICARE

## 2020-07-09 DIAGNOSIS — M62.81 MUSCLE WEAKNESS: ICD-10-CM

## 2020-07-09 DIAGNOSIS — R26.81 GAIT INSTABILITY: Primary | ICD-10-CM

## 2020-07-09 PROCEDURE — 97110 THERAPEUTIC EXERCISES: CPT | Mod: HCNC,PN | Performed by: PHYSICAL THERAPIST

## 2020-07-09 PROCEDURE — 97112 NEUROMUSCULAR REEDUCATION: CPT | Mod: HCNC,PN | Performed by: PHYSICAL THERAPIST

## 2020-07-09 NOTE — PROGRESS NOTES
Physical Therapy Daily Treatment Note     Name: Maggi Fontaine  Clinic Number: [id number]    Therapy Diagnosis:   Encounter Diagnoses   Name Primary?    Gait instability Yes    Muscle weakness      Physician: Negro Finnegan, *    Visit Date: 7/9/2020    Physician Orders: PT Eval and Treat   Medical Diagnosis from Referral: Z91.81 (ICD-10-CM) - At risk for falls  Evaluation Date: 7/2/2020  Authorization Period Expiration: 1-6-21  Plan of Care Expiration: 9-24-20  Visit # / Visits authorized: 2/ 21  MD Follow up appointment: 9-28-20    Time In: 10:17  Time Out: 11:00  Total Billable Time: 43 minutes    Precautions: B TKR  atrial fib and pacemaker    Subjective     Pt reports: sore in calves from overdoing ankle pumps while waiting in MD office for appt.  Pt states she gets cramps in HS often  Pt was compliant with home exercise program.  Response to previous treatment: felt ok  Functional change: not yet still wobbly    Pain: 0/10       Objective       TREATMENT    JEROME received therapeutic exercises to develop strength, endurance and core stabilization for 15 minutes including:  Glut sets x 2/10  Attempted bridge led to cramping in HS   Quad set x 10  Attempted SLR led to pain in knee so held   SAQ x 10 3 count hold  Ankle pumps x 10 calf is still sore instructed pt to not push down as much  Hip abd/add supine x 10     Hip flexion sitting x 10   Long arc quad x 10      JEROME participated in neuromuscular re-education activities to improve: Balance, Coordination, Kinesthetic, Sense and Proprioception for 28 minutes. The following activities were included:  Eastman Balance score 34/56    Activities in // bars  March and backward walk 3 laps  Side walk 3 laps  Standing Feet together rhythmic stab x 30 sec  Standing on foam x 30 sec  Standing on foam feet together x 30 sec  Pt a little dizzy after activities and recovered after sitting.        Home Exercises Provided and Patient Education Provided     Education  provided:   - HEP     Written Home Exercises Provided: yes.  Exercises were reviewed and JEROME was able to demonstrate them prior to the end of the session.  JEROME demonstrated good  understanding of the education provided.     See EMR under Patient Instructions for exercises provided at initial eval and 7/9/2020.    Assessment     Pt able to progress slightly with strengthening but continues to be severely limited with poor strength  Able to tolerate balance work with fair endurance Eastman Balance indicates medium risk for fall    JEROME is progressing well towards her goals.   Pt prognosis is Good.     Pt will continue to benefit from skilled outpatient physical therapy to address the deficits listed in the problem list box on initial evaluation, provide pt/family education and to maximize pt's level of independence in the home and community environment.     Pt's spiritual, cultural and educational needs considered and pt agreeable to plan of care and goals.     Anticipated barriers to physical therapy: none    GOALS:   Short Term Goals:  4-6 weeks  Increase strength 1/2 muscle grade  Be able to perform HEP with minimal cueing required  Eastman Balance score 38/56     Long Term Goals: 10-12 weeks  Improve muscle strength 1 muscle grade  Improve TUG to 13 sec or less  Eastman Balance score 41/56 or greater  Restore ability to ambulate with normal gait pattern  Walking for ADL and exercise will be restored without unsteadiness  Restore ability to stand for ADL without unsteadiness  Restore ability to perform ADL's and household activities independently and without unsteadiness       Plan   Continue with established plan of care towards PT goals.      Progress strengthening and balance work as tolerated     Alondra Luna, PT

## 2020-07-16 ENCOUNTER — CLINICAL SUPPORT (OUTPATIENT)
Dept: REHABILITATION | Facility: HOSPITAL | Age: 85
End: 2020-07-16
Attending: FAMILY MEDICINE
Payer: MEDICARE

## 2020-07-16 DIAGNOSIS — M62.81 MUSCLE WEAKNESS: ICD-10-CM

## 2020-07-16 DIAGNOSIS — R26.81 GAIT INSTABILITY: Primary | ICD-10-CM

## 2020-07-16 PROCEDURE — 97112 NEUROMUSCULAR REEDUCATION: CPT | Mod: HCNC,PN | Performed by: PHYSICAL THERAPIST

## 2020-07-16 PROCEDURE — 97110 THERAPEUTIC EXERCISES: CPT | Mod: HCNC,PN | Performed by: PHYSICAL THERAPIST

## 2020-07-16 NOTE — PROGRESS NOTES
Physical Therapy Daily Treatment Note     Name: Maggi Fontaine  Clinic Number: [id number]    Therapy Diagnosis:   Encounter Diagnoses   Name Primary?    Gait instability Yes    Muscle weakness      Physician: Negro Finnegan, *    Visit Date: 7/16/2020    Physician Orders: PT Eval and Treat   Medical Diagnosis from Referral: Z91.81 (ICD-10-CM) - At risk for falls  Evaluation Date: 7/2/2020  Authorization Period Expiration: 1-6-21  Plan of Care Expiration: 9-24-20  Visit # / Visits authorized: 3/ 21  MD Follow up appointment: 9-28-20    Time In: 10:19  Time Out: 11:05  Total Billable Time: 45 minutes    Precautions: B TKR  atrial fib and pacemaker    Subjective     Pt reports: overdid ex and ate salt with watermelon and had a lot of swelling and was unable to exercise Swelling is now improved   Pt was compliant with home exercise program.  Response to previous treatment: felt ok  Functional change: not yet still wobbly    Pain: 0/10       Objective       TREATMENT    JEROME received therapeutic exercises to develop strength, endurance and core stabilization for 20 minutes including:  Glut sets x 2/10   Bridge with small lift x 10, able to perform without cramping in HS   Quad set x 10   SLR able to perform in small plane without pain  SAQ x 10 3 count hold  Ankle pumps x 10 able to PF with more ease today   Clam sidelying x 10    Hip flexion sitting x 10  Long arc quad x 10      JEROME participated in neuromuscular re-education activities to improve: Balance, Coordination, Kinesthetic, Sense and Proprioception for 25 minutes. The following activities were included:    Activities in // bars  March and backward walk 3 laps  Side walk 3 laps  Standing Feet together rhythmic stab x 30 sec   Standing sharp rhomberg(tandem) x 30 B feet in front Pt requires holding onto position and then does need touch balance at times during activity   Heel raises x 10  Standing on foam x 30 sec  Standing on foam feet together x 30  sec   Step over hurdles back and forth x 2 laps   Head turns with gait one revolution around clinic   Normal gait one revolution around clinic  Pt had a couple of episodes of unsteadiness with one foot crossing over another as she propelled forward, but did report she felt tired at end of session       Home Exercises Provided and Patient Education Provided     Education provided:   - HEP     Written Home Exercises Provided: yes.  Exercises were reviewed and JEROME was able to demonstrate them prior to the end of the session.  JEROME demonstrated good  understanding of the education provided.     See EMR under Patient Instructions for exercises provided at initial eval and 7/9/2020, 7-16-20    Assessment     Pt able to progress further with strengthening she was unable to perform at last visit Pt with good balance in static situations and did better with hurdles than expected.  As pt strengthens balance should further improve    JEROME is progressing well towards her goals.   Pt prognosis is Good.     Pt will continue to benefit from skilled outpatient physical therapy to address the deficits listed in the problem list box on initial evaluation, provide pt/family education and to maximize pt's level of independence in the home and community environment.     Pt's spiritual, cultural and educational needs considered and pt agreeable to plan of care and goals.     Anticipated barriers to physical therapy: none    GOALS:   Short Term Goals:  4-6 weeks Progressing, not met  Increase strength 1/2 muscle grade  Be able to perform HEP with minimal cueing required  Eastman Balance score 38/56     Long Term Goals: 10-12 weeks  Progressing, not met  Improve muscle strength 1 muscle grade  Improve TUG to 13 sec or less  Eastman Balance score 41/56 or greater  Restore ability to ambulate with normal gait pattern  Walking for ADL and exercise will be restored without unsteadiness  Restore ability to stand for ADL without  unsteadiness  Restore ability to perform ADL's and household activities independently and without unsteadiness       Plan   Continue with established plan of care towards PT goals.      Progress strengthening and balance work as tolerated     Alondra Luna, PT

## 2020-07-21 ENCOUNTER — CLINICAL SUPPORT (OUTPATIENT)
Dept: REHABILITATION | Facility: HOSPITAL | Age: 85
End: 2020-07-21
Attending: FAMILY MEDICINE
Payer: MEDICARE

## 2020-07-21 DIAGNOSIS — R26.81 GAIT INSTABILITY: Primary | ICD-10-CM

## 2020-07-21 DIAGNOSIS — M62.81 MUSCLE WEAKNESS: ICD-10-CM

## 2020-07-21 PROCEDURE — 97110 THERAPEUTIC EXERCISES: CPT | Mod: HCNC,PN,CQ

## 2020-07-21 PROCEDURE — 97112 NEUROMUSCULAR REEDUCATION: CPT | Mod: HCNC,PN,CQ

## 2020-07-21 NOTE — PROGRESS NOTES
Physical Therapy Daily Treatment Note     Name: Maggi Fontaine  Clinic Number: [id number]    Therapy Diagnosis:   Encounter Diagnoses   Name Primary?    Gait instability Yes    Muscle weakness      Physician: Negro Finnegan, *    Visit Date: 7/21/2020    Physician Orders: PT Eval and Treat   Medical Diagnosis from Referral: Z91.81 (ICD-10-CM) - At risk for falls  Evaluation Date: 7/2/2020  Authorization Period Expiration: 1-6-21  Plan of Care Expiration: 9-24-20  Visit # / Visits authorized: 4 / 21  MD Follow up appointment: 9-28-20    Time In: 9:35  Time Out: 10:20  Total Billable Time: 45 minutes    Precautions: B TKR  atrial fib and pacemaker    Subjective     Pt reports: having some soreness along the lower part of the legs and calves from doing her exercises. States sometimes she over-does it which makes her more sore.   Pt was compliant with home exercise program.  Response to previous treatment: felt ok  Functional change: not yet still wobbly    Pain: 0/10       Objective       TREATMENT    JEROME received therapeutic exercises to develop strength, endurance and core stabilization for 20 minutes including:  Glut sets x 2/10  Bridge with small lift x 10, able to perform without cramping in HS   Quad set x 10  SLR 2/5 able to perform in small plane without pain  SAQ x 10 3 count hold  Ankle pumps x 10 able to PF with more ease today  Clam sidelying x 10    Hip flexion sitting x 10  Long arc quad x 10      JEROME participated in neuromuscular re-education activities to improve: Balance, Coordination, Kinesthetic, Sense and Proprioception for 25 minutes. The following activities were included:    Activities in // bars  March and backward walk 3 laps  Side walk 3 laps  Standing Feet together rhythmic stab x 30 sec  Standing sharp rhomberg(tandem) x 30 B feet in front - L foot back more challenging  Heel raises x 10  Standing on foam x 30 sec  Standing on foam feet together x 30 sec  Step over hurdles back  and forth x 2 laps  Head turns with gait one revolution around clinic  Normal gait one revolution around clinic  Pt had a couple of episodes of unsteadiness with one foot crossing over another as she propelled forward, but did report she felt tired at end of session       Home Exercises Provided and Patient Education Provided     Education provided:   - HEP     Written Home Exercises Provided: yes.  Exercises were reviewed and JEROME was able to demonstrate them prior to the end of the session.  JEROME demonstrated good  understanding of the education provided.     See EMR under Patient Instructions for exercises provided at initial eval and 7/9/2020, 7-16-20    Assessment     Jerome tolerated treatment well overall this date without exacerbation of symptoms. Fatigue with standing exercises noted into B LEs. Moderate sway noted with eyes closed. Unable to perform lateral step over hurdles without looking at floor. Pt will benefit from continued LE strength and balance training.  JEROME is progressing well towards her goals.   Pt prognosis is Good.     Pt will continue to benefit from skilled outpatient physical therapy to address the deficits listed in the problem list box on initial evaluation, provide pt/family education and to maximize pt's level of independence in the home and community environment.     Pt's spiritual, cultural and educational needs considered and pt agreeable to plan of care and goals.     Anticipated barriers to physical therapy: none    GOALS:   Short Term Goals:  4-6 weeks Progressing, not met  Increase strength 1/2 muscle grade  Be able to perform HEP with minimal cueing required  Eastman Balance score 38/56     Long Term Goals: 10-12 weeks  Progressing, not met  Improve muscle strength 1 muscle grade  Improve TUG to 13 sec or less  Eastman Balance score 41/56 or greater  Restore ability to ambulate with normal gait pattern  Walking for ADL and exercise will be restored without  unsteadiness  Restore ability to stand for ADL without unsteadiness  Restore ability to perform ADL's and household activities independently and without unsteadiness       Plan   Continue with established plan of care towards PT goals.      Progress strengthening and balance work as tolerated     Stephani Ruiz, [place]

## 2020-07-28 ENCOUNTER — CLINICAL SUPPORT (OUTPATIENT)
Dept: REHABILITATION | Facility: HOSPITAL | Age: 85
End: 2020-07-28
Attending: FAMILY MEDICINE
Payer: MEDICARE

## 2020-07-28 DIAGNOSIS — M62.81 MUSCLE WEAKNESS: ICD-10-CM

## 2020-07-28 DIAGNOSIS — R26.81 GAIT INSTABILITY: Primary | ICD-10-CM

## 2020-07-28 PROCEDURE — 97110 THERAPEUTIC EXERCISES: CPT | Mod: HCNC,PN,CQ

## 2020-07-28 PROCEDURE — 97112 NEUROMUSCULAR REEDUCATION: CPT | Mod: HCNC,PN,CQ

## 2020-07-28 NOTE — PROGRESS NOTES
"  Physical Therapy Daily Treatment Note     Name: Maggi Fontaine  Clinic Number: [id number]    Therapy Diagnosis:   Encounter Diagnoses   Name Primary?    Gait instability Yes    Muscle weakness      Physician: Negro Finnegan, *    Visit Date: 7/28/2020    Physician Orders: PT Eval and Treat   Medical Diagnosis from Referral: Z91.81 (ICD-10-CM) - At risk for falls  Evaluation Date: 7/2/2020  Authorization Period Expiration: 1-6-21  Plan of Care Expiration: 9-24-20  Visit # / Visits authorized: 5 / 21  MD Follow up appointment: 9-28-20    Time In: 9:35  Time Out: 10:20  Total Billable Time: 45 minutes    Precautions: B TKR  atrial fib and pacemaker    Subjective     Pt reports: some soreness into the legs from being inactive and unable to go anywhere with all this rain. Denies pain, only soreness.  Pt was compliant with home exercise program.  Response to previous treatment: felt ok  Functional change: not yet still wobbly    Pain: 0/10       Objective       TREATMENT    JEROME received therapeutic exercises to develop strength, endurance and core stabilization for 20 minutes including:  Glut sets x 2/10  Bridge with small lift x 10, able to perform without cramping in HS   Quad set x 10  SLR 2/5 able to perform in small plane without pain  (NP-time) SAQ x 10 3 count hold  Ankle pumps x 10 able to PF with more ease today  Clam sidelying x 15    Hip flexion sitting x 10  Long arc quad x 2/10      JEROME participated in neuromuscular re-education activities to improve: Balance, Coordination, Kinesthetic, Sense and Proprioception for 25 minutes. The following activities were included:    Activities in // bars  March and backward walk 3 laps  Side walk 3 laps  Standing Feet together rhythmic stab 2 x 30 sec  Standing sharp rhomberg(tandem) x 30 B feet in front - L foot back more challenging  Heel raises x 15   Forward step up on 4" step x 10 each   Standing on foam eyes closed x 30 sec  Standing on foam feet " together x 30 sec   March on foam no hands x 30 sec  Step over hurdles back and forth x 3 laps  Head turns with gait one revolution around clinic  Normal gait one revolution around clinic  Pt had a couple of episodes of unsteadiness with one foot crossing over another as she propelled forward, but did report she felt tired at end of session       Home Exercises Provided and Patient Education Provided     Education provided:   - HEP     Written Home Exercises Provided: yes.  Exercises were reviewed and JEROME was able to demonstrate them prior to the end of the session.  JEROME demonstrated good  understanding of the education provided.     See EMR under Patient Instructions for exercises provided at initial eval and 7/9/2020, 7-16-20    Assessment     Jerome presents with mild improvement of steadiness with balance activities. Remains challenging with sharp rhomberg balance and balance on foam with eyes closed. Able to perform static march on foam without hand hold. Pt will benefit from continued LE strength and balance training.  JEROME is progressing well towards her goals.   Pt prognosis is Good.     Pt will continue to benefit from skilled outpatient physical therapy to address the deficits listed in the problem list box on initial evaluation, provide pt/family education and to maximize pt's level of independence in the home and community environment.     Pt's spiritual, cultural and educational needs considered and pt agreeable to plan of care and goals.     Anticipated barriers to physical therapy: none    GOALS:   Short Term Goals:  4-6 weeks Progressing, not met  Increase strength 1/2 muscle grade  Be able to perform HEP with minimal cueing required  Eastman Balance score 38/56     Long Term Goals: 10-12 weeks  Progressing, not met  Improve muscle strength 1 muscle grade  Improve TUG to 13 sec or less  Eastman Balance score 41/56 or greater  Restore ability to ambulate with normal gait pattern  Walking for ADL and  exercise will be restored without unsteadiness  Restore ability to stand for ADL without unsteadiness  Restore ability to perform ADL's and household activities independently and without unsteadiness       Plan   Continue with established plan of care towards PT goals.      Progress strengthening and balance work as tolerated     Stephani Ruiz, PTA

## 2020-07-30 ENCOUNTER — CLINICAL SUPPORT (OUTPATIENT)
Dept: REHABILITATION | Facility: HOSPITAL | Age: 85
End: 2020-07-30
Attending: FAMILY MEDICINE
Payer: MEDICARE

## 2020-07-30 DIAGNOSIS — M62.81 MUSCLE WEAKNESS: ICD-10-CM

## 2020-07-30 DIAGNOSIS — R26.81 GAIT INSTABILITY: Primary | ICD-10-CM

## 2020-07-30 PROCEDURE — 97110 THERAPEUTIC EXERCISES: CPT | Mod: HCNC,PN,CQ

## 2020-07-30 PROCEDURE — 97112 NEUROMUSCULAR REEDUCATION: CPT | Mod: HCNC,PN,CQ

## 2020-07-30 NOTE — PROGRESS NOTES
"  Physical Therapy Daily Treatment Note     Name: Maggi Fontaine  Clinic Number: [id number]    Therapy Diagnosis:   Encounter Diagnoses   Name Primary?    Gait instability Yes    Muscle weakness      Physician: Negro Finnegan, *    Visit Date: 7/30/2020    Physician Orders: PT Eval and Treat   Medical Diagnosis from Referral: Z91.81 (ICD-10-CM) - At risk for falls  Evaluation Date: 7/2/2020  Authorization Period Expiration: 1-6-21  Plan of Care Expiration: 9-24-20  Visit # / Visits authorized: 6 / 21  MD Follow up appointment: 9-28-20    Time In: 9:30  Time Out: 10:17  Total Billable Time: 45 minutes    Precautions: B TKR  atrial fib and pacemaker    Subjective     Pt reports: she was tired after her last session and had to take a nap. Overall feeling pretty good today.  Pt was compliant with home exercise program.  Response to previous treatment: increased tiredness  Functional change: not yet still wobbly    Pain: 0/10       Objective       TREATMENT    JEROME received therapeutic exercises to develop strength, endurance and core stabilization for 15 minutes including:  (NP-time) Glut sets x 2/10  Bridge with small lift x 15, able to perform without cramping in HS   (NP) Quad set x 10  SLR x 10 emphasis on quad set   (NP-time) SAQ x 10 3 count hold  Ankle pumps x 10 able to PF with more ease today  Clam sidelying x 15    Hip flexion sitting x 10  Long arc quad x 2/10      JEROME participated in neuromuscular re-education activities to improve: Balance, Coordination, Kinesthetic, Sense and Proprioception for 30 minutes. The following activities were included:    Activities in // bars  March and backward walk 3 laps  Side walk with hurdles (2 holes between each) 3 laps  Standing Feet together rhythmic stab 2 x 30 sec (on foam next time)  Standing sharp rhomberg(tandem) x 30 B feet in front - L foot back more challenging  Heel raises x 15  Forward step up on 4" step x 10 each  Standing on foam eyes closed x 30 " sec  Standing on foam feet together x 30 sec   March on foam no hands x 45 sec   Step over obstacles like 1/2 roll, full roll, and up/over foam in // bars  back and forth x 3 laps  Head turns with gait one revolution around clinic  (NP) Normal gait one revolution around clinic  Pt had a couple of episodes of unsteadiness with one foot crossing over another as she propelled forward, but did report she felt tired at end of session       Home Exercises Provided and Patient Education Provided     Education provided:   - HEP     Written Home Exercises Provided: yes.  Exercises were reviewed and JEROME was able to demonstrate them prior to the end of the session.  JEROME demonstrated good  understanding of the education provided.     See EMR under Patient Instructions for exercises provided at initial eval and 7/9/2020, 7-16-20    Assessment     Jerome tolerated progression of exercises well this date. Demonstrates greater weakness of R LE than L LE with SLR however able to perform through greater ROM. Sway noted with tandem balnace and activities with eyes closed, however ability to self correct is slightly better. Good stability with lateral walk over hurdles and obstacle course over foam and rolls. Pt will benefit from continued LE strength and balance training.  JEROME is progressing well towards her goals.   Pt prognosis is Good.     Pt will continue to benefit from skilled outpatient physical therapy to address the deficits listed in the problem list box on initial evaluation, provide pt/family education and to maximize pt's level of independence in the home and community environment.     Pt's spiritual, cultural and educational needs considered and pt agreeable to plan of care and goals.     Anticipated barriers to physical therapy: none    GOALS:   Short Term Goals:  4-6 weeks Progressing, not met  Increase strength 1/2 muscle grade  Be able to perform HEP with minimal cueing required  Eastman Balance score  38/56     Long Term Goals: 10-12 weeks  Progressing, not met  Improve muscle strength 1 muscle grade  Improve TUG to 13 sec or less  Eastman Balance score 41/56 or greater  Restore ability to ambulate with normal gait pattern  Walking for ADL and exercise will be restored without unsteadiness  Restore ability to stand for ADL without unsteadiness  Restore ability to perform ADL's and household activities independently and without unsteadiness       Plan   Continue with established plan of care towards PT goals.      Progress strengthening and balance work as tolerated     Stephani Ruiz, PTA

## 2020-08-04 ENCOUNTER — CLINICAL SUPPORT (OUTPATIENT)
Dept: REHABILITATION | Facility: HOSPITAL | Age: 85
End: 2020-08-04
Attending: FAMILY MEDICINE
Payer: MEDICARE

## 2020-08-04 DIAGNOSIS — M62.81 MUSCLE WEAKNESS: ICD-10-CM

## 2020-08-04 DIAGNOSIS — R26.81 GAIT INSTABILITY: Primary | ICD-10-CM

## 2020-08-04 PROCEDURE — 97110 THERAPEUTIC EXERCISES: CPT | Mod: HCNC,PN | Performed by: PHYSICAL THERAPIST

## 2020-08-04 PROCEDURE — 97112 NEUROMUSCULAR REEDUCATION: CPT | Mod: HCNC,PN | Performed by: PHYSICAL THERAPIST

## 2020-08-04 NOTE — PROGRESS NOTES
Physical Therapy Progress and Daily Treatment Note     Name: Maggi Fontaine  Clinic Number: [id number]    Therapy Diagnosis:   Encounter Diagnoses   Name Primary?    Gait instability Yes    Muscle weakness      Physician: Negro Finnegan, *    Visit Date: 8/4/2020    Physician Orders: PT Eval and Treat   Medical Diagnosis from Referral: Z91.81 (ICD-10-CM) - At risk for falls  Evaluation Date: 7/2/2020  Authorization Period Expiration: 1-6-21  Plan of Care Expiration: 9-24-20  Visit # / Visits authorized: 6 / 21  MD Follow up appointment: 9-28-20    Time In: 11:00  Time Out: 11:45  Total Billable Time: 45 minutes    Precautions: B TKR  atrial fib and pacemaker    Subjective     Pt reports: she is feeling tired. Pt states she has been busy at home  Pt was compliant with home exercise program.  Response to previous treatment: felt ok.    Functional change: still sways with prolonged standing, but feels improving a little, pt states walking better    Pain: 0/10       Objective   Functional assessment:   - walking/gait:pt ambulating with more secure gait  At IE slightly unsteady with cautious steps   - sit to stand: min use of hands at IE min difficulty  - sit to supine: very min difficulty at IE min difficulty       - supine to sit:very min difficulty at IE min difficulty  - supine to prone:very min difficulty at IE min difficulty      Knee ROM WFL with 0 degrees ext B     Flexibility testing:  - hamstrings:     90/90 test R 25 L 25 at IE R 30 L 30           - gastrocnemius:   DF ankle R 5 L 5 at IE R 0 degrees L 0 degrees                   Muscle Strength 8-4-20  MMT R L   Hip flexion 4/5 4/5   Hip abduction 4/5 4/5   Hip extension able to do small bridge w/o pain     Knee extension 4+/5 4+/5   Knee flexion 4+/5 4+/5   Ankle dorsiflexion 5/5 5/5   Ankle plantar flexion N/T N/T   Ankle inversion N/T N/T   Ankle eversion N/T N/T            Muscle Strength  MMT R L   Hip flexion 4-/5 3+/5   Hip abduction 3+/5 3+/5  "  Hip extension able to do small bridge but leads to pain       Knee extension 4/5 4/5   Knee flexion 4-/5 4-/5   Ankle dorsiflexion 5/5 5/5   Ankle plantar flexion 4/5 4/5   Ankle inversion 5/5 5/5   Ankle eversion 5/5 5/5      Endurance is poor.     Special tests: TUG 12:22 sec at IE  14:36 Eastman Balance score 43/56 indicating low risk for fall at IE 34/56 indicating medium risk for fall          TREATMENT    JEROME received therapeutic exercises to develop strength, endurance and core stabilization for 30 minutes including:  Bridge with small lift x 15, able to perform without cramping in HS    Pelvic tilt x 20   Trunk rotation supine x 10  SLR x 10 emphasis on quad set   Ankle pumps x 10     Clam sidelying x 15    Hip flexion sitting x 10  Long arc quad x 2/10     AR push/pull diagonal with UE's x 10 B   AR trunk FB and BB with PT x 10      JEROME participated in neuromuscular re-education activities to improve: Balance, Coordination, Kinesthetic, Sense and Proprioception for 15 minutes. The following activities were included:  Performed TUG and Eastman Balance test and discussed results with pt. Pt requested to hold further balance activities due to feeling tired.     Activities in // bars  March and backward walk 3 laps  Side walk with hurdles (2 holes between each) 3 laps  Standing Feet together rhythmic stab 2 x 30 sec (on foam next time)  Standing sharp rhomberg(tandem) x 30 B feet in front - L foot back more challenging  Heel raises x 15  Forward step up on 4" step x 10 each  Standing on foam eyes closed x 30 sec  Standing on foam feet together x 30 sec   March on foam no hands x 45 sec   Step over obstacles like 1/2 roll, full roll, and up/over foam in // bars  back and forth x 3 laps  Head turns with gait one revolution around clinic  (NP) Normal gait one revolution around clinic  Pt had a couple of episodes of unsteadiness with one foot crossing over another as she propelled forward, but did report she felt " tired at end of session       Home Exercises Provided and Patient Education Provided     Education provided:   - HEP     Written Home Exercises Provided: yes.  Exercises were reviewed and JEROME was able to demonstrate them prior to the end of the session.  JEROME demonstrated good  understanding of the education provided.     See EMR under Patient Instructions for exercises provided at initial eval and 7/9/2020, 7-16-20, 8-4-20    Assessment     Patient demonstrates improvement in range of motion, strength, stabilization and function. Pt feels balance with walking improved, but still limited with standing for feeling unsteady in standing.  Pt may benefit from further core strengthening.        JEROME is progressing well towards her goals.   Pt prognosis is Good.     Pt will continue to benefit from skilled outpatient physical therapy to address the deficits listed in the problem list box on initial evaluation, provide pt/family education and to maximize pt's level of independence in the home and community environment.     Pt's spiritual, cultural and educational needs considered and pt agreeable to plan of care and goals.     Anticipated barriers to physical therapy: none    GOALS:   Short Term Goals:  4-6 weeks MET STG's  Increase strength 1/2 muscle grade  Be able to perform HEP with minimal cueing required  Eastman Balance score 38/56     Long Term Goals: 10-12 weeks  Progressing, not met  Improve muscle strength 1 muscle grade  Improve TUG to 13 sec or less MET   Eastman Balance score 41/56 or greater MET  Restore ability to ambulate with normal gait pattern  Walking for ADL and exercise will be restored without unsteadiness  Restore ability to stand for ADL without unsteadiness  Restore ability to perform ADL's and household activities independently and without unsteadiness       Plan   Continue with established plan of care towards PT goals.      Progress strengthening and balance work as tolerated     Alondra KENNEDY  Cheryl, PT

## 2020-08-06 ENCOUNTER — CLINICAL SUPPORT (OUTPATIENT)
Dept: REHABILITATION | Facility: HOSPITAL | Age: 85
End: 2020-08-06
Attending: FAMILY MEDICINE
Payer: MEDICARE

## 2020-08-06 DIAGNOSIS — R26.81 GAIT INSTABILITY: Primary | ICD-10-CM

## 2020-08-06 DIAGNOSIS — M62.81 MUSCLE WEAKNESS: ICD-10-CM

## 2020-08-06 PROCEDURE — 97112 NEUROMUSCULAR REEDUCATION: CPT | Mod: HCNC,PN,CQ

## 2020-08-06 PROCEDURE — 97110 THERAPEUTIC EXERCISES: CPT | Mod: HCNC,PN,CQ

## 2020-08-06 NOTE — PROGRESS NOTES
Physical Therapy Daily Treatment Note     Name: Maggi Fontaine  Clinic Number: [id number]    Therapy Diagnosis:   Encounter Diagnoses   Name Primary?    Gait instability Yes    Muscle weakness      Physician: Negro Finnegan, *    Visit Date: 8/6/2020    Physician Orders: PT Eval and Treat   Medical Diagnosis from Referral: Z91.81 (ICD-10-CM) - At risk for falls  Evaluation Date: 7/2/2020  Authorization Period Expiration: 1-6-21  Plan of Care Expiration: 9-24-20  Visit # / Visits authorized: 7 / 21  MD Follow up appointment: 9-28-20    Time In: 2:50  Time Out: 3:35  Total Billable Time: 45 minutes    Precautions: B TKR  atrial fib and pacemaker    Subjective     Pt reports: was feeling tired after her last session but has been doing better since. Was tired earlier today too but took a short nap and feels more refreshed. A little soreness along the L lower ribs but unsure why it has been sore.  Pt was compliant with home exercise program.  Response to previous treatment: felt ok.    Functional change: still sways with prolonged standing, but feels improving a little, pt states walking better    Pain: 0/10    Objective      TREATMENT    JEROME received therapeutic exercises to develop strength, endurance and core stabilization for 15 minutes including:  Bridge with small lift x 15, able to perform without cramping in HS   (NP for time purposes) Pelvic tilt x 20  Trunk rotation supine x 10  SLR x 10 emphasis on quad set   Ankle pumps x 10     Clam sidelying x 15    (NP for time purposes) Hip flexion sitting x 10  (NP for time purposes) Long arc quad x 2/10    AR push/pull diagonal with UE's x 10 B  AR trunk FB and BB with PT x 10      JEROME participated in neuromuscular re-education activities to improve: Balance, Coordination, Kinesthetic, Sense and Proprioception for 30 minutes. The following activities were included:     Seated eye exercises #1 and #2 - most difficulty with eyes diagonal up toward  "left  Activities in // bars  March and backward walk 3 laps  Side walk with hurdles (2 holes between each) 3 laps  Standing sharp rhomberg(tandem) x 30 B feet in front - L foot back more challenging  Heel raises x 15  Forward step up on 4" step x 10 each  Standing Feet together rhythmic stab on foam 2 x 30 sec  Standing on foam eyes closed x 30 sec  Standing on foam feet together x 30 sec  March on foam no hands x 45 sec  Step over obstacles like 1/2 roll, full roll, and up/over foam in // bars  back and forth x 3 laps  Head turns with gait one revolution around clinic  (NP) Normal gait one revolution around clinic  Pt had a couple of episodes of unsteadiness with one foot crossing over another as she propelled forward, but did report she felt tired at end of session       Home Exercises Provided and Patient Education Provided     Education provided:   - HEP     Written Home Exercises Provided: Patient instructed to cont prior HEP.  Exercises were reviewed and JEROME was able to demonstrate them prior to the end of the session.  JEROME demonstrated good  understanding of the education provided.     See EMR under Patient Instructions for exercises provided at initial eval and 7/9/2020, 7-16-20, 8-4-20    Assessment     Patient tolerated overall good tolerance to exercises this date. Good eye movement with eye exercises #1 and #2, some sticking in left eye with diagonal up toward left. Some unsteadiness noted with balance activities on foam, slight increase of sway with eyes closed. Verbal cues for greater step height with lateral steps over hurdles and forward walk over obstacles.  Pt may benefit from further core strengthening.        JEROME is progressing well towards her goals.   Pt prognosis is Good.     Pt will continue to benefit from skilled outpatient physical therapy to address the deficits listed in the problem list box on initial evaluation, provide pt/family education and to maximize pt's level of " independence in the home and community environment.     Pt's spiritual, cultural and educational needs considered and pt agreeable to plan of care and goals.     Anticipated barriers to physical therapy: none    GOALS:   Short Term Goals:  4-6 weeks MET STG's  Increase strength 1/2 muscle grade  Be able to perform HEP with minimal cueing required  Eastman Balance score 38/56     Long Term Goals: 10-12 weeks  Progressing, not met  Improve muscle strength 1 muscle grade  Improve TUG to 13 sec or less MET   Eastman Balance score 41/56 or greater MET  Restore ability to ambulate with normal gait pattern  Walking for ADL and exercise will be restored without unsteadiness  Restore ability to stand for ADL without unsteadiness  Restore ability to perform ADL's and household activities independently and without unsteadiness       Plan   Continue with established plan of care towards PT goals.      Progress strengthening and balance work as tolerated     Stephani Ruiz, PTA

## 2020-08-11 ENCOUNTER — CLINICAL SUPPORT (OUTPATIENT)
Dept: REHABILITATION | Facility: HOSPITAL | Age: 85
End: 2020-08-11
Attending: FAMILY MEDICINE
Payer: MEDICARE

## 2020-08-11 DIAGNOSIS — R26.81 GAIT INSTABILITY: Primary | ICD-10-CM

## 2020-08-11 DIAGNOSIS — M62.81 MUSCLE WEAKNESS: ICD-10-CM

## 2020-08-11 PROCEDURE — 97110 THERAPEUTIC EXERCISES: CPT | Mod: HCNC,PN | Performed by: PHYSICAL THERAPIST

## 2020-08-11 PROCEDURE — 97112 NEUROMUSCULAR REEDUCATION: CPT | Mod: HCNC,PN | Performed by: PHYSICAL THERAPIST

## 2020-08-11 NOTE — PATIENT INSTRUCTIONS
EYE EXERCISES #1          Copyright [employer] 5720-8424 - All Rights Reserved                                             EYE EXERCISE #2      copyright [employer] 2268-2852 - All Rights Reserved

## 2020-08-11 NOTE — PLAN OF CARE
Physical Therapy Progress and Daily Treatment Note     Name: Maggi Fontaine  Clinic Number: [id number]    Therapy Diagnosis:   Encounter Diagnoses   Name Primary?    Gait instability Yes    Muscle weakness      Physician: Negro Finnegan, *    Visit Date: 8/4/2020    Physician Orders: PT Eval and Treat   Medical Diagnosis from Referral: Z91.81 (ICD-10-CM) - At risk for falls  Evaluation Date: 7/2/2020  Authorization Period Expiration: 1-6-21  Plan of Care Expiration: 9-24-20  Visit # / Visits authorized: 6 / 21  MD Follow up appointment: 9-28-20    Time In: 11:00  Time Out: 11:45  Total Billable Time: 45 minutes    Precautions: B TKR  atrial fib and pacemaker    Subjective     Pt reports: she is feeling tired. Pt states she has been busy at home  Pt was compliant with home exercise program.  Response to previous treatment: felt ok.    Functional change: still sways with prolonged standing, but feels improving a little, pt states walking better    Pain: 0/10       Objective   Functional assessment:   - walking/gait:pt ambulating with more secure gait  At IE slightly unsteady with cautious steps   - sit to stand: min use of hands at IE min difficulty  - sit to supine: very min difficulty at IE min difficulty       - supine to sit:very min difficulty at IE min difficulty  - supine to prone:very min difficulty at IE min difficulty      Knee ROM WFL with 0 degrees ext B     Flexibility testing:  - hamstrings:     90/90 test R 25 L 25 at IE R 30 L 30           - gastrocnemius:   DF ankle R 5 L 5 at IE R 0 degrees L 0 degrees                   Muscle Strength 8-4-20  MMT R L   Hip flexion 4/5 4/5   Hip abduction 4/5 4/5   Hip extension able to do small bridge w/o pain     Knee extension 4+/5 4+/5   Knee flexion 4+/5 4+/5   Ankle dorsiflexion 5/5 5/5   Ankle plantar flexion N/T N/T   Ankle inversion N/T N/T   Ankle eversion N/T N/T            Muscle Strength  MMT R L   Hip flexion 4-/5 3+/5   Hip abduction 3+/5 3+/5  "  Hip extension able to do small bridge but leads to pain       Knee extension 4/5 4/5   Knee flexion 4-/5 4-/5   Ankle dorsiflexion 5/5 5/5   Ankle plantar flexion 4/5 4/5   Ankle inversion 5/5 5/5   Ankle eversion 5/5 5/5      Endurance is poor.     Special tests: TUG 12:22 sec at IE  14:36 Eastman Balance score 43/56 indicating low risk for fall at IE 34/56 indicating medium risk for fall          TREATMENT    JEROME received therapeutic exercises to develop strength, endurance and core stabilization for 30 minutes including:  Bridge with small lift x 15, able to perform without cramping in HS    Pelvic tilt x 20   Trunk rotation supine x 10  SLR x 10 emphasis on quad set   Ankle pumps x 10     Clam sidelying x 15    Hip flexion sitting x 10  Long arc quad x 2/10     AR push/pull diagonal with UE's x 10 B   AR trunk FB and BB with PT x 10      JEROME participated in neuromuscular re-education activities to improve: Balance, Coordination, Kinesthetic, Sense and Proprioception for 15 minutes. The following activities were included:  Performed TUG and Eastman Balance test and discussed results with pt. Pt requested to hold further balance activities due to feeling tired.     Activities in // bars  March and backward walk 3 laps  Side walk with hurdles (2 holes between each) 3 laps  Standing Feet together rhythmic stab 2 x 30 sec (on foam next time)  Standing sharp rhomberg(tandem) x 30 B feet in front - L foot back more challenging  Heel raises x 15  Forward step up on 4" step x 10 each  Standing on foam eyes closed x 30 sec  Standing on foam feet together x 30 sec   March on foam no hands x 45 sec   Step over obstacles like 1/2 roll, full roll, and up/over foam in // bars  back and forth x 3 laps  Head turns with gait one revolution around clinic  (NP) Normal gait one revolution around clinic  Pt had a couple of episodes of unsteadiness with one foot crossing over another as she propelled forward, but did report she felt " tired at end of session       Home Exercises Provided and Patient Education Provided     Education provided:   - HEP     Written Home Exercises Provided: yes.  Exercises were reviewed and JEROME was able to demonstrate them prior to the end of the session.  JEROME demonstrated good  understanding of the education provided.     See EMR under Patient Instructions for exercises provided at initial eval and 7/9/2020, 7-16-20, 8-4-20    Assessment     Patient demonstrates improvement in range of motion, strength, stabilization and function. Pt feels balance with walking improved, but still limited with standing for feeling unsteady in standing.  Pt may benefit from further core strengthening.        JEROME is progressing well towards her goals.   Pt prognosis is Good.     Pt will continue to benefit from skilled outpatient physical therapy to address the deficits listed in the problem list box on initial evaluation, provide pt/family education and to maximize pt's level of independence in the home and community environment.     Pt's spiritual, cultural and educational needs considered and pt agreeable to plan of care and goals.     Anticipated barriers to physical therapy: none    GOALS:   Short Term Goals:  4-6 weeks MET STG's  Increase strength 1/2 muscle grade  Be able to perform HEP with minimal cueing required  Eastman Balance score 38/56     Long Term Goals: 10-12 weeks  Progressing, not met  Improve muscle strength 1 muscle grade  Improve TUG to 13 sec or less MET   Eastman Balance score 41/56 or greater MET  Restore ability to ambulate with normal gait pattern  Walking for ADL and exercise will be restored without unsteadiness  Restore ability to stand for ADL without unsteadiness  Restore ability to perform ADL's and household activities independently and without unsteadiness       Plan   Continue with established plan of care towards PT goals.      Progress strengthening and balance work as tolerated     Alondra KENNEDY  Cheryl, PT

## 2020-08-11 NOTE — PROGRESS NOTES
Physical Therapy Daily Treatment Note     Name: Maggi Fontaine  Clinic Number: [id number]    Therapy Diagnosis:   Encounter Diagnoses   Name Primary?    Gait instability Yes    Muscle weakness      Physician: Negro Finnegan, *    Visit Date: 8/11/2020    Physician Orders: PT Eval and Treat   Medical Diagnosis from Referral: Z91.81 (ICD-10-CM) - At risk for falls  Evaluation Date: 7/2/2020  Authorization Period Expiration: 1-6-21  Plan of Care Expiration: 9-24-20  Visit # / Visits authorized: 9 / 21  MD Follow up appointment: 9-28-20    Time In: 10:52  Time Out: 11:40  Total Billable Time: 48 minutes    Precautions: B TKR  atrial fib and pacemaker    Subjective     Pt reports: feeling pretty good  Pt states still feel off balance  Pt states walking more    Pt was compliant with home exercise program.  Response to previous treatment: felt ok.    Functional change: still sways with prolonged standing, but feels improving a little, pt states walking better    Pain: 0/10    Objective    L eye a little jerky with gaze stabilization     TREATMENT    JEROME received therapeutic exercises to develop strength, endurance and core stabilization for 15 minutes including:       Bridge with small lift x 15, able to perform without cramping in HS   (NP for time purposes) Pelvic tilt x 20  Trunk rotation supine x 10  SLR x 10 emphasis on quad set then x 5  Ankle pumps x 10     Clam sidelying x 15    (NP for time purposes) Hip flexion sitting x 10  (NP for time purposes) Long arc quad x 2/10    AR push/pull diagonal with UE's x 10 B  AR trunk FB and BB with PT x 10      JEROME participated in neuromuscular re-education activities to improve: Balance, Coordination, Kinesthetic, Sense and Proprioception for 33 minutes. The following activities were included:     Seated eye exercises #1 and #2 - most difficulty with eyes diagonal up toward left, fatigue at end so held to these ex  Activities in // bars  (NP)March and backward  "walk 3 laps  (NP)Side walk with hurdles (2 holes between each) 3 laps  Standing sharp rhomberg(tandem) x 30 B feet in front - L foot back more challenging  Heel raises x 15  (NP)Forward step up on 4" step x 10 each   Standing rhythmic stab x 30 sec   Standing feet together with rhythmic stab x 30 sec   Standing Feet together rhythmic stab on foam 2 x 30 sec  Standing on foam eyes closed x 30 sec   Standing on foam feet together x 30 sec with rhyhtmic stab  March on foam no hands x 45 sec  Step over obstacles like 1/2 roll, full roll, and up/over foam in // bars  back and forth x 3 laps  Head turns with gait one revolution around clinic, no scissoring of feet today during activity though little unsteady at times with turns to L    Home Exercises Provided and Patient Education Provided     Education provided:   - HEP     Written Home Exercises Provided:  yes.  Exercises were reviewed and JEROME was able to demonstrate them prior to the end of the session.  JEROME demonstrated good  understanding of the education provided.     See EMR under Patient Instructions for exercises provided at initial eval and 7/9/2020, 7-16-20, 8-4-20. 8-11-20    Assessment     Patient tolerated overall good tolerance to exercises this date. Good eye movement with eye exercises #1 and #2, some sticking in left eye with diagonal up toward left and fatigue at end so will have pt work on these at home  Added more rhythmic stab with balance work which may help core stability to help further decrease sway.  Slight increase of sway with eyes closed continues. .  Pt may benefit from further core strengthening.        JEROME is progressing well towards her goals.   Pt prognosis is Good.     Pt will continue to benefit from skilled outpatient physical therapy to address the deficits listed in the problem list box on initial evaluation, provide pt/family education and to maximize pt's level of independence in the home and community environment. "     Pt's spiritual, cultural and educational needs considered and pt agreeable to plan of care and goals.     Anticipated barriers to physical therapy: none    GOALS:   Short Term Goals:  4-6 weeks MET STG's  Increase strength 1/2 muscle grade  Be able to perform HEP with minimal cueing required  Eastman Balance score 38/56     Long Term Goals: 10-12 weeks  Progressing, not met  Improve muscle strength 1 muscle grade  Improve TUG to 13 sec or less MET   Eastman Balance score 41/56 or greater MET  Restore ability to ambulate with normal gait pattern  Walking for ADL and exercise will be restored without unsteadiness  Restore ability to stand for ADL without unsteadiness  Restore ability to perform ADL's and household activities independently and without unsteadiness       Plan   Continue with established plan of care towards PT goals.      Progress strengthening and balance work as tolerated     Alondra Luna, PT

## 2020-08-12 NOTE — PROGRESS NOTES
Physical Therapy Daily Treatment Note     Name: Maggi Fontaine  Clinic Number: [id number]    Therapy Diagnosis:   Encounter Diagnoses   Name Primary?    Gait instability Yes    Muscle weakness      Physician: Negro Finnegan, *    Visit Date: 8/13/2020    Physician Orders: PT Eval and Treat   Medical Diagnosis from Referral: Z91.81 (ICD-10-CM) - At risk for falls  Evaluation Date: 7/2/2020  Authorization Period Expiration: 1-6-21  Plan of Care Expiration: 9-24-20  Visit # / Visits authorized: 10/ 21  MD Follow up appointment: 9-28-20    Time In: 10:14  Time Out: 10:59  Total Billable Time: 45 minutes    Precautions: B TKR  atrial fib and pacemaker    Subjective     Pt reports: feeling slow this AM,  Woke up feeling groggy.    Pt was compliant with home exercise program.  Response to previous treatment: felt ok.    Functional change: still sways with prolonged standing, but feels improving a little, pt states walking better    Pain: 0/10    Objective    L eye a little jerky with gaze stabilization     TREATMENT    JEROME received therapeutic exercises to develop strength, endurance and core stabilization for 10 minutes including:      AR push/pull diagonal with UE's x 10 B  AR trunk FB and BB with PT x 10  Shoulder ext with RTB x 10  Yellow sports cord    Verbally reviewed exercises below for HEP   Bridge with small lift x 15, able to perform without cramping in HS   (NP for time purposes) Pelvic tilt x 20  Trunk rotation supine x 10  SLR x 10 emphasis on quad set then x 5  Ankle pumps x 10     Clam sidelying x 15    (NP for time purposes) Hip flexion sitting x 10  (NP for time purposes) Long arc quad x 2/10        JEROME participated in neuromuscular re-education activities to improve: Balance, Coordination, Kinesthetic, Sense and Proprioception for 35 minutes. The following activities were included:     Seated eye exercises #1 and #2 - added eye ex #3 and #4 and able to perform well, with fatigue at end, but  no c/o dizzines  Activities in // bars  (NP)March and backward walk 3 laps  (NP)Side walk with hurdles (2 holes between each) 3 laps  Heel raises x 15  Standing sharp rhomberg(tandem) x 30 B feet in front - L foot back more challenging   Heel to toe 1 full lap pt required hand assist on bar    Standing rhythmic stab x 30 sec  Standing feet together with rhythmic stab x 30 sec   Standing Feet together rhythmic stab on foam 2 x 30 sec  Standing on foam eyes closed x 30 sec   Standing on foam feet together x 30 sec with rhyhtmic stab  March on foam no hands x 30 sec x 2  Step over obstacles like 1/2 roll, full roll, and up/over foam in // bars  back and forth x 3 laps  Head turns with gait one revolution around clinic, no scissoring of feet today during activity though little unsteady at times with turns to L    Home Exercises Provided and Patient Education Provided     Education provided:   - HEP     Written Home Exercises Provided:  yes.  Exercises were reviewed and JEROME was able to demonstrate them prior to the end of the session.  JEROME demonstrated good  understanding of the education provided.     See EMR under Patient Instructions for exercises provided at initial eval and 7/9/2020, 7-16-20, 8-4-20. 8-11-20, 8-13-20    Assessment     Pt able to progress with eye exercises.  Pt continues with sway in standing though more steady with eyes closed as compared to last session with min sway, mod sway previously.  Continues to be a little unsteady with gait with head turns, but no scissoring and no LOB where unable to recover I       JEROME is progressing well towards her goals.   Pt prognosis is Good.     Pt will continue to benefit from skilled outpatient physical therapy to address the deficits listed in the problem list box on initial evaluation, provide pt/family education and to maximize pt's level of independence in the home and community environment.     Pt's spiritual, cultural and educational needs  considered and pt agreeable to plan of care and goals.     Anticipated barriers to physical therapy: none    GOALS:   Short Term Goals:  4-6 weeks MET STG's  Increase strength 1/2 muscle grade  Be able to perform HEP with minimal cueing required  Eastman Balance score 38/56     Long Term Goals: 10-12 weeks  Progressing, not met  Improve muscle strength 1 muscle grade  Improve TUG to 13 sec or less MET   Eastman Balance score 41/56 or greater MET  Restore ability to ambulate with normal gait pattern  Walking for ADL and exercise will be restored without unsteadiness  Restore ability to stand for ADL without unsteadiness  Restore ability to perform ADL's and household activities independently and without unsteadiness       Plan   Continue with established plan of care towards PT goals.      Progress strengthening and balance work as tolerated     Alondra Luna, PT

## 2020-08-13 ENCOUNTER — CLINICAL SUPPORT (OUTPATIENT)
Dept: REHABILITATION | Facility: HOSPITAL | Age: 85
End: 2020-08-13
Attending: FAMILY MEDICINE
Payer: MEDICARE

## 2020-08-13 DIAGNOSIS — R26.81 GAIT INSTABILITY: Primary | ICD-10-CM

## 2020-08-13 DIAGNOSIS — M62.81 MUSCLE WEAKNESS: ICD-10-CM

## 2020-08-13 PROCEDURE — 97110 THERAPEUTIC EXERCISES: CPT | Mod: HCNC,PN | Performed by: PHYSICAL THERAPIST

## 2020-08-13 PROCEDURE — 97112 NEUROMUSCULAR REEDUCATION: CPT | Mod: HCNC,PN | Performed by: PHYSICAL THERAPIST

## 2020-08-13 NOTE — PATIENT INSTRUCTIONS
EYE EXERCISE #3        Copyright [employer] 7899-6961 - All Rights Reserved                                                                                                         EYE EXERCISE #4

## 2020-08-18 ENCOUNTER — CLINICAL SUPPORT (OUTPATIENT)
Dept: REHABILITATION | Facility: HOSPITAL | Age: 85
End: 2020-08-18
Attending: FAMILY MEDICINE
Payer: MEDICARE

## 2020-08-18 DIAGNOSIS — R26.81 GAIT INSTABILITY: Primary | ICD-10-CM

## 2020-08-18 DIAGNOSIS — M62.81 MUSCLE WEAKNESS: ICD-10-CM

## 2020-08-18 PROCEDURE — 97110 THERAPEUTIC EXERCISES: CPT | Mod: HCNC,PN | Performed by: PHYSICAL THERAPIST

## 2020-08-18 PROCEDURE — 97112 NEUROMUSCULAR REEDUCATION: CPT | Mod: HCNC,PN | Performed by: PHYSICAL THERAPIST

## 2020-08-18 NOTE — PROGRESS NOTES
Physical Therapy Daily Treatment Note     Name: Maggi Fontaine  Clinic Number: [id number]    Therapy Diagnosis:   Encounter Diagnoses   Name Primary?    Gait instability Yes    Muscle weakness      Physician: Negro Finnegan, *    Visit Date: 8/18/2020    Physician Orders: PT Eval and Treat   Medical Diagnosis from Referral: Z91.81 (ICD-10-CM) - At risk for falls  Evaluation Date: 7/2/2020  Authorization Period Expiration: 1-6-21  Plan of Care Expiration: 9-24-20  Visit # / Visits authorized: 11/ 21  MD Follow up appointment: 9-28-20    Time In: 10:50  Time Out: 11:35  Total Billable Time: 45 minutes    Precautions: B TKR  atrial fib and pacemaker    Subjective     Pt reports: feeling tired and lacking energy.  Pt states she had gingerbread and coffee for breakfast today but usually has a big lunch and will generally have some  protein at lunch and dinner   Pt was compliant with home exercise program.  Response to previous treatment: felt ok.    Functional change: still sways with prolonged standing, but feels improving a little, pt states walking better    Pain: 0/10    Objective    L eye more stable today with gaze stabilization    TREATMENT    JEROME received therapeutic exercises to develop strength, endurance and core stabilization for 10 minutes including:  Pt reported she is getting blood work for one of her MD's 9-7-20.  Instructed pt to contact PCP regarding fatigue and see if further diagnostics for blood work would be helpful     Pt walking 200 steps in house, but not sure of pace.  Instructed pt to get timer to time herself and try to increase steps or decrease time performing steps for progressing        AR push/pull diagonal with UE's x 10 B  AR trunk FB and BB with PT x 10  Shoulder ext with RTB x 10  Yellow sports cord    Verbally reviewed exercises below for HEP   Bridge with small lift x 15, able to perform without cramping in HS   (NP for time purposes) Pelvic tilt x 20  Trunk rotation  supine x 10  SLR x 10 emphasis on quad set then x 5  Ankle pumps x 10     Clam sidelying x 15    (NP for time purposes) Hip flexion sitting x 10  (NP for time purposes) Long arc quad x 2/10        JEROME participated in neuromuscular re-education activities to improve: Balance, Coordination, Kinesthetic, Sense and Proprioception for 35 minutes. The following activities were included:    Seated eye exercises #1-4  Activities in // bars  (NP)March and backward walk 3 laps  (NP)Side walk with hurdles (2 holes between each) 3 laps  Heel raises x 15  Standing sharp rhomberg(tandem) x 30' B feet in front - L foot back more challenging  Heel to toe 1 full lap pt required hand assist on bar    Standing rhythmic stab x 30 sec  Standing feet together with rhythmic stab x 30 sec   Standing Feet together rhythmic stab on foam 2 x 30 sec  Standing on foam eyes closed x 30 sec   Standing on foam feet together x 30 sec with rhyhtmic stab  March on foam no hands x 30 sec x 2  Step over obstacles like 1/2 roll, full roll, and up/over foam in // bars  back and forth x 3 laps  (NP)Head turns with gait one revolution around clinic, no scissoring of feet today during activity though little unsteady at times with turns to L    Home Exercises Provided and Patient Education Provided     Education provided:   - HEP     Written Home Exercises Provided:  Continue with prior HEP  Exercises were reviewed and JEROME was able to demonstrate them prior to the end of the session.  JEROME demonstrated good  understanding of the education provided.     See EMR under Patient Instructions for exercises provided at initial eval and 7/9/2020, 7-16-20, 8-4-20. 8-11-20, 8-13-20    Assessment     Pt with less sway in standing.  Pt with improvement in gaze stabilization.  Pt understands to contact PCP regarding c/o feeling tired since pt has had this chronic c/o last few visits    JEROME is progressing well towards her goals.   Pt prognosis is Good.     Pt  will continue to benefit from skilled outpatient physical therapy to address the deficits listed in the problem list box on initial evaluation, provide pt/family education and to maximize pt's level of independence in the home and community environment.     Pt's spiritual, cultural and educational needs considered and pt agreeable to plan of care and goals.     Anticipated barriers to physical therapy: none    GOALS:   Short Term Goals:  4-6 weeks MET STG's  Increase strength 1/2 muscle grade  Be able to perform HEP with minimal cueing required  Eastman Balance score 38/56     Long Term Goals: 10-12 weeks  Progressing, not met  Improve muscle strength 1 muscle grade  Improve TUG to 13 sec or less MET   Eastman Balance score 41/56 or greater MET  Restore ability to ambulate with normal gait pattern  Walking for ADL and exercise will be restored without unsteadiness  Restore ability to stand for ADL without unsteadiness  Restore ability to perform ADL's and household activities independently and without unsteadiness       Plan   Continue with established plan of care towards PT goals.      Progress strengthening and balance work as tolerated     Alondra Luna, PT

## 2020-08-20 ENCOUNTER — CLINICAL SUPPORT (OUTPATIENT)
Dept: REHABILITATION | Facility: HOSPITAL | Age: 85
End: 2020-08-20
Attending: FAMILY MEDICINE
Payer: MEDICARE

## 2020-08-20 DIAGNOSIS — R26.81 GAIT INSTABILITY: Primary | ICD-10-CM

## 2020-08-20 DIAGNOSIS — M62.81 MUSCLE WEAKNESS: ICD-10-CM

## 2020-08-20 PROCEDURE — 97112 NEUROMUSCULAR REEDUCATION: CPT | Mod: HCNC,PN,CQ

## 2020-08-20 PROCEDURE — 97110 THERAPEUTIC EXERCISES: CPT | Mod: HCNC,PN,CQ

## 2020-08-20 NOTE — PROGRESS NOTES
Physical Therapy Daily Treatment Note     Name: Maggi Fontaine  Clinic Number: [id number]    Therapy Diagnosis:   Encounter Diagnoses   Name Primary?    Gait instability Yes    Muscle weakness      Physician: Negro Finnegan, *    Visit Date: 8/20/2020    Physician Orders: PT Eval and Treat   Medical Diagnosis from Referral: Z91.81 (ICD-10-CM) - At risk for falls  Evaluation Date: 7/2/2020  Authorization Period Expiration: 1-6-21  Plan of Care Expiration: 9-24-20  Visit # / Visits authorized: 12/ 21  MD Follow up appointment: 9-28-20    Time In: 11:06  Time Out: 11:47  Total Billable Time: 40 minutes    Precautions: B TKR  atrial fib and pacemaker    Subjective     Pt reports: still feeling tired and having less energy. Trying to have bigger breakfasts, but it is not easy. States her legs feel a little achy today.  Pt was compliant with home exercise program.  Response to previous treatment: felt ok.    Functional change: still sways with prolonged standing, but feels improving a little, pt states walking better    Pain: 0/10    Objective    L eye more stable today with gaze stabilization    TREATMENT    JEROME received therapeutic exercises to develop strength, endurance and core stabilization for 10 minutes including:  Pt reported she is getting blood work for one of her MD's 9-7-20.  Instructed pt to contact PCP regarding fatigue and see if further diagnostics for blood work would be helpful     Pt walking 200 steps in house, but not sure of pace.  Instructed pt to get timer to time herself and try to increase steps or decrease time performing steps for progressing      AR push/pull diagonal with UE's x 10 B  AR trunk FB and BB with PT x 10  Shoulder ext with RTB x 10  Yellow sports cord    Verbally reviewed exercises below for HEP   Bridge with small lift x 15, able to perform without cramping in HS   (NP for time purposes) Pelvic tilt x 20  Trunk rotation supine x 10  SLR x 10 emphasis on quad set then x  5  Ankle pumps x 10   Clam sidelying x 15    (NP for time purposes) Hip flexion sitting x 10  (NP for time purposes) Long arc quad x 2/10        JEROME participated in neuromuscular re-education activities to improve: Balance, Coordination, Kinesthetic, Sense and Proprioception for 30 minutes. The following activities were included:    Seated eye exercises #1-4  Activities in // bars  (NP)March and backward walk 3 laps  (NP)Side walk with hurdles (2 holes between each) 3 laps  Heel raises x 15  Standing sharp rhomberg(tandem) x 30' B feet in front - L foot back more challenging  Heel to toe 2 full lap pt required occasional hand assist on bar    Standing rhythmic stab x 30 sec  Standing feet together with rhythmic stab x 30 sec   Standing Feet together on foam 2 x 30 sec  Standing on foam eyes closed x 30 sec   Standing on foam feet together 2 x 30 sec with rhyhtmic stab  March on foam no hands x 30 sec x 2  Step over obstacles like 1/2 roll, full roll, and up/over foam in // bars  back and forth x 3 laps  (NP)Head turns with gait one revolution around clinic, no scissoring of feet today during activity though little unsteady at times with turns to L    Home Exercises Provided and Patient Education Provided     Education provided:   - HEP     Written Home Exercises Provided:  Continue with prior HEP  Exercises were reviewed and JEROME was able to demonstrate them prior to the end of the session.  JEROME demonstrated good  understanding of the education provided.     See EMR under Patient Instructions for exercises provided at initial eval and 7/9/2020, 7-16-20, 8-4-20. 8-11-20, 8-13-20    Assessment     Pt demonstrates increased stability with heel to toe walking, requiring less use of hand for assistance. Pt with improvement in gaze stabilization. Slight onset of shortness of breath following march on foam and step over obstacles. Patient has good understanding of mat exercises to do for home so balance activities  can be primary focus in therapy.    JEROME is progressing well towards her goals.   Pt prognosis is Good.     Pt will continue to benefit from skilled outpatient physical therapy to address the deficits listed in the problem list box on initial evaluation, provide pt/family education and to maximize pt's level of independence in the home and community environment.     Pt's spiritual, cultural and educational needs considered and pt agreeable to plan of care and goals.     Anticipated barriers to physical therapy: none    GOALS:   Short Term Goals:  4-6 weeks MET STG's  Increase strength 1/2 muscle grade  Be able to perform HEP with minimal cueing required  Eastman Balance score 38/56     Long Term Goals: 10-12 weeks  Progressing, not met  Improve muscle strength 1 muscle grade  Improve TUG to 13 sec or less MET   Eastman Balance score 41/56 or greater MET  Restore ability to ambulate with normal gait pattern  Walking for ADL and exercise will be restored without unsteadiness  Restore ability to stand for ADL without unsteadiness  Restore ability to perform ADL's and household activities independently and without unsteadiness       Plan   Continue with established plan of care towards PT goals.      Progress strengthening and balance work as tolerated     Stephani Ruiz, PTA

## 2020-08-27 ENCOUNTER — CLINICAL SUPPORT (OUTPATIENT)
Dept: REHABILITATION | Facility: HOSPITAL | Age: 85
End: 2020-08-27
Attending: FAMILY MEDICINE
Payer: MEDICARE

## 2020-08-27 ENCOUNTER — TELEPHONE (OUTPATIENT)
Dept: FAMILY MEDICINE | Facility: CLINIC | Age: 85
End: 2020-08-27

## 2020-08-27 DIAGNOSIS — M62.81 MUSCLE WEAKNESS: ICD-10-CM

## 2020-08-27 DIAGNOSIS — R26.81 GAIT INSTABILITY: Primary | ICD-10-CM

## 2020-08-27 PROCEDURE — 97110 THERAPEUTIC EXERCISES: CPT | Mod: HCNC,PN | Performed by: PHYSICAL THERAPIST

## 2020-08-27 PROCEDURE — 97112 NEUROMUSCULAR REEDUCATION: CPT | Mod: HCNC,PN | Performed by: PHYSICAL THERAPIST

## 2020-08-27 NOTE — PROGRESS NOTES
Physical Therapy Daily Treatment Note     Name: Maggi Fontaine  Clinic Number: [id number]    Therapy Diagnosis:   Encounter Diagnoses   Name Primary?    Gait instability Yes    Muscle weakness      Physician: Negro Finnegan, *    Visit Date: 8/27/2020    Physician Orders: PT Eval and Treat   Medical Diagnosis from Referral: Z91.81 (ICD-10-CM) - At risk for falls  Evaluation Date: 7/2/2020  Authorization Period Expiration: 1-6-21  Plan of Care Expiration: 9-24-20  Visit # / Visits authorized: 13/ 21  MD Follow up appointment: 9-28-20    Time In: 10:23  Time Out: 11:05  Total Billable Time: 42 minutes    Precautions: B TKR  atrial fib and pacemaker    Subjective     Pt reports: overall walking balance is better and does not feel like she is falling, she just feels imbalanced Pt states she feels she still sways alot  Pt was compliant with home exercise program.  Response to previous treatment: felt ok.    Functional change: still sways with prolonged standing, but feels improving, pt states walking better no falls since started PT , 4 previous falls this year    Pain: 0/10    Objective    Improved with eye ex #1, still struggle with eye ex #2    Special tests: TUG 12 sec on 8-11-20 12:22 sec at IE  14:36     TREATMENT    JEROME received therapeutic exercises to develop strength, endurance and core stabilization for 12 minutes including:  Pt reported she is getting blood work for one of her MD's 9-7-20.  Instructed pt to contact PCP regarding fatigue and see if further diagnostics for blood work would be helpful       AR push/pull diagonal with UE's x 10 B  AR trunk FB and BB with PT x 10  Shoulder ext with RTB x 10  Yellow sports cord    Verbally reviewed exercises below for HEP   Bridge with small lift x 15, able to perform without cramping in HS   (NP for time purposes) Pelvic tilt x 20  Trunk rotation supine x 10  SLR x 10 emphasis on quad set then x 5  Ankle pumps x 10   Clam sidelying x 15    (NP for time  purposes) Hip flexion sitting x 10  (NP for time purposes) Long arc quad x 2/10        JEROME participated in neuromuscular re-education activities to improve: Balance, Coordination, Kinesthetic, Sense and Proprioception for 30 minutes. The following activities were included:    Seated eye exercises #1-4  Activities in // bars  (NP)March and backward walk 3 laps  (NP)Side walk with hurdles (2 holes between each) 3 laps  Heel raises x 15  Standing sharp rhomberg(tandem) x 30' B feet in front - L foot back more challenging  Heel to toe 2 full lap pt required occasional hand assist on bar    Standing rhythmic stab x 30 sec  Standing feet together with rhythmic stab x 30 sec   Standing Feet together on foam 2 x 30 sec  Standing on foam eyes closed x 30 sec   Standing on foam feet together 2 x 30 sec with rhyhtmic stab  March on foam no hands x 30 sec x 2  Step over obstacles like 1/2 roll, full roll, and up/over foam in // bars  back and forth x 3 laps  (NP)Head turns with gait one revolution around clinic, no scissoring of feet today during activity though little unsteady at times with turns to L    Home Exercises Provided and Patient Education Provided     Education provided:   - HEP     Written Home Exercises Provided:  Continue with prior HEP  Exercises were reviewed and JEROME was able to demonstrate them prior to the end of the session.  JEROME demonstrated good  understanding of the education provided.     See EMR under Patient Instructions for exercises provided at initial eval and 7/9/2020, 7-16-20, 8-4-20. 8-11-20, 8-13-20    Assessment     Pt with further improvement in TUG, less sway noted in standing when in clinic, no falls and progressing slowly with fear of falling.      JEROME is progressing well towards her goals.   Pt prognosis is Good.     Pt will continue to benefit from skilled outpatient physical therapy to address the deficits listed in the problem list box on initial evaluation, provide  pt/family education and to maximize pt's level of independence in the home and community environment.     Pt's spiritual, cultural and educational needs considered and pt agreeable to plan of care and goals.     Anticipated barriers to physical therapy: none    GOALS:   Short Term Goals:  4-6 weeks MET STG's  Increase strength 1/2 muscle grade  Be able to perform HEP with minimal cueing required  Eastman Balance score 38/56     Long Term Goals: 10-12 weeks  Progressing, not met  Improve muscle strength 1 muscle grade  Improve TUG to 13 sec or less MET   Eastman Balance score 41/56 or greater MET  Restore ability to ambulate with normal gait pattern  Walking for ADL and exercise will be restored without unsteadiness  Restore ability to stand for ADL without unsteadiness  Restore ability to perform ADL's and household activities independently and without unsteadiness       Plan   Continue with established plan of care towards PT goals.      Progress strengthening and balance work as tolerated     Alondra Luna, PT

## 2020-08-27 NOTE — TELEPHONE ENCOUNTER
Spoke with pt, she states she is taking prozac 10mg at at 3:00/4:00 in the afternight and finds she is still having panic attacks at night and has a hard time falling asleep. The 20mg at night was too strong for her before, she is asking if she can take 10mg in the am and 10mg in the pm

## 2020-08-27 NOTE — TELEPHONE ENCOUNTER
She can or she could even take 20 mg all in the morning time depending on how she does.  For most people I recommend taking it in the morning time.  Is not a sedative but rarely can cause sedation and if it does all switch to nighttime but the vast majority of people do better in the morning

## 2020-08-27 NOTE — TELEPHONE ENCOUNTER
----- Message from Verna Javier sent at 8/27/2020 12:30 PM CDT -----  Regarding: Pt Message  Type: Needs Medical Advice  Who Called:  Pt  Best Call Back Number: [phone]  Additional Information: Patient is requesting a call back in regards to medications  . Please and thank you

## 2020-08-28 NOTE — TELEPHONE ENCOUNTER
Spoke with pt and she states that she cannot take the 20 mg because she would not eat at all. She states that she will keep it the same for now until she sees you at the end of Sept. 10mg in the evening.

## 2020-08-28 NOTE — TELEPHONE ENCOUNTER
That is fine.  If she would like to try 10 mg twice a day and if that works for her that is perfectly okay.

## 2020-09-03 ENCOUNTER — TELEPHONE (OUTPATIENT)
Dept: REHABILITATION | Facility: HOSPITAL | Age: 85
End: 2020-09-03

## 2020-09-08 ENCOUNTER — LAB VISIT (OUTPATIENT)
Dept: LAB | Facility: HOSPITAL | Age: 85
End: 2020-09-08
Attending: INTERNAL MEDICINE
Payer: MEDICARE

## 2020-09-08 DIAGNOSIS — M15.9 GENERALIZED OSTEOARTHROSIS, INVOLVING MULTIPLE SITES: ICD-10-CM

## 2020-09-08 DIAGNOSIS — R80.9 PROTEINURIA: ICD-10-CM

## 2020-09-08 DIAGNOSIS — I10 ESSENTIAL HYPERTENSION, MALIGNANT: ICD-10-CM

## 2020-09-08 DIAGNOSIS — D50.9 IRON DEFICIENCY ANEMIA, UNSPECIFIED: Primary | ICD-10-CM

## 2020-09-08 DIAGNOSIS — N18.30 CHRONIC KIDNEY DISEASE, STAGE III (MODERATE): ICD-10-CM

## 2020-09-08 DIAGNOSIS — I25.10 CORONARY ATHEROSCLEROSIS OF NATIVE CORONARY ARTERY: ICD-10-CM

## 2020-09-08 LAB
ALBUMIN SERPL BCP-MCNC: 3.7 G/DL (ref 3.5–5.2)
ANION GAP SERPL CALC-SCNC: 6 MMOL/L (ref 8–16)
BACTERIA #/AREA URNS HPF: ABNORMAL /HPF
BILIRUB UR QL STRIP: NEGATIVE
BUN SERPL-MCNC: 27 MG/DL (ref 8–23)
CALCIUM SERPL-MCNC: 9.3 MG/DL (ref 8.7–10.5)
CHLORIDE SERPL-SCNC: 111 MMOL/L (ref 95–110)
CLARITY UR: CLEAR
CO2 SERPL-SCNC: 25 MMOL/L (ref 23–29)
COLOR UR: YELLOW
CREAT SERPL-MCNC: 1.4 MG/DL (ref 0.5–1.4)
EST. GFR  (AFRICAN AMERICAN): 39 ML/MIN/1.73 M^2
EST. GFR  (NON AFRICAN AMERICAN): 33.8 ML/MIN/1.73 M^2
GLUCOSE SERPL-MCNC: 77 MG/DL (ref 70–110)
GLUCOSE UR QL STRIP: NEGATIVE
HCT VFR BLD AUTO: 38.4 % (ref 37–48.5)
HGB BLD-MCNC: 12.9 G/DL (ref 12–16)
HGB UR QL STRIP: NEGATIVE
HYALINE CASTS #/AREA URNS LPF: 0 /LPF
KETONES UR QL STRIP: NEGATIVE
LEUKOCYTE ESTERASE UR QL STRIP: NEGATIVE
MAGNESIUM SERPL-MCNC: 2 MG/DL (ref 1.6–2.6)
MICROSCOPIC COMMENT: ABNORMAL
NITRITE UR QL STRIP: NEGATIVE
PH UR STRIP: 6 [PH] (ref 5–8)
PHOSPHATE SERPL-MCNC: 3.9 MG/DL (ref 2.7–4.5)
POTASSIUM SERPL-SCNC: 5.1 MMOL/L (ref 3.5–5.1)
PROT UR QL STRIP: ABNORMAL
RBC #/AREA URNS HPF: 1 /HPF (ref 0–4)
SODIUM SERPL-SCNC: 142 MMOL/L (ref 136–145)
SP GR UR STRIP: 1.02 (ref 1–1.03)
SQUAMOUS #/AREA URNS HPF: 10 /HPF
URATE SERPL-MCNC: 6.4 MG/DL (ref 2.4–5.7)
URN SPEC COLLECT METH UR: ABNORMAL
WBC #/AREA URNS HPF: 4 /HPF (ref 0–5)

## 2020-09-08 PROCEDURE — 83735 ASSAY OF MAGNESIUM: CPT | Mod: HCNC

## 2020-09-08 PROCEDURE — 81000 URINALYSIS NONAUTO W/SCOPE: CPT | Mod: HCNC,PO

## 2020-09-08 PROCEDURE — 85014 HEMATOCRIT: CPT | Mod: HCNC

## 2020-09-08 PROCEDURE — 80069 RENAL FUNCTION PANEL: CPT | Mod: HCNC

## 2020-09-08 PROCEDURE — 87086 URINE CULTURE/COLONY COUNT: CPT | Mod: HCNC

## 2020-09-08 PROCEDURE — 84550 ASSAY OF BLOOD/URIC ACID: CPT | Mod: HCNC

## 2020-09-08 PROCEDURE — 36415 COLL VENOUS BLD VENIPUNCTURE: CPT | Mod: HCNC,PO

## 2020-09-08 PROCEDURE — 85018 HEMOGLOBIN: CPT | Mod: HCNC

## 2020-09-09 LAB — BACTERIA UR CULT: NO GROWTH

## 2020-09-10 ENCOUNTER — CLINICAL SUPPORT (OUTPATIENT)
Dept: REHABILITATION | Facility: HOSPITAL | Age: 85
End: 2020-09-10
Attending: FAMILY MEDICINE
Payer: MEDICARE

## 2020-09-10 DIAGNOSIS — M62.81 MUSCLE WEAKNESS: ICD-10-CM

## 2020-09-10 DIAGNOSIS — R26.81 GAIT INSTABILITY: Primary | ICD-10-CM

## 2020-09-10 PROCEDURE — 97110 THERAPEUTIC EXERCISES: CPT | Mod: HCNC,PN,CQ

## 2020-09-10 PROCEDURE — 97112 NEUROMUSCULAR REEDUCATION: CPT | Mod: HCNC,PN,CQ

## 2020-09-10 NOTE — PROGRESS NOTES
Physical Therapy Daily Treatment Note     Name: Maggi Fontaine  Clinic Number: [id number]    Therapy Diagnosis:   Encounter Diagnoses   Name Primary?    Gait instability Yes    Muscle weakness      Physician: Negro Finnegan, *    Visit Date: 9/10/2020    Physician Orders: PT Eval and Treat   Medical Diagnosis from Referral: Z91.81 (ICD-10-CM) - At risk for falls  Evaluation Date: 7/2/2020  Authorization Period Expiration: 1-6-21  Plan of Care Expiration: 9-24-20  Visit # / Visits authorized: 14/ 21  MD Follow up appointment: 9-28-20    Time In: 8:49  Time Out: 9:30  Total Billable Time: 40 minutes    Precautions: B TKR  atrial fib and pacemaker    Subjective     Pt reports: still feeling unsteady with standing activities but feels she has improved some with her eye exercises.   Pt was compliant with home exercise program.  Response to previous treatment: felt alright   Functional change: none since previous visit    Pain: 0/10    Objective    Improved with eye ex #1, still struggle with eye ex #2      TREATMENT    JEROME received therapeutic exercises to develop strength, endurance and core stabilization for 15 minutes including:  Pt reported she is getting blood work for one of her MD's 9-7-20.  Instructed pt to contact PCP regarding fatigue and see if further diagnostics for blood work would be helpful       AR push/pull diagonal with UE's x 10 B  AR trunk FB and BB with PT x 10  Shoulder ext with RTB x 10  Yellow sports cord    Reviewed exercises below for HEP   Bridge with small lift x 10, able to perform without cramping in HS   Trunk rotation supine x 10  SLR x 10 emphasis on quad set then x 5  (NP) Ankle pumps x 10   Clam sidelying x 15    (NP for time purposes) Hip flexion sitting x 10  (NP for time purposes) Long arc quad x 2/10        JEROME participated in neuromuscular re-education activities to improve: Balance, Coordination, Kinesthetic, Sense and Proprioception for 25 minutes. The following  "activities were included:    Seated eye exercises #1-4  Activities in // bars  (NP)March and backward walk 3 laps  (NP)Side walk with hurdles (2 holes between each) 3 laps  Heel raises x 15  Standing sharp rhomberg(tandem) x 30" B feet in front - L foot back more challenging  Heel to toe 2 full lap pt required occasional hand assist on bar    Standing rhythmic stab x 30 sec  Standing feet together with rhythmic stab x 30 sec   Standing Feet together on foam 2 x 30 sec  Standing on foam eyes closed 2 x 30 sec   Standing on foam feet together 2 x 30 sec with rhyhtmic stab  March on foam no hands x 30 sec x 2  Step over obstacles like 1/2 roll, full roll, and up/over foam in // bars  back and forth x 3 laps  (NP)Head turns with gait one revolution around clinic, no scissoring of feet today during activity though little unsteady at times with turns to L    Home Exercises Provided and Patient Education Provided     Education provided:   - HEP     Written Home Exercises Provided:  Continue with prior HEP  Exercises were reviewed and JEROME was able to demonstrate them prior to the end of the session.  JEROME demonstrated good  understanding of the education provided.     See EMR under Patient Instructions for exercises provided at initial eval and 7/9/2020, 7-16-20, 8-4-20. 8-11-20, 8-13-20    Assessment     Pt presents with decreased sway with static 2 legged exercises however pt required near constant fingertip assist with sharp rhomberg balance. Good balance present with stepping over obstacles, but required occasional cues to get closer to object prior to stepping over it. Pt will benefit from continued strength and balance training as able.    JEROME is progressing well towards her goals.   Pt prognosis is Good.     Pt will continue to benefit from skilled outpatient physical therapy to address the deficits listed in the problem list box on initial evaluation, provide pt/family education and to maximize pt's level of " independence in the home and community environment.     Pt's spiritual, cultural and educational needs considered and pt agreeable to plan of care and goals.     Anticipated barriers to physical therapy: none    GOALS:   Short Term Goals:  4-6 weeks MET STG's  Increase strength 1/2 muscle grade  Be able to perform HEP with minimal cueing required  Eastman Balance score 38/56     Long Term Goals: 10-12 weeks  Progressing, not met  Improve muscle strength 1 muscle grade  Improve TUG to 13 sec or less MET   Eastman Balance score 41/56 or greater MET  Restore ability to ambulate with normal gait pattern  Walking for ADL and exercise will be restored without unsteadiness  Restore ability to stand for ADL without unsteadiness  Restore ability to perform ADL's and household activities independently and without unsteadiness       Plan   Continue with established plan of care towards PT goals.      Progress strengthening and balance work as tolerated     Stephani Ruiz, PTA

## 2020-09-15 ENCOUNTER — CLINICAL SUPPORT (OUTPATIENT)
Dept: REHABILITATION | Facility: HOSPITAL | Age: 85
End: 2020-09-15
Attending: FAMILY MEDICINE
Payer: MEDICARE

## 2020-09-15 DIAGNOSIS — R26.81 GAIT INSTABILITY: Primary | ICD-10-CM

## 2020-09-15 DIAGNOSIS — M62.81 MUSCLE WEAKNESS: ICD-10-CM

## 2020-09-15 PROCEDURE — 97112 NEUROMUSCULAR REEDUCATION: CPT | Mod: HCNC,PN | Performed by: PHYSICAL THERAPIST

## 2020-09-15 PROCEDURE — 97110 THERAPEUTIC EXERCISES: CPT | Mod: HCNC,PN | Performed by: PHYSICAL THERAPIST

## 2020-09-15 NOTE — PROGRESS NOTES
Physical Therapy Daily Treatment Note     Name: Maggi Fontaine  Clinic Number: [id number]    Therapy Diagnosis:   Encounter Diagnoses   Name Primary?    Gait instability Yes    Muscle weakness      Physician: Negro Finnegan, *    Visit Date: 9/15/2020    Physician Orders: PT Eval and Treat   Medical Diagnosis from Referral: Z91.81 (ICD-10-CM) - At risk for falls  Evaluation Date: 7/2/2020  Authorization Period Expiration: 1-6-21  Plan of Care Expiration: 9-24-20  Visit # / Visits authorized: 15/ 21  MD Follow up appointment: 9-28-20    Time In: 10:45  Time Out: 11:30  Total Billable Time: 45 minutes    Precautions: B TKR  atrial fib and pacemaker    Subjective     Pt reports: still feels unsteady Pt feels being cooped up due to COVID as a shut in has made her weaker she feels for she does not have a destination at home she just walks around and feels she sits a lot   Pt was compliant with home exercise program.  Response to previous treatment: felt alright   Functional change: less weaving with gait    Pain: 0/10    Objective        Special tests: TUG on 9-15-20 11:65 sec on 8-11-20 12:22 sec at IE  14:36    TREATMENT    JEROME received therapeutic exercises to develop strength, endurance and core stabilization for 15 minutes including:  Discussed walking outside and with weather being hot she does not feel comfortable going outside to walk due to others outside walking early AM or in PM.  Discussed possibility of walking during day with pt when weather gets cooler.        AR push/pull diagonal with UE's x 10 B  AR trunk FB and BB with PT x 10  Shoulder ext with RTB x 10  Yellow sports cord    Verbally Reviewed exercises below for HEP   Bridge with small lift x 10, able to perform without cramping in HS   Trunk rotation supine x 10  SLR x 10 emphasis on quad set then x 5  (NP) Ankle pumps x 10   Clam sidelying x 15    Hip flexion sitting x 10   Long arc quad x 2/10        JEROME participated in neuromuscular  "re-education activities to improve: Balance, Coordination, Kinesthetic, Sense and Proprioception for 30 minutes. The following activities were included:    Seated eye exercises #2  Activities in // bars  (NP)March and backward walk 3 laps  (NP)Side walk with hurdles (2 holes between each) 3 laps  Heel raises x 15  Standing sharp rhomberg(tandem) x 30" B feet in front - L foot back more challenging  Heel to toe 2 full lap pt did not require occasional hand assist on bar as much only reaching 1-2 times and 1 lap fully no hands assist    Standing rhythmic stab x 30 sec  Standing feet together with rhythmic stab x 30 sec   Standing Feet together on foam  x 30 sec  Standing on foam eyes closed x 30 sec  Had LOB a few times during actiity today  Standing on foam feet together  x 30 sec with rhyhtmic stab  March on foam no hands x 30 sec x 2  Step over obstacles like 1/2 roll, full small roll, and up/over foam and 4" step in // bars  back and forth x 3 laps  Ambulation in clinic one revolution no scissoring and pt reported feeling steady  Head turns with gait one revolution around clinic, no scissoring of feet or unsteadiness today during activity     Home Exercises Provided and Patient Education Provided     Education provided:   - HEP     Written Home Exercises Provided:  Continue with prior HEP  Exercises were reviewed and JEROME was able to demonstrate them prior to the end of the session.  JEORME demonstrated good  understanding of the education provided.     See EMR under Patient Instructions for exercises provided at initial eval and 7/9/2020, 7-16-20, 8-4-20. 8-11-20, 8-13-20    Assessment     Pt with improved TUG.  Pt still lacks confidence with gait but does see improvement in her balance when noted.  Pt with improved balance with all exercises and even obstacle course required no hand assist     JEROME is progressing well towards her goals.   Pt prognosis is Good.     Pt will continue to benefit from skilled " outpatient physical therapy to address the deficits listed in the problem list box on initial evaluation, provide pt/family education and to maximize pt's level of independence in the home and community environment.     Pt's spiritual, cultural and educational needs considered and pt agreeable to plan of care and goals.     Anticipated barriers to physical therapy: none    GOALS:   Short Term Goals:  4-6 weeks MET STG's  Increase strength 1/2 muscle grade  Be able to perform HEP with minimal cueing required  Eastman Balance score 38/56     Long Term Goals: 10-12 weeks  Progressing, not met  Improve muscle strength 1 muscle grade  Improve TUG to 13 sec or less MET   Eastman Balance score 41/56 or greater MET  Restore ability to ambulate with normal gait pattern  Walking for ADL and exercise will be restored without unsteadiness  Restore ability to stand for ADL without unsteadiness  Restore ability to perform ADL's and household activities independently and without unsteadiness       Plan   Continue with established plan of care towards PT goals.      Progress strengthening and balance work as tolerated     Alondra Luna, PT

## 2020-09-17 ENCOUNTER — CLINICAL SUPPORT (OUTPATIENT)
Dept: REHABILITATION | Facility: HOSPITAL | Age: 85
End: 2020-09-17
Attending: FAMILY MEDICINE
Payer: MEDICARE

## 2020-09-17 DIAGNOSIS — R26.81 GAIT INSTABILITY: Primary | ICD-10-CM

## 2020-09-17 DIAGNOSIS — M62.81 MUSCLE WEAKNESS: ICD-10-CM

## 2020-09-17 PROCEDURE — 97110 THERAPEUTIC EXERCISES: CPT | Mod: HCNC,PN,CQ

## 2020-09-17 PROCEDURE — 97112 NEUROMUSCULAR REEDUCATION: CPT | Mod: HCNC,PN,CQ
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
## 2020-09-21 NOTE — PROGRESS NOTES
Physical Therapy Reassessment/Plan of Care and  Daily Treatment Note     Name: Maggi Fontaine  Clinic Number: [id number]    Therapy Diagnosis:   Encounter Diagnoses   Name Primary?    Gait instability Yes    Muscle weakness      Physician: Negro Finnegan, *    Visit Date: 9/22/2020    Physician Orders: PT Eval and Treat   Medical Diagnosis from Referral: Z91.81 (ICD-10-CM) - At risk for falls  Evaluation Date: 7/2/2020  Authorization Period Expiration: 1-6-21  Plan of Care Expiration: 11-17-20  Visit # / Visits authorized: 17 / 21  MD Follow up appointment: 9-28-20    Time In: 10:04  Time Out: 10:50  Total Billable Time: 46 minutes    Precautions: B TKR  atrial fib and pacemaker    Subjective     Pt reports: pt states she has been working on exercises.  Pt c/o pain along distal tibia with palpation .No pain at rest Pt states she sleeps on back with legs crossed  Pt states she has been doing little walking with cooler weather  Pt was compliant with home exercise program.  Response to previous treatment: felt alright   Functional change: walking a little for exercise and walking better    Pain: 0/10    Objective   Functional assessment:   - walking/gait:pt ambulating with more secure gait with normal gait pattern  At IE slightly unsteady with cautious steps   - sit to stand: min use of hands at IE min difficulty  - sit to supine: no difficulty at IE min difficulty       - supine to sit:no difficulty at IE min difficulty  - supine to prone: not tested  at IE min difficulty      Knee ROM WFL with 0 degrees ext B     Flexibility testing:  - hamstrings:     90/90 test R 20 L 20 at IE R 30 L 30           - gastrocnemius:   DF ankle R 5 L 5 at IE R 0 degrees L 0 degrees                 Muscle Strength 9-22-20  MMT R L   Hip flexion 4/5 4/5   Hip abduction 4/5 4+/5   Hip extension able to do 75% bridge w/o pain       Knee extension 5-/5 5-/5   Knee flexion 5-/5 5-/5   Ankle dorsiflexion 5/5 5/5   Ankle plantar flexion  "N/T N/T   Ankle inversion N/T N/T   Ankle eversion N/T N/T            Muscle Strength initial eval  MMT R L   Hip flexion 4-/5 3+/5   Hip abduction 3+/5 3+/5   Hip extension able to do small bridge but leads to pain       Knee extension 4/5 4/5   Knee flexion 4-/5 4-/5   Ankle dorsiflexion 5/5 5/5   Ankle plantar flexion 4/5 4/5   Ankle inversion 5/5 5/5   Ankle eversion 5/5 5/5      Endurance is fair at IE poor.     Special tests: TUG 9-22-20 12:20 sec 8-11-20 12:22 sec at IE  14:36   Eastman Balance score on 9-22-20 46/56,  8-11-20 43/56 indicating low risk for fall at IE 34/56 indicating medium risk for fall          TREATMENT    JEROME received therapeutic exercises to develop strength, endurance and core stabilization for 21 minutes including:  Discussed possibility of walking during day, in AM or PM  when weather gets cooler.      AR push/pull diagonal with UE's x 10 B  AR trunk FB and BB with PT x 10  Shoulder ext with RTB x 10  Yellow sports cord    Hip flexion sitting x 15  Long arc quad x 2/10    Verbally Reviewed exercises below for HEP   Bridge with small lift x 10, able to perform without cramping in HS   Trunk rotation supine x 10  SLR x 10 emphasis on quad set then x 5  Clam sidelying x 15     Add single leg balance to HEP next visit      JEROME participated in neuromuscular re-education activities to improve: Balance, Coordination, Kinesthetic, Sense and Proprioception for 25 minutes. The following activities were included:    Seated eye exercises #2  Activities in // bars  (NP)March and backward walk 3 laps  (NP)Side walk with hurdles (2 holes between each) 3 laps  Heel raises x 15  Standing sharp rhomberg(tandem) x 30" B feet in front - L foot back more challenging  Heel to toe 2 full laps -  pt did require occasional hand assist on bar as much only reaching 1-2 times and 1 lap fully no hands assist    Standing rhythmic stab x 30 sec  Standing feet together with rhythmic stab x 30 sec   Standing Feet " "together on foam  x 30 sec  Standing on foam eyes closed x 30 sec  Had LOB a few times during actiity today  Standing on foam feet together  x 30 sec with rhyhtmic stab  March on foam no hands x 30 sec x 2  Step over obstacles like 1/2 roll, full small roll, and up/over foam and 4" step in // bars  back and forth x 3 laps  Ambulation in clinic one revolution no scissoring and pt reported feeling steady  Head turns with gait one revolution around clinic, no scissoring of feet or unsteadiness today during activity     Home Exercises Provided and Patient Education Provided     Education provided:   - HEP     Written Home Exercises Provided:  Continue with prior HEP  Exercises were reviewed and JEROME was able to demonstrate them prior to the end of the session.  JEROME demonstrated good  understanding of the education provided.     See EMR under Patient Instructions for exercises provided at initial eval and 7/9/2020, 7-16-20, 8-4-20. 8-11-20, 8-13-20    Assessment   Patient demonstrates improvement in strength, stabilization and function.  Pt expressed concern about fear of regressing if PT were to end.  Pt appears to understand that we can start the process of weaning from PT and continue to monitor her and if any regression were to occur we can reassess and address as needed.  Pt will benefit from continuing with her HEP and will work with pt one time a week for 4 weeks and then every other week for 4 weeks to allow pt to further increase confidence and further stability to decrease risk for fall and restore her full goals        JEROME is progressing well towards her goals.   Pt prognosis is Good.     Pt will continue to benefit from skilled outpatient physical therapy to address the deficits listed in the problem list box on initial evaluation, provide pt/family education and to maximize pt's level of independence in the home and community environment.     Pt's spiritual, cultural and educational needs considered " and pt agreeable to plan of care and goals.     Anticipated barriers to physical therapy: none    GOALS:   Short Term Goals:  4-6 weeks MET STG's  Increase strength 1/2 muscle grade  Be able to perform HEP with minimal cueing required  Eastman Balance score 38/56     Long Term Goals: 10-12 weeks  Progressing, not met  Improve muscle strength 1 muscle grade  Improve TUG to 13 sec or less MET   Eastman Balance score 41/56 or greater MET  Restore ability to ambulate with normal gait pattern   Walking for ADL and exercise will be restored without unsteadiness  Restore ability to stand for ADL without unsteadiness  Restore ability to perform ADL's and household activities independently and without unsteadiness       Plan     Plan of care Certification: 9- to 11-.     Outpatient Physical Therapy 1 time weekly for 4 weeks then every other week for 4 weeks to include the following interventions: Therapeutic exercises, manual therapy techniques neuromuscular re-education, therapeutic activities, modalities such as moist heat, ice as needed for soreness, and temporary orthotics will be considered and utilized as needed.       Alondra Luna, PT      I certify the need for these services furnished under this plan of treatment and while under my care.     ____________________________________ Physician/Referring Practitioner                                       Date of Signature

## 2020-09-22 ENCOUNTER — CLINICAL SUPPORT (OUTPATIENT)
Dept: REHABILITATION | Facility: HOSPITAL | Age: 85
End: 2020-09-22
Attending: FAMILY MEDICINE
Payer: MEDICARE

## 2020-09-22 DIAGNOSIS — M62.81 MUSCLE WEAKNESS: ICD-10-CM

## 2020-09-22 DIAGNOSIS — R26.81 GAIT INSTABILITY: Primary | ICD-10-CM

## 2020-09-22 PROCEDURE — 97112 NEUROMUSCULAR REEDUCATION: CPT | Mod: HCNC,PN | Performed by: PHYSICAL THERAPIST

## 2020-09-22 PROCEDURE — 97110 THERAPEUTIC EXERCISES: CPT | Mod: HCNC,PN | Performed by: PHYSICAL THERAPIST

## 2020-09-22 NOTE — PLAN OF CARE
Physical Therapy Reassessment/Plan of Care      Name: Maggi Fontaine  Clinic Number: [id number]    Therapy Diagnosis:   Encounter Diagnoses   Name Primary?    Gait instability Yes    Muscle weakness      Physician: Negro Finnegan, *    Visit Date: 9/22/2020    Physician Orders: PT Eval and Treat   Medical Diagnosis from Referral: Z91.81 (ICD-10-CM) - At risk for falls  Evaluation Date: 7/2/2020  Authorization Period Expiration: 1-6-21  Plan of Care Expiration: 11-17-20  Visit # / Visits authorized: 17 / 21  MD Follow up appointment: 9-28-20    Time In: 10:04  Time Out: 10:50  Total Billable Time: 46 minutes    Precautions: B TKR  atrial fib and pacemaker    Subjective     Pt reports: pt states she has been working on exercises.  Pt c/o pain along distal tibia with palpation .No pain at rest Pt states she sleeps on back with legs crossed  Pt states she has been doing little walking with cooler weather  Pt was compliant with home exercise program.  Response to previous treatment: felt alright   Functional change: walking a little for exercise and walking better    Pain: 0/10    Objective   Functional assessment:   - walking/gait:pt ambulating with more secure gait with normal gait pattern  At IE slightly unsteady with cautious steps   - sit to stand: min use of hands at IE min difficulty  - sit to supine: no difficulty at IE min difficulty       - supine to sit:no difficulty at IE min difficulty  - supine to prone: not tested  at IE min difficulty      Knee ROM WFL with 0 degrees ext B     Flexibility testing:  - hamstrings:     90/90 test R 20 L 20 at IE R 30 L 30           - gastrocnemius:   DF ankle R 5 L 5 at IE R 0 degrees L 0 degrees                 Muscle Strength 9-22-20  MMT R L   Hip flexion 4/5 4/5   Hip abduction 4/5 4+/5   Hip extension able to do 75% bridge w/o pain       Knee extension 5-/5 5-/5   Knee flexion 5-/5 5-/5   Ankle dorsiflexion 5/5 5/5   Ankle plantar flexion N/T N/T   Ankle  inversion N/T N/T   Ankle eversion N/T N/T            Muscle Strength initial eval  MMT R L   Hip flexion 4-/5 3+/5   Hip abduction 3+/5 3+/5   Hip extension able to do small bridge but leads to pain       Knee extension 4/5 4/5   Knee flexion 4-/5 4-/5   Ankle dorsiflexion 5/5 5/5   Ankle plantar flexion 4/5 4/5   Ankle inversion 5/5 5/5   Ankle eversion 5/5 5/5      Endurance is fair at IE poor.     Special tests: TUG 9-22-20 12:20 sec 8-11-20 12:22 sec at IE  14:36   Eastman Balance score on 9-22-20 46/56,  8-11-20 43/56 indicating low risk for fall at IE 34/56 indicating medium risk for fall    Assessment   Patient demonstrates improvement in strength, stabilization and function.  Pt expressed concern about fear of regressing if PT were to end.  Pt appears to understand that we can start the process of weaning from PT and continue to monitor her and if any regression were to occur we can reassess and address as needed.  Pt will benefit from continuing with her HEP and will work with pt one time a week for 4 weeks and then every other week for 4 weeks to allow pt to further increase confidence and further stability to decrease risk for fall and restore her full goals        JEROME is progressing well towards her goals.   Pt prognosis is Good.     Pt will continue to benefit from skilled outpatient physical therapy to address the deficits listed in the problem list box on initial evaluation, provide pt/family education and to maximize pt's level of independence in the home and community environment.     Pt's spiritual, cultural and educational needs considered and pt agreeable to plan of care and goals.     Anticipated barriers to physical therapy: none    GOALS:   Short Term Goals:  4-6 weeks MET STG's  Increase strength 1/2 muscle grade  Be able to perform HEP with minimal cueing required  Eastman Balance score 38/56     Long Term Goals: 10-12 weeks  Progressing, not met  Improve muscle strength 1 muscle  grade  Improve TUG to 13 sec or less MET   Eastman Balance score 41/56 or greater MET  Restore ability to ambulate with normal gait pattern   Walking for ADL and exercise will be restored without unsteadiness  Restore ability to stand for ADL without unsteadiness  Restore ability to perform ADL's and household activities independently and without unsteadiness       Plan     Plan of care Certification: 9- to 11-.     Outpatient Physical Therapy 1 time weekly for 4 weeks then every other week for 4 weeks to include the following interventions: Therapeutic exercises, manual therapy techniques neuromuscular re-education, therapeutic activities, modalities such as moist heat, ice as needed for soreness, and temporary orthotics will be considered and utilized as needed.       Alondra Luna, PT      I certify the need for these services furnished under this plan of treatment and while under my care.     ____________________________________ Physician/Referring Practitioner                                       Date of Signature

## 2020-09-28 ENCOUNTER — OFFICE VISIT (OUTPATIENT)
Dept: FAMILY MEDICINE | Facility: CLINIC | Age: 85
End: 2020-09-28
Payer: MEDICARE

## 2020-09-28 VITALS
HEART RATE: 94 BPM | WEIGHT: 133.94 LBS | TEMPERATURE: 98 F | SYSTOLIC BLOOD PRESSURE: 122 MMHG | OXYGEN SATURATION: 100 % | BODY MASS INDEX: 22.99 KG/M2 | DIASTOLIC BLOOD PRESSURE: 68 MMHG

## 2020-09-28 DIAGNOSIS — I10 ESSENTIAL HYPERTENSION: Primary | ICD-10-CM

## 2020-09-28 DIAGNOSIS — F41.9 ANXIETY: ICD-10-CM

## 2020-09-28 DIAGNOSIS — F41.0 PANIC ATTACKS: ICD-10-CM

## 2020-09-28 DIAGNOSIS — Z91.81 AT RISK FOR FALLS: ICD-10-CM

## 2020-09-28 DIAGNOSIS — E03.9 HYPOTHYROIDISM, UNSPECIFIED TYPE: ICD-10-CM

## 2020-09-28 DIAGNOSIS — R25.1 TREMOR: ICD-10-CM

## 2020-09-28 PROCEDURE — 90694 FLU VACCINE - QUADRIVALENT - ADJUVANTED: ICD-10-PCS | Mod: HCNC,S$GLB,, | Performed by: FAMILY MEDICINE

## 2020-09-28 PROCEDURE — 99499 UNLISTED E&M SERVICE: CPT | Mod: HCNC,S$GLB,, | Performed by: FAMILY MEDICINE

## 2020-09-28 PROCEDURE — 1159F MED LIST DOCD IN RCRD: CPT | Mod: HCNC,S$GLB,, | Performed by: FAMILY MEDICINE

## 2020-09-28 PROCEDURE — 99499 RISK ADDL DX/OHS AUDIT: ICD-10-PCS | Mod: HCNC,S$GLB,, | Performed by: FAMILY MEDICINE

## 2020-09-28 PROCEDURE — 99214 OFFICE O/P EST MOD 30 MIN: CPT | Mod: HCNC,25,S$GLB, | Performed by: FAMILY MEDICINE

## 2020-09-28 PROCEDURE — 99214 PR OFFICE/OUTPT VISIT, EST, LEVL IV, 30-39 MIN: ICD-10-PCS | Mod: HCNC,25,S$GLB, | Performed by: FAMILY MEDICINE

## 2020-09-28 PROCEDURE — 1101F PT FALLS ASSESS-DOCD LE1/YR: CPT | Mod: HCNC,CPTII,S$GLB, | Performed by: FAMILY MEDICINE

## 2020-09-28 PROCEDURE — G0008 ADMIN INFLUENZA VIRUS VAC: HCPCS | Mod: HCNC,S$GLB,, | Performed by: FAMILY MEDICINE

## 2020-09-28 PROCEDURE — G0008 FLU VACCINE - QUADRIVALENT - ADJUVANTED: ICD-10-PCS | Mod: HCNC,S$GLB,, | Performed by: FAMILY MEDICINE

## 2020-09-28 PROCEDURE — 99999 PR PBB SHADOW E&M-EST. PATIENT-LVL IV: ICD-10-PCS | Mod: PBBFAC,HCNC,, | Performed by: FAMILY MEDICINE

## 2020-09-28 PROCEDURE — 1125F PR PAIN SEVERITY QUANTIFIED, PAIN PRESENT: ICD-10-PCS | Mod: HCNC,S$GLB,, | Performed by: FAMILY MEDICINE

## 2020-09-28 PROCEDURE — 1159F PR MEDICATION LIST DOCUMENTED IN MEDICAL RECORD: ICD-10-PCS | Mod: HCNC,S$GLB,, | Performed by: FAMILY MEDICINE

## 2020-09-28 PROCEDURE — 99999 PR PBB SHADOW E&M-EST. PATIENT-LVL IV: CPT | Mod: PBBFAC,HCNC,, | Performed by: FAMILY MEDICINE

## 2020-09-28 PROCEDURE — 1101F PR PT FALLS ASSESS DOC 0-1 FALLS W/OUT INJ PAST YR: ICD-10-PCS | Mod: HCNC,CPTII,S$GLB, | Performed by: FAMILY MEDICINE

## 2020-09-28 PROCEDURE — 90694 VACC AIIV4 NO PRSRV 0.5ML IM: CPT | Mod: HCNC,S$GLB,, | Performed by: FAMILY MEDICINE

## 2020-09-28 PROCEDURE — 1125F AMNT PAIN NOTED PAIN PRSNT: CPT | Mod: HCNC,S$GLB,, | Performed by: FAMILY MEDICINE

## 2020-09-28 RX ORDER — FLUOXETINE 10 MG/1
TABLET ORAL
Qty: 45 TABLET | Refills: 11 | Status: SHIPPED | OUTPATIENT
Start: 2020-09-28 | End: 2020-11-30

## 2020-09-28 NOTE — PROGRESS NOTES
THIS DOCUMENT WAS MADE IN PART WITH VOICE RECOGNITION SOFTWARE.  OCCASIONALLY THIS SOFTWARE WILL MISINTERPRET WORDS OR PHRASES.      Maggi Fontaine  9/24/1932    Maggi was seen today for extremity weakness.    Diagnoses and all orders for this visit:    Essential hypertension  Satisfactory control, stable    Hypothyroidism, unspecified type  -     TSH; Future  -     T3, free; Future  -     T4, free; Future    Tremor  Very subtle hand tremor visible today.  She says it varies from day-to-day.  Nothing that looks like a parkinsonian tremor.  Would be somewhat unusual development essential tremor this late in life but cannot exclude this either.  Will check thyroid test.  If TSH suppressed or even borderline low could consider reducing the dosage and see if this affects her tremor.  If worsening or if desired the next step would be Neurology    Anxiety  -     FLUoxetine 10 MG Tab; Take 1 and 1/2 tablet (15 mg) daily    Panic attacks  Anxiety and panic attacks are better on the fluoxetine but she has not been able to tolerate 20 mg.  Because there is no in between dosage I will switch to the tablet and have her take one and half the of the 10 mg.    At risk for falls  She continues in physical therapy, at a reduced schedule but should continue home exercises.  Other orders  -     Influenza - Quadrivalent (Adjuvanted)    Total time greater than 45 min, including chart review, lab reviewed, med reconcilliation., more than 50% face-to-face counseling on a variety of topics    Subjective     Chief Complaint   Patient presents with    Extremity Weakness     weakness in both legs       HPI    'shaking' usually after am meds, some days worse than others.  She describes tremor in her hands that sometimes affects eating, taking her medications, sometimes writing.  But she also describes her leg shaking when she stands.  No recent falls.  She has had some improvement with therapy    Hypertension chronic, improved  satisfactory    Anxiety/panic attacks in pm, some improvement with the fluoxetine, did not tolerate the increased dosage at 20 mg.  She reports they typically occur every evening and she anticipates then which probably adds to the anxiety.  But she states her son has moved nearby and when the anxiety gets severe she calls him and he is able to help her feel better.      Active Ambulatory Problems     Diagnosis Date Noted    Hyperlipidemia     Hypothyroidism     Cardiac pacemaker in situ 01/22/2015    Sinoatrial node dysfunction 01/22/2015    Hyperuricemia 07/06/2015    Long term current use of anticoagulant therapy 08/11/2015    Urinary incontinence 09/13/2016    Paroxysmal atrial fibrillation 09/13/2016    Idiopathic gout 09/13/2016    Chronic kidney disease, stage III (moderate) 09/13/2016    History of TIA (transient ischemic attack) 09/13/2016    Osteopenia 09/13/2016    SNHL (sensorineural hearing loss) 09/13/2016    Aortic atherosclerosis 08/03/2017    Ectatic aorta 08/03/2017    Laryngopharyngeal reflux (LPR) 08/03/2017    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    Chronic pansinusitis 03/26/2018    Vitamin D deficiency 05/28/2018    PAF (paroxysmal atrial fibrillation) 07/08/2019    TIA (transient ischemic attack) 11/21/2019    Gait instability 07/02/2020    Muscle weakness 07/02/2020     Resolved Ambulatory Problems     Diagnosis Date Noted    Palpitations 08/27/2013    Benign paroxysmal positional vertigo of left ear 09/13/2016     Past Medical History:   Diagnosis Date    Acid reflux     Arthritis     Atrial fibrillation     Cardiac pacemaker     Gout     Headache(784.0)     HEARING LOSS     Hypertension     Polymyalgia rheumatica     Renal insufficiency     Sinusitis     SOB (shortness of breath)     Stroke     West Nile encephalitis          Review of Systems   Respiratory: Negative for shortness of breath.    Cardiovascular: Negative for chest pain and leg swelling.    Musculoskeletal: Positive for arthralgias.   Neurological: Positive for tremors and weakness.   Psychiatric/Behavioral: Negative for dysphoric mood and suicidal ideas. The patient is nervous/anxious.        Objective     Physical Exam  Vitals signs reviewed.   Constitutional:       Appearance: Normal appearance. She is well-developed. She is not ill-appearing, toxic-appearing or diaphoretic.   HENT:      Head: Normocephalic and atraumatic.   Eyes:      General: No scleral icterus.  Cardiovascular:      Rate and Rhythm: Normal rate and regular rhythm.      Heart sounds: Normal heart sounds. No murmur.   Pulmonary:      Effort: Pulmonary effort is normal. No respiratory distress.      Breath sounds: Normal breath sounds.   Skin:     General: Skin is dry.      Findings: No rash.   Neurological:      Mental Status: She is alert and oriented to person, place, and time.      Comments: Very minimal hand tremor with intention, she is ambulatory.  She does not use a cane nor a walker and she appears steady.   Psychiatric:         Behavior: Behavior normal.       Vitals:    09/28/20 1008   BP: 122/68   BP Location: Left arm   Patient Position: Sitting   BP Method: Medium (Manual)   Pulse: 94   Temp: 98.2 °F (36.8 °C)   TempSrc: Skin   SpO2: 100%   Weight: 60.7 kg (133 lb 14.9 oz)       MOST RECENT LABS IN OUR ELECTRONIC MEDICAL RECORD:     Results for orders placed or performed in visit on 09/08/20   Urine culture    Specimen: Urine, Clean Catch   Result Value Ref Range    Urine Culture, Routine No growth    HEMATOCRIT   Result Value Ref Range    Hematocrit 38.4 37.0 - 48.5 %   HEMOGLOBIN   Result Value Ref Range    Hemoglobin 12.9 12.0 - 16.0 g/dL   RENAL FUNCTION PANEL   Result Value Ref Range    Glucose 77 70 - 110 mg/dL    Sodium 142 136 - 145 mmol/L    Potassium 5.1 3.5 - 5.1 mmol/L    Chloride 111 (H) 95 - 110 mmol/L    CO2 25 23 - 29 mmol/L    BUN, Bld 27 (H) 8 - 23 mg/dL    Calcium 9.3 8.7 - 10.5 mg/dL    Creatinine  1.4 0.5 - 1.4 mg/dL    Albumin 3.7 3.5 - 5.2 g/dL    Phosphorus 3.9 2.7 - 4.5 mg/dL    eGFR if  39.0 (A) >60 mL/min/1.73 m^2    eGFR if non  33.8 (A) >60 mL/min/1.73 m^2    Anion Gap 6 (L) 8 - 16 mmol/L   Magnesium   Result Value Ref Range    Magnesium 2.0 1.6 - 2.6 mg/dL   URIC ACID   Result Value Ref Range    Uric Acid 6.4 (H) 2.4 - 5.7 mg/dL   Urinalysis   Result Value Ref Range    Specimen UA Urine, Clean Catch     Color, UA Yellow Yellow, Straw, Carol    Appearance, UA Clear Clear    pH, UA 6.0 5.0 - 8.0    Specific Gravity, UA 1.025 1.005 - 1.030    Protein, UA 1+ (A) Negative    Glucose, UA Negative Negative    Ketones, UA Negative Negative    Bilirubin (UA) Negative Negative    Occult Blood UA Negative Negative    Nitrite, UA Negative Negative    Leukocytes, UA Negative Negative   Urinalysis Microscopic   Result Value Ref Range    RBC, UA 1 0 - 4 /hpf    WBC, UA 4 0 - 5 /hpf    Bacteria Few (A) None-Occ /hpf    Squam Epithel, UA 10 /hpf    Hyaline Casts, UA 0 0-1/lpf /lpf    Microscopic Comment SEE COMMENT

## 2020-09-29 ENCOUNTER — TELEPHONE (OUTPATIENT)
Dept: FAMILY MEDICINE | Facility: CLINIC | Age: 85
End: 2020-09-29

## 2020-09-29 NOTE — TELEPHONE ENCOUNTER
Please call to discuss labs.  Patient has been having a tremor that seems worse in the morning after taking the thyroid medication.  Let her know the labs do not show elevated thyroid levels.  However if her symptoms remain prominent in the morning we still could consider a dosage decrease in the medicine to see if this is contributing.  Let me know if she would like to try this change

## 2020-09-30 ENCOUNTER — CLINICAL SUPPORT (OUTPATIENT)
Dept: REHABILITATION | Facility: HOSPITAL | Age: 85
End: 2020-09-30
Attending: FAMILY MEDICINE
Payer: MEDICARE

## 2020-09-30 DIAGNOSIS — M62.81 MUSCLE WEAKNESS: ICD-10-CM

## 2020-09-30 DIAGNOSIS — R26.81 GAIT INSTABILITY: Primary | ICD-10-CM

## 2020-09-30 PROCEDURE — 97112 NEUROMUSCULAR REEDUCATION: CPT | Mod: HCNC,PN | Performed by: PHYSICAL THERAPIST

## 2020-09-30 PROCEDURE — 97110 THERAPEUTIC EXERCISES: CPT | Mod: HCNC,PN | Performed by: PHYSICAL THERAPIST

## 2020-09-30 NOTE — PROGRESS NOTES
"  Physical Therapy   Daily Treatment Note     Name: Maggi Fontaine  Clinic Number: [id number]    Therapy Diagnosis:   Encounter Diagnoses   Name Primary?    Gait instability Yes    Muscle weakness      Physician: Negro Finnegan, *    Visit Date: 9/30/2020    Physician Orders: PT Eval and Treat   Medical Diagnosis from Referral: Z91.81 (ICD-10-CM) - At risk for falls  Evaluation Date: 7/2/2020  Authorization Period Expiration: 1-6-21  Plan of Care Expiration: 11-17-20  Visit # / Visits authorized: 17 / 21  MD Follow up appointment: 9-28-20    Time In: 3:20  Time Out: 4:00  Total Billable Time: 40 minutes    Precautions: B TKR  atrial fib and pacemaker    Subjective     Pt reports: saw Dr. Finnegan and he feels some of the sway with standing may be medicine related and is adjusting meds  Pt was compliant with home exercise program.  Response to previous treatment: felt alright   Functional change: walking a little for exercise and walking better    Pain: 0/10    Objective          TREATMENT    JEROME received therapeutic exercises to develop strength, endurance and core stabilization for 15 minutes including:    AR push/pull diagonal with UE's x 10 B  AR trunk FB and BB with PT x 10    Verbally Reviewed exercises below for HEP   Shoulder ext with RTB x 10  Yellow sports cord Instructed pt to increase reps of this ex at home  Bridge with small lift x 10, able to perform without cramping in HS   Trunk rotation supine x 10  SLR x 10 emphasis on quad set then x 5  Clam sidelying x 15      single leg balance x 30 B MARIANNE CANO participated in neuromuscular re-education activities to improve: Balance, Coordination, Kinesthetic, Sense and Proprioception for 25 minutes. The following activities were included:    Activities in // bars  (NP)March and backward walk 3 laps  (NP)Side walk with hurdles (2 holes between each) 3 laps  Heel raises x 15  Standing sharp rhomberg(tandem) x 30" B feet in front - L foot back " "more challenging  Heel to toe 2 full laps -  pt did require occasional hand assist on bar as much only reaching 1-2 times and 1 lap fully no hands assist    Standing rhythmic stab x 30 sec  Standing feet together with rhythmic stab x 30 sec   Standing Feet together on foam  x 30 sec  Standing on foam eyes closed x 30 sec  Had LOB a few times during actiity today  Standing on foam feet together  x 30 sec with rhyhtmic stab  March on foam no hands x 30 sec x 2  Step over obstacles like 1/2 roll, full small roll, and up/over foam and 4" step in // bars  back and forth x 3 laps  Ambulation in clinic one revolution no scissoring and pt reported feeling steady  Head turns with gait one revolution around clinic, no scissoring of feet or unsteadiness today during activity     Home Exercises Provided and Patient Education Provided     Education provided:   - HEP     Written Home Exercises Provided:  yes  Exercises were reviewed and JEROME was able to demonstrate them prior to the end of the session.  JEROME demonstrated good  understanding of the education provided.     See EMR under Patient Instructions for exercises provided at initial eval and 7/9/2020, 7-16-20, 8-4-20. 8-11-20, 8-13-20, 9-30-20    Assessment   Pt marilee treatment well and able to complete program though slight fatigue at end.  Discussed pt walking outside for exercise with cooler weather, but understands precautions to not go too far from home in case of fatigue    JEROME is progressing well towards her goals.   Pt prognosis is Good.     Pt will continue to benefit from skilled outpatient physical therapy to address the deficits listed in the problem list box on initial evaluation, provide pt/family education and to maximize pt's level of independence in the home and community environment.     Pt's spiritual, cultural and educational needs considered and pt agreeable to plan of care and goals.     Anticipated barriers to physical therapy: none    GOALS: "   Short Term Goals:  4-6 weeks MET STG's  Increase strength 1/2 muscle grade  Be able to perform HEP with minimal cueing required  Eastman Balance score 38/56     Long Term Goals: 10-12 weeks  Progressing, not met  Improve muscle strength 1 muscle grade  Improve TUG to 13 sec or less MET   Eastman Balance score 41/56 or greater MET  Restore ability to ambulate with normal gait pattern   Walking for ADL and exercise will be restored without unsteadiness  Restore ability to stand for ADL without unsteadiness  Restore ability to perform ADL's and household activities independently and without unsteadiness       Plan   Continue with established plan of care towards PT goals.    Continue to wean from PT with continued work on HEP and developing walking program

## 2020-10-14 ENCOUNTER — TELEPHONE (OUTPATIENT)
Dept: FAMILY MEDICINE | Facility: CLINIC | Age: 85
End: 2020-10-14

## 2020-10-14 ENCOUNTER — CLINICAL SUPPORT (OUTPATIENT)
Dept: REHABILITATION | Facility: HOSPITAL | Age: 85
End: 2020-10-14
Payer: MEDICARE

## 2020-10-14 DIAGNOSIS — M62.81 MUSCLE WEAKNESS: ICD-10-CM

## 2020-10-14 DIAGNOSIS — R26.81 GAIT INSTABILITY: Primary | ICD-10-CM

## 2020-10-14 PROCEDURE — 97530 THERAPEUTIC ACTIVITIES: CPT | Mod: HCNC,PN

## 2020-10-14 PROCEDURE — 97110 THERAPEUTIC EXERCISES: CPT | Mod: HCNC,PN

## 2020-10-14 PROCEDURE — 97112 NEUROMUSCULAR REEDUCATION: CPT | Mod: HCNC,PN

## 2020-10-14 NOTE — PROGRESS NOTES
Physical Therapy   Daily Treatment Note     Name: Maggi Fontaine  Clinic Number: [id number]    Therapy Diagnosis:   Encounter Diagnoses   Name Primary?    Gait instability Yes    Muscle weakness      Physician: Negro Finnegan, *    Visit Date: 10/14/2020    Physician Orders: PT Eval and Treat   Medical Diagnosis from Referral: Z91.81 (ICD-10-CM) - At risk for falls  Evaluation Date: 7/2/2020  Authorization Period Expiration: 1-6-21  Plan of Care Expiration: 11-17-20  Visit # / Visits authorized: 18 / 21  MD Follow up appointment: 9-28-20    Time In: 10:41 am  Time Out: 11:25 am  Total Billable Time: 39 minutes    Precautions: B TKR  atrial fib and pacemaker    Subjective     Pt reports: she had a fall last night. She states she stood from her bed and thinks she slipped on the bottom of her silk pajamas. She reports she fell back and hit the back of her head on the floor. She reports no pain or soreness. No dizziness or light-headedness reported.   PT suggested patient go to ER however patient declined. Pt's PCP and cardiologist were notified by electronic messaging.  Pt was compliant with home exercise program.  Response to previous treatment: no complaints  Functional change: walking a little for exercise and walking better    Pain: 0/10    Objective          TREATMENT    JEROME received therapeutic exercises to develop strength, endurance and core stabilization for 15 minutes including:  Seated therex to begin session this date to assess patient's response to treatment following fall:  Heel/toe raises x 20  LAQ x 20 ea, alternating  Seated marching x 20 ea, alternating  Seated hip abduction 2x10 ea  Patient reported no adverse reaction to exercise, proceeded to standing activities    Standing heel raises 2x10      JEROME participated in dynamic functional therapeutic activities to improve functional performance for 15  minutes, including:  Ambulation one lap around clinic, 2 trials with seated rest between,  "mild instability, no loss of balance  Ambulation with head turns, one lap around clinic, minimal decline in chanda, no decline in gait quality with head turns    Forward walking in // bars stepping over hurdles  Stepping over obstacles in // bars with one loss of balance Jhony correction with use of // bars  Alternating toe taps on 4" step 30 sec x 2      [NP: AR push/pull diagonal with UE's x 10 B  AR trunk FB and BB with PT x 10    Verbally Reviewed exercises below for HEP   Shoulder ext with RTB x 10  Yellow sports cord Instructed pt to increase reps of this ex at home  Bridge with small lift x 10, able to perform without cramping in HS   Trunk rotation supine x 10  SLR x 10 emphasis on quad set then x 5  Clam sidelying x 15]            JEROME participated in neuromuscular re-education activities to improve: Balance, Coordination, Kinesthetic, Sense and Proprioception for 9 minutes. The following activities were included:  Single limb stance 15 sec x 2 bilateral LEs with use of // bars as needed   Static standing on foam, feet together x 30 sec eyes open  Marching on foam x 20 with use of // bars as needed    [NP:  Activities in // bars  (NP)March and backward walk 3 laps  (NP)Side walk with hurdles (2 holes between each) 3 laps  Heel raises x 15  Standing sharp rhomberg(tandem) x 30" B feet in front - L foot back more challenging  Heel to toe 2 full laps -  pt did require occasional hand assist on bar as much only reaching 1-2 times and 1 lap fully no hands assist  Standing rhythmic stab x 30 sec  Standing feet together with rhythmic stab x 30 sec   Standing Feet together on foam  x 30 sec  Standing on foam eyes closed x 30 sec  Had LOB a few times during actiity today  Standing on foam feet together  x 30 sec with rhyhtmic stab  March on foam no hands x 30 sec x 2  Step over obstacles like 1/2 roll, full small roll, and up/over foam and 4" step in // bars  back and forth x 3 laps]      Home Exercises Provided " and Patient Education Provided     Education provided:   - HEP     Written Home Exercises Provided:  yes  Exercises were reviewed and JEROME was able to demonstrate them prior to the end of the session.  JEROME demonstrated good  understanding of the education provided.     See EMR under Patient Instructions for exercises provided at initial eval and 7/9/2020, 7-16-20, 8-4-20. 8-11-20, 8-13-20, 9-30-20    Assessment   PT noted no bump on patient's head, assessment revealed appropriate cranial nerve responses, including pupillary response and verbal responses. Patient with no signs of dizziness or imbalance more so than normal.    JEROME is progressing well towards her goals.   Pt prognosis is Good.     Pt will continue to benefit from skilled outpatient physical therapy to address the deficits listed in the problem list box on initial evaluation, provide pt/family education and to maximize pt's level of independence in the home and community environment.     Pt's spiritual, cultural and educational needs considered and pt agreeable to plan of care and goals.     Anticipated barriers to physical therapy: none    GOALS:   Short Term Goals:  4-6 weeks MET STG's  Increase strength 1/2 muscle grade  Be able to perform HEP with minimal cueing required  Eastman Balance score 38/56     Long Term Goals: 10-12 weeks  Progressing, not met  Improve muscle strength 1 muscle grade  Improve TUG to 13 sec or less MET   Eastman Balance score 41/56 or greater MET  Restore ability to ambulate with normal gait pattern   Walking for ADL and exercise will be restored without unsteadiness  Restore ability to stand for ADL without unsteadiness  Restore ability to perform ADL's and household activities independently and without unsteadiness       Plan   Continue with established plan of care towards PT goals.    Continue to wean from PT with continued work on HEP and developing walking program

## 2020-10-14 NOTE — TELEPHONE ENCOUNTER
----- Message from Marianne Mcmahan, PT sent at 10/14/2020 10:59 AM CDT -----  Regarding: Recent fall!  Ms Minal came into our therapy clinic today and reported a fall last night in which she hit the back of her head. Given her age and the hit on the head I recommended that she report to ED, which she declined.   I just want to make you aware of her recent fall. Maybe you would have a recommendation for her, as you know her history much better than I. Please reach out to the patient as you see fit.     Thank you for your time and consideration,     Marianne Mcmahan

## 2020-10-14 NOTE — TELEPHONE ENCOUNTER
Spoke w/ pt. She states the Eliquis is what caused this. She states she took 2 at night since she skipped the one in the morning. She continued to talk about side effects and bad dreams associated with, she thinks, is the eliquis. Averted pt back to the importance of the head injury and the cause of the fall may or may not be related to the Eliquis. Reiterated Dr Duuqe instructions that she would likely benefit from CT quicker than we can get it. Advised that the ED will evaluated for any need for add'l testing as well. Pt agreed. Advised that [facility] has access to our records and that she should bring all of her current medications. Pt agreed.

## 2020-10-14 NOTE — TELEPHONE ENCOUNTER
See message sent to me.  Please reach out to her regarding recent fall.   any person on eliquis or similar and has a head injury needs to go to the ER to consider CT scan.  If the injury is several days old and she feels fine I could consider an outpatient CT scan but in general I error on the side of precaution and typically recommend ER

## 2020-10-17 ENCOUNTER — TELEPHONE (OUTPATIENT)
Dept: FAMILY MEDICINE | Facility: CLINIC | Age: 85
End: 2020-10-17

## 2020-10-17 NOTE — TELEPHONE ENCOUNTER
----- Message from Abeba Kenyashlee sent at 10/16/2020 10:45 AM CDT -----  Contact: self  Patient is taking FLUoxetine 10 MG Tab, she has been taking this since February and is having nightmares.  Her sleeping is not normal for the patient, this past week she got out of bed and didn't wake up until she fell.  She wants get off of it so please call back to advise at [phone] (home).  Thanks

## 2020-10-17 NOTE — TELEPHONE ENCOUNTER
Okay to discontinue fluoxetine.  She is on a very low dose but I would still recommend tapering down at least over a few weeks.  She may take half a tablet a day for one week, after that can go have 1/2 every other day if tolerated.    If panic attacks return then we may need to consult with Psychiatry to look at alternative medication options.

## 2020-10-17 NOTE — TELEPHONE ENCOUNTER
Patient states that Fluoxetine causes her to have nightmares which was the cause of her fall and head injury on 10/13/20. Her daughter told her that she is talking in her sleep while having nightmares and has noticed it since she started the medication in February. She only took 1/2 of Fluoxetine last night and did not have the nightmares like usual.   States she is taking it for panic attacks, but would really like to stop taking it all together.   Please advise on how she can stop taking this medication?

## 2020-10-19 ENCOUNTER — TELEPHONE (OUTPATIENT)
Dept: FAMILY MEDICINE | Facility: CLINIC | Age: 85
End: 2020-10-19

## 2020-10-19 NOTE — TELEPHONE ENCOUNTER
----- Message from Princess MARCIA Alexis sent at 10/19/2020 10:15 AM CDT -----  Contact: pt  Type: Needs Medical Advice  Who Called:  pt   Best Call Back Number: [phone] (home)   Additional Information:  requesting a call in regards to getting medication reduction. Please advise

## 2020-10-19 NOTE — TELEPHONE ENCOUNTER
Spoke w/ pt. Advised as recommended. Pt has not been taking this as rx'd.   (8/27/20) Pt was advised to change rx from 20mg daily to 10mg BID per pt's request, pt just went to 10mg daily because the bottle she had said once daily.  When seen on 9/28/20, it appeared as if pt was taking 10mg BID as previously instructed and Dr Finnegan decreased it to 1.5 daily. Pt states she has been taking 1 tab daily until 10/14/20(started having a nightmare this night), pt got up and fell. Went to ED. Pt started taking 1/2 tab on 10/15/20 because she feels it is causing the nightmares. So, pt has been taking 1/2 tab since 10/15/20. Please review and advise on weaning off.

## 2020-10-19 NOTE — TELEPHONE ENCOUNTER
If she is only on 1/2 of the 10 mg fluoxetine daily  and doing well she can probably just discontinue however if she is concerned she could change to 1/2 tablet every other day for a week before stopping

## 2020-10-20 ENCOUNTER — TELEPHONE (OUTPATIENT)
Dept: FAMILY MEDICINE | Facility: CLINIC | Age: 85
End: 2020-10-20

## 2020-10-20 NOTE — TELEPHONE ENCOUNTER
----- Message from Lindsay Luciano sent at 10/19/2020  3:36 PM CDT -----  Contact: patient  Type: Needs Medical Advice  Who Called:  patient    Best Call Back Number: [phone] (home)     Additional Information: please contact to give instructions on how to get off the medication she is on

## 2020-10-21 ENCOUNTER — CLINICAL SUPPORT (OUTPATIENT)
Dept: REHABILITATION | Facility: HOSPITAL | Age: 85
End: 2020-10-21
Payer: MEDICARE

## 2020-10-21 DIAGNOSIS — M62.81 MUSCLE WEAKNESS: ICD-10-CM

## 2020-10-21 DIAGNOSIS — R26.81 GAIT INSTABILITY: Primary | ICD-10-CM

## 2020-10-21 PROCEDURE — 97110 THERAPEUTIC EXERCISES: CPT | Mod: HCNC,PN | Performed by: PHYSICAL THERAPIST

## 2020-10-21 PROCEDURE — 97112 NEUROMUSCULAR REEDUCATION: CPT | Mod: HCNC,PN | Performed by: PHYSICAL THERAPIST

## 2020-10-21 NOTE — PROGRESS NOTES
Physical Therapy   Daily Treatment Note     Name: Maggi Fontaine  Clinic Number: [id number]    Therapy Diagnosis:   Encounter Diagnoses   Name Primary?    Gait instability Yes    Muscle weakness      Physician: Negro Finnegan, *    Visit Date: 10/21/2020    Physician Orders: PT Eval and Treat   Medical Diagnosis from Referral: Z91.81 (ICD-10-CM) - At risk for falls  Evaluation Date: 7/2/2020  Authorization Period Expiration: 1-6-21  Plan of Care Expiration: 11-17-20  Visit # / Visits authorized: 18 / 21  MD Follow up appointment: 9-28-20    Time In: 10:00 am  Time Out: 10:45 am  Total Billable Time: 45 minutes    Precautions: B TKR  atrial fib and pacemaker    Subjective     Pt reports: she went to ER and no abnormal findings and decided some of her problems may be related to taking Prozac which she started due to COVID isolation issues and was having panic attacks and increased anxiety  and is now weaning off medicine and wants to try and stop taking any anti-depressant pill to see how she does Pt states she is feeling okay so far.      Pt was not compliant with home exercise program due to all the issues.  Pt was staying at her son's to make sure she did okay weaning off Prozac.      Response to previous treatment: no complaints  Functional change: not shaking as bad, walk still a little unsteady but is walking for exercise. Pt states she walked 200 steps yesterday for first time    Pain: 0/10    Objective      TREATMENT    JEROME received therapeutic exercises to develop strength, endurance and core stabilization for 30 minutes including:  AR push/pull diagonal with UE's x 10 B  AR trunk FB and BB with PT x 10     Bridge with small lift x 10, able to perform without cramping in HS   Trunk rotation supine x 10  SLR x 10 emphasis on quad set   Clam sidelying x 15    Shoulder ext with RTB x 10  Yellow sports cord Instructed pt to increase reps of this ex at home  Heel raises B x 10   single leg balance x 30 B  "MARIANNE CANO participated in neuromuscular re-education activities to improve: Balance, Coordination, Kinesthetic, Sense and Proprioception for 15 minutes. The following activities were included:  Single limb stance 15 sec x 2 bilateral LEs with use of // bars as needed   Static standing on foam, feet together x 30 sec eyes open  Marching on foam x 20 with use of // bars as needed      Activities in // bars  March and backward walk 2 laps  Side step x 2 laps  (NP)Side walk with hurdles (2 holes between each) 3 laps    Standing sharp rhomberg(tandem) x 30" B feet in front - L foot back more challenging  Heel to toe 2 full laps -  pt did require occasional hand assist on bar only reaching 1-2 times and 1 lap fully no hands assist  Standing feet together with rhythmic stab x 30 sec   Standing feet together eyes closed x 30 sec  Standing Feet together on foam  x 30 sec  Standing on foam eyes closed x 30 sec  Had LOB a few times during actiity today  Standing on foam feet together  x 30 sec with rhyhtmic stab  March on foam no hands x 30 sec   (NP)Step over obstacles like 1/2 roll, full small roll, and up/over foam and 4" step in // bars  back and forth x 3 laps]      Home Exercises Provided and Patient Education Provided     Education provided:   - HEP emphasis on returning to HEP    Written Home Exercises Provided:  None today  Exercises were reviewed and JEROME was able to demonstrate them prior to the end of the session.  JEROME demonstrated good  understanding of the education provided.     See EMR under Patient Instructions for exercises provided at initial eval and 7/9/2020, 7-16-20, 8-4-20. 8-11-20, 8-13-20, 9-30-20    Assessment   Pt is weaning from Prozac and has not been as diligent with HEP>  Pt able to complete 10 reps of all ex and understands importance of resuming HEP.  Pt marilee treatment well with good stamina throughout program.       JEROME is progressing well towards her goals.   Pt prognosis is Good. "     Pt will continue to benefit from skilled outpatient physical therapy to address the deficits listed in the problem list box on initial evaluation, provide pt/family education and to maximize pt's level of independence in the home and community environment.     Pt's spiritual, cultural and educational needs considered and pt agreeable to plan of care and goals.     Anticipated barriers to physical therapy: none    GOALS:   Short Term Goals:  4-6 weeks MET STG's  Increase strength 1/2 muscle grade  Be able to perform HEP with minimal cueing required  Eastman Balance score 38/56     Long Term Goals: 10-12 weeks  Progressing, not met  Improve muscle strength 1 muscle grade  Improve TUG to 13 sec or less MET   Eastman Balance score 41/56 or greater MET  Restore ability to ambulate with normal gait pattern   Walking for ADL and exercise will be restored without unsteadiness  Restore ability to stand for ADL without unsteadiness  Restore ability to perform ADL's and household activities independently and without unsteadiness       Plan   Continue with established plan of care towards PT goals.    Continue to wean from PT with continued work on HEP and developing walking program  Follow up in 2 weeks unless problem arises

## 2020-11-02 ENCOUNTER — CLINICAL SUPPORT (OUTPATIENT)
Dept: REHABILITATION | Facility: HOSPITAL | Age: 85
End: 2020-11-02
Payer: MEDICARE

## 2020-11-02 DIAGNOSIS — M62.81 MUSCLE WEAKNESS: ICD-10-CM

## 2020-11-02 DIAGNOSIS — R26.81 GAIT INSTABILITY: Primary | ICD-10-CM

## 2020-11-02 PROCEDURE — 97112 NEUROMUSCULAR REEDUCATION: CPT | Mod: HCNC,PN | Performed by: PHYSICAL THERAPIST

## 2020-11-02 PROCEDURE — 97110 THERAPEUTIC EXERCISES: CPT | Mod: HCNC,PN | Performed by: PHYSICAL THERAPIST

## 2020-11-02 NOTE — PROGRESS NOTES
Physical Therapy   Daily Treatment Note     Name: Maggi Fontaine  Clinic Number: [id number]    Therapy Diagnosis:   Encounter Diagnoses   Name Primary?    Gait instability Yes    Muscle weakness      Physician: Negro Finnegan, *    Visit Date: 11/2/2020    Physician Orders: PT Eval and Treat   Medical Diagnosis from Referral: Z91.81 (ICD-10-CM) - At risk for falls  Evaluation Date: 7/2/2020  Authorization Period Expiration: 1-6-21  Plan of Care Expiration: 11-17-20  Visit # / Visits authorized: 19 / 21  MD Follow up appointment: 11-30-20    Time In: 11:31   Time Out: 12:12  Total Billable Time: 42 minutes    Precautions: B TKR  atrial fib and pacemaker    Subjective     Pt reports: she is still off Prozac and is feeling ok.  Pt states she felt Prozac was making her feel unsteady She is still weaning off Prozac  Pt states she feels she is getting better akanksha related to having nightmares, getting up at night Pt states feeling more secure in standing.        Pt was  compliant with home exercise program.      Response to previous treatment: no complaints  Functional change: feeling more secure on feet     Pain: 0/10    Objective      TREATMENT    JEROME received therapeutic exercises to develop strength, endurance and core stabilization for 27 minutes including:  AR push/pull diagonal with UE's x 10 B  AR trunk FB and BB with PT x 10     Bridge with small lift x 20, able to perform without cramping in HS   Trunk rotation supine x 20  SLR x 20 emphasis on quad set   Clam sidelying x 20    Shoulder ext with RTB x 10  Yellow sports cord Instructed pt to increase reps of this ex at home  Heel raises B x 20   single leg balance x 30 sec  B MARIANNE    Discussed pt resuming walking program with good weather and she will start with walking in [facility]     JEROME participated in neuromuscular re-education activities to improve: Balance, Coordination, Kinesthetic, Sense and Proprioception for 15 minutes. The following  "activities were included:  Single limb stance 15 sec x 2 bilateral LEs with use of // bars as needed   Static standing on foam, feet together x 30 sec eyes open  Marching on foam x 20 with use of // bars as needed      Activities in // bars  March and backward walk 2 laps  Side step x 2 laps  (NP)Side walk with hurdles (2 holes between each) 3 laps    Standing sharp rhomberg(tandem) x 30" B feet in front - L foot back more challenging  Heel to toe 2 full laps -  pt did require occasional hand assist on bar only reaching 1-2 times and 1 lap fully no hands assist  Standing feet together with rhythmic stab x 30 sec   Standing feet together eyes closed x 30 sec  Standing Feet together on foam  x 30 sec  Standing on foam eyes closed x 30 sec  Had LOB a few times during actiity today  Standing on foam feet together  x 30 sec with rhyhtmic stab  March on foam no hands x 30 sec   Pt fatigue at end   (NP)Step over obstacles like 1/2 roll, full small roll, and up/over foam and 4" step in // bars  back and forth x 3 laps]      Home Exercises Provided and Patient Education Provided     Education provided:   - HEP emphasis on returning to HEP    Written Home Exercises Provided:  None today  Exercises were reviewed and JEROME was able to demonstrate them prior to the end of the session.  JEROME demonstrated good  understanding of the education provided.     See EMR under Patient Instructions for exercises provided at initial eval and 7/9/2020, 7-16-20, 8-4-20. 8-11-20, 8-13-20, 9-30-20    Assessment   Pt is weaning from Prozac and has been more diligent with HEP Pt feeling more secure in standing Pt with improved endurance with ex. Fatigue at end so held obstacle course but LOB during balance work very minimal Reassess next visit and consider discharge    JEROME is progressing well towards her goals.   Pt prognosis is Good.     Pt will continue to benefit from skilled outpatient physical therapy to address the deficits listed in " the problem list box on initial evaluation, provide pt/family education and to maximize pt's level of independence in the home and community environment.     Pt's spiritual, cultural and educational needs considered and pt agreeable to plan of care and goals.     Anticipated barriers to physical therapy: none    GOALS:   Short Term Goals:  4-6 weeks MET STG's  Increase strength 1/2 muscle grade  Be able to perform HEP with minimal cueing required  Eastman Balance score 38/56     Long Term Goals: 10-12 weeks  Progressing, not met  Improve muscle strength 1 muscle grade  Improve TUG to 13 sec or less MET   Eastman Balance score 41/56 or greater MET  Restore ability to ambulate with normal gait pattern   Walking for ADL and exercise will be restored without unsteadiness  Restore ability to stand for ADL without unsteadiness  Restore ability to perform ADL's and household activities independently and without unsteadiness       Plan   Continue with established plan of care towards PT goals.    Continue to wean from PT with continued work on HEP and developing walking program  Follow up in 2 weeks for final visit

## 2020-11-16 ENCOUNTER — TELEPHONE (OUTPATIENT)
Dept: FAMILY MEDICINE | Facility: CLINIC | Age: 85
End: 2020-11-16

## 2020-11-16 NOTE — TELEPHONE ENCOUNTER
----- Message from Abeba Miramontes sent at 11/16/2020  9:06 AM CST -----  Contact: daughter Ken  Patients daughter Ken is requesting a call back to see if there is anything in her mothers chart about possible dementia.  They are trying to get her to stop driving and the last time she spoke to the doctor he mentioned it.  Call Ken back at [phone] and thank you.

## 2020-11-17 NOTE — PROGRESS NOTES
Physical Therapy Discharge and  Daily Treatment Note     Name: Maggi Fontaine  Clinic Number: [id number]    Therapy Diagnosis:   Encounter Diagnoses   Name Primary?    Gait instability Yes    Muscle weakness      Physician: Negro Finnegan, *    Visit Date: 11/18/2020    Physician Orders: PT Eval and Treat   Medical Diagnosis from Referral: Z91.81 (ICD-10-CM) - At risk for falls  Evaluation Date: 7/2/2020  Authorization Period Expiration: 1-6-21  Plan of Care Expiration: 11-18-20  Visit # / Visits authorized: 21 / 21  MD Follow up appointment: 11-30-20    Cancellations: 3  No Shows: 1    Time In: 10:00   Time Out: 10:55  Total Billable Time: 48 minutes    Precautions: B TKR  atrial fib and pacemaker    Subjective     Pt reports: she recently saw on the news that patients on blood thinners can have a side effect of dizziness.  Pt states she has not talked to PCP about this yet.  Pt states she still feels she is unsteady but better than prior to PT  Pt states no falls  Pt feels she is not unsteady doing things, just when she is standing still she feels she will sway     Pt was  compliant with home exercise program.      Response to previous treatment: no complaints  Functional change: feeling more secure on feet     Pain: 0/10    Objective      Functional assessment:   - walking/gait:pt ambulating with more secure gait with normal gait pattern  At IE slightly unsteady with cautious steps   - sit to stand: min to 0 use of hands at IE min difficulty  - sit to supine: no difficulty at IE min difficulty       - supine to sit:no difficulty at IE min difficulty  - supine to prone: not tested  at IE min difficulty      Knee ROM WFL with 0 degrees ext B     Flexibility testing:  - hamstrings:     90/90 test R 20 L 20 at IE R 30 L 30           - gastrocnemius:   DF ankle R 5 L 5 at IE R 0 degrees L 0 degrees                 Muscle Strength 11-18-20  MMT R L   Hip flexion 4/5 4/5   Hip abduction 4+/5 4+/5   Hip extension  able to do 100%  bridge w/o pain       Knee extension 5/5 5/5   Knee flexion 5/5 5/5   Ankle dorsiflexion 5/5 5/5   Ankle plantar flexion N/T N/T   Ankle inversion N/T N/T   Ankle eversion N/T N/T            Muscle Strength initial eval  MMT R L   Hip flexion 4-/5 3+/5   Hip abduction 3+/5 3+/5   Hip extension able to do small bridge but leads to pain       Knee extension 4/5 4/5   Knee flexion 4-/5 4-/5   Ankle dorsiflexion 5/5 5/5   Ankle plantar flexion 4/5 4/5   Ankle inversion 5/5 5/5   Ankle eversion 5/5 5/5      Endurance is good- at IE poor.     Special tests: TUG on 11-18-20 11:92 sec   on  9-22-20 12:20 sec 8-11-20 12:22 sec at IE  14:36   Eastman Balance score on 11-18-20 52/56      On 9-22-20 46/56,  8-11-20 43/56 indicating low risk for fall at IE 34/56 indicating medium risk for fall    CMS Impairment/Limitation/Restriction for FOTO upper leg Survey     Therapist reviewed FOTO scores for Maggi Fontaine   FOTO documents entered into [employer] - see Media section.     Limitation Score: 60% at IE 69%    TREATMENT    JEROME received therapeutic exercises to develop strength, endurance and core stabilization for 23 minutes including:    Instructed pt to contact MD regarding her concerns about blood thinners and reminded her not to get off medication unless she has discussed this with her MD.     Bridge with small lift x 20,   Trunk rotation supine x 20  SLR x 20 emphasis on quad set   Clam sidelying x 20    Shoulder ext with RTB x 15  Yellow sports cord Instructed pt to increase reps of this ex at home  Heel raises B x 20   single leg balance x 30 sec  B LE    [place] weather will walk inside.      JEROME participated in neuromuscular re-education activities to improve: Balance, Coordination, Kinesthetic, Sense and Proprioception for 25 minutes. The following activities were included:    Eastman Balance and TUG and review of single leg stance for HEP and importance to continue  "with balance work and walking for exercise    Not performed below  Single limb stance 15 sec x 2 bilateral LEs with use of // bars as needed   Static standing on foam, feet together x 30 sec eyes open  Marching on foam x 20 with use of // bars as needed      Activities in // bars  March and backward walk 2 laps  Side step x 2 laps  (NP)Side walk with hurdles (2 holes between each) 3 laps    Standing sharp rhomberg(tandem) x 30" B feet in front - L foot back more challenging  Heel to toe 2 full laps -  pt did require occasional hand assist on bar only reaching 1-2 times and 1 lap fully no hands assist  Standing feet together with rhythmic stab x 30 sec   Standing feet together eyes closed x 30 sec  Standing Feet together on foam  x 30 sec  Standing on foam eyes closed x 30 sec  Had LOB a few times during actiity today  Standing on foam feet together  x 30 sec with rhyhtmic stab  March on foam no hands x 30 sec   Pt fatigue at end   (NP)Step over obstacles like 1/2 roll, full small roll, and up/over foam and 4" step in // bars  back and forth x 3 laps]      Home Exercises Provided and Patient Education Provided     Education provided:   - HEP as Wellness program  - continuation of walking program    Written Home Exercises Provided:  None today  Exercises were reviewed and JEROME was able to demonstrate them prior to the end of the session.  JEROME demonstrated good  understanding of the education provided.     See EMR under Patient Instructions for exercises provided at initial eval and 7/9/2020, 7-16-20, 8-4-20. 8-11-20, 8-13-20, 9-30-20    Assessment   Patient demonstrates improvement in range of motion, strength, stabilization and function.    Patient appears independent in the prescribed HEP and ready for discharge after nearly fully achieving the established goals.    Pt appears to understand need to contact MD about medicine concerns    GOALS:   Short Term Goals:  4-6 weeks MET STG's  Increase strength 1/2 " muscle grade  Be able to perform HEP with minimal cueing required  Eastman Balance score 38/56     Long Term Goals: 10-12 weeks    Improve muscle strength 1 muscle grade Nearly fully met  Improve TUG to 13 sec or less MET   Eastman Balance score 41/56 or greater MET  Restore ability to ambulate with normal gait pattern MET  Walking for ADL and exercise will be restored without unsteadiness  Restore ability to stand for ADL without unsteadiness Progressing, not met  Restore ability to perform ADL's and household activities independently and without unsteadiness MET       Plan   If you concur, I recommend patient be discharged from physical therapy to continue with her HEP with final visit today 11-18-20.   Please advise us of your findings and recommendations. Thank you for allowing us to assist in the care of your patient.      I certify the need for these services furnished under this plan of treatment and while under my care.    ____________________________________ Physician/Referring Practitioner                                  Date of Signature

## 2020-11-18 ENCOUNTER — CLINICAL SUPPORT (OUTPATIENT)
Dept: REHABILITATION | Facility: HOSPITAL | Age: 85
End: 2020-11-18
Payer: MEDICARE

## 2020-11-18 DIAGNOSIS — R26.81 GAIT INSTABILITY: Primary | ICD-10-CM

## 2020-11-18 DIAGNOSIS — M62.81 MUSCLE WEAKNESS: ICD-10-CM

## 2020-11-18 PROCEDURE — 97112 NEUROMUSCULAR REEDUCATION: CPT | Mod: HCNC,PN | Performed by: PHYSICAL THERAPIST

## 2020-11-18 PROCEDURE — 97110 THERAPEUTIC EXERCISES: CPT | Mod: HCNC,PN | Performed by: PHYSICAL THERAPIST

## 2020-11-18 NOTE — PLAN OF CARE
Physical Therapy Discharge and  Daily Treatment Note     Name: Maggi Fontaine  Clinic Number: [id number]    Therapy Diagnosis:   Encounter Diagnoses   Name Primary?    Gait instability Yes    Muscle weakness      Physician: Negro Finnegan, *    Visit Date: 11/18/2020    Physician Orders: PT Eval and Treat   Medical Diagnosis from Referral: Z91.81 (ICD-10-CM) - At risk for falls  Evaluation Date: 7/2/2020  Authorization Period Expiration: 1-6-21  Plan of Care Expiration: 11-18-20  Visit # / Visits authorized: 21 / 21  MD Follow up appointment: 11-30-20    Cancellations: 3  No Shows: 1    Time In: 10:00   Time Out: 10:55  Total Billable Time: 48 minutes    Precautions: B TKR  atrial fib and pacemaker    Subjective     Pt reports: she recently saw on the news that patients on blood thinners can have a side effect of dizziness.  Pt states she has not talked to PCP about this yet.  Pt states she still feels she is unsteady but better than prior to PT  Pt states no falls  Pt feels she is not unsteady doing things, just when she is standing still she feels she will sway     Pt was  compliant with home exercise program.      Response to previous treatment: no complaints  Functional change: feeling more secure on feet     Pain: 0/10    Objective      Functional assessment:   - walking/gait:pt ambulating with more secure gait with normal gait pattern  At IE slightly unsteady with cautious steps   - sit to stand: min to 0 use of hands at IE min difficulty  - sit to supine: no difficulty at IE min difficulty       - supine to sit:no difficulty at IE min difficulty  - supine to prone: not tested  at IE min difficulty      Knee ROM WFL with 0 degrees ext B     Flexibility testing:  - hamstrings:     90/90 test R 20 L 20 at IE R 30 L 30           - gastrocnemius:   DF ankle R 5 L 5 at IE R 0 degrees L 0 degrees                 Muscle Strength 11-18-20  MMT R L   Hip flexion 4/5 4/5   Hip abduction 4+/5 4+/5   Hip extension  able to do 100%  bridge w/o pain       Knee extension 5/5 5/5   Knee flexion 5/5 5/5   Ankle dorsiflexion 5/5 5/5   Ankle plantar flexion N/T N/T   Ankle inversion N/T N/T   Ankle eversion N/T N/T            Muscle Strength initial eval  MMT R L   Hip flexion 4-/5 3+/5   Hip abduction 3+/5 3+/5   Hip extension able to do small bridge but leads to pain       Knee extension 4/5 4/5   Knee flexion 4-/5 4-/5   Ankle dorsiflexion 5/5 5/5   Ankle plantar flexion 4/5 4/5   Ankle inversion 5/5 5/5   Ankle eversion 5/5 5/5      Endurance is good- at IE poor.     Special tests: TUG on 11-18-20 11:92 sec   on  9-22-20 12:20 sec 8-11-20 12:22 sec at IE  14:36   Eastman Balance score on 11-18-20 52/56      On 9-22-20 46/56,  8-11-20 43/56 indicating low risk for fall at IE 34/56 indicating medium risk for fall    CMS Impairment/Limitation/Restriction for FOTO upper leg Survey     Therapist reviewed FOTO scores for Maggi Fontaine   FOTO documents entered into [employer] - see Media section.     Limitation Score: 60% at IE 69%      TREATMENT    JEROME received therapeutic exercises to develop strength, endurance and core stabilization for 23 minutes including:    Instructed pt to contact MD regarding her concerns about blood thinners and reminded her not to get off medication unless she has discussed this with her MD.     Bridge with small lift x 20,   Trunk rotation supine x 20  SLR x 20 emphasis on quad set   Clam sidelying x 20    Shoulder ext with RTB x 15  Yellow sports cord Instructed pt to increase reps of this ex at home  Heel raises B x 20   single leg balance x 30 sec  B LE    Pt states she is walking [place] and in bad weather will walk inside.      JEROME participated in neuromuscular re-education activities to improve: Balance, Coordination, Kinesthetic, Sense and Proprioception for 25 minutes. The following activities were included:    Eastman Balance and TUG and review of single leg stance for HEP and importance to  "continue with balance work and walking for exercise    Not performed below  Single limb stance 15 sec x 2 bilateral LEs with use of // bars as needed   Static standing on foam, feet together x 30 sec eyes open  Marching on foam x 20 with use of // bars as needed      Activities in // bars  March and backward walk 2 laps  Side step x 2 laps  (NP)Side walk with hurdles (2 holes between each) 3 laps    Standing sharp rhomberg(tandem) x 30" B feet in front - L foot back more challenging  Heel to toe 2 full laps -  pt did require occasional hand assist on bar only reaching 1-2 times and 1 lap fully no hands assist  Standing feet together with rhythmic stab x 30 sec   Standing feet together eyes closed x 30 sec  Standing Feet together on foam  x 30 sec  Standing on foam eyes closed x 30 sec  Had LOB a few times during actiity today  Standing on foam feet together  x 30 sec with rhyhtmic stab  March on foam no hands x 30 sec   Pt fatigue at end   (NP)Step over obstacles like 1/2 roll, full small roll, and up/over foam and 4" step in // bars  back and forth x 3 laps]      Home Exercises Provided and Patient Education Provided     Education provided:   - HEP as Wellness program  - continuation of walking program    Written Home Exercises Provided:  None today  Exercises were reviewed and JEROME was able to demonstrate them prior to the end of the session.  JEROME demonstrated good  understanding of the education provided.     See EMR under Patient Instructions for exercises provided at initial eval and 7/9/2020, 7-16-20, 8-4-20. 8-11-20, 8-13-20, 9-30-20    Assessment   Patient demonstrates improvement in range of motion, strength, stabilization and function.    Patient appears independent in the prescribed HEP and ready for discharge after nearly fully achieving the established goals.    Pt appears to understand need to contact MD about medicine concerns    GOALS:   Short Term Goals:  4-6 weeks MET STG's  Increase strength " 1/2 muscle grade  Be able to perform HEP with minimal cueing required  Eastman Balance score 38/56     Long Term Goals: 10-12 weeks    Improve muscle strength 1 muscle grade Nearly fully met  Improve TUG to 13 sec or less MET   Eastman Balance score 41/56 or greater MET  Restore ability to ambulate with normal gait pattern MET  Walking for ADL and exercise will be restored without unsteadiness  Restore ability to stand for ADL without unsteadiness Progressing, not met  Restore ability to perform ADL's and household activities independently and without unsteadiness MET       Plan   If you concur, I recommend patient be discharged from physical therapy to continue with her HEP with final visit today 11-18-20.   Please advise us of your findings and recommendations. Thank you for allowing us to assist in the care of your patient.      I certify the need for these services furnished under this plan of treatment and while under my care.    ____________________________________ Physician/Referring Practitioner                                  Date of Signature

## 2020-11-30 ENCOUNTER — HOSPITAL ENCOUNTER (OUTPATIENT)
Dept: RADIOLOGY | Facility: HOSPITAL | Age: 85
Discharge: HOME | End: 2020-11-30
Attending: FAMILY MEDICINE
Payer: MEDICARE

## 2020-11-30 ENCOUNTER — OFFICE VISIT (OUTPATIENT)
Dept: FAMILY MEDICINE | Facility: CLINIC | Age: 85
End: 2020-11-30
Payer: MEDICARE

## 2020-11-30 VITALS
TEMPERATURE: 97 F | WEIGHT: 137.56 LBS | DIASTOLIC BLOOD PRESSURE: 60 MMHG | HEIGHT: 63 IN | HEART RATE: 126 BPM | BODY MASS INDEX: 24.38 KG/M2 | SYSTOLIC BLOOD PRESSURE: 118 MMHG | OXYGEN SATURATION: 100 %

## 2020-11-30 DIAGNOSIS — I48.91 ATRIAL FIBRILLATION, UNSPECIFIED TYPE: ICD-10-CM

## 2020-11-30 DIAGNOSIS — R07.89 CHEST WALL PAIN: ICD-10-CM

## 2020-11-30 DIAGNOSIS — E03.9 HYPOTHYROIDISM, UNSPECIFIED TYPE: ICD-10-CM

## 2020-11-30 DIAGNOSIS — F41.0 PANIC ATTACKS: ICD-10-CM

## 2020-11-30 DIAGNOSIS — F41.9 ANXIETY: ICD-10-CM

## 2020-11-30 DIAGNOSIS — I10 ESSENTIAL HYPERTENSION: Primary | ICD-10-CM

## 2020-11-30 PROCEDURE — 99214 OFFICE O/P EST MOD 30 MIN: CPT | Mod: HCNC,S$GLB,, | Performed by: FAMILY MEDICINE

## 2020-11-30 PROCEDURE — 1101F PT FALLS ASSESS-DOCD LE1/YR: CPT | Mod: HCNC,CPTII,S$GLB, | Performed by: FAMILY MEDICINE

## 2020-11-30 PROCEDURE — 99214 PR OFFICE/OUTPT VISIT, EST, LEVL IV, 30-39 MIN: ICD-10-PCS | Mod: HCNC,S$GLB,, | Performed by: FAMILY MEDICINE

## 2020-11-30 PROCEDURE — 93010 ELECTROCARDIOGRAM REPORT: CPT | Mod: HCNC,S$GLB,, | Performed by: INTERNAL MEDICINE

## 2020-11-30 PROCEDURE — 3288F FALL RISK ASSESSMENT DOCD: CPT | Mod: HCNC,CPTII,S$GLB, | Performed by: FAMILY MEDICINE

## 2020-11-30 PROCEDURE — 1159F MED LIST DOCD IN RCRD: CPT | Mod: HCNC,S$GLB,, | Performed by: FAMILY MEDICINE

## 2020-11-30 PROCEDURE — 93005 ELECTROCARDIOGRAM TRACING: CPT | Mod: HCNC,S$GLB,, | Performed by: FAMILY MEDICINE

## 2020-11-30 PROCEDURE — 1101F PR PT FALLS ASSESS DOC 0-1 FALLS W/OUT INJ PAST YR: ICD-10-PCS | Mod: HCNC,CPTII,S$GLB, | Performed by: FAMILY MEDICINE

## 2020-11-30 PROCEDURE — 99999 PR PBB SHADOW E&M-EST. PATIENT-LVL IV: ICD-10-PCS | Mod: PBBFAC,HCNC,, | Performed by: FAMILY MEDICINE

## 2020-11-30 PROCEDURE — 93010 EKG 12-LEAD: ICD-10-PCS | Mod: HCNC,S$GLB,, | Performed by: INTERNAL MEDICINE

## 2020-11-30 PROCEDURE — 93005 EKG 12-LEAD: ICD-10-PCS | Mod: HCNC,S$GLB,, | Performed by: FAMILY MEDICINE

## 2020-11-30 PROCEDURE — 71046 X-RAY EXAM CHEST 2 VIEWS: CPT | Mod: 26,HCNC,, | Performed by: RADIOLOGY

## 2020-11-30 PROCEDURE — 71046 XR CHEST PA AND LATERAL: ICD-10-PCS | Mod: 26,HCNC,, | Performed by: RADIOLOGY

## 2020-11-30 PROCEDURE — 99999 PR PBB SHADOW E&M-EST. PATIENT-LVL IV: CPT | Mod: PBBFAC,HCNC,, | Performed by: FAMILY MEDICINE

## 2020-11-30 PROCEDURE — 71046 X-RAY EXAM CHEST 2 VIEWS: CPT | Mod: TC,HCNC,PN

## 2020-11-30 PROCEDURE — 1159F PR MEDICATION LIST DOCUMENTED IN MEDICAL RECORD: ICD-10-PCS | Mod: HCNC,S$GLB,, | Performed by: FAMILY MEDICINE

## 2020-11-30 PROCEDURE — 3288F PR FALLS RISK ASSESSMENT DOCUMENTED: ICD-10-PCS | Mod: HCNC,CPTII,S$GLB, | Performed by: FAMILY MEDICINE

## 2020-11-30 NOTE — PROGRESS NOTES
THIS DOCUMENT WAS MADE IN PART WITH VOICE RECOGNITION SOFTWARE.  OCCASIONALLY THIS SOFTWARE WILL MISINTERPRET WORDS OR PHRASES.      Maggi Fontaine  9/24/1932    Maggi was seen today for extremity weakness.    Diagnoses and all orders for this visit:    Essential hypertension  This is a chronic condition.  This condition has been reviewed and evaluated and it is currently stable.    Hypothyroidism, unspecified type  Last TSH was normal but will check again because of AFib    Anxiety  Panic attacks  She feels she is doing better, still has mild anxiety and mild panic attacks that she feels she can manage, reports too many side effects with fluoxetine and does not wish to start this or an alternative but will keep me informed    Chest wall pain  -     X-Ray Chest PA And Lateral; Future    Atrial fibrillation, unspecified type  -     EKG 12-lead; Future  TSH, CBC, BMP  Will reach out her cardiologist to see if they can see her this week.  She was advised she has any changing or worsening symptoms to let us know or go to the ER right away      Subjective     Chief Complaint   Patient presents with    Extremity Weakness       HPI    Anxiety, panic attacks  Stopped prozac, states was falling, better now  Feels she can control the panic now    But also feels the 'blood thinners'    Left sided chest wall pain in evening, not exertional, states when she lays on her side she feels her.  no shortness of breath, no jaw pain, sometimes she feels discomfort when stretching and moving or taking a deep breath.  No pain in the jaw or neck.  She has occasional pains the left arm but not related to the chest wall pain.  Again symptoms are not exertional typically when she lies down at nighttime.  No chest pain at the time of this appointment  No rash    Palpitations and rapid heartbeat, she says she has noticed this for a few months now but did not tell me or her cardiologist.  She has a history of recurring atrial fibrillation.  She  does remain on Eliquis.  No rate lowering medications listed.  No syncope but she does have balance problems and falls that she contributed to her medication but I suspect much of this may be related to the    Active Ambulatory Problems     Diagnosis Date Noted    Hyperlipidemia     Hypothyroidism     Cardiac pacemaker in situ 01/22/2015    Sinoatrial node dysfunction 01/22/2015    Hyperuricemia 07/06/2015    Long term current use of anticoagulant therapy 08/11/2015    Urinary incontinence 09/13/2016    Paroxysmal atrial fibrillation 09/13/2016    Idiopathic gout 09/13/2016    Chronic kidney disease, stage III (moderate) 09/13/2016    History of TIA (transient ischemic attack) 09/13/2016    Osteopenia 09/13/2016    SNHL (sensorineural hearing loss) 09/13/2016    Aortic atherosclerosis 08/03/2017    Ectatic aorta 08/03/2017    Laryngopharyngeal reflux (LPR) 08/03/2017    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/2018    Chronic pansinusitis 03/26/2018    Vitamin D deficiency 05/28/2018    PAF (paroxysmal atrial fibrillation) 07/08/2019    TIA (transient ischemic attack) 11/21/2019     Resolved Ambulatory Problems     Diagnosis Date Noted    Palpitations 08/27/2013    Benign paroxysmal positional vertigo of left ear 09/13/2016    Gait instability 07/02/2020    Muscle weakness 07/02/2020     Past Medical History:   Diagnosis Date    Acid reflux     Arthritis     Atrial fibrillation     Cardiac pacemaker     Gout     Headache(784.0)     HEARING LOSS     Hypertension     Polymyalgia rheumatica     Renal insufficiency     Sinusitis     SOB (shortness of breath)     Stroke     West Nile encephalitis          Review of Systems   HENT: Negative.    Eyes: Negative.    Respiratory: Negative for shortness of breath and wheezing.    Cardiovascular: Positive for palpitations. Negative for leg swelling.        As above   Gastrointestinal: Negative.    Genitourinary: Negative.    Musculoskeletal:  "Positive for arthralgias.   Neurological: Positive for light-headedness.   Psychiatric/Behavioral: Negative for suicidal ideas. The patient is nervous/anxious.        Objective     Physical Exam  Vitals signs reviewed.   Constitutional:       Appearance: She is well-developed. She is not diaphoretic.   HENT:      Head: Normocephalic and atraumatic.   Eyes:      General: No scleral icterus.  Cardiovascular:      Rate and Rhythm: Tachycardia present. Rhythm irregular.      Heart sounds: Normal heart sounds. No murmur.      Comments: No edema  Pulmonary:      Effort: Pulmonary effort is normal. No respiratory distress.      Breath sounds: Normal breath sounds.   Skin:     General: Skin is dry.      Findings: No rash.   Neurological:      Mental Status: She is alert and oriented to person, place, and time.   Psychiatric:         Behavior: Behavior normal.       Vitals:    11/30/20 1027   BP: 118/60   BP Location: Right arm   Patient Position: Sitting   BP Method: Medium (Manual)   Pulse: (!) 126   Temp: 97 °F (36.1 °C)   TempSrc: Skin   SpO2: 100%   Weight: 62.4 kg (137 lb 9.1 oz)   Height: 5' 3" (1.6 m)       MOST RECENT LABS IN OUR ELECTRONIC MEDICAL RECORD:     Results for orders placed or performed in visit on 09/28/20   CBC auto differential   Result Value Ref Range    WBC 4.23 3.90 - 12.70 K/uL    RBC 4.55 4.00 - 5.40 M/uL    Hemoglobin 12.2 12.0 - 16.0 g/dL    Hematocrit 35.9 (L) 37.0 - 48.5 %    MCV 79 (L) 82 - 98 fL    MCH 26.8 (L) 27.0 - 31.0 pg    MCHC 34.0 32.0 - 36.0 g/dL    RDW 15.2 (H) 11.5 - 14.5 %    Platelets 215 150 - 350 K/uL    MPV 12.3 9.2 - 12.9 fL    Immature Granulocytes 0.2 0.0 - 0.5 %    Gran # (ANC) 1.5 (L) 1.8 - 7.7 K/uL    Immature Grans (Abs) 0.01 0.00 - 0.04 K/uL    Lymph # 1.9 1.0 - 4.8 K/uL    Mono # 0.6 0.3 - 1.0 K/uL    Eos # 0.1 0.0 - 0.5 K/uL    Baso # 0.05 0.00 - 0.20 K/uL    nRBC 0 0 /100 WBC    Gran % 36.2 (L) 38.0 - 73.0 %    Lymph % 44.0 18.0 - 48.0 %    Mono % 15.1 (H) 4.0 - " 15.0 %    Eosinophil % 3.3 0.0 - 8.0 %    Basophil % 1.2 0.0 - 1.9 %    Differential Method Automated    TSH   Result Value Ref Range    TSH 1.868 0.400 - 4.000 uIU/mL   Iron and TIBC   Result Value Ref Range    Iron 59 30 - 160 ug/dL    Transferrin 275 200 - 375 mg/dL    TIBC 407 250 - 450 ug/dL    Saturated Iron 14 (L) 20 - 50 %   TSH   Result Value Ref Range    TSH 1.868 0.400 - 4.000 uIU/mL   T3, free   Result Value Ref Range    T3, Free 2.1 (L) 2.3 - 4.2 pg/mL   T4, free   Result Value Ref Range    Free T4 1.33 0.71 - 1.51 ng/dL

## 2020-12-01 ENCOUNTER — TELEPHONE (OUTPATIENT)
Dept: FAMILY MEDICINE | Facility: CLINIC | Age: 85
End: 2020-12-01

## 2020-12-01 DIAGNOSIS — E87.5 HYPERKALEMIA: Primary | ICD-10-CM

## 2020-12-01 NOTE — TELEPHONE ENCOUNTER
Please call with lab results.  Potassium is mildly elevated.  Make certain she is drinking plenty of water and not taking any potassium supplements.  Please schedule a repeat BMP Thursday or Friday.    Also check with her to see if has spoken to her cardiologist regarding her atrial fibrillation as we discussed yesterday.

## 2020-12-02 ENCOUNTER — OFFICE VISIT (OUTPATIENT)
Dept: URGENT CARE | Facility: CLINIC | Age: 85
End: 2020-12-02
Payer: MEDICARE

## 2020-12-02 VITALS
DIASTOLIC BLOOD PRESSURE: 68 MMHG | WEIGHT: 137 LBS | OXYGEN SATURATION: 98 % | HEIGHT: 63 IN | RESPIRATION RATE: 16 BRPM | HEART RATE: 84 BPM | TEMPERATURE: 99 F | SYSTOLIC BLOOD PRESSURE: 115 MMHG | BODY MASS INDEX: 24.27 KG/M2

## 2020-12-02 DIAGNOSIS — S41.102A OPEN WOUND OF LEFT UPPER EXTREMITY, INITIAL ENCOUNTER: Primary | ICD-10-CM

## 2020-12-02 PROBLEM — S41.109A OPEN WOUND OF ARM: Status: ACTIVE | Noted: 2020-12-02

## 2020-12-02 PROCEDURE — 12002 RPR S/N/AX/GEN/TRNK2.6-7.5CM: CPT | Mod: S$GLB,,, | Performed by: INTERNAL MEDICINE

## 2020-12-02 PROCEDURE — 12002 LACERATION REPAIR: ICD-10-PCS | Mod: S$GLB,,, | Performed by: INTERNAL MEDICINE

## 2020-12-02 PROCEDURE — 99212 OFFICE O/P EST SF 10 MIN: CPT | Mod: 25,S$GLB,, | Performed by: INTERNAL MEDICINE

## 2020-12-02 PROCEDURE — 99212 PR OFFICE/OUTPT VISIT, EST, LEVL II, 10-19 MIN: ICD-10-PCS | Mod: 25,S$GLB,, | Performed by: INTERNAL MEDICINE

## 2020-12-02 NOTE — PATIENT INSTRUCTIONS
Small or Superficial Laceration: Not Sutured  A laceration is a cut through the skin. A laceration requires stitches or staples if it is deep or spread open. A small laceration often doesn't require stitches.   You may need a tetanus shot. This may be given if you have no record of this vaccination and the object that caused the cut may lead to tetanus  Home care  · Your healthcare provider may prescribe an antibiotic. This is to help prevent infection. Follow all instructions for taking this medicine. Take the medicine every day until it is gone or you are told to stop. You should not have any left over.  · The healthcare provider may prescribe medicines for pain. Follow instructions for taking them.  · Follow the healthcare providers instructions on how to care for the cut.  · Wash your hands with soap and warm water before and after caring for cut. This helps prevent infection.  · Keep the wound clean and dry. If a bandage was applied and it becomes wet or dirty, replace it. Otherwise, leave it in place for the first 24 hours, then change it once a day or as directed.  · Clean the wound daily:  ¨ After removing any bandage, wash the area with soap and water. Use a wet cotton swab to loosen and remove any blood or crust that forms.  ¨ After cleaning, keep the wound clean and dry. Talk with your doctor before applying any antibiotic ointment to the wound. Reapply a fresh bandage.  · You may remove the bandage to shower as usual after the first 24 hours, but do not soak the area in water (no tub baths or swimming) for the next 5 days.  · If the area gets wet, gently pat it dry with a clean cloth. Replace the wet bandage with a dry one.  · Avoid activities that may reinjure your wound.  · Do not scratch, rub, or pick at the area.  · Check the wound daily for signs of infection listed below.  Follow-up care  Follow up with your healthcare provider as advised.  When to seek medical advice  Call your healthcare  provider right away if any of these occur:  · Wound bleeding not controlled by direct pressure  · Signs of infection, including increasing pain in the wound, increasing wound redness or swelling, or pus or bad odor coming from the wound  · Fever of 100.4°F (38ºC) or higher or as directed by your healthcare provider  · Stitches or staples come apart or fall out or surgical tape falls off before 7 days  · Wound edges re-open  · Wound changes colors  · Numbness around the wound   · Decreased movement around the injured area  Date Last Reviewed: 6/14/2015  © 4685-6679 [employer]. [address] [place], [place] [id number]. All rights reserved. This information is not intended as a substitute for professional medical care. Always follow your healthcare professional's instructions.

## 2020-12-02 NOTE — PROGRESS NOTES
"Subjective:       Patient ID: Maggi Fontaine is a 88 y.o. female.    Vitals:  height is 5' 3" (1.6 m) and weight is 62.1 kg (137 lb). Her temperature is 98.5 °F (36.9 °C). Her blood pressure is 115/68 and her pulse is 84. Her respiration is 16 and oxygen saturation is 98%.     Chief Complaint: Laceration (left arm)    Patient c/o a cut on her left arm. She states that she fell in her attic yesterday and scratched her arm on the wooden beam. She applied an abx ointment on it last night but states that when she woke up this morning it was swollen, bruised and red.     Laceration   The incident occurred 12 to 24 hours ago. The laceration is located on the left arm. The laceration is 2 cm in size. Injury mechanism: wood. The pain is at a severity of 0/10. The patient is experiencing no pain. The pain has been intermittent since onset. It is unknown if a foreign body is present.       Constitution: Negative for activity change, appetite change, chills, fatigue and fever.   HENT: Negative for congestion and sore throat.    Neck: Negative for painful lymph nodes.   Cardiovascular: Negative for chest pain and leg swelling.   Eyes: Negative for eye trauma, photophobia, vision loss, double vision and blurred vision.   Respiratory: Negative for sleep apnea, chest tightness, cough and shortness of breath.    Gastrointestinal: Negative for nausea, vomiting and diarrhea.   Genitourinary: Negative for dysuria, frequency, urgency, flank pain, bladder incontinence and history of kidney stones.   Musculoskeletal: Negative for trauma, joint pain, joint swelling, muscle cramps and muscle ache.   Skin: Positive for laceration. Negative for color change, pale, rash and bruising.   Allergic/Immunologic: Negative for environmental allergies and seasonal allergies.   Neurological: Negative for dizziness, history of vertigo, light-headedness, passing out and headaches.   Hematologic/Lymphatic: Negative for swollen lymph nodes. "   Psychiatric/Behavioral: Negative for nervous/anxious, sleep disturbance and depression. The patient is not nervous/anxious.        Objective:      Physical Exam        Laceration Repair    Date/Time: 12/2/2020 11:50 AM  Performed by: Ava Wall MD  Authorized by: Ava Wall MD   Consent Done: Not Needed  Body area: upper extremity  Location details: left lower arm  Laceration length: 5 cm  Foreign bodies: no foreign bodies  Tendon involvement: none  Nerve involvement: none  Vascular damage: no  Patient sedated: no  Preparation: Patient was prepped and draped in the usual sterile fashion.  Irrigation solution: saline  Irrigation method: syringe  Amount of cleaning: standard  Debridement: none  Degree of undermining: none  Wound skin closure material used: steri strips.  Approximation: close  Approximation difficulty: simple  Dressing: 4x4 sterile gauze  Patient tolerance: Patient tolerated the procedure well with no immediate complications        Assessment:       1. Open wound of left upper extremity, initial encounter        Plan:         Open wound of left upper extremity, initial encounter  -     Laceration Repair      Patient Instructions     Small or Superficial Laceration: Not Sutured  A laceration is a cut through the skin. A laceration requires stitches or staples if it is deep or spread open. A small laceration often doesn't require stitches.   You may need a tetanus shot. This may be given if you have no record of this vaccination and the object that caused the cut may lead to tetanus  Home care  · Your healthcare provider may prescribe an antibiotic. This is to help prevent infection. Follow all instructions for taking this medicine. Take the medicine every day until it is gone or you are told to stop. You should not have any left over.  · The healthcare provider may prescribe medicines for pain. Follow instructions for taking them.  · Follow the healthcare providers instructions on  how to care for the cut.  · Wash your hands with soap and warm water before and after caring for cut. This helps prevent infection.  · Keep the wound clean and dry. If a bandage was applied and it becomes wet or dirty, replace it. Otherwise, leave it in place for the first 24 hours, then change it once a day or as directed.  · Clean the wound daily:  ¨ After removing any bandage, wash the area with soap and water. Use a wet cotton swab to loosen and remove any blood or crust that forms.  ¨ After cleaning, keep the wound clean and dry. Talk with your doctor before applying any antibiotic ointment to the wound. Reapply a fresh bandage.  · You may remove the bandage to shower as usual after the first 24 hours, but do not soak the area in water (no tub baths or swimming) for the next 5 days.  · If the area gets wet, gently pat it dry with a clean cloth. Replace the wet bandage with a dry one.  · Avoid activities that may reinjure your wound.  · Do not scratch, rub, or pick at the area.  · Check the wound daily for signs of infection listed below.  Follow-up care  Follow up with your healthcare provider as advised.  When to seek medical advice  Call your healthcare provider right away if any of these occur:  · Wound bleeding not controlled by direct pressure  · Signs of infection, including increasing pain in the wound, increasing wound redness or swelling, or pus or bad odor coming from the wound  · Fever of 100.4°F (38ºC) or higher or as directed by your healthcare provider  · Stitches or staples come apart or fall out or surgical tape falls off before 7 days  · Wound edges re-open  · Wound changes colors  · Numbness around the wound   · Decreased movement around the injured area  Date Last Reviewed: 6/14/2015  © 3499-2148 [employer]. [address] [place] [address], [place] [address]. All rights reserved. This information is not intended as a substitute for professional medical care. Always follow your  healthcare professional's instructions.           My notes were dictated with [employer]. Any misspellings or nonsensical grammar should be attributed to its use and allowances made for errors and typographic syntactical error(s).

## 2020-12-03 PROBLEM — Z71.89 ACP (ADVANCE CARE PLANNING): Status: ACTIVE | Noted: 2020-12-03

## 2020-12-03 PROBLEM — R55 SYNCOPE: Status: ACTIVE | Noted: 2020-12-03

## 2020-12-04 PROBLEM — I95.1 ORTHOSTATIC SYNCOPE: Status: ACTIVE | Noted: 2020-12-03

## 2020-12-04 PROBLEM — R55 POSTURAL DIZZINESS WITH PRESYNCOPE: Status: ACTIVE | Noted: 2020-12-04

## 2020-12-04 PROBLEM — R42 POSTURAL DIZZINESS WITH PRESYNCOPE: Status: ACTIVE | Noted: 2020-12-04

## 2020-12-08 ENCOUNTER — TELEPHONE (OUTPATIENT)
Dept: FAMILY MEDICINE | Facility: CLINIC | Age: 85
End: 2020-12-08

## 2020-12-08 NOTE — TELEPHONE ENCOUNTER
----- Message from Toribio Solomon sent at 12/7/2020  3:46 PM CST -----  Type: Needs Medical Advice    Who Called:  Patient  Best Call Back Number: [phone]  Additional Information: Patient just got out of hospital and was instructed to call the office to discuss medication pravastatin (PRAVACHOL) 40 MG tablet. Please call to advise. Thanks!

## 2020-12-08 NOTE — TELEPHONE ENCOUNTER
----- Message from Sandra Starks sent at 12/8/2020  9:33 AM CST -----  Regarding: advice  Contact: Patient  Type:  Patient Returning Call    Who Called:  patient  Who Left Message for Patient: Michelle  Does the patient know what this is regarding?:  appointment and pravastatin  Best Call Back Number:  [phone] (home)   Additional Information:  Patient just spoke to Michelle regarding an appointment and needs to speak to her about the pravastatin. Patient states she did not take while she was in the hospital. Please call patient. Thanks!

## 2020-12-09 ENCOUNTER — TELEPHONE (OUTPATIENT)
Dept: FAMILY MEDICINE | Facility: CLINIC | Age: 85
End: 2020-12-09

## 2020-12-09 NOTE — TELEPHONE ENCOUNTER
Is no longer driving and has not been able to get ride for visit. Asked that it be changed to a phone call.

## 2020-12-09 NOTE — TELEPHONE ENCOUNTER
----- Message from Aleyda Jewell sent at 12/9/2020 10:56 AM CST -----  Regarding: advice  Contact: Patient/[phone] (home)  Type: Needs Medical Advice  Who Called:  Patient/[phone] (home)   Additional Information: Needs to talk to the nurse concerning her pravastatin. Please call for the details.

## 2020-12-09 NOTE — TELEPHONE ENCOUNTER
----- Message from Jemma Pereira sent at 12/9/2020  2:09 PM CST -----  Contact: call  pt  [phone]   Type:  Sooner Apoointment Request    Caller is requesting a sooner appointment.  Caller declined first available appointment listed below.  Caller will not accept being placed on the waitlist and is requesting a message be sent to doctor.    Name of Caller: pt  When is the first available appointment?   Pt  is asking  for a  phone  call   visit  and  not VV visit // pt is  schedule  for  fri   at  8:20  Symptoms:    hospital  follow  and  questions  on med Best Call Back Number:  call  pt  [phone]  Additional Information:   please call  to  book a   phone   visit //  pt was  offed a  vv  vist turned down please call  by  this  evening pt   will need to get a  reide

## 2020-12-11 ENCOUNTER — OFFICE VISIT (OUTPATIENT)
Dept: FAMILY MEDICINE | Facility: CLINIC | Age: 85
End: 2020-12-11
Payer: MEDICARE

## 2020-12-11 VITALS — DIASTOLIC BLOOD PRESSURE: 78 MMHG | SYSTOLIC BLOOD PRESSURE: 138 MMHG

## 2020-12-11 DIAGNOSIS — I10 ESSENTIAL HYPERTENSION: Primary | ICD-10-CM

## 2020-12-11 DIAGNOSIS — I48.91 ATRIAL FIBRILLATION, UNSPECIFIED TYPE: ICD-10-CM

## 2020-12-11 DIAGNOSIS — E78.5 HYPERLIPIDEMIA, UNSPECIFIED HYPERLIPIDEMIA TYPE: ICD-10-CM

## 2020-12-11 DIAGNOSIS — E03.9 HYPOTHYROIDISM, UNSPECIFIED TYPE: ICD-10-CM

## 2020-12-11 DIAGNOSIS — F41.0 PANIC ATTACKS: ICD-10-CM

## 2020-12-11 PROCEDURE — 99442 PR PHYSICIAN TELEPHONE EVALUATION 11-20 MIN: ICD-10-PCS | Mod: HCNC,95,, | Performed by: FAMILY MEDICINE

## 2020-12-11 PROCEDURE — 1159F MED LIST DOCD IN RCRD: CPT | Mod: HCNC,95,, | Performed by: FAMILY MEDICINE

## 2020-12-11 PROCEDURE — 99442 PR PHYSICIAN TELEPHONE EVALUATION 11-20 MIN: CPT | Mod: HCNC,95,, | Performed by: FAMILY MEDICINE

## 2020-12-11 PROCEDURE — 1159F PR MEDICATION LIST DOCUMENTED IN MEDICAL RECORD: ICD-10-PCS | Mod: HCNC,95,, | Performed by: FAMILY MEDICINE

## 2020-12-11 RX ORDER — BUSPIRONE HYDROCHLORIDE 5 MG/1
TABLET ORAL
Qty: 60 TABLET | Refills: 3 | Status: SHIPPED | OUTPATIENT
Start: 2020-12-11 | End: 2021-11-09

## 2020-12-11 NOTE — PROGRESS NOTES
Established Patient - Audio Only Telehealth Visit     The patient location is: home    The chief complaint leading to consultation is: hospital follow up an anxiety   Visit type: Virtual visit with audio only (telephone)  Total time spent with patient: 15 min       The reason for the audio only service rather than synchronous audio and video virtual visit was related to technical difficulties or patient preference/necessity.     Each patient to whom I provide medical services by telemedicine is:  (1) informed of the relationship between the physician and patient and the respective role of any other health care provider with respect to management of the patient; and (2) notified that they may decline to receive medical services by telemedicine and may withdraw from such care at any time. Patient verbally consented to receive this service via voice-only telephone call.       HPI:       Essential hypertension  Today 138/78    Hypothyroidism, unspecified type  Stable controlled    Atrial fibrillation, unspecified type  States improved  Hyperlipidemia, unspecified hyperlipidemia type    Panic attacks  Has had a few since hospital discharge        138 /73     Assessment and plan:      Anxiety, I still think an SSRI would be best overall but she is concerned because of the side effects she has with Prozac.  Certainly would like to avoid benzodiazepine which would increase her risk for falls.  Will try BuSpar and cautiously titrate depending on results.  She will let me know how she is doing in a few weeks.               This service was not originating from a related E/M service provided within the previous 7 days nor will  to an E/M service or procedure within the next 24 hours or my soonest available appointment.  Prevailing standard of care was able to be met in this audio-only visit.

## 2021-01-06 ENCOUNTER — IMMUNIZATION (OUTPATIENT)
Dept: FAMILY MEDICINE | Facility: CLINIC | Age: 86
End: 2021-01-06
Payer: MEDICARE

## 2021-01-06 DIAGNOSIS — Z23 NEED FOR VACCINATION: ICD-10-CM

## 2021-01-06 PROCEDURE — 91300 COVID-19, MRNA, LNP-S, PF, 30 MCG/0.3 ML DOSE VACCINE: CPT | Mod: PBBFAC | Performed by: INTERNAL MEDICINE

## 2021-01-21 PROBLEM — R42 POSTURAL DIZZINESS WITH PRESYNCOPE: Status: RESOLVED | Noted: 2020-12-04 | Resolved: 2021-01-21

## 2021-01-21 PROBLEM — I31.39 PERICARDIAL EFFUSION WITHOUT CARDIAC TAMPONADE: Status: ACTIVE | Noted: 2021-01-21

## 2021-01-21 PROBLEM — I95.1 ORTHOSTATIC SYNCOPE: Status: RESOLVED | Noted: 2020-12-03 | Resolved: 2021-01-21

## 2021-01-21 PROBLEM — R55 POSTURAL DIZZINESS WITH PRESYNCOPE: Status: RESOLVED | Noted: 2020-12-04 | Resolved: 2021-01-21

## 2021-01-25 ENCOUNTER — TELEPHONE (OUTPATIENT)
Dept: FAMILY MEDICINE | Facility: CLINIC | Age: 86
End: 2021-01-25

## 2021-01-27 ENCOUNTER — IMMUNIZATION (OUTPATIENT)
Dept: FAMILY MEDICINE | Facility: CLINIC | Age: 86
End: 2021-01-27
Payer: MEDICARE

## 2021-01-27 DIAGNOSIS — Z23 NEED FOR VACCINATION: Primary | ICD-10-CM

## 2021-01-27 PROCEDURE — 0002A COVID-19, MRNA, LNP-S, PF, 30 MCG/0.3 ML DOSE VACCINE: CPT | Mod: PBBFAC | Performed by: NURSE PRACTITIONER

## 2021-01-27 PROCEDURE — 91300 COVID-19, MRNA, LNP-S, PF, 30 MCG/0.3 ML DOSE VACCINE: CPT | Mod: PBBFAC | Performed by: NURSE PRACTITIONER

## 2021-02-01 PROBLEM — R93.1 ABNORMAL ECHOCARDIOGRAM: Status: ACTIVE | Noted: 2021-02-01

## 2021-03-02 PROBLEM — R93.1 ABNORMAL ECHOCARDIOGRAM: Status: RESOLVED | Noted: 2021-02-01 | Resolved: 2021-03-02

## 2021-03-02 PROBLEM — I42.8 NICM (NONISCHEMIC CARDIOMYOPATHY): Status: ACTIVE | Noted: 2021-03-02

## 2021-03-02 PROBLEM — I31.39 PERICARDIAL EFFUSION WITHOUT CARDIAC TAMPONADE: Status: RESOLVED | Noted: 2021-01-21 | Resolved: 2021-03-02

## 2021-03-23 ENCOUNTER — OFFICE VISIT (OUTPATIENT)
Dept: PRIMARY CARE CLINIC | Facility: CLINIC | Age: 86
End: 2021-03-23
Payer: MEDICARE

## 2021-03-23 ENCOUNTER — LAB VISIT (OUTPATIENT)
Dept: LAB | Facility: HOSPITAL | Age: 86
End: 2021-03-23
Attending: FAMILY MEDICINE
Payer: MEDICARE

## 2021-03-23 VITALS
HEIGHT: 64 IN | TEMPERATURE: 98 F | HEART RATE: 86 BPM | DIASTOLIC BLOOD PRESSURE: 52 MMHG | OXYGEN SATURATION: 98 % | WEIGHT: 141.56 LBS | BODY MASS INDEX: 24.17 KG/M2 | SYSTOLIC BLOOD PRESSURE: 104 MMHG | RESPIRATION RATE: 18 BRPM

## 2021-03-23 DIAGNOSIS — E78.2 MIXED HYPERLIPIDEMIA: Chronic | ICD-10-CM

## 2021-03-23 DIAGNOSIS — I25.10 CORONARY ARTERY DISEASE, NON-OCCLUSIVE: ICD-10-CM

## 2021-03-23 DIAGNOSIS — Z91.81 RISK FOR FALLS: ICD-10-CM

## 2021-03-23 DIAGNOSIS — F41.9 ANXIETY: ICD-10-CM

## 2021-03-23 DIAGNOSIS — Z95.0 CARDIAC PACEMAKER IN SITU: Chronic | ICD-10-CM

## 2021-03-23 DIAGNOSIS — N18.30 STAGE 3 CHRONIC KIDNEY DISEASE, UNSPECIFIED WHETHER STAGE 3A OR 3B CKD: Chronic | ICD-10-CM

## 2021-03-23 DIAGNOSIS — I70.0 AORTIC ATHEROSCLEROSIS: Chronic | ICD-10-CM

## 2021-03-23 DIAGNOSIS — Z79.01 LONG TERM CURRENT USE OF ANTICOAGULANT THERAPY: Chronic | ICD-10-CM

## 2021-03-23 DIAGNOSIS — E03.9 HYPOTHYROIDISM, UNSPECIFIED TYPE: Primary | ICD-10-CM

## 2021-03-23 DIAGNOSIS — E03.9 HYPOTHYROIDISM, UNSPECIFIED TYPE: ICD-10-CM

## 2021-03-23 DIAGNOSIS — Z86.73 HISTORY OF TIA (TRANSIENT ISCHEMIC ATTACK): Chronic | ICD-10-CM

## 2021-03-23 DIAGNOSIS — E79.0 HYPERURICEMIA: Chronic | ICD-10-CM

## 2021-03-23 DIAGNOSIS — I48.11 LONGSTANDING PERSISTENT ATRIAL FIBRILLATION: Chronic | ICD-10-CM

## 2021-03-23 DIAGNOSIS — I49.5 SINOATRIAL NODE DYSFUNCTION: Chronic | ICD-10-CM

## 2021-03-23 DIAGNOSIS — I77.819 ECTATIC AORTA: Chronic | ICD-10-CM

## 2021-03-23 DIAGNOSIS — R29.898 MUSCULAR DECONDITIONING: ICD-10-CM

## 2021-03-23 DIAGNOSIS — I42.8 NICM (NONISCHEMIC CARDIOMYOPATHY): Chronic | ICD-10-CM

## 2021-03-23 PROCEDURE — 1125F PR PAIN SEVERITY QUANTIFIED, PAIN PRESENT: ICD-10-PCS | Mod: S$GLB,,, | Performed by: FAMILY MEDICINE

## 2021-03-23 PROCEDURE — 99215 PR OFFICE/OUTPT VISIT, EST, LEVL V, 40-54 MIN: ICD-10-PCS | Mod: S$GLB,,, | Performed by: FAMILY MEDICINE

## 2021-03-23 PROCEDURE — 36415 COLL VENOUS BLD VENIPUNCTURE: CPT | Mod: PN | Performed by: FAMILY MEDICINE

## 2021-03-23 PROCEDURE — 99215 OFFICE O/P EST HI 40 MIN: CPT | Mod: S$GLB,,, | Performed by: FAMILY MEDICINE

## 2021-03-23 PROCEDURE — 84550 ASSAY OF BLOOD/URIC ACID: CPT | Performed by: FAMILY MEDICINE

## 2021-03-23 PROCEDURE — 99999 PR PBB SHADOW E&M-EST. PATIENT-LVL IV: CPT | Mod: PBBFAC,,, | Performed by: FAMILY MEDICINE

## 2021-03-23 PROCEDURE — 99499 UNLISTED E&M SERVICE: CPT | Mod: S$GLB,,, | Performed by: FAMILY MEDICINE

## 2021-03-23 PROCEDURE — 1125F AMNT PAIN NOTED PAIN PRSNT: CPT | Mod: S$GLB,,, | Performed by: FAMILY MEDICINE

## 2021-03-23 PROCEDURE — 1159F PR MEDICATION LIST DOCUMENTED IN MEDICAL RECORD: ICD-10-PCS | Mod: S$GLB,,, | Performed by: FAMILY MEDICINE

## 2021-03-23 PROCEDURE — 1101F PR PT FALLS ASSESS DOC 0-1 FALLS W/OUT INJ PAST YR: ICD-10-PCS | Mod: CPTII,S$GLB,, | Performed by: FAMILY MEDICINE

## 2021-03-23 PROCEDURE — 3288F FALL RISK ASSESSMENT DOCD: CPT | Mod: CPTII,S$GLB,, | Performed by: FAMILY MEDICINE

## 2021-03-23 PROCEDURE — 99999 PR PBB SHADOW E&M-EST. PATIENT-LVL IV: ICD-10-PCS | Mod: PBBFAC,,, | Performed by: FAMILY MEDICINE

## 2021-03-23 PROCEDURE — 99499 RISK ADDL DX/OHS AUDIT: ICD-10-PCS | Mod: S$GLB,,, | Performed by: FAMILY MEDICINE

## 2021-03-23 PROCEDURE — 3288F PR FALLS RISK ASSESSMENT DOCUMENTED: ICD-10-PCS | Mod: CPTII,S$GLB,, | Performed by: FAMILY MEDICINE

## 2021-03-23 PROCEDURE — 85025 COMPLETE CBC W/AUTO DIFF WBC: CPT | Performed by: FAMILY MEDICINE

## 2021-03-23 PROCEDURE — 84443 ASSAY THYROID STIM HORMONE: CPT | Performed by: FAMILY MEDICINE

## 2021-03-23 PROCEDURE — 1159F MED LIST DOCD IN RCRD: CPT | Mod: S$GLB,,, | Performed by: FAMILY MEDICINE

## 2021-03-23 PROCEDURE — 1101F PT FALLS ASSESS-DOCD LE1/YR: CPT | Mod: CPTII,S$GLB,, | Performed by: FAMILY MEDICINE

## 2021-03-23 PROCEDURE — 80053 COMPREHEN METABOLIC PANEL: CPT | Performed by: FAMILY MEDICINE

## 2021-03-24 LAB
ALBUMIN SERPL BCP-MCNC: 3.5 G/DL (ref 3.5–5.2)
ALP SERPL-CCNC: 85 U/L (ref 55–135)
ALT SERPL W/O P-5'-P-CCNC: 18 U/L (ref 10–44)
ANION GAP SERPL CALC-SCNC: 8 MMOL/L (ref 8–16)
AST SERPL-CCNC: 29 U/L (ref 10–40)
BASOPHILS # BLD AUTO: 0.04 K/UL (ref 0–0.2)
BASOPHILS NFR BLD: 0.7 % (ref 0–1.9)
BILIRUB SERPL-MCNC: 0.6 MG/DL (ref 0.1–1)
BUN SERPL-MCNC: 31 MG/DL (ref 8–23)
CALCIUM SERPL-MCNC: 9.2 MG/DL (ref 8.7–10.5)
CHLORIDE SERPL-SCNC: 107 MMOL/L (ref 95–110)
CO2 SERPL-SCNC: 25 MMOL/L (ref 23–29)
CREAT SERPL-MCNC: 1.5 MG/DL (ref 0.5–1.4)
DIFFERENTIAL METHOD: ABNORMAL
EOSINOPHIL # BLD AUTO: 0.2 K/UL (ref 0–0.5)
EOSINOPHIL NFR BLD: 3.7 % (ref 0–8)
ERYTHROCYTE [DISTWIDTH] IN BLOOD BY AUTOMATED COUNT: 15.9 % (ref 11.5–14.5)
EST. GFR  (AFRICAN AMERICAN): 35.6 ML/MIN/1.73 M^2
EST. GFR  (NON AFRICAN AMERICAN): 30.9 ML/MIN/1.73 M^2
GLUCOSE SERPL-MCNC: 76 MG/DL (ref 70–110)
HCT VFR BLD AUTO: 37.4 % (ref 37–48.5)
HGB BLD-MCNC: 13 G/DL (ref 12–16)
IMM GRANULOCYTES # BLD AUTO: 0 K/UL (ref 0–0.04)
IMM GRANULOCYTES NFR BLD AUTO: 0 % (ref 0–0.5)
LYMPHOCYTES # BLD AUTO: 3.3 K/UL (ref 1–4.8)
LYMPHOCYTES NFR BLD: 57.6 % (ref 18–48)
MCH RBC QN AUTO: 27 PG (ref 27–31)
MCHC RBC AUTO-ENTMCNC: 34.8 G/DL (ref 32–36)
MCV RBC AUTO: 78 FL (ref 82–98)
MONOCYTES # BLD AUTO: 0.8 K/UL (ref 0.3–1)
MONOCYTES NFR BLD: 13 % (ref 4–15)
NEUTROPHILS # BLD AUTO: 1.4 K/UL (ref 1.8–7.7)
NEUTROPHILS NFR BLD: 25 % (ref 38–73)
NRBC BLD-RTO: 0 /100 WBC
PLATELET # BLD AUTO: 204 K/UL (ref 150–350)
PMV BLD AUTO: 11.6 FL (ref 9.2–12.9)
POTASSIUM SERPL-SCNC: 4.6 MMOL/L (ref 3.5–5.1)
PROT SERPL-MCNC: 6.7 G/DL (ref 6–8.4)
RBC # BLD AUTO: 4.82 M/UL (ref 4–5.4)
SODIUM SERPL-SCNC: 140 MMOL/L (ref 136–145)
TSH SERPL DL<=0.005 MIU/L-ACNC: 1.51 UIU/ML (ref 0.4–4)
URATE SERPL-MCNC: 7.1 MG/DL (ref 2.4–5.7)
WBC # BLD AUTO: 5.75 K/UL (ref 3.9–12.7)

## 2021-03-26 ENCOUNTER — TELEPHONE (OUTPATIENT)
Dept: ADMINISTRATIVE | Facility: CLINIC | Age: 86
End: 2021-03-26

## 2021-03-31 ENCOUNTER — TELEPHONE (OUTPATIENT)
Dept: PRIMARY CARE CLINIC | Facility: CLINIC | Age: 86
End: 2021-03-31

## 2021-04-15 ENCOUNTER — TELEPHONE (OUTPATIENT)
Dept: ADMINISTRATIVE | Facility: CLINIC | Age: 86
End: 2021-04-15

## 2021-05-03 ENCOUNTER — TELEPHONE (OUTPATIENT)
Dept: PRIMARY CARE CLINIC | Facility: CLINIC | Age: 86
End: 2021-05-03

## 2021-05-03 DIAGNOSIS — F41.0 PANIC ATTACKS: Primary | ICD-10-CM

## 2021-05-03 RX ORDER — ALPRAZOLAM 0.25 MG/1
TABLET ORAL
Qty: 20 TABLET | Refills: 0 | Status: SHIPPED | OUTPATIENT
Start: 2021-05-03 | End: 2021-06-04

## 2021-05-04 RX ORDER — METOPROLOL SUCCINATE 25 MG/1
25 TABLET, EXTENDED RELEASE ORAL DAILY
Qty: 90 TABLET | Refills: 0 | Status: SHIPPED | OUTPATIENT
Start: 2021-05-04 | End: 2021-07-09

## 2021-05-14 ENCOUNTER — TELEPHONE (OUTPATIENT)
Dept: PRIMARY CARE CLINIC | Facility: CLINIC | Age: 86
End: 2021-05-14

## 2021-05-14 RX ORDER — FUROSEMIDE 20 MG/1
TABLET ORAL
Qty: 14 TABLET | Refills: 1 | Status: ON HOLD | OUTPATIENT
Start: 2021-05-14 | End: 2023-03-17 | Stop reason: HOSPADM

## 2021-05-17 ENCOUNTER — TELEPHONE (OUTPATIENT)
Dept: PRIMARY CARE CLINIC | Facility: CLINIC | Age: 86
End: 2021-05-17

## 2021-05-20 ENCOUNTER — CLINICAL SUPPORT (OUTPATIENT)
Dept: REHABILITATION | Facility: HOSPITAL | Age: 86
End: 2021-05-20
Payer: MEDICARE

## 2021-05-20 DIAGNOSIS — I48.11 LONGSTANDING PERSISTENT ATRIAL FIBRILLATION: ICD-10-CM

## 2021-05-20 DIAGNOSIS — R53.81 PHYSICAL DECONDITIONING: ICD-10-CM

## 2021-05-20 DIAGNOSIS — I10 ESSENTIAL HYPERTENSION: ICD-10-CM

## 2021-05-20 DIAGNOSIS — I42.8 NICM (NONISCHEMIC CARDIOMYOPATHY): ICD-10-CM

## 2021-05-20 DIAGNOSIS — R53.1 WEAKNESS: ICD-10-CM

## 2021-05-20 DIAGNOSIS — Z86.73 HISTORY OF TIA (TRANSIENT ISCHEMIC ATTACK): ICD-10-CM

## 2021-05-20 PROCEDURE — 97161 PT EVAL LOW COMPLEX 20 MIN: CPT | Mod: PN

## 2021-05-21 ENCOUNTER — TELEPHONE (OUTPATIENT)
Dept: PRIMARY CARE CLINIC | Facility: CLINIC | Age: 86
End: 2021-05-21

## 2021-05-21 DIAGNOSIS — D64.9 ANEMIA, UNSPECIFIED TYPE: ICD-10-CM

## 2021-05-21 DIAGNOSIS — I25.10 CORONARY ARTERY DISEASE, NON-OCCLUSIVE: ICD-10-CM

## 2021-05-21 DIAGNOSIS — E03.9 HYPOTHYROIDISM, UNSPECIFIED TYPE: Primary | ICD-10-CM

## 2021-05-25 ENCOUNTER — PATIENT MESSAGE (OUTPATIENT)
Dept: PRIMARY CARE CLINIC | Facility: CLINIC | Age: 86
End: 2021-05-25

## 2021-06-03 ENCOUNTER — TELEPHONE (OUTPATIENT)
Dept: PRIMARY CARE CLINIC | Facility: CLINIC | Age: 86
End: 2021-06-03

## 2021-06-15 ENCOUNTER — LAB VISIT (OUTPATIENT)
Dept: LAB | Facility: HOSPITAL | Age: 86
End: 2021-06-15
Attending: FAMILY MEDICINE
Payer: MEDICARE

## 2021-06-15 ENCOUNTER — OFFICE VISIT (OUTPATIENT)
Dept: PRIMARY CARE CLINIC | Facility: CLINIC | Age: 86
End: 2021-06-15
Payer: MEDICARE

## 2021-06-15 VITALS
WEIGHT: 145.5 LBS | RESPIRATION RATE: 18 BRPM | HEART RATE: 70 BPM | SYSTOLIC BLOOD PRESSURE: 138 MMHG | DIASTOLIC BLOOD PRESSURE: 70 MMHG | OXYGEN SATURATION: 98 % | HEIGHT: 64 IN | BODY MASS INDEX: 24.84 KG/M2

## 2021-06-15 DIAGNOSIS — D64.9 ANEMIA, UNSPECIFIED TYPE: ICD-10-CM

## 2021-06-15 DIAGNOSIS — M54.9 DORSALGIA, UNSPECIFIED: ICD-10-CM

## 2021-06-15 DIAGNOSIS — E03.9 HYPOTHYROIDISM, UNSPECIFIED TYPE: ICD-10-CM

## 2021-06-15 DIAGNOSIS — I48.91 ATRIAL FIBRILLATION, UNSPECIFIED TYPE: ICD-10-CM

## 2021-06-15 DIAGNOSIS — I25.10 CORONARY ARTERY DISEASE, NON-OCCLUSIVE: ICD-10-CM

## 2021-06-15 DIAGNOSIS — F41.0 PANIC ATTACKS: ICD-10-CM

## 2021-06-15 DIAGNOSIS — G45.9 TIA (TRANSIENT ISCHEMIC ATTACK): Primary | ICD-10-CM

## 2021-06-15 LAB
ANION GAP SERPL CALC-SCNC: 11 MMOL/L (ref 8–16)
BASOPHILS # BLD AUTO: 0.04 K/UL (ref 0–0.2)
BASOPHILS NFR BLD: 0.6 % (ref 0–1.9)
BUN SERPL-MCNC: 28 MG/DL (ref 8–23)
CALCIUM SERPL-MCNC: 9.4 MG/DL (ref 8.7–10.5)
CHLORIDE SERPL-SCNC: 107 MMOL/L (ref 95–110)
CO2 SERPL-SCNC: 22 MMOL/L (ref 23–29)
CREAT SERPL-MCNC: 1.7 MG/DL (ref 0.5–1.4)
DIFFERENTIAL METHOD: ABNORMAL
EOSINOPHIL # BLD AUTO: 0.2 K/UL (ref 0–0.5)
EOSINOPHIL NFR BLD: 2.4 % (ref 0–8)
ERYTHROCYTE [DISTWIDTH] IN BLOOD BY AUTOMATED COUNT: 15.9 % (ref 11.5–14.5)
EST. GFR  (AFRICAN AMERICAN): 30.6 ML/MIN/1.73 M^2
EST. GFR  (NON AFRICAN AMERICAN): 26.5 ML/MIN/1.73 M^2
FERRITIN SERPL-MCNC: 17 NG/ML (ref 20–300)
GLUCOSE SERPL-MCNC: 88 MG/DL (ref 70–110)
HCT VFR BLD AUTO: 37.7 % (ref 37–48.5)
HGB BLD-MCNC: 13 G/DL (ref 12–16)
IMM GRANULOCYTES # BLD AUTO: 0.01 K/UL (ref 0–0.04)
IMM GRANULOCYTES NFR BLD AUTO: 0.2 % (ref 0–0.5)
IRON SERPL-MCNC: 89 UG/DL (ref 30–160)
LYMPHOCYTES # BLD AUTO: 3.5 K/UL (ref 1–4.8)
LYMPHOCYTES NFR BLD: 55.4 % (ref 18–48)
MCH RBC QN AUTO: 26.8 PG (ref 27–31)
MCHC RBC AUTO-ENTMCNC: 34.5 G/DL (ref 32–36)
MCV RBC AUTO: 78 FL (ref 82–98)
MONOCYTES # BLD AUTO: 0.7 K/UL (ref 0.3–1)
MONOCYTES NFR BLD: 10.7 % (ref 4–15)
NEUTROPHILS # BLD AUTO: 2 K/UL (ref 1.8–7.7)
NEUTROPHILS NFR BLD: 30.7 % (ref 38–73)
NRBC BLD-RTO: 0 /100 WBC
PLATELET # BLD AUTO: 207 K/UL (ref 150–450)
PMV BLD AUTO: 11.9 FL (ref 9.2–12.9)
POTASSIUM SERPL-SCNC: 4.8 MMOL/L (ref 3.5–5.1)
RBC # BLD AUTO: 4.85 M/UL (ref 4–5.4)
SATURATED IRON: 19 % (ref 20–50)
SODIUM SERPL-SCNC: 140 MMOL/L (ref 136–145)
TOTAL IRON BINDING CAPACITY: 463 UG/DL (ref 250–450)
TRANSFERRIN SERPL-MCNC: 313 MG/DL (ref 200–375)
TSH SERPL DL<=0.005 MIU/L-ACNC: 1.69 UIU/ML (ref 0.4–4)
WBC # BLD AUTO: 6.35 K/UL (ref 3.9–12.7)

## 2021-06-15 PROCEDURE — 1159F PR MEDICATION LIST DOCUMENTED IN MEDICAL RECORD: ICD-10-PCS | Mod: S$GLB,,, | Performed by: FAMILY MEDICINE

## 2021-06-15 PROCEDURE — 1125F PR PAIN SEVERITY QUANTIFIED, PAIN PRESENT: ICD-10-PCS | Mod: S$GLB,,, | Performed by: FAMILY MEDICINE

## 2021-06-15 PROCEDURE — 83540 ASSAY OF IRON: CPT | Performed by: FAMILY MEDICINE

## 2021-06-15 PROCEDURE — 99215 PR OFFICE/OUTPT VISIT, EST, LEVL V, 40-54 MIN: ICD-10-PCS | Mod: S$GLB,,, | Performed by: FAMILY MEDICINE

## 2021-06-15 PROCEDURE — 1125F AMNT PAIN NOTED PAIN PRSNT: CPT | Mod: S$GLB,,, | Performed by: FAMILY MEDICINE

## 2021-06-15 PROCEDURE — 80048 BASIC METABOLIC PNL TOTAL CA: CPT | Performed by: FAMILY MEDICINE

## 2021-06-15 PROCEDURE — 85025 COMPLETE CBC W/AUTO DIFF WBC: CPT | Performed by: FAMILY MEDICINE

## 2021-06-15 PROCEDURE — 99499 UNLISTED E&M SERVICE: CPT | Mod: S$GLB,,, | Performed by: FAMILY MEDICINE

## 2021-06-15 PROCEDURE — 3288F PR FALLS RISK ASSESSMENT DOCUMENTED: ICD-10-PCS | Mod: CPTII,S$GLB,, | Performed by: FAMILY MEDICINE

## 2021-06-15 PROCEDURE — 84443 ASSAY THYROID STIM HORMONE: CPT | Performed by: FAMILY MEDICINE

## 2021-06-15 PROCEDURE — 99215 OFFICE O/P EST HI 40 MIN: CPT | Mod: S$GLB,,, | Performed by: FAMILY MEDICINE

## 2021-06-15 PROCEDURE — 1159F MED LIST DOCD IN RCRD: CPT | Mod: S$GLB,,, | Performed by: FAMILY MEDICINE

## 2021-06-15 PROCEDURE — 1101F PR PT FALLS ASSESS DOC 0-1 FALLS W/OUT INJ PAST YR: ICD-10-PCS | Mod: CPTII,S$GLB,, | Performed by: FAMILY MEDICINE

## 2021-06-15 PROCEDURE — 1101F PT FALLS ASSESS-DOCD LE1/YR: CPT | Mod: CPTII,S$GLB,, | Performed by: FAMILY MEDICINE

## 2021-06-15 PROCEDURE — 99999 PR PBB SHADOW E&M-EST. PATIENT-LVL IV: CPT | Mod: PBBFAC,,, | Performed by: FAMILY MEDICINE

## 2021-06-15 PROCEDURE — 82728 ASSAY OF FERRITIN: CPT | Performed by: FAMILY MEDICINE

## 2021-06-15 PROCEDURE — 36415 COLL VENOUS BLD VENIPUNCTURE: CPT | Mod: PN | Performed by: FAMILY MEDICINE

## 2021-06-15 PROCEDURE — 3288F FALL RISK ASSESSMENT DOCD: CPT | Mod: CPTII,S$GLB,, | Performed by: FAMILY MEDICINE

## 2021-06-15 PROCEDURE — 99499 RISK ADDL DX/OHS AUDIT: ICD-10-PCS | Mod: S$GLB,,, | Performed by: FAMILY MEDICINE

## 2021-06-15 PROCEDURE — 99999 PR PBB SHADOW E&M-EST. PATIENT-LVL IV: ICD-10-PCS | Mod: PBBFAC,,, | Performed by: FAMILY MEDICINE

## 2021-06-16 ENCOUNTER — TELEPHONE (OUTPATIENT)
Dept: PRIMARY CARE CLINIC | Facility: CLINIC | Age: 86
End: 2021-06-16

## 2021-06-16 DIAGNOSIS — M54.9 BACK PAIN, UNSPECIFIED BACK LOCATION, UNSPECIFIED BACK PAIN LATERALITY, UNSPECIFIED CHRONICITY: ICD-10-CM

## 2021-06-16 DIAGNOSIS — R26.9 GAIT ABNORMALITY: ICD-10-CM

## 2021-06-16 DIAGNOSIS — G45.9 TIA (TRANSIENT ISCHEMIC ATTACK): Primary | ICD-10-CM

## 2021-06-18 PROCEDURE — G0180 PR HOME HEALTH MD CERTIFICATION: ICD-10-PCS | Mod: ,,, | Performed by: FAMILY MEDICINE

## 2021-06-18 PROCEDURE — G0180 MD CERTIFICATION HHA PATIENT: HCPCS | Mod: ,,, | Performed by: FAMILY MEDICINE

## 2021-07-06 ENCOUNTER — TELEPHONE (OUTPATIENT)
Dept: PRIMARY CARE CLINIC | Facility: CLINIC | Age: 86
End: 2021-07-06

## 2021-07-13 ENCOUNTER — EXTERNAL HOME HEALTH (OUTPATIENT)
Dept: HOME HEALTH SERVICES | Facility: HOSPITAL | Age: 86
End: 2021-07-13
Payer: MEDICARE

## 2021-07-13 ENCOUNTER — HOSPITAL ENCOUNTER (OUTPATIENT)
Dept: RADIOLOGY | Facility: HOSPITAL | Age: 86
Discharge: HOME | End: 2021-07-13
Attending: FAMILY MEDICINE
Payer: MEDICARE

## 2021-07-13 ENCOUNTER — HOSPITAL ENCOUNTER (OUTPATIENT)
Dept: CARDIOLOGY | Facility: HOSPITAL | Age: 86
Discharge: HOME | End: 2021-07-13
Attending: FAMILY MEDICINE
Payer: MEDICARE

## 2021-07-13 DIAGNOSIS — M54.9 DORSALGIA, UNSPECIFIED: ICD-10-CM

## 2021-07-13 DIAGNOSIS — G45.9 TIA (TRANSIENT ISCHEMIC ATTACK): ICD-10-CM

## 2021-07-13 PROCEDURE — 72100 X-RAY EXAM L-S SPINE 2/3 VWS: CPT | Mod: 26,,, | Performed by: RADIOLOGY

## 2021-07-13 PROCEDURE — 93880 CV US DOPPLER CAROTID (CUPID ONLY): ICD-10-PCS | Mod: 26,,, | Performed by: INTERNAL MEDICINE

## 2021-07-13 PROCEDURE — 93880 EXTRACRANIAL BILAT STUDY: CPT | Mod: PO

## 2021-07-13 PROCEDURE — 72100 XR LUMBAR SPINE AP AND LATERAL: ICD-10-PCS | Mod: 26,,, | Performed by: RADIOLOGY

## 2021-07-13 PROCEDURE — 93880 EXTRACRANIAL BILAT STUDY: CPT | Mod: 26,,, | Performed by: INTERNAL MEDICINE

## 2021-07-13 PROCEDURE — 72100 X-RAY EXAM L-S SPINE 2/3 VWS: CPT | Mod: TC,FY,PO

## 2021-07-14 ENCOUNTER — DOCUMENT SCAN (OUTPATIENT)
Dept: HOME HEALTH SERVICES | Facility: HOSPITAL | Age: 86
End: 2021-07-14
Payer: MEDICARE

## 2021-07-14 LAB
LEFT CBA DIAS: 14 CM/S
LEFT CBA SYS: 42 CM/S
LEFT CCA DIST DIAS: 11 CM/S
LEFT CCA DIST SYS: 50 CM/S
LEFT CCA MID DIAS: 6 CM/S
LEFT CCA MID SYS: 38 CM/S
LEFT CCA PROX DIAS: 10 CM/S
LEFT CCA PROX SYS: 80 CM/S
LEFT ECA DIAS: 1 CM/S
LEFT ECA SYS: 37 CM/S
LEFT ICA DIST DIAS: 12 CM/S
LEFT ICA DIST SYS: 68 CM/S
LEFT ICA MID DIAS: 17 CM/S
LEFT ICA MID SYS: 64 CM/S
LEFT ICA PROX DIAS: 10 CM/S
LEFT ICA PROX SYS: 47 CM/S
LEFT VERTEBRAL DIAS: 6 CM/S
LEFT VERTEBRAL SYS: 51 CM/S
OHS CV CAROTID RIGHT ICA EDV HIGHEST: 26
OHS CV CAROTID ULTRASOUND LEFT ICA/CCA RATIO: 1.36
OHS CV CAROTID ULTRASOUND RIGHT ICA/CCA RATIO: 2.3
OHS CV PV CAROTID LEFT HIGHEST CCA: 80
OHS CV PV CAROTID LEFT HIGHEST ICA: 68
OHS CV PV CAROTID RIGHT HIGHEST CCA: 75
OHS CV PV CAROTID RIGHT HIGHEST ICA: 85
OHS CV US CAROTID LEFT HIGHEST EDV: 17
RIGHT CBA DIAS: 4 CM/S
RIGHT CBA SYS: 34 CM/S
RIGHT CCA DIST DIAS: 7 CM/S
RIGHT CCA DIST SYS: 37 CM/S
RIGHT CCA MID DIAS: 5 CM/S
RIGHT CCA MID SYS: 37 CM/S
RIGHT CCA PROX DIAS: 5 CM/S
RIGHT CCA PROX SYS: 75 CM/S
RIGHT ECA DIAS: 0 CM/S
RIGHT ECA SYS: 26 CM/S
RIGHT ICA DIST DIAS: 10 CM/S
RIGHT ICA DIST SYS: 62 CM/S
RIGHT ICA MID DIAS: 17 CM/S
RIGHT ICA MID SYS: 81 CM/S
RIGHT ICA PROX DIAS: 26 CM/S
RIGHT ICA PROX SYS: 85 CM/S
RIGHT VERTEBRAL DIAS: 9 CM/S
RIGHT VERTEBRAL SYS: 59 CM/S

## 2021-07-15 ENCOUNTER — DOCUMENT SCAN (OUTPATIENT)
Dept: HOME HEALTH SERVICES | Facility: HOSPITAL | Age: 86
End: 2021-07-15
Payer: MEDICARE

## 2021-07-24 ENCOUNTER — TELEPHONE (OUTPATIENT)
Dept: FAMILY MEDICINE | Facility: CLINIC | Age: 86
End: 2021-07-24

## 2021-07-24 DIAGNOSIS — F41.0 PANIC ATTACKS: ICD-10-CM

## 2021-07-24 RX ORDER — ALPRAZOLAM 0.25 MG/1
TABLET ORAL
Qty: 20 TABLET | Refills: 0 | Status: SHIPPED | OUTPATIENT
Start: 2021-07-24 | End: 2021-08-19 | Stop reason: SDUPTHER

## 2021-08-12 ENCOUNTER — TELEPHONE (OUTPATIENT)
Dept: PRIMARY CARE CLINIC | Facility: CLINIC | Age: 86
End: 2021-08-12

## 2021-08-12 DIAGNOSIS — M54.9 BACK PAIN, UNSPECIFIED BACK LOCATION, UNSPECIFIED BACK PAIN LATERALITY, UNSPECIFIED CHRONICITY: ICD-10-CM

## 2021-08-12 DIAGNOSIS — R26.9 GAIT ABNORMALITY: Primary | ICD-10-CM

## 2021-08-19 ENCOUNTER — OFFICE VISIT (OUTPATIENT)
Dept: PRIMARY CARE CLINIC | Facility: CLINIC | Age: 86
End: 2021-08-19
Payer: MEDICARE

## 2021-08-19 VITALS
RESPIRATION RATE: 18 BRPM | DIASTOLIC BLOOD PRESSURE: 56 MMHG | SYSTOLIC BLOOD PRESSURE: 114 MMHG | BODY MASS INDEX: 25.63 KG/M2 | OXYGEN SATURATION: 98 % | HEART RATE: 85 BPM | HEIGHT: 64 IN | WEIGHT: 150.13 LBS

## 2021-08-19 DIAGNOSIS — G45.9 TIA (TRANSIENT ISCHEMIC ATTACK): Primary | ICD-10-CM

## 2021-08-19 DIAGNOSIS — E03.9 HYPOTHYROIDISM, UNSPECIFIED TYPE: ICD-10-CM

## 2021-08-19 DIAGNOSIS — F41.0 PANIC ATTACKS: ICD-10-CM

## 2021-08-19 DIAGNOSIS — F41.9 ANXIETY: ICD-10-CM

## 2021-08-19 DIAGNOSIS — F41.0 PANIC ATTACK: ICD-10-CM

## 2021-08-19 DIAGNOSIS — I48.91 ATRIAL FIBRILLATION, UNSPECIFIED TYPE: ICD-10-CM

## 2021-08-19 PROCEDURE — 3288F PR FALLS RISK ASSESSMENT DOCUMENTED: ICD-10-PCS | Mod: CPTII,S$GLB,, | Performed by: FAMILY MEDICINE

## 2021-08-19 PROCEDURE — 1159F MED LIST DOCD IN RCRD: CPT | Mod: CPTII,S$GLB,, | Performed by: FAMILY MEDICINE

## 2021-08-19 PROCEDURE — 1126F AMNT PAIN NOTED NONE PRSNT: CPT | Mod: CPTII,S$GLB,, | Performed by: FAMILY MEDICINE

## 2021-08-19 PROCEDURE — 3288F FALL RISK ASSESSMENT DOCD: CPT | Mod: CPTII,S$GLB,, | Performed by: FAMILY MEDICINE

## 2021-08-19 PROCEDURE — 1159F PR MEDICATION LIST DOCUMENTED IN MEDICAL RECORD: ICD-10-PCS | Mod: CPTII,S$GLB,, | Performed by: FAMILY MEDICINE

## 2021-08-19 PROCEDURE — 99999 PR PBB SHADOW E&M-EST. PATIENT-LVL IV: CPT | Mod: PBBFAC,,, | Performed by: FAMILY MEDICINE

## 2021-08-19 PROCEDURE — 1101F PR PT FALLS ASSESS DOC 0-1 FALLS W/OUT INJ PAST YR: ICD-10-PCS | Mod: CPTII,S$GLB,, | Performed by: FAMILY MEDICINE

## 2021-08-19 PROCEDURE — 99499 RISK ADDL DX/OHS AUDIT: ICD-10-PCS | Mod: HCNC,S$GLB,, | Performed by: FAMILY MEDICINE

## 2021-08-19 PROCEDURE — 99999 PR PBB SHADOW E&M-EST. PATIENT-LVL IV: ICD-10-PCS | Mod: PBBFAC,,, | Performed by: FAMILY MEDICINE

## 2021-08-19 PROCEDURE — 99214 PR OFFICE/OUTPT VISIT, EST, LEVL IV, 30-39 MIN: ICD-10-PCS | Mod: S$GLB,,, | Performed by: FAMILY MEDICINE

## 2021-08-19 PROCEDURE — 1126F PR PAIN SEVERITY QUANTIFIED, NO PAIN PRESENT: ICD-10-PCS | Mod: CPTII,S$GLB,, | Performed by: FAMILY MEDICINE

## 2021-08-19 PROCEDURE — 1160F PR REVIEW ALL MEDS BY PRESCRIBER/CLIN PHARMACIST DOCUMENTED: ICD-10-PCS | Mod: CPTII,S$GLB,, | Performed by: FAMILY MEDICINE

## 2021-08-19 PROCEDURE — 1160F RVW MEDS BY RX/DR IN RCRD: CPT | Mod: CPTII,S$GLB,, | Performed by: FAMILY MEDICINE

## 2021-08-19 PROCEDURE — 99499 UNLISTED E&M SERVICE: CPT | Mod: HCNC,S$GLB,, | Performed by: FAMILY MEDICINE

## 2021-08-19 PROCEDURE — 99214 OFFICE O/P EST MOD 30 MIN: CPT | Mod: S$GLB,,, | Performed by: FAMILY MEDICINE

## 2021-08-19 PROCEDURE — 1101F PT FALLS ASSESS-DOCD LE1/YR: CPT | Mod: CPTII,S$GLB,, | Performed by: FAMILY MEDICINE

## 2021-08-19 RX ORDER — ALPRAZOLAM 0.25 MG/1
TABLET ORAL
Qty: 20 TABLET | Refills: 2 | Status: SHIPPED | OUTPATIENT
Start: 2021-08-19 | End: 2023-03-07

## 2021-09-07 ENCOUNTER — DOCUMENT SCAN (OUTPATIENT)
Dept: HOME HEALTH SERVICES | Facility: HOSPITAL | Age: 86
End: 2021-09-07
Payer: MEDICARE

## 2021-10-04 ENCOUNTER — IMMUNIZATION (OUTPATIENT)
Dept: FAMILY MEDICINE | Facility: CLINIC | Age: 86
End: 2021-10-04
Payer: MEDICARE

## 2021-10-04 DIAGNOSIS — Z23 NEED FOR VACCINATION: Primary | ICD-10-CM

## 2021-10-04 PROCEDURE — 0003A COVID-19, MRNA, LNP-S, PF, 30 MCG/0.3 ML DOSE VACCINE: CPT | Mod: HCNC,PBBFAC | Performed by: FAMILY MEDICINE

## 2021-10-04 PROCEDURE — 91300 COVID-19, MRNA, LNP-S, PF, 30 MCG/0.3 ML DOSE VACCINE: CPT | Mod: HCNC,PBBFAC | Performed by: FAMILY MEDICINE

## 2021-11-22 ENCOUNTER — PES CALL (OUTPATIENT)
Dept: ADMINISTRATIVE | Facility: CLINIC | Age: 86
End: 2021-11-22
Payer: MEDICARE

## 2021-12-03 ENCOUNTER — TELEPHONE (OUTPATIENT)
Dept: FAMILY MEDICINE | Facility: CLINIC | Age: 86
End: 2021-12-03
Payer: MEDICARE

## 2022-01-12 ENCOUNTER — TELEPHONE (OUTPATIENT)
Dept: FAMILY MEDICINE | Facility: CLINIC | Age: 87
End: 2022-01-12
Payer: MEDICARE

## 2022-01-12 NOTE — TELEPHONE ENCOUNTER
Tried calling patient - went straight to mailbox which is not set up. Patient scheduled for her AWV appointment on 2/16/22 at 12:30.

## 2022-01-12 NOTE — TELEPHONE ENCOUNTER
----- Message from Rachna Eaton sent at 1/12/2022  3:21 PM CST -----  Contact: pt at  Type: Needs Medical Advice  Who Called:  pt  Best Call Back Number: [phone]  Additional Information: pt is calling the office to find out what time and date her appt from today was rescheduled to. The pt spoke to someone earlier that assured her it had been rescheduled. Please call back and advise.

## 2022-01-17 DIAGNOSIS — E55.9 HYPOVITAMINOSIS D: ICD-10-CM

## 2022-01-19 RX ORDER — ERGOCALCIFEROL 1.25 MG/1
CAPSULE ORAL
Qty: 12 CAPSULE | Refills: 2 | Status: SHIPPED | OUTPATIENT
Start: 2022-01-19 | End: 2023-02-08

## 2022-01-24 ENCOUNTER — LAB VISIT (OUTPATIENT)
Dept: LAB | Facility: HOSPITAL | Age: 87
End: 2022-01-24
Attending: INTERNAL MEDICINE
Payer: MEDICARE

## 2022-01-24 DIAGNOSIS — D63.1 ANEMIA IN CKD (CHRONIC KIDNEY DISEASE): ICD-10-CM

## 2022-01-24 DIAGNOSIS — I12.9 HYPERTENSIVE KIDNEY DISEASE: ICD-10-CM

## 2022-01-24 DIAGNOSIS — R80.9 PROTEINURIA: ICD-10-CM

## 2022-01-24 DIAGNOSIS — N39.0 URINARY TRACT INFECTIOUS DISEASE: ICD-10-CM

## 2022-01-24 DIAGNOSIS — N18.30 CHRONIC RENAL DISEASE, STAGE III: Primary | ICD-10-CM

## 2022-01-24 DIAGNOSIS — N18.9 ANEMIA IN CKD (CHRONIC KIDNEY DISEASE): ICD-10-CM

## 2022-01-24 LAB
ALBUMIN/CREAT UR: 67.4 UG/MG (ref 0–30)
BILIRUB UR QL STRIP: NEGATIVE
CLARITY UR: CLEAR
COLOR UR: YELLOW
CREAT UR-MCNC: 132 MG/DL (ref 15–325)
CREAT UR-MCNC: 132 MG/DL (ref 15–325)
GLUCOSE UR QL STRIP: NEGATIVE
HGB UR QL STRIP: NEGATIVE
KETONES UR QL STRIP: NEGATIVE
LEUKOCYTE ESTERASE UR QL STRIP: NEGATIVE
MICROALBUMIN UR DL<=1MG/L-MCNC: 89 UG/ML
NITRITE UR QL STRIP: NEGATIVE
PH UR STRIP: 7 [PH] (ref 5–8)
PROT UR QL STRIP: NEGATIVE
PROT UR-MCNC: 17 MG/DL (ref 0–15)
PROT/CREAT UR: 0.13 MG/G{CREAT} (ref 0–0.2)
SP GR UR STRIP: 1.02 (ref 1–1.03)
URN SPEC COLLECT METH UR: NORMAL

## 2022-01-24 PROCEDURE — 82570 ASSAY OF URINE CREATININE: CPT | Mod: HCNC | Performed by: INTERNAL MEDICINE

## 2022-01-24 PROCEDURE — 84156 ASSAY OF PROTEIN URINE: CPT | Mod: HCNC | Performed by: INTERNAL MEDICINE

## 2022-01-24 PROCEDURE — 81003 URINALYSIS AUTO W/O SCOPE: CPT | Mod: HCNC,PO | Performed by: INTERNAL MEDICINE

## 2022-01-24 PROCEDURE — 82043 UR ALBUMIN QUANTITATIVE: CPT | Mod: HCNC | Performed by: INTERNAL MEDICINE

## 2022-02-09 PROBLEM — I25.10 CORONARY ARTERY DISEASE, NON-OCCLUSIVE: Chronic | Status: RESOLVED | Noted: 2021-03-23 | Resolved: 2022-02-09

## 2022-02-09 PROBLEM — I48.91 ATRIAL FIBRILLATION: Status: RESOLVED | Noted: 2021-08-19 | Resolved: 2022-02-09

## 2022-02-16 ENCOUNTER — OFFICE VISIT (OUTPATIENT)
Dept: FAMILY MEDICINE | Facility: CLINIC | Age: 87
End: 2022-02-16
Payer: MEDICARE

## 2022-02-16 ENCOUNTER — TELEPHONE (OUTPATIENT)
Dept: PRIMARY CARE CLINIC | Facility: CLINIC | Age: 87
End: 2022-02-16
Payer: MEDICARE

## 2022-02-16 VITALS
OXYGEN SATURATION: 97 % | HEIGHT: 64 IN | DIASTOLIC BLOOD PRESSURE: 80 MMHG | WEIGHT: 148.13 LBS | HEART RATE: 82 BPM | SYSTOLIC BLOOD PRESSURE: 126 MMHG | BODY MASS INDEX: 25.29 KG/M2

## 2022-02-16 DIAGNOSIS — M54.50 CHRONIC BILATERAL LOW BACK PAIN, UNSPECIFIED WHETHER SCIATICA PRESENT: ICD-10-CM

## 2022-02-16 DIAGNOSIS — R53.81 PHYSICAL DECONDITIONING: ICD-10-CM

## 2022-02-16 DIAGNOSIS — E03.9 HYPOTHYROIDISM, UNSPECIFIED TYPE: Chronic | ICD-10-CM

## 2022-02-16 DIAGNOSIS — R29.898 WEAKNESS OF BOTH LOWER EXTREMITIES: ICD-10-CM

## 2022-02-16 DIAGNOSIS — N18.30 STAGE 3 CHRONIC KIDNEY DISEASE, UNSPECIFIED WHETHER STAGE 3A OR 3B CKD: Chronic | ICD-10-CM

## 2022-02-16 DIAGNOSIS — F41.0 PANIC ATTACK: ICD-10-CM

## 2022-02-16 DIAGNOSIS — Z00.00 ENCOUNTER FOR PREVENTIVE HEALTH EXAMINATION: Primary | ICD-10-CM

## 2022-02-16 DIAGNOSIS — E78.5 HYPERLIPIDEMIA, UNSPECIFIED HYPERLIPIDEMIA TYPE: Chronic | ICD-10-CM

## 2022-02-16 DIAGNOSIS — R26.9 ABNORMALITY OF GAIT AND MOBILITY: ICD-10-CM

## 2022-02-16 DIAGNOSIS — Z95.0 CARDIAC PACEMAKER IN SITU: Chronic | ICD-10-CM

## 2022-02-16 DIAGNOSIS — I70.0 AORTIC ATHEROSCLEROSIS: Chronic | ICD-10-CM

## 2022-02-16 DIAGNOSIS — F33.1 MODERATE EPISODE OF RECURRENT MAJOR DEPRESSIVE DISORDER: ICD-10-CM

## 2022-02-16 DIAGNOSIS — G89.29 CHRONIC BILATERAL LOW BACK PAIN, UNSPECIFIED WHETHER SCIATICA PRESENT: ICD-10-CM

## 2022-02-16 DIAGNOSIS — I48.11 LONGSTANDING PERSISTENT ATRIAL FIBRILLATION: Chronic | ICD-10-CM

## 2022-02-16 DIAGNOSIS — I77.819 ECTATIC AORTA: Chronic | ICD-10-CM

## 2022-02-16 PROCEDURE — 1159F PR MEDICATION LIST DOCUMENTED IN MEDICAL RECORD: ICD-10-PCS | Mod: HCNC,CPTII,S$GLB, | Performed by: NURSE PRACTITIONER

## 2022-02-16 PROCEDURE — 99499 RISK ADDL DX/OHS AUDIT: ICD-10-PCS | Mod: S$GLB,,, | Performed by: NURSE PRACTITIONER

## 2022-02-16 PROCEDURE — 1159F MED LIST DOCD IN RCRD: CPT | Mod: HCNC,CPTII,S$GLB, | Performed by: NURSE PRACTITIONER

## 2022-02-16 PROCEDURE — 3288F PR FALLS RISK ASSESSMENT DOCUMENTED: ICD-10-PCS | Mod: HCNC,CPTII,S$GLB, | Performed by: NURSE PRACTITIONER

## 2022-02-16 PROCEDURE — G0439 PPPS, SUBSEQ VISIT: HCPCS | Mod: HCNC,S$GLB,, | Performed by: NURSE PRACTITIONER

## 2022-02-16 PROCEDURE — 1126F AMNT PAIN NOTED NONE PRSNT: CPT | Mod: HCNC,CPTII,S$GLB, | Performed by: NURSE PRACTITIONER

## 2022-02-16 PROCEDURE — 99999 PR PBB SHADOW E&M-EST. PATIENT-LVL IV: CPT | Mod: PBBFAC,HCNC,, | Performed by: NURSE PRACTITIONER

## 2022-02-16 PROCEDURE — 1160F PR REVIEW ALL MEDS BY PRESCRIBER/CLIN PHARMACIST DOCUMENTED: ICD-10-PCS | Mod: HCNC,CPTII,S$GLB, | Performed by: NURSE PRACTITIONER

## 2022-02-16 PROCEDURE — 1170F FXNL STATUS ASSESSED: CPT | Mod: HCNC,CPTII,S$GLB, | Performed by: NURSE PRACTITIONER

## 2022-02-16 PROCEDURE — 1100F PTFALLS ASSESS-DOCD GE2>/YR: CPT | Mod: HCNC,CPTII,S$GLB, | Performed by: NURSE PRACTITIONER

## 2022-02-16 PROCEDURE — 1160F RVW MEDS BY RX/DR IN RCRD: CPT | Mod: HCNC,CPTII,S$GLB, | Performed by: NURSE PRACTITIONER

## 2022-02-16 PROCEDURE — 99999 PR PBB SHADOW E&M-EST. PATIENT-LVL IV: ICD-10-PCS | Mod: PBBFAC,HCNC,, | Performed by: NURSE PRACTITIONER

## 2022-02-16 PROCEDURE — 1100F PR PT FALLS ASSESS DOC 2+ FALLS/FALL W/INJURY/YR: ICD-10-PCS | Mod: HCNC,CPTII,S$GLB, | Performed by: NURSE PRACTITIONER

## 2022-02-16 PROCEDURE — G0439 PR MEDICARE ANNUAL WELLNESS SUBSEQUENT VISIT: ICD-10-PCS | Mod: HCNC,S$GLB,, | Performed by: NURSE PRACTITIONER

## 2022-02-16 PROCEDURE — 3288F FALL RISK ASSESSMENT DOCD: CPT | Mod: HCNC,CPTII,S$GLB, | Performed by: NURSE PRACTITIONER

## 2022-02-16 PROCEDURE — 1126F PR PAIN SEVERITY QUANTIFIED, NO PAIN PRESENT: ICD-10-PCS | Mod: HCNC,CPTII,S$GLB, | Performed by: NURSE PRACTITIONER

## 2022-02-16 PROCEDURE — 99499 UNLISTED E&M SERVICE: CPT | Mod: S$GLB,,, | Performed by: NURSE PRACTITIONER

## 2022-02-16 PROCEDURE — 1170F PR FUNCTIONAL STATUS ASSESSED: ICD-10-PCS | Mod: HCNC,CPTII,S$GLB, | Performed by: NURSE PRACTITIONER

## 2022-02-16 RX ORDER — ESCITALOPRAM OXALATE 5 MG/1
5 TABLET ORAL DAILY
Qty: 30 TABLET | Refills: 11 | Status: SHIPPED | OUTPATIENT
Start: 2022-02-16 | End: 2022-09-02

## 2022-02-16 NOTE — PROGRESS NOTES
"  Maggi Fontaine presented for a  Medicare AWV and comprehensive Health Risk Assessment today. The following components were reviewed and updated:    · Medical history  · Family History  · Social history  · Allergies and Current Medications  · Health Risk Assessment  · Health Maintenance  · Care Team     ** See Completed Assessments for Annual Wellness Visit within the encounter summary.**    The following assessments were completed:  · Living Situation  · CAGE  · Depression Screening  · Timed Get Up and Go  · Whisper Test  · Cognitive Function Screening     · Nutrition Screening  · ADL Screening  · PAQ Screening    Vitals:    02/16/22 1224   BP: 126/80   BP Location: Left arm   Patient Position: Sitting   BP Method: Medium (Manual)   Pulse: 82   SpO2: 97%   Weight: 67.2 kg (148 lb 2.4 oz)   Height: 5' 4" (1.626 m)     Body mass index is 25.43 kg/m².  Physical Exam  Vitals reviewed.   Constitutional:       Appearance: Normal appearance.   Cardiovascular:      Rate and Rhythm: Rhythm irregularly irregular.      Pulses: Normal pulses.      Heart sounds: Normal heart sounds.   Pulmonary:      Effort: Pulmonary effort is normal.      Breath sounds: Normal breath sounds.   Skin:     General: Skin is warm and dry.   Neurological:      Mental Status: She is alert and oriented to person, place, and time.       Diagnoses and health risks identified today and associated recommendations/orders:    1. Encounter for preventive health examination  Reviewed and discussed health maintenance.      2. Chronic bilateral low back pain, unspecified whether sciatica present  - Ambulatory referral/consult to Physical/Occupational Therapy; Future    3. Weakness of both lower extremities  - Ambulatory referral/consult to Physical/Occupational Therapy; Future    4. Physical deconditioning  - Ambulatory referral/consult to Physical/Occupational Therapy; Future    5. Abnormality of gait and mobility  Safety issues discussed and precautions " reviewed    6. Panic attack  Lexapro 5mg daily. Follow up with PCP in 4 weeks    7. Moderate episode of recurrent major depressive disorder  Lexapro 5mg daily. Follow up with PCP in 4 weeks    8. Aortic atherosclerosis  Stable- continue current treatment and follow up routinely with PCP     9. Cardiac pacemaker in situ  Stable- continue current treatment and follow up routinely with PCP     10. Ectatic aorta  Stable- continue current treatment and follow up routinely with PCP     11. Hyperlipidemia, unspecified hyperlipidemia type  Stable- continue current treatment and follow up routinely with PCP     12. Longstanding persistent atrial fibrillation  Stable- continue current treatment and follow up routinely with PCP     13. Stage 3 chronic kidney disease, unspecified whether stage 3a or 3b CKD  Stable- continue current treatment and follow up routinely with PCP   Labs reviewed. Avoid NSAIDs and increase fluids    14. Hypothyroidism, unspecified type  Stable- continue current treatment and follow up routinely with PCP     Provided Maggi with a 5-10 year written screening schedule and personal prevention plan. Recommendations were developed using the USPSTF age appropriate recommendations. Education, counseling, and referrals were provided as needed. After Visit Summary printed and given to patient which includes a list of additional screenings\tests needed.    I offered to discuss advanced care planning, including how to pick a person who would make decisions for you if you were unable to make them for yourself, called a health care power of , and what kind of decisions you might make such as use of life sustaining treatments such as ventilators and tube feeding when faced with a life limiting illness recorded on a living will that they will need to know. (How you want to be cared for as you near the end of your natural life)     X Patient is interested in learning more about how to make advanced directives.   I provided them paperwork and offered to discuss this with them.    Nay Allison, NP

## 2022-02-16 NOTE — PATIENT INSTRUCTIONS
Counseling and Referral of Other Preventative  (Italic type indicates deductible and co-insurance are waived)    Patient Name: Maggi Fontaine  Today's Date: 2/16/2022    Health Maintenance       Date Due Completion Date    Shingles Vaccine (1 of 2) Never done ---    Lipid Panel 11/22/2020 11/22/2019    TETANUS VACCINE 02/23/2029 2/23/2019        Orders Placed This Encounter   Procedures    Ambulatory referral/consult to Physical/Occupational Therapy     The following information is provided to all patients.  This information is to help you find resources for any of the problems found today that may be affecting your health:                Living healthy guide: www.[facility].[place].[email or web address]      Understanding Diabetes: www.diabetes.org      Eating healthy: www.cdc.gov/healthyweight      CDC home safety checklist: www.cdc.gov/steadi/patient.html      Agency on Aging: www.goea.[place].[facility]      Alcoholics anonymous (AA): www.aa.org      Physical Activity: www.vy.nih.gov/sm3qemr      Tobacco use: www.quitwithusla.org

## 2022-03-02 ENCOUNTER — TELEPHONE (OUTPATIENT)
Dept: FAMILY MEDICINE | Facility: CLINIC | Age: 87
End: 2022-03-02
Payer: MEDICARE

## 2022-03-02 NOTE — TELEPHONE ENCOUNTER
----- Message from Lakisha Fallon MA sent at 3/2/2022  1:18 PM CST -----  Type: Needs Medical Advice  Who Called:  Irene  Robby Call Back Number: [phone]  Additional Information: patient states she needs to speak with someone urgently

## 2022-03-02 NOTE — TELEPHONE ENCOUNTER
Spoke with pt.   States that she has been having anxiety and believes it may be a side effect from eliquis. States that cardiologist suggested med change. Wants to have appt with PCP to discuss potential changes and be sure PCP is on board.   Appt scheduled for 3/3/2022 at 4 PM due to patient limitations on transportation.

## 2022-03-02 NOTE — TELEPHONE ENCOUNTER
I feel that the cardiologist should be the one explaining the options for treatment for atrial fibrillation, risk, benefit, etc.  but okay to discuss the basics and I am comfortable with whatever her cardiologist decides.    Typically if the patient cannot take Eliquis they will consider Coumadin, in some cases potentially Xarelto.    If the patient is at a very high risk for bleeding then sometimes they will simply go to an aspirin alone, although this may not be as protective in preventing a stroke compared to the stronger blood thinners.

## 2022-03-03 ENCOUNTER — OFFICE VISIT (OUTPATIENT)
Dept: PRIMARY CARE CLINIC | Facility: CLINIC | Age: 87
End: 2022-03-03
Payer: MEDICARE

## 2022-03-03 VITALS
SYSTOLIC BLOOD PRESSURE: 128 MMHG | WEIGHT: 146.06 LBS | BODY MASS INDEX: 24.94 KG/M2 | OXYGEN SATURATION: 99 % | RESPIRATION RATE: 18 BRPM | HEART RATE: 80 BPM | HEIGHT: 64 IN | DIASTOLIC BLOOD PRESSURE: 60 MMHG

## 2022-03-03 DIAGNOSIS — E03.9 HYPOTHYROIDISM, UNSPECIFIED TYPE: Chronic | ICD-10-CM

## 2022-03-03 DIAGNOSIS — N18.30 STAGE 3 CHRONIC KIDNEY DISEASE, UNSPECIFIED WHETHER STAGE 3A OR 3B CKD: Chronic | ICD-10-CM

## 2022-03-03 DIAGNOSIS — I42.8 NICM (NONISCHEMIC CARDIOMYOPATHY): Primary | Chronic | ICD-10-CM

## 2022-03-03 DIAGNOSIS — I77.819 ECTATIC AORTA: Chronic | ICD-10-CM

## 2022-03-03 DIAGNOSIS — Z95.0 CARDIAC PACEMAKER IN SITU: Chronic | ICD-10-CM

## 2022-03-03 DIAGNOSIS — E78.5 HYPERLIPIDEMIA, UNSPECIFIED HYPERLIPIDEMIA TYPE: Chronic | ICD-10-CM

## 2022-03-03 DIAGNOSIS — I49.5 SINOATRIAL NODE DYSFUNCTION: Chronic | ICD-10-CM

## 2022-03-03 DIAGNOSIS — Z86.73 HISTORY OF TIA (TRANSIENT ISCHEMIC ATTACK): Chronic | ICD-10-CM

## 2022-03-03 DIAGNOSIS — Z79.01 LONG TERM CURRENT USE OF ANTICOAGULANT THERAPY: Chronic | ICD-10-CM

## 2022-03-03 DIAGNOSIS — I48.11 LONGSTANDING PERSISTENT ATRIAL FIBRILLATION: Chronic | ICD-10-CM

## 2022-03-03 DIAGNOSIS — I70.0 AORTIC ATHEROSCLEROSIS: Chronic | ICD-10-CM

## 2022-03-03 DIAGNOSIS — E79.0 HYPERURICEMIA: Chronic | ICD-10-CM

## 2022-03-03 DIAGNOSIS — F41.9 ANXIETY: ICD-10-CM

## 2022-03-03 DIAGNOSIS — I25.10 NON-OCCLUSIVE CORONARY ARTERY DISEASE: ICD-10-CM

## 2022-03-03 PROCEDURE — 1160F RVW MEDS BY RX/DR IN RCRD: CPT | Mod: CPTII,S$GLB,, | Performed by: FAMILY MEDICINE

## 2022-03-03 PROCEDURE — 99499 UNLISTED E&M SERVICE: CPT | Mod: HCNC,S$GLB,, | Performed by: FAMILY MEDICINE

## 2022-03-03 PROCEDURE — 1126F PR PAIN SEVERITY QUANTIFIED, NO PAIN PRESENT: ICD-10-PCS | Mod: CPTII,S$GLB,, | Performed by: FAMILY MEDICINE

## 2022-03-03 PROCEDURE — 3288F FALL RISK ASSESSMENT DOCD: CPT | Mod: CPTII,S$GLB,, | Performed by: FAMILY MEDICINE

## 2022-03-03 PROCEDURE — 1159F MED LIST DOCD IN RCRD: CPT | Mod: CPTII,S$GLB,, | Performed by: FAMILY MEDICINE

## 2022-03-03 PROCEDURE — 99999 PR PBB SHADOW E&M-EST. PATIENT-LVL III: CPT | Mod: PBBFAC,,, | Performed by: FAMILY MEDICINE

## 2022-03-03 PROCEDURE — 99215 OFFICE O/P EST HI 40 MIN: CPT | Mod: S$GLB,,, | Performed by: FAMILY MEDICINE

## 2022-03-03 PROCEDURE — 99999 PR PBB SHADOW E&M-EST. PATIENT-LVL III: ICD-10-PCS | Mod: PBBFAC,,, | Performed by: FAMILY MEDICINE

## 2022-03-03 PROCEDURE — 99499 RISK ADDL DX/OHS AUDIT: ICD-10-PCS | Mod: HCNC,S$GLB,, | Performed by: FAMILY MEDICINE

## 2022-03-03 PROCEDURE — 1101F PR PT FALLS ASSESS DOC 0-1 FALLS W/OUT INJ PAST YR: ICD-10-PCS | Mod: CPTII,S$GLB,, | Performed by: FAMILY MEDICINE

## 2022-03-03 PROCEDURE — 1160F PR REVIEW ALL MEDS BY PRESCRIBER/CLIN PHARMACIST DOCUMENTED: ICD-10-PCS | Mod: CPTII,S$GLB,, | Performed by: FAMILY MEDICINE

## 2022-03-03 PROCEDURE — 99215 PR OFFICE/OUTPT VISIT, EST, LEVL V, 40-54 MIN: ICD-10-PCS | Mod: S$GLB,,, | Performed by: FAMILY MEDICINE

## 2022-03-03 PROCEDURE — 1101F PT FALLS ASSESS-DOCD LE1/YR: CPT | Mod: CPTII,S$GLB,, | Performed by: FAMILY MEDICINE

## 2022-03-03 PROCEDURE — 3288F PR FALLS RISK ASSESSMENT DOCUMENTED: ICD-10-PCS | Mod: CPTII,S$GLB,, | Performed by: FAMILY MEDICINE

## 2022-03-03 PROCEDURE — 1159F PR MEDICATION LIST DOCUMENTED IN MEDICAL RECORD: ICD-10-PCS | Mod: CPTII,S$GLB,, | Performed by: FAMILY MEDICINE

## 2022-03-03 PROCEDURE — 1126F AMNT PAIN NOTED NONE PRSNT: CPT | Mod: CPTII,S$GLB,, | Performed by: FAMILY MEDICINE

## 2022-03-03 NOTE — PROGRESS NOTES
THIS DOCUMENT WAS MADE IN PART WITH VOICE RECOGNITION SOFTWARE.  OCCASIONALLY THIS SOFTWARE WILL MISINTERPRET WORDS OR PHRASES.      Primary Care Provider Appointment - [facility] ([facility])      Patient ID: Maggi Fontaine is a 89 y.o. female.    ASSESSMENT/PLAN by Problem List:  Problem List Items Addressed This Visit     Anxiety     Anxiety and intermittent panic attacks, possibly related to the Eliquis.  She will discuss with her cardiologist as to whether to continue this, consider other options or whether the benefit versus risk at this point would suggest discontinuation.    Since we do not know exactly how her cardiologist is going to proceed I can give her a precise recommendation.  But I would say if her cardiologist feels she needs to remain on the Eliquis then I do recommend starting the Lexapro as recommended.  If the Eliquis is stopped or changed then she may wish to wait and see if in fact her anxiety does improve when this is stopped.  She will let me know how she is doing.           NICM (nonischemic cardiomyopathy) - Primary (Chronic)     Moderately depressed EF at 35%.  Remains on Lasix.  No sign of fluid overload.  She follows with cardiology           Non-occlusive coronary artery disease (Chronic)     Stable no recent angina, no unstable symptoms           Long term current use of anticoagulant therapy (Chronic)     Remains on Eliquis for management of AFib, managed by Cardiology           Longstanding persistent atrial fibrillation (Chronic)     Stable, rate controlled.  Remains on Eliquis and metoprolol per Cardiology           Cardiac pacemaker in situ (Chronic)     Stable per Cardiology           Sinoatrial node dysfunction (Chronic)    History of TIA (transient ischemic attack) (Chronic)     Stable, treating risk factors           Aortic atherosclerosis (Chronic)     She remains on a statin medication she is not on aspirin as she is on Eliquis for AFib            Ectatic aorta (Chronic)     As above           Hyperlipidemia (Chronic)     Stable on pravastatin           Hypothyroidism (Chronic)     Stable on levothyroxine           Hyperuricemia (Chronic)    Chronic kidney disease, stage III (moderate) (Chronic)          Follow Up:  Three months    Forty-three minutes of total time spent on the encounter, which includes face to face time and non-face to face time preparing to see the patient (eg, review of tests), Obtaining and/or reviewing separately obtained history, Documenting clinical information in the electronic or other health record, Independently interpreting results (not separately reported) and communicating results to the patient/family/caregiver, or Care coordination (not separately reported).    Health Maintenance       Date Due Completion Date    Shingles Vaccine (1 of 2) Never done ---    Lipid Panel 11/22/2020 11/22/2019    COVID-19 Vaccine (4 - Booster for Pfizer series) 03/04/2022 10/4/2021    TETANUS VACCINE 02/23/2029 2/23/2019              Subjective:     Chief Complaint   Patient presents with    Anxiety     Patient c/o anxiety she thinks it could be a side effect from Eliquis; Nay Allison wants patient to start taking Lexapro but she does not want to start until she talks to PCP first.     Medication Management     Also she was told to only take her vitamin D once a month instead of once a week.      I have reviewed the information entered by the ancillary staff regarding the chief complaint as well as the related history.    HPI    Patient is a/an 89 y.o.  female   RISK of ADMIT/ED: 36    Feels eliquis causing panic attacks  Reports anxiety is less when the dose was reduced.    She was previously prescribed an SSRI to help with panic attacks but had side effects, minor intolerance is but discontinued.  Recently when she was in for her annual wellness Lexapro was prescribed to help with anxiety.  She is hesitant on whether to start  this.        Active Ambulatory Problems     Diagnosis Date Noted    Hyperlipidemia     Hypothyroidism     Cardiac pacemaker in situ 01/22/2015    Sinoatrial node dysfunction 01/22/2015    Hyperuricemia 07/06/2015    Long term current use of anticoagulant therapy 08/11/2015    Urinary incontinence 09/13/2016    Longstanding persistent atrial fibrillation 09/13/2016    Idiopathic gout 09/13/2016    Chronic kidney disease, stage III (moderate) 09/13/2016    History of TIA (transient ischemic attack) 09/13/2016    Osteopenia 09/13/2016    SNHL (sensorineural hearing loss) 09/13/2016    Aortic atherosclerosis 08/03/2017    Ectatic aorta 08/03/2017    Laryngopharyngeal reflux (LPR) 08/03/2017    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/2018    Chronic pansinusitis 03/26/2018    Vitamin D deficiency 05/28/2018    Open wound of arm 12/02/2020    ACP (advance care planning) 12/03/2020    NICM (nonischemic cardiomyopathy) 03/02/2021    Non-occlusive coronary artery disease 03/23/2021    Risk for falls 03/23/2021    Weakness 05/20/2021    Anxiety 08/19/2021    Panic attack 08/19/2021    Moderate episode of recurrent major depressive disorder 02/16/2022     Resolved Ambulatory Problems     Diagnosis Date Noted    Palpitations 08/27/2013    Benign paroxysmal positional vertigo of left ear 09/13/2016    Gait instability 07/02/2020    Muscle weakness 07/02/2020    Orthostatic syncope 12/03/2020    Postural dizziness with presyncope 12/04/2020    Pericardial effusion without cardiac tamponade 01/21/2021    Abnormal echocardiogram 02/01/2021    Atrial fibrillation 08/19/2021     Past Medical History:   Diagnosis Date    Acid reflux     Arthritis     At risk for falling     Cardiac pacemaker     Gout     Headache(784.0)     HEARING LOSS     Hypertension     Polymyalgia rheumatica     Renal insufficiency     Sinusitis     SOB (shortness of breath)     Stroke     West Nile encephalitis         Past Surgical History:   Procedure Laterality Date    ANGIOGRAM, CORONARY, WITH LEFT HEART CATHETERIZATION Left 2/1/2021    Procedure: Angiogram, Coronary, with Left Heart Cath;  Surgeon: Eriberto Alvarez III, MD;  Location: [facility];  Service: Cardiology;  Laterality: Left;    CARDIAC PACEMAKER PLACEMENT      CATARACT EXTRACTION EXTRACAPSULAR W/ INTRAOCULAR LENS IMPLANTATION      EYE SURGERY      HYSTERECTOMY      JOINT REPLACEMENT      anupam knees    KNEE SURGERY      PARTIAL HYSTERECTOMY      TREATMENT OF CARDIAC ARRHYTHMIA N/A 7/8/2019    Procedure: CARDIOVERSION;  Surgeon: Eriberto Alvarez III, MD;  Location: [facility];  Service: Cardiology;  Laterality: N/A;       Past Medical History:   Diagnosis Date    Acid reflux     Arthritis     At risk for falling     Atrial fibrillation     Cardiac pacemaker     Gait instability     muscle weakness    Gout     Headache(784.0)     HEARING LOSS     Hyperlipidemia     Hypertension     Hypothyroidism     Polymyalgia rheumatica     remission now    Renal insufficiency     Sinusitis     SOB (shortness of breath)     Stroke     history of TIA    Urinary incontinence     West Nile encephalitis     2002       Social History     Socioeconomic History    Marital status:    Occupational History    Occupation: homemaker   Tobacco Use    Smoking status: Never Smoker    Smokeless tobacco: Never Used   Substance and Sexual Activity    Alcohol use: Yes     Alcohol/week: 0.8 standard drinks     Types: 1 Standard drinks or equivalent per week     Comment: less than 1 drink weekly    Drug use: No   Other Topics Concern    Financial Status: Other Yes    Home situation: lives with family Yes    Leisure: Time with family Yes    Childhood History: Raised by parents Yes    Home situation: lives with significant other Yes    Childhood History: Uneventful Yes    Leisure: Shopping Yes   Social History Narrative    Born and raised in [place]   "HS graduate.  Came to [place] [facility] at 17 to work.  Met and   at 19 - 3 children, 59 years .  He will probably be going to a  for alzheimer dx next week.  He is currently hospitalized for medical clearance.         Review of Systems   Respiratory: Negative.    Cardiovascular: Negative.    Gastrointestinal: Negative.    Genitourinary: Negative.    Psychiatric/Behavioral: The patient is nervous/anxious.        Objective     Physical Exam  Vitals reviewed.   Constitutional:       General: She is not in acute distress.     Appearance: She is well-developed. She is not toxic-appearing or diaphoretic.   HENT:      Head: Normocephalic and atraumatic.      Mouth/Throat:      Pharynx: Oropharynx is clear. No oropharyngeal exudate.   Eyes:      General: No scleral icterus.     Conjunctiva/sclera: Conjunctivae normal.   Cardiovascular:      Rate and Rhythm: Normal rate. Rhythm irregular.      Heart sounds: Normal heart sounds. No murmur heard.     Comments: No edema  Pulmonary:      Effort: Pulmonary effort is normal. No respiratory distress.      Breath sounds: Normal breath sounds. No wheezing.   Neurological:      Mental Status: She is alert.      Motor: No tremor or abnormal muscle tone.      Coordination: Finger-Nose-Finger Test normal. Rapid alternating movements normal.      Gait: Gait abnormal (Mildly antalgic).   Psychiatric:         Mood and Affect: Mood normal.         Behavior: Behavior normal.       Vitals:    03/03/22 1556   BP: 128/60   BP Location: Left arm   Patient Position: Sitting   BP Method: Medium (Manual)   Pulse: 80   Resp: 18   SpO2: 99%   Weight: 66.3 kg (146 lb 0.9 oz)   Height: 5' 4" (1.626 m)       RECENT LABS:    Lab Results   Component Value Date    WBC 5.12 01/24/2022    HGB 13.7 01/24/2022    HCT 39.5 01/24/2022     01/24/2022    CHOL 135 11/22/2019    TRIG 102 11/22/2019    HDL 47 11/22/2019    ALT 18 03/23/2021    AST 29 03/23/2021     01/24/2022    K 4.4 " 01/24/2022     01/24/2022    CREATININE 1.5 (H) 01/24/2022    BUN 29 (H) 01/24/2022    CO2 21 (L) 01/24/2022    TSH 1.687 06/15/2021    INR 1.3 12/03/2020    GLUF 112 (H) 10/05/2005    HGBA1C 5.5 12/04/2020       Results for orders placed or performed in visit on 01/24/22   Magnesium   Result Value Ref Range    Magnesium 2.0 1.6 - 2.6 mg/dL   RENAL FUNCTION PANEL   Result Value Ref Range    Glucose 117 (H) 70 - 110 mg/dL    Sodium 141 136 - 145 mmol/L    Potassium 4.4 3.5 - 5.1 mmol/L    Chloride 107 95 - 110 mmol/L    CO2 21 (L) 23 - 29 mmol/L    BUN 29 (H) 8 - 23 mg/dL    Calcium 9.6 8.7 - 10.5 mg/dL    Creatinine 1.5 (H) 0.5 - 1.4 mg/dL    Albumin 3.5 3.5 - 5.2 g/dL    Phosphorus 3.5 2.7 - 4.5 mg/dL    eGFR if  35.4 (A) >60 mL/min/1.73 m^2    eGFR if non  30.7 (A) >60 mL/min/1.73 m^2    Anion Gap 13 8 - 16 mmol/L   CBC W/ AUTO DIFFERENTIAL   Result Value Ref Range    WBC 5.12 3.90 - 12.70 K/uL    RBC 5.07 4.00 - 5.40 M/uL    Hemoglobin 13.7 12.0 - 16.0 g/dL    Hematocrit 39.5 37.0 - 48.5 %    MCV 78 (L) 82 - 98 fL    MCH 27.0 27.0 - 31.0 pg    MCHC 34.7 32.0 - 36.0 g/dL    RDW 16.9 (H) 11.5 - 14.5 %    Platelets 216 150 - 450 K/uL    MPV 11.8 9.2 - 12.9 fL    Immature Granulocytes 0.2 0.0 - 0.5 %    Gran # (ANC) 1.8 1.8 - 7.7 K/uL    Immature Grans (Abs) 0.01 0.00 - 0.04 K/uL    Lymph # 2.6 1.0 - 4.8 K/uL    Mono # 0.5 0.3 - 1.0 K/uL    Eos # 0.1 0.0 - 0.5 K/uL    Baso # 0.04 0.00 - 0.20 K/uL    nRBC 0 0 /100 WBC    Gran % 35.1 (L) 38.0 - 73.0 %    Lymph % 51.0 (H) 18.0 - 48.0 %    Mono % 10.2 4.0 - 15.0 %    Eosinophil % 2.7 0.0 - 8.0 %    Basophil % 0.8 0.0 - 1.9 %    Differential Method Automated      *Note: Due to a large number of results and/or encounters for the requested time period, some results have not been displayed. A complete set of results can be found in Results Review.

## 2022-03-03 NOTE — ASSESSMENT & PLAN NOTE
Anxiety and intermittent panic attacks, possibly related to the Eliquis.  She will discuss with her cardiologist as to whether to continue this, consider other options or whether the benefit versus risk at this point would suggest discontinuation.    Since we do not know exactly how her cardiologist is going to proceed I can not give her a precise recommendation just yet.  But I would say if her cardiologist feels she needs to remain on the Eliquis then I do recommend starting the Lexapro as recommended.  If the Eliquis is stopped or changed then she may wish to wait and see if in fact her anxiety does improve when this is stopped.  She will let me know how things go

## 2022-03-03 NOTE — ASSESSMENT & PLAN NOTE
Moderately depressed EF at 35%.  Remains on Lasix.  No sign of fluid overload.  She follows with cardiology

## 2022-04-29 ENCOUNTER — NURSE TRIAGE (OUTPATIENT)
Dept: ADMINISTRATIVE | Facility: CLINIC | Age: 87
End: 2022-04-29
Payer: MEDICARE

## 2022-04-29 NOTE — TELEPHONE ENCOUNTER
Transferred to me from scheduling. Pt called in stating she has Left sided chest pain and some SOB. Pain with breathing. Care advice per protocol. Pt asking if she can have her sitter bring to Provider office. Advised per prot col that she should call 911 for ambulance and explained office is not able to do testing and treat - based on severity of symptoms, pt advised to call 911. Asking if someone can drive her to hospital- again advised based on severity symptoms reported, recommend calling 911. Pt verbalized understanding.     Reason for Disposition   Difficult to awaken or acting confused (e.g., disoriented, slurred speech)    Additional Information   Negative: SEVERE difficulty breathing (e.g., struggling for each breath, speaks in single words)   Negative: Passed out (i.e., fainted, collapsed and was not responding)    Protocols used: CHEST PAIN-A-[place]

## 2022-05-05 NOTE — TELEPHONE ENCOUNTER
Left message to call in regards to an appt for CVA. Also given the number for [facility] for a sooner appt.   70

## 2022-05-09 ENCOUNTER — PATIENT MESSAGE (OUTPATIENT)
Dept: SMOKING CESSATION | Facility: CLINIC | Age: 87
End: 2022-05-09
Payer: MEDICARE

## 2022-05-13 ENCOUNTER — TELEPHONE (OUTPATIENT)
Dept: PRIMARY CARE CLINIC | Facility: CLINIC | Age: 87
End: 2022-05-13
Payer: MEDICARE

## 2022-05-13 NOTE — TELEPHONE ENCOUNTER
Spoke to patient and states that she forgot to take her medications last night. Pt states that she took them this morning and will take them again tonight. Pt was just concerned because she has never missed taking her medications. Stated to her that she should be fine just missing one dose. Pt states that she feels fine. Verbalized understanding.

## 2022-05-16 PROBLEM — I10 ESSENTIAL HYPERTENSION: Status: ACTIVE | Noted: 2022-05-16

## 2022-05-16 PROBLEM — I51.89 DIASTOLIC DYSFUNCTION: Status: ACTIVE | Noted: 2022-05-16

## 2022-05-18 DIAGNOSIS — Z78.0 ASYMPTOMATIC MENOPAUSAL STATE: ICD-10-CM

## 2022-05-20 NOTE — TELEPHONE ENCOUNTER
Care Due:                  Date            Visit Type   Department     Provider  --------------------------------------------------------------------------------                                ESTABLISHED                  Negro Artis  Last Visit: 03-      PATIENT      None Found     Finnegan                              ESTABLISHED                  Negro Artis  Next Visit: 06-      PATIENT      None Found     Finnegan                                                            Last  Test          Frequency    Reason                     Performed    Due Date  --------------------------------------------------------------------------------    Lipid Panel.  12 months..  pravastatin..............  Not Found    Overdue    [employer] Embedded Care Gaps. Reference number: [id number]. 5/20/2022   2:14:17 PM CDT

## 2022-05-20 NOTE — TELEPHONE ENCOUNTER
Refill Routing Note   Medication(s) are not appropriate for processing by [facility] for the following reason(s):      - Required laboratory values are outdated  - Required vitals are abnormal  - Patient has been seen in the ED/Hospital since the last PCP visit    [facility] action(s):  Defer          Medication reconciliation completed: No     Appointments  past 12m or future 3m with PCP    Date Provider   Last Visit   3/3/2022 Negro Finnegan MD   Next Visit   6/10/2022 Negro Finnegan MD   ED visits in past 90 days: 1        Note composed:5:30 PM 05/20/2022

## 2022-05-22 RX ORDER — PRAVASTATIN SODIUM 40 MG/1
TABLET ORAL
Qty: 90 TABLET | Refills: 3 | Status: SHIPPED | OUTPATIENT
Start: 2022-05-22 | End: 2022-05-26 | Stop reason: SDUPTHER

## 2022-05-22 RX ORDER — METOPROLOL SUCCINATE 25 MG/1
TABLET, EXTENDED RELEASE ORAL
Qty: 90 TABLET | Refills: 1 | Status: SHIPPED | OUTPATIENT
Start: 2022-05-22 | End: 2022-05-26 | Stop reason: SDUPTHER

## 2022-05-23 ENCOUNTER — PATIENT MESSAGE (OUTPATIENT)
Dept: ADMINISTRATIVE | Facility: HOSPITAL | Age: 87
End: 2022-05-23
Payer: MEDICARE

## 2022-05-24 ENCOUNTER — TELEPHONE (OUTPATIENT)
Dept: PRIMARY CARE CLINIC | Facility: CLINIC | Age: 87
End: 2022-05-24

## 2022-05-24 DIAGNOSIS — E03.9 HYPOTHYROIDISM, UNSPECIFIED TYPE: ICD-10-CM

## 2022-05-24 RX ORDER — LEVOTHYROXINE SODIUM 88 UG/1
TABLET ORAL
Qty: 30 TABLET | Refills: 0 | Status: SHIPPED | OUTPATIENT
Start: 2022-05-24 | End: 2022-05-26

## 2022-05-24 NOTE — TELEPHONE ENCOUNTER
Attempted to contact patient. Left detailed message stating medication had been sent to the pharmacy for refill.

## 2022-05-24 NOTE — TELEPHONE ENCOUNTER
----- Message from June Thapa sent at 5/24/2022 10:54 AM CDT -----  Regarding: pharmacy call  Contact: tangela  Type:  Pharmacy Calling to Clarify an RX       Pharmacy Name:    [id number] [place] [address] AT [facility] & [address] [address]  Phone: [phone] Fax: [phone]    Prescription Name:  levothyroxine (SYNTHROID) 88 MCG tablet  What do they need to clarify?:  approval to fill  Best Call Back Number:  [phone]    Additional Information:  mail order is late, asking for a supply to be approved at local pharmacy

## 2022-05-24 NOTE — TELEPHONE ENCOUNTER
----- Message from Ortizestefaniarayshawn Tanner sent at 5/24/2022  1:59 PM CDT -----  Contact: pt at [phone]  Type: Needs Medical Advice  Who Called:  pt  Best Call Back Number: [phone]  Additional Information: pt is calling the office regarding her medications. She states she usually orders her scripts from [facility] but delivery takes to long at times. She want all of her of medications call in to the [facility] on [address] so if she runs out she can get it from them. Please call back and advise.  PER THE PT SHE REALLY NEED HER THYROID MEDICATION BUT WANTS ALL OF HER MEDICATION CALL IN.

## 2022-05-26 DIAGNOSIS — E03.9 HYPOTHYROIDISM, UNSPECIFIED TYPE: ICD-10-CM

## 2022-05-26 RX ORDER — LEVOTHYROXINE SODIUM 88 UG/1
TABLET ORAL
Qty: 90 TABLET | Refills: 0 | Status: SHIPPED | OUTPATIENT
Start: 2022-05-26 | End: 2022-08-26 | Stop reason: SDUPTHER

## 2022-05-26 RX ORDER — METOPROLOL SUCCINATE 25 MG/1
25 TABLET, EXTENDED RELEASE ORAL DAILY
Qty: 10 TABLET | Refills: 0 | Status: SHIPPED | OUTPATIENT
Start: 2022-05-26 | End: 2022-09-02 | Stop reason: SDUPTHER

## 2022-05-26 RX ORDER — PRAVASTATIN SODIUM 40 MG/1
40 TABLET ORAL DAILY
Qty: 10 TABLET | Refills: 0 | Status: SHIPPED | OUTPATIENT
Start: 2022-05-26 | End: 2022-09-02 | Stop reason: SDUPTHER

## 2022-05-26 NOTE — TELEPHONE ENCOUNTER
Refill Routing Note   Medication(s) are not appropriate for processing by [facility] for the following reason(s):      - Patient has been seen in the ED/Hospital since the last PCP visit    [facility] action(s):  Route          Medication reconciliation completed: No     Appointments  past 12m or future 3m with PCP    Date Provider   Last Visit   3/3/2022 Negro Finnegan MD   Next Visit   6/10/2022 Negro Finnegan MD   ED visits in past 90 days: 1        Note composed:10:56 AM 05/26/2022

## 2022-05-26 NOTE — TELEPHONE ENCOUNTER
----- Message from Ara Littlejohn sent at 5/26/2022  8:19 AM CDT -----  Contact: pt  Type:  RX Refill Request    Who Called:  pt  Refill or New Rx:  Temp supply     levothyroxine (SYNTHROID) 88 MCG tablet  pravastatin (PRAVACHOL) 40 MG tablet  metoprolol succinate (TOPROL-XL) 25 MG 24 hr tablet    RX Name and Strength:    How is the patient currently taking it? (ex. 1XDay):  as ordered  Is this a 30 day or 90 day RX:  30  Preferred Pharmacy with phone number:    [id number] [place] [address] AT [address] & [address] [address]  Phone: [phone] Fax: [phone]    Local or Mail Order: local  Ordering Provider:  ace   Best Call Back Number:  [phone]  Additional Information:  Jr did not ship in time and her rx is delayed. Pt just needs enough to get her by until they arrive in 1 week.  Pt would like  office to call her once done due to her not driving and has to take a cab to .

## 2022-05-26 NOTE — TELEPHONE ENCOUNTER
Pt requesting temporary refill on Pravastain and Metoprolol sent to [facility]. States she is out of medication.

## 2022-05-26 NOTE — TELEPHONE ENCOUNTER
No new care gaps identified.  [facility] Embedded Care Gaps. Reference number: [id number]. 5/26/2022   10:56:21 AM GELYT

## 2022-05-26 NOTE — TELEPHONE ENCOUNTER
Spoke to patient and stated to her that the requested prescriptions have been sent to the pharmacy. Verbalized understanding.

## 2022-06-13 ENCOUNTER — TELEPHONE (OUTPATIENT)
Dept: ADMINISTRATIVE | Facility: HOSPITAL | Age: 87
End: 2022-06-13
Payer: MEDICARE

## 2022-06-13 ENCOUNTER — PATIENT OUTREACH (OUTPATIENT)
Dept: ADMINISTRATIVE | Facility: HOSPITAL | Age: 87
End: 2022-06-13
Payer: MEDICARE

## 2022-06-13 NOTE — PROGRESS NOTES
Population Health review for Dexa history. Working a report. Called pt and scheduled a dexa screening for 06/17/2022 at [facility], order linked and appt confirmed. Pt proceeded to tell me that she fell 06/10/2022 and her left ankle a leg was hurting. She stated that the left foot was swollen and the swelling seemed to be moving up her leg. She sated that it was bruised and painful. I attempted to get her into the clinic to see a provider, but was not able to find an opening. I encouraged the pt to have her care taker bring her to the [facility] on [address] or go to the nearest ED to have it checked. Sending a message to provider at this time.

## 2022-06-13 NOTE — TELEPHONE ENCOUNTER
While calling pt about scheduling a dexa screening, she  proceeded to tell me that she fell 06/10/2022 and her left ankle a leg was hurting. She stated that the left foot was swollen and the swelling seemed to be moving up her leg. She sated that it was bruised and painful. I attempted to get her into the clinic to see a provider, but was not able to find an opening. I encouraged the pt to have her care taker bring her to the [facility] on [address] or go to the nearest ED to have it checked. Sending a message to provider at this time.

## 2022-06-13 NOTE — TELEPHONE ENCOUNTER
Is it possible to get in her here to see me. I can see her and evaluate whether an Xray or US may be more helpful in evaluating the swelling and pain after an exam of the ankles.

## 2022-06-17 ENCOUNTER — HOSPITAL ENCOUNTER (OUTPATIENT)
Dept: RADIOLOGY | Facility: HOSPITAL | Age: 87
Discharge: HOME | End: 2022-06-17
Attending: FAMILY MEDICINE
Payer: MEDICARE

## 2022-06-17 DIAGNOSIS — Z78.0 ASYMPTOMATIC MENOPAUSAL STATE: ICD-10-CM

## 2022-06-17 PROCEDURE — 77080 DXA BONE DENSITY AXIAL: CPT | Mod: TC,PO

## 2022-06-17 PROCEDURE — 77080 DXA BONE DENSITY AXIAL: CPT | Mod: 26,,, | Performed by: RADIOLOGY

## 2022-06-17 PROCEDURE — 77080 DEXA BONE DENSITY SPINE HIP: ICD-10-PCS | Mod: 26,,, | Performed by: RADIOLOGY

## 2022-07-11 ENCOUNTER — OFFICE VISIT (OUTPATIENT)
Dept: PRIMARY CARE CLINIC | Facility: CLINIC | Age: 87
End: 2022-07-11
Payer: MEDICARE

## 2022-07-11 VITALS
BODY MASS INDEX: 23.73 KG/M2 | SYSTOLIC BLOOD PRESSURE: 130 MMHG | WEIGHT: 142.44 LBS | HEART RATE: 76 BPM | DIASTOLIC BLOOD PRESSURE: 70 MMHG | HEIGHT: 65 IN

## 2022-07-11 DIAGNOSIS — M85.80 OSTEOPENIA, UNSPECIFIED LOCATION: ICD-10-CM

## 2022-07-11 DIAGNOSIS — I25.10 NON-OCCLUSIVE CORONARY ARTERY DISEASE: Chronic | ICD-10-CM

## 2022-07-11 DIAGNOSIS — I10 ESSENTIAL HYPERTENSION: Chronic | ICD-10-CM

## 2022-07-11 DIAGNOSIS — I48.11 LONGSTANDING PERSISTENT ATRIAL FIBRILLATION: Primary | Chronic | ICD-10-CM

## 2022-07-11 DIAGNOSIS — F41.9 ANXIETY: ICD-10-CM

## 2022-07-11 DIAGNOSIS — N18.30 STAGE 3 CHRONIC KIDNEY DISEASE, UNSPECIFIED WHETHER STAGE 3A OR 3B CKD: ICD-10-CM

## 2022-07-11 PROCEDURE — 3288F FALL RISK ASSESSMENT DOCD: CPT | Mod: CPTII,S$GLB,, | Performed by: FAMILY MEDICINE

## 2022-07-11 PROCEDURE — 1159F PR MEDICATION LIST DOCUMENTED IN MEDICAL RECORD: ICD-10-PCS | Mod: CPTII,S$GLB,, | Performed by: FAMILY MEDICINE

## 2022-07-11 PROCEDURE — 1101F PT FALLS ASSESS-DOCD LE1/YR: CPT | Mod: CPTII,S$GLB,, | Performed by: FAMILY MEDICINE

## 2022-07-11 PROCEDURE — 99214 OFFICE O/P EST MOD 30 MIN: CPT | Mod: S$GLB,,, | Performed by: FAMILY MEDICINE

## 2022-07-11 PROCEDURE — 1160F RVW MEDS BY RX/DR IN RCRD: CPT | Mod: CPTII,S$GLB,, | Performed by: FAMILY MEDICINE

## 2022-07-11 PROCEDURE — 1125F AMNT PAIN NOTED PAIN PRSNT: CPT | Mod: CPTII,S$GLB,, | Performed by: FAMILY MEDICINE

## 2022-07-11 PROCEDURE — 3288F PR FALLS RISK ASSESSMENT DOCUMENTED: ICD-10-PCS | Mod: CPTII,S$GLB,, | Performed by: FAMILY MEDICINE

## 2022-07-11 PROCEDURE — 99499 UNLISTED E&M SERVICE: CPT | Mod: S$GLB,,, | Performed by: FAMILY MEDICINE

## 2022-07-11 PROCEDURE — 1125F PR PAIN SEVERITY QUANTIFIED, PAIN PRESENT: ICD-10-PCS | Mod: CPTII,S$GLB,, | Performed by: FAMILY MEDICINE

## 2022-07-11 PROCEDURE — 99999 PR PBB SHADOW E&M-EST. PATIENT-LVL IV: ICD-10-PCS | Mod: PBBFAC,,, | Performed by: FAMILY MEDICINE

## 2022-07-11 PROCEDURE — 99999 PR PBB SHADOW E&M-EST. PATIENT-LVL IV: CPT | Mod: PBBFAC,,, | Performed by: FAMILY MEDICINE

## 2022-07-11 PROCEDURE — 1101F PR PT FALLS ASSESS DOC 0-1 FALLS W/OUT INJ PAST YR: ICD-10-PCS | Mod: CPTII,S$GLB,, | Performed by: FAMILY MEDICINE

## 2022-07-11 PROCEDURE — 99214 PR OFFICE/OUTPT VISIT, EST, LEVL IV, 30-39 MIN: ICD-10-PCS | Mod: S$GLB,,, | Performed by: FAMILY MEDICINE

## 2022-07-11 PROCEDURE — 1159F MED LIST DOCD IN RCRD: CPT | Mod: CPTII,S$GLB,, | Performed by: FAMILY MEDICINE

## 2022-07-11 PROCEDURE — 99499 RISK ADDL DX/OHS AUDIT: ICD-10-PCS | Mod: S$GLB,,, | Performed by: FAMILY MEDICINE

## 2022-07-11 PROCEDURE — 1160F PR REVIEW ALL MEDS BY PRESCRIBER/CLIN PHARMACIST DOCUMENTED: ICD-10-PCS | Mod: CPTII,S$GLB,, | Performed by: FAMILY MEDICINE

## 2022-07-11 NOTE — ASSESSMENT & PLAN NOTE
Relatively stable with baseline creatinine about 1.5.  No uremic symptoms, no obstructive voiding symptoms.  I encouraged her to follow back regularly with Nephrology but she states she does not plan to follow with her previous nephrologist because she has difficulty scheduling.  She requested a referral within our system so this was entered for her.

## 2022-07-11 NOTE — ASSESSMENT & PLAN NOTE
Reviewed, she follows with cardiology.  No chest pain or shortness of breath.  This appears stable.  Continue current medications and regular follow-up with Cardiology.

## 2022-07-11 NOTE — PROGRESS NOTES
THIS DOCUMENT WAS MADE IN PART WITH VOICE RECOGNITION SOFTWARE.  OCCASIONALLY THIS SOFTWARE WILL MISINTERPRET WORDS OR PHRASES.      Primary Care Provider Appointment   [facility] ([facility])  [address] [place] [address]   Ph: [phone]  Fax: [phone]      Patient ID: Maggi Fontaine is a 89 y.o. female.    ASSESSMENT/PLAN by Problem List:  Problem List Items Addressed This Visit     Non-occlusive coronary artery disease (Chronic)     Reviewed, she follows with cardiology.  No chest pain or shortness of breath.  This appears stable.  Continue current medications and regular follow-up with Cardiology.           Longstanding persistent atrial fibrillation - Primary (Chronic)     Patient was taken off of Eliquis and placed on Xarelto because of panic attacks.  Although she reports that she is now in the donut hole because of the cost of these medications.  She asked for an alternative.  I advised her that her cardiologist manages this and she should discuss with him but did advise her the only alternative would be Coumadin and discuss the issues, blood test, more difficult to manage, etc..  She will continue the Xarelto for now but discuss further with her cardiologist.           Essential hypertension (Chronic)     Stable, satisfactory control.           Chronic kidney disease, stage III (moderate) (Chronic)     Relatively stable with baseline creatinine about 1.5.  No uremic symptoms, no obstructive voiding symptoms.  I encouraged her to follow back regularly with Nephrology but she states she does not plan to follow with her previous nephrologist because she has difficulty scheduling.  She requested a referral within our system so this was entered for her.           Relevant Orders    Ambulatory referral/consult to Nephrology    Anxiety     This seems to have improved since she was taken off the Eliquis and placed on Xarelto.  Although she has not certain if  she is still taking the Lexapro or not which may be helping.  I asked her caregiver to double check and let us know if she has been taking this when they get home.           Osteopenia     Mild osteopenia but based on recent DEXA.  I can find no history of previous treatment with anti resorptive therapy.  With a FRAX score of 5.1% at the hip especially with recent fracture, she does qualify for anti resorptive therapy, however kidney function borderline a GFR at 30 makes me uncomfortable prescribing an oral bisphosphonate as well as Prolia, as this is going to worsen.  She is not a candidate for evista because of risk of thrombotic disease.  And she is really not interested in considering daily injection like Forteo.  So I recommend resuming weight-bearing exercise once her ankle heals, recommend continuing her vitamin-D supplement along with the calcium rich diet      FINDINGS:  The L1-4 vertebral bone mineral density is equal to 0.901 g/cm squared with a T score of -1.3.  The left femoral neck bone mineral density is equal to 0.643 g/cm squared with a T score of -1.9.  The total hip bone mineral density is equal to 0.837 g/cm squared with a T score of -0.9.  There is a 15% risk of a major osteoporotic fracture and a 5.1% risk of hip fracture in the next 10 years (FRAX).                    Follow Up:  Four months      Health Maintenance       Date Due Completion Date    Shingles Vaccine (1 of 2) Never done ---    Lipid Panel 11/22/2020 11/22/2019    COVID-19 Vaccine (4 - Booster for Pfizer series) 01/04/2022 10/4/2021    Influenza Vaccine (1) 09/01/2022 8/19/2021    TETANUS VACCINE 02/23/2029 2/23/2019          Subjective:     Chief Complaint   Patient presents with    Hypertension     Follow up    Hypothyroidism    Medication Problem     Would like to talk about Xarelto Rx as she is in the donut hole with medication      I have reviewed the information entered by the ancillary staff regarding the chief  complaint as well as the related history.    HPI    Patient is a/an 89 y.o.  female   RISK of ADMIT/ED: 71    Routine follow-up.  She is here with her caregiver.  She recently had a fall and suffered a fibula fracture on the left side.  She is seeing external Orthopedics and is improving.  But she is still very limited.  She has questions about her anticoagulation, and she request consultation/referral to a new nephrologist.  See above for all details addressed today    For complete problem list, past medical history, surgical history, social history, etc., see appropriate section in the electronic medical record    Review of Systems   HENT: Negative.    Respiratory: Negative.  Negative for stridor.    Cardiovascular: Positive for leg swelling.   Gastrointestinal: Negative.    Genitourinary: Negative.    Musculoskeletal: Positive for arthralgias and gait problem.   Psychiatric/Behavioral: Negative for dysphoric mood and sleep disturbance. The patient is not nervous/anxious.        Objective     Physical Exam  Vitals reviewed.   Constitutional:       General: She is not in acute distress.     Appearance: She is well-developed. She is not toxic-appearing or diaphoretic.   HENT:      Head: Normocephalic and atraumatic.      Mouth/Throat:      Pharynx: Oropharynx is clear. No oropharyngeal exudate.   Eyes:      General: No scleral icterus.     Conjunctiva/sclera: Conjunctivae normal.   Cardiovascular:      Rate and Rhythm: Normal rate. Rhythm irregular.      Heart sounds: Normal heart sounds. No murmur heard.     Comments: No edema  Pulmonary:      Effort: Pulmonary effort is normal. No respiratory distress.      Breath sounds: Normal breath sounds. No wheezing.   Musculoskeletal:      Comments: There is some swelling mild bruising noted over the lateral left ankle.   Neurological:      Mental Status: She is alert.      Motor: No tremor or abnormal muscle tone.      Coordination: Finger-Nose-Finger Test normal. Rapid  "alternating movements normal.      Comments: She was brought back in a wheelchair today because of recent left tibial fracture.   Psychiatric:         Mood and Affect: Mood normal.         Behavior: Behavior normal.       Vitals:    07/11/22 1030   BP: 130/70   BP Location: Left arm   Patient Position: Sitting   BP Method: Medium (Manual)   Pulse: 76   Weight: 64.6 kg (142 lb 6.7 oz)   Height: 5' 5" (1.651 m)       RECENT LABS:    Lab Results   Component Value Date    WBC 5.12 01/24/2022    HGB 13.7 01/24/2022    HCT 39.5 01/24/2022     01/24/2022    CHOL 135 11/22/2019    TRIG 102 11/22/2019    HDL 47 11/22/2019    ALT 18 03/23/2021    AST 29 03/23/2021     01/24/2022    K 4.4 01/24/2022     01/24/2022    CREATININE 1.5 (H) 01/24/2022    BUN 29 (H) 01/24/2022    CO2 21 (L) 01/24/2022    TSH 0.441 04/29/2022    INR 1.3 12/03/2020    GLUF 112 (H) 10/05/2005    HGBA1C 5.5 12/04/2020       Results for orders placed or performed during the hospital encounter of 05/11/22   Nuclear Stress - Cardiology Interpreted   Result Value Ref Range    85% Max Predicted      Max Predicted      OHS CV CPX PATIENT IS MALE 0.0     OHS CV CPX PATIENT IS FEMALE 1.0     HR at rest 116 bpm    Systolic blood pressure 112 mmHg    Diastolic blood pressure 76 mmHg    RPP 12,992     Peak  bpm    Peak Systolic  mmHg    Peak Diatolic BP 64 mmHg    Peak RPP 13,100     % Max HR Achieved 103     Nuc Rest EF 40      *Note: Due to a large number of results and/or encounters for the requested time period, some results have not been displayed. A complete set of results can be found in Results Review.       "

## 2022-07-11 NOTE — ASSESSMENT & PLAN NOTE
Patient was taken off of Eliquis and placed on Xarelto because of panic attacks.  Although she reports that she is now in the donut hole because of the cost of these medications.  She asked for an alternative.  I advised her that her cardiologist manages this and she should discuss with him but did advise her the only alternative would be Coumadin and discuss the issues, blood test, more difficult to manage, etc..  She will continue the Xarelto for now but discuss further with her cardiologist.

## 2022-07-11 NOTE — ASSESSMENT & PLAN NOTE
This seems to have improved since she was taken off the Eliquis and placed on Xarelto.  Although she has not certain if she is still taking the Lexapro or not which may be helping.  I asked her caregiver to double check and let us know if she has been taking this when they get home.

## 2022-07-11 NOTE — ASSESSMENT & PLAN NOTE
Mild osteopenia but based on recent DEXA.  I can find no history of previous treatment with anti resorptive therapy.  With a FRAX score of 5.1% at the hip especially with recent fracture, she does qualify for anti resorptive therapy, however kidney function borderline a GFR at 30 makes me uncomfortable prescribing an oral bisphosphonate as well as Prolia, as this is going to worsen.  She is not a candidate for evista because of risk of thrombotic disease.  And she is really not interested in considering daily injection like Forteo.  So I recommend resuming weight-bearing exercise once her ankle heals, recommend continuing her vitamin-D supplement along with the calcium rich diet      FINDINGS:  The L1-4 vertebral bone mineral density is equal to 0.901 g/cm squared with a T score of -1.3.  The left femoral neck bone mineral density is equal to 0.643 g/cm squared with a T score of -1.9.  The total hip bone mineral density is equal to 0.837 g/cm squared with a T score of -0.9.  There is a 15% risk of a major osteoporotic fracture and a 5.1% risk of hip fracture in the next 10 years (FRAX).

## 2022-07-19 NOTE — TELEPHONE ENCOUNTER
See if she's willing to come in this afternoon for a strep test. Most sore throat are viral but if positive for strep then an antibiotic might help her. It's negative then anabiotic might actually her feel worse.     If she refuses to let me know  
----- Message from Arielle Tejeda sent at 5/8/2018  8:03 AM CDT -----  Contact: [phone]  Patient is requesting a call back from the nurse stated she have a sore throat and requesting medication to be called in.    Please call the patient upon request at phone number [phone].    Patient will be using   [facility] [address] [place] [address] AT [facility] & [address] [address]  Phone: [phone] Fax: [phone]    
I spoke with her.  I advised her the rapid strep was negative.  Most likely this is viral.  She seems to understand and was okay waiting to see how she does tomorrow for the culture to return.  If she gets worse she will let me know.  
Orders signed  
Pt will be coming in for a strep test please place orders.   Pended orders.  
Type: Flu Symptoms/URI/Cold/Sinus    How long? :  Several Days  Fever? Yes  Congestion?Yes   Cough? Yes  Mucous? Yes   If yes, what color? clear  Shortness of Breath / Wheezing? n/a   Any medications taken over the counter? No  If so, what medications? N/A  Has anyone around been sick? yes, a friend  Have you seen recently for this issue? No  Are you allergic to anything? Yes   Review of patient's allergies indicates:  Allergen Reactions   Nsaids (non-steroidal anti-inflammatory drug) Other (See Comments)    Other reaction(s): Chronic Renal Insufficiency   Penicillins Swelling    Preferred pharmacy?   [facility] [address] [place] [address] AT SEC of [facility] & [address] [address]  Phone: [phone] Fax: [phone]    Additional information:  When pt coughs there is yellow mucous coming out.     Pt would really like a STRONG antibiotic called in. Advised pt that Dr. Finnegan will order a medication that best suits her symptoms. Pt would like a call back when something is sent in.    
potc was negative  
abdominal pain

## 2022-08-26 ENCOUNTER — TELEPHONE (OUTPATIENT)
Dept: PRIMARY CARE CLINIC | Facility: CLINIC | Age: 87
End: 2022-08-26
Payer: MEDICARE

## 2022-08-26 DIAGNOSIS — E03.9 HYPOTHYROIDISM, UNSPECIFIED TYPE: ICD-10-CM

## 2022-08-26 RX ORDER — LEVOTHYROXINE SODIUM 88 UG/1
TABLET ORAL
Qty: 30 TABLET | Refills: 0 | Status: SHIPPED | OUTPATIENT
Start: 2022-08-26 | End: 2022-08-26 | Stop reason: SDUPTHER

## 2022-08-26 RX ORDER — LEVOTHYROXINE SODIUM 88 UG/1
TABLET ORAL
Qty: 90 TABLET | Refills: 0 | Status: CANCELLED | OUTPATIENT
Start: 2022-08-26

## 2022-08-26 RX ORDER — LEVOTHYROXINE SODIUM 88 UG/1
TABLET ORAL
Qty: 90 TABLET | Refills: 1 | Status: SHIPPED | OUTPATIENT
Start: 2022-08-26 | End: 2023-09-06 | Stop reason: SDUPTHER

## 2022-08-26 NOTE — TELEPHONE ENCOUNTER
Pt came into the office, states [facility] told her that all Rx were canceled by Dr Finnegan. Assured pt there must be a mistake, we will look into it and notify pt.     Pt request Rx for Levothyroxine to be sent to local pharmacy until mail order Rx is received. Refill request sent to Dr Finnegan. Ame will contact pt regarding mail order Rx

## 2022-08-26 NOTE — TELEPHONE ENCOUNTER
Spoke to patient and stated to her what was noted. Verbalized understanding. Nothing further needed.

## 2022-09-02 ENCOUNTER — OFFICE VISIT (OUTPATIENT)
Dept: NEPHROLOGY | Facility: CLINIC | Age: 87
End: 2022-09-02
Payer: MEDICARE

## 2022-09-02 ENCOUNTER — PATIENT MESSAGE (OUTPATIENT)
Dept: NEPHROLOGY | Facility: CLINIC | Age: 87
End: 2022-09-02

## 2022-09-02 VITALS
OXYGEN SATURATION: 94 % | SYSTOLIC BLOOD PRESSURE: 106 MMHG | HEART RATE: 87 BPM | WEIGHT: 142 LBS | DIASTOLIC BLOOD PRESSURE: 60 MMHG | HEIGHT: 65 IN | BODY MASS INDEX: 23.66 KG/M2

## 2022-09-02 DIAGNOSIS — N18.30 STAGE 3 CHRONIC KIDNEY DISEASE, UNSPECIFIED WHETHER STAGE 3A OR 3B CKD: ICD-10-CM

## 2022-09-02 DIAGNOSIS — I10 ESSENTIAL HYPERTENSION: Primary | Chronic | ICD-10-CM

## 2022-09-02 PROCEDURE — 1159F PR MEDICATION LIST DOCUMENTED IN MEDICAL RECORD: ICD-10-PCS | Mod: CPTII,S$GLB,, | Performed by: INTERNAL MEDICINE

## 2022-09-02 PROCEDURE — 99999 PR PBB SHADOW E&M-EST. PATIENT-LVL III: ICD-10-PCS | Mod: PBBFAC,,, | Performed by: INTERNAL MEDICINE

## 2022-09-02 PROCEDURE — 1160F RVW MEDS BY RX/DR IN RCRD: CPT | Mod: CPTII,S$GLB,, | Performed by: INTERNAL MEDICINE

## 2022-09-02 PROCEDURE — 99999 PR PBB SHADOW E&M-EST. PATIENT-LVL III: CPT | Mod: PBBFAC,,, | Performed by: INTERNAL MEDICINE

## 2022-09-02 PROCEDURE — 1100F PTFALLS ASSESS-DOCD GE2>/YR: CPT | Mod: CPTII,S$GLB,, | Performed by: INTERNAL MEDICINE

## 2022-09-02 PROCEDURE — 99204 PR OFFICE/OUTPT VISIT, NEW, LEVL IV, 45-59 MIN: ICD-10-PCS | Mod: S$GLB,,, | Performed by: INTERNAL MEDICINE

## 2022-09-02 PROCEDURE — 3288F FALL RISK ASSESSMENT DOCD: CPT | Mod: CPTII,S$GLB,, | Performed by: INTERNAL MEDICINE

## 2022-09-02 PROCEDURE — 1159F MED LIST DOCD IN RCRD: CPT | Mod: CPTII,S$GLB,, | Performed by: INTERNAL MEDICINE

## 2022-09-02 PROCEDURE — 3288F PR FALLS RISK ASSESSMENT DOCUMENTED: ICD-10-PCS | Mod: CPTII,S$GLB,, | Performed by: INTERNAL MEDICINE

## 2022-09-02 PROCEDURE — 99204 OFFICE O/P NEW MOD 45 MIN: CPT | Mod: S$GLB,,, | Performed by: INTERNAL MEDICINE

## 2022-09-02 PROCEDURE — 1160F PR REVIEW ALL MEDS BY PRESCRIBER/CLIN PHARMACIST DOCUMENTED: ICD-10-PCS | Mod: CPTII,S$GLB,, | Performed by: INTERNAL MEDICINE

## 2022-09-02 PROCEDURE — 1100F PR PT FALLS ASSESS DOC 2+ FALLS/FALL W/INJURY/YR: ICD-10-PCS | Mod: CPTII,S$GLB,, | Performed by: INTERNAL MEDICINE

## 2022-09-02 RX ORDER — PRAVASTATIN SODIUM 40 MG/1
40 TABLET ORAL DAILY
Qty: 90 TABLET | Refills: 1 | Status: SHIPPED | OUTPATIENT
Start: 2022-09-02 | End: 2023-02-24 | Stop reason: SDUPTHER

## 2022-09-02 RX ORDER — METOPROLOL SUCCINATE 25 MG/1
25 TABLET, EXTENDED RELEASE ORAL DAILY
Qty: 90 TABLET | Refills: 1 | Status: SHIPPED | OUTPATIENT
Start: 2022-09-02 | End: 2023-02-22

## 2022-09-02 NOTE — PROGRESS NOTES
Subjective:       Patient ID: Maggi Fontaine is a 89 y.o. White female who presents for new patient evaluation for chronic renal failure.    Maggi Fontaine is referred by Negro Finnegan MD to be evaluated for chronic renal failure.      I was asked to evaluate/give opinon this patient who was seeing another kidney doctor but she wishes to establish care with us now.    She has no uremic or urinary symptoms and is in her usual state of health.  There have been no recent illnesses, hospitalizations or procedures.  She denies chronic NSAIDs.      Review of Systems   Constitutional:  Negative for appetite change, chills and fever.   HENT:  Positive for hearing loss. Negative for congestion.    Eyes:  Negative for visual disturbance.   Respiratory:  Negative for cough and shortness of breath.    Cardiovascular:  Negative for chest pain and leg swelling.   Gastrointestinal:  Negative for abdominal pain, diarrhea, nausea and vomiting.   Genitourinary:  Negative for difficulty urinating, dysuria and hematuria.   Musculoskeletal:  Negative for myalgias.   Skin:  Negative for rash.   Neurological:  Negative for headaches.   Psychiatric/Behavioral:  Negative for sleep disturbance.      The past medical, family and social histories were reviewed for this encounter.     Past Medical History:   Diagnosis Date    Acid reflux     Arthritis     At risk for falling     Atrial fibrillation     Cardiac pacemaker     Gait instability     muscle weakness    Gout     Headache(784.0)     HEARING LOSS     Hyperlipidemia     Hypertension     Hypothyroidism     Pacemaker     Polymyalgia rheumatica     remission now    Renal insufficiency     Sinusitis     SOB (shortness of breath)     Stroke     history of TIA    Urinary incontinence     West Nile encephalitis     2002     Past Surgical History:   Procedure Laterality Date    ANGIOGRAM, CORONARY, WITH LEFT HEART CATHETERIZATION Left 2/1/2021    Procedure: Angiogram, Coronary, with Left  Heart Cath;  Surgeon: Eriberto Alvarez III, MD;  Location: [facility];  Service: Cardiology;  Laterality: Left;    CARDIAC PACEMAKER PLACEMENT      CATARACT EXTRACTION EXTRACAPSULAR W/ INTRAOCULAR LENS IMPLANTATION      EYE SURGERY      HYSTERECTOMY      JOINT REPLACEMENT      anupam knees    KNEE SURGERY      PARTIAL HYSTERECTOMY      TREATMENT OF CARDIAC ARRHYTHMIA N/A 7/8/2019    Procedure: CARDIOVERSION;  Surgeon: Eriberto Alvarez III, MD;  Location: [facility];  Service: Cardiology;  Laterality: N/A;     Social History     Socioeconomic History    Marital status:    Occupational History    Occupation: homemaker   Tobacco Use    Smoking status: Never    Smokeless tobacco: Never   Substance and Sexual Activity    Alcohol use: Yes     Alcohol/week: 0.8 standard drinks     Types: 1 Standard drinks or equivalent per week     Comment: less than 1 drink weekly    Drug use: No   Other Topics Concern    Financial Status: Other Yes    Home situation: lives with family Yes    Leisure: Time with family Yes    Childhood History: Raised by parents Yes    Home situation: lives with significant other Yes    Childhood History: Uneventful Yes    Leisure: Shopping Yes   Social History Narrative    Born and raised in [place].  Came to [place] at 17 to work.  Met and   at 19 - 3 children, 59 years .  He will probably be going to a  for alzheimer dx next week.  He is currently hospitalized for medical clearance.       Current Outpatient Medications   Medication Sig    ALPRAZolam (XANAX) 0.25 MG tablet TAKE 1 TABLET BY MOUTH DAILY AS NEEDED FOR PANIC ATTACKS    ergocalciferol (ERGOCALCIFEROL) 50,000 unit Cap TAKE 1 CAPSULE EVERY WEEK    furosemide (LASIX) 20 MG tablet Take one tablet daily as needed for swelling    levothyroxine (SYNTHROID) 88 MCG tablet TAKE 1 TABLET BY MOUTH EVERY DAY BEFORE BREAKFAST    metoprolol succinate (TOPROL-XL) 25 MG 24 hr tablet Take 1 tablet (25 mg total) by mouth once  "daily.    multivitamin capsule Take 1 capsule by mouth once daily.    pravastatin (PRAVACHOL) 40 MG tablet Take 1 tablet (40 mg total) by mouth once daily.    rivaroxaban (XARELTO) 15 mg Tab Take 1 tablet (15 mg total) by mouth daily with dinner or evening meal.    EScitalopram oxalate (LEXAPRO) 5 MG Tab Take 1 tablet (5 mg total) by mouth once daily. (Patient not taking: Reported on 9/2/2022)     No current facility-administered medications for this visit.       /60 (BP Location: Left arm, Patient Position: Sitting, BP Method: Medium (Manual))   Pulse 87   Ht 5' 5" (1.651 m)   Wt 64.4 kg (142 lb)   SpO2 (!) 94%   BMI 23.63 kg/m²     Objective:      Physical Exam  Vitals reviewed.   Constitutional:       General: She is not in acute distress.     Appearance: She is well-developed.   HENT:      Head: Normocephalic and atraumatic.   Eyes:      General: No scleral icterus.     Conjunctiva/sclera: Conjunctivae normal.   Neck:      Vascular: No JVD.   Cardiovascular:      Rate and Rhythm: Normal rate and regular rhythm.      Heart sounds: Normal heart sounds. No murmur heard.    No friction rub. No gallop.   Pulmonary:      Effort: Pulmonary effort is normal. No respiratory distress.      Breath sounds: Normal breath sounds. No wheezing.   Abdominal:      General: Bowel sounds are normal. There is no distension.      Palpations: Abdomen is soft.      Tenderness: There is no abdominal tenderness.   Musculoskeletal:      Cervical back: Normal range of motion.      Right lower leg: No edema.      Left lower leg: No edema.   Skin:     General: Skin is warm and dry.      Findings: No rash.   Neurological:      Mental Status: She is alert and oriented to person, place, and time.   Psychiatric:         Mood and Affect: Mood normal.         Behavior: Behavior normal.       Assessment:       1. Essential hypertension    2. Stage 3 chronic kidney disease, unspecified whether stage 3a or 3b CKD          Plan:   Return to " clinic in 4 months.  Labs for next visit include rp pth.  Baseline creatinine is 1.3-1.6 since 2004.  UPC is negative.  Your blood pressure is controlled on your current medications.   She may need her metoprolol dose dropped however to avoid hypotension.  She has had an overall stable baseline since 2004.  We discussed things to avoid as well as precautions to help keep her function stable.    Regarding her tendency toward falls, I did  her that it is imperative that she avoid falls as this oses a significant risk of morbidity and mortality.

## 2022-10-28 ENCOUNTER — CLINICAL SUPPORT (OUTPATIENT)
Dept: PRIMARY CARE CLINIC | Facility: CLINIC | Age: 87
End: 2022-10-28
Payer: MEDICARE

## 2022-10-28 DIAGNOSIS — Z20.822 EXPOSURE TO COVID-19 VIRUS: Primary | ICD-10-CM

## 2022-10-28 LAB
CTP QC/QA: YES
SARS-COV-2 RDRP RESP QL NAA+PROBE: NEGATIVE

## 2022-10-28 PROCEDURE — 87635: ICD-10-PCS | Mod: QW,S$GLB,, | Performed by: FAMILY MEDICINE

## 2022-10-28 PROCEDURE — 87635 SARS-COV-2 COVID-19 AMP PRB: CPT | Mod: QW,S$GLB,, | Performed by: FAMILY MEDICINE

## 2022-12-28 ENCOUNTER — OFFICE VISIT (OUTPATIENT)
Dept: PRIMARY CARE CLINIC | Facility: CLINIC | Age: 87
End: 2022-12-28
Payer: MEDICARE

## 2022-12-28 VITALS
DIASTOLIC BLOOD PRESSURE: 60 MMHG | WEIGHT: 141 LBS | HEART RATE: 79 BPM | BODY MASS INDEX: 23.49 KG/M2 | HEIGHT: 65 IN | SYSTOLIC BLOOD PRESSURE: 118 MMHG | OXYGEN SATURATION: 98 %

## 2022-12-28 DIAGNOSIS — R26.89 BALANCE PROBLEM: ICD-10-CM

## 2022-12-28 DIAGNOSIS — R51.9 NONINTRACTABLE HEADACHE, UNSPECIFIED CHRONICITY PATTERN, UNSPECIFIED HEADACHE TYPE: Primary | ICD-10-CM

## 2022-12-28 DIAGNOSIS — H53.413 VISION LOSS, CENTRAL, BILATERAL: ICD-10-CM

## 2022-12-28 PROCEDURE — 99999 PR PBB SHADOW E&M-EST. PATIENT-LVL III: ICD-10-PCS | Mod: PBBFAC,HCNC,, | Performed by: FAMILY MEDICINE

## 2022-12-28 PROCEDURE — 99999 PR PBB SHADOW E&M-EST. PATIENT-LVL III: CPT | Mod: PBBFAC,HCNC,, | Performed by: FAMILY MEDICINE

## 2022-12-28 PROCEDURE — 99213 OFFICE O/P EST LOW 20 MIN: CPT | Mod: HCNC,S$GLB,, | Performed by: FAMILY MEDICINE

## 2022-12-28 PROCEDURE — 1125F AMNT PAIN NOTED PAIN PRSNT: CPT | Mod: HCNC,CPTII,S$GLB, | Performed by: FAMILY MEDICINE

## 2022-12-28 PROCEDURE — 1159F MED LIST DOCD IN RCRD: CPT | Mod: HCNC,CPTII,S$GLB, | Performed by: FAMILY MEDICINE

## 2022-12-28 PROCEDURE — 1159F PR MEDICATION LIST DOCUMENTED IN MEDICAL RECORD: ICD-10-PCS | Mod: HCNC,CPTII,S$GLB, | Performed by: FAMILY MEDICINE

## 2022-12-28 PROCEDURE — 1160F RVW MEDS BY RX/DR IN RCRD: CPT | Mod: HCNC,CPTII,S$GLB, | Performed by: FAMILY MEDICINE

## 2022-12-28 PROCEDURE — 1160F PR REVIEW ALL MEDS BY PRESCRIBER/CLIN PHARMACIST DOCUMENTED: ICD-10-PCS | Mod: HCNC,CPTII,S$GLB, | Performed by: FAMILY MEDICINE

## 2022-12-28 PROCEDURE — 1125F PR PAIN SEVERITY QUANTIFIED, PAIN PRESENT: ICD-10-PCS | Mod: HCNC,CPTII,S$GLB, | Performed by: FAMILY MEDICINE

## 2022-12-28 PROCEDURE — 99213 PR OFFICE/OUTPT VISIT, EST, LEVL III, 20-29 MIN: ICD-10-PCS | Mod: HCNC,S$GLB,, | Performed by: FAMILY MEDICINE

## 2022-12-28 NOTE — PROGRESS NOTES
THIS DOCUMENT WAS MADE IN PART WITH VOICE RECOGNITION SOFTWARE.  OCCASIONALLY THIS SOFTWARE WILL MISINTERPRET WORDS OR PHRASES.      Primary Care Provider Appointment   [facility]  [address] [place] [address]   Ph: [phone]  Fax: [phone]      Patient ID: Maggi Fontaine is a 89 y.o. female.    Maggi was seen today for follow-up, dizziness and back pain.    Diagnoses and all orders for this visit:    Nonintractable headache, unspecified chronicity pattern, unspecified headache type    Balance problem    Vision loss, central, bilateral    Patient showed up for a routine scheduled visit but informed me that earlier today she lost central vision.  She could see the outlines of people's faces but not their eyes.  This happened shortly after exercising and may have lasted for few hours.  The vision has improved apparently over the last hour or two.  But she also has a headache on the top of her head and reports her gait is more unstable than normal.  She denies any facial drooping or weakness.  No focal abnormalities were seen on exam other than a positive Romberg and poor balance.  Although I am unable to visualize her retinas as she has rather to leave small pupils but other cranial nerves were intact.      I cannot exclude an acute cerebrovascular event that happened today.  She needs emergent head imaging.  I recommended calling 911 to take her to the emergency room but she refused however she will allow her caregiver to take her straight to the emergency room.    Health Maintenance         Date Due Completion Date    Shingles Vaccine (1 of 2) Never done ---    Lipid Panel 11/22/2020 11/22/2019    TETANUS VACCINE 02/23/2029 2/23/2019            Subjective:     Chief Complaint   Patient presents with    Follow-up     4 month follow up to .    Dizziness     Patient complains of feeling lightheaded and dizzy after exercising this morning, where her  "vision got blurry and she got a headache. Patient's vision has started to slowly clear, but she still feels like she "is in twilight".    Back Pain     Symptoms occur mostly in the mornings x6 months, and improve during the day.     I have reviewed the information entered by the ancillary staff regarding the chief complaint as well as the related history.    HPI    Patient is a/an 89 y.o.  female   RISK of ADMIT/ED: 79    If she is here today for routine visit but reported some urgent concerning problem see above for details    For complete problem list, past medical history, surgical history, social history, etc., see appropriate section in the electronic medical record    Review of Systems   Eyes:  Positive for visual disturbance.   Respiratory:  Negative for shortness of breath.    Cardiovascular:  Negative for chest pain.   Neurological:  Positive for dizziness, weakness and headaches.     Objective     Physical Exam  Vitals reviewed.   Constitutional:       General: She is not in acute distress.     Appearance: She is well-developed. She is not toxic-appearing or diaphoretic.   HENT:      Head: Normocephalic and atraumatic.      Mouth/Throat:      Pharynx: Oropharynx is clear. No oropharyngeal exudate.   Eyes:      General: No scleral icterus.     Conjunctiva/sclera: Conjunctivae normal.   Cardiovascular:      Rate and Rhythm: Normal rate. Rhythm irregular.      Heart sounds: Normal heart sounds. No murmur heard.     Comments: No edema  Pulmonary:      Effort: Pulmonary effort is normal. No respiratory distress.      Breath sounds: Normal breath sounds. No wheezing.   Neurological:      Mental Status: She is alert.      Cranial Nerves: No dysarthria or facial asymmetry.      Motor: No tremor or abnormal muscle tone.      Coordination: Coordination abnormal.      Gait: Gait abnormal.      Deep Tendon Reflexes:      Reflex Scores:       Tricep reflexes are 2+ on the right side and 2+ on the left side.       Bicep " "reflexes are 2+ on the right side and 2+ on the left side.       Brachioradialis reflexes are 2+ on the right side and 2+ on the left side.       Patellar reflexes are 2+ on the right side and 2+ on the left side.       Achilles reflexes are 2+ on the right side and 2+ on the left side.     Comments: Her gait is unsteady.  Pupils are small but symmetrical.  Unable to visualize adequately for funduscopic exam.  Cranial nerves otherwise appear intact     Vitals:    12/28/22 1422   BP: 118/60   BP Location: Right arm   Patient Position: Sitting   BP Method: Medium (Manual)   Pulse: 79   SpO2: 98%   Weight: 64 kg (140 lb 15.8 oz)   Height: 5' 5" (1.651 m)       RECENT LABS:    Lab Results   Component Value Date    WBC 5.12 01/24/2022    HGB 13.7 01/24/2022    HCT 39.5 01/24/2022     01/24/2022    CHOL 135 11/22/2019    TRIG 102 11/22/2019    HDL 47 11/22/2019    ALT 18 03/23/2021    AST 29 03/23/2021     01/24/2022    K 4.4 01/24/2022     01/24/2022    CREATININE 1.5 (H) 01/24/2022    BUN 29 (H) 01/24/2022    CO2 21 (L) 01/24/2022    TSH 0.441 04/29/2022    INR 1.3 12/03/2020    GLUF 112 (H) 10/05/2005    HGBA1C 5.5 12/04/2020       Results for orders placed or performed in visit on 10/28/22   POCT COVID-19 Rapid Screening   Result Value Ref Range    POC Rapid COVID Negative Negative     Acceptable Yes      *Note: Due to a large number of results and/or encounters for the requested time period, some results have not been displayed. A complete set of results can be found in Results Review.     "

## 2022-12-29 ENCOUNTER — TELEPHONE (OUTPATIENT)
Dept: NEUROLOGY | Facility: CLINIC | Age: 87
End: 2022-12-29
Payer: MEDICARE

## 2022-12-29 ENCOUNTER — TELEPHONE (OUTPATIENT)
Dept: PRIMARY CARE CLINIC | Facility: CLINIC | Age: 87
End: 2022-12-29
Payer: MEDICARE

## 2022-12-29 DIAGNOSIS — I48.11 LONGSTANDING PERSISTENT ATRIAL FIBRILLATION: Primary | ICD-10-CM

## 2022-12-29 NOTE — TELEPHONE ENCOUNTER
Spoke with the pt, she state's, she does not need to be seen for h/a. She is having blurred vision.  She will talk to Dr. Finnegan.  Declined appt.

## 2022-12-29 NOTE — TELEPHONE ENCOUNTER
----- Message from Danna Lawson sent at 12/29/2022  9:54 AM CST -----  Contact: patient  Type:  Needs Medical Advice    Who Called:  Patient     Would the patient rather a call back or a response via [facility]? Call     Best Call Back Number: [phone] (home)      Additional Information:  Patient would like to speak with the nurse in regards to an appointment.     Please call to advise

## 2022-12-29 NOTE — TELEPHONE ENCOUNTER
Spoke to patient, she is wanting to be seen she has questions and things to discuss with you. Her visit was cut short yesterday with having to leave and go to the ER. Please let me know where you would like me to fit her in at.

## 2022-12-29 NOTE — TELEPHONE ENCOUNTER
Feeling better today, did spend most of her day in bed, but visual symptoms have resolved.  Still some fatigue and unsteadiness.  Headache is better P    Wants to see cardiologist within [facility], 2nd opinion regarding AFib, Xarelto and ongoing symptoms.  I advised her that would check with tomorrow and help her schedule something.

## 2022-12-29 NOTE — TELEPHONE ENCOUNTER
----- Message from Esther Jamil, CRT sent at 12/29/2022 10:33 AM CST -----  Regarding: follow up emergency room [facility] 12/28/2022  Patient seen in ER on 12/29/2022.  Please contact patient to schedule.  Referral has been placed.     Thanks,    Esther Jamil   Emergency Room Navigator/Case Management  [phone]

## 2022-12-30 NOTE — TELEPHONE ENCOUNTER
She has requested a consultation with Cardiology at [facility] regarding her longstanding atrial fibrillation medication management.  Please help her schedule with one of our cardiologists.

## 2023-01-04 ENCOUNTER — OFFICE VISIT (OUTPATIENT)
Dept: NEPHROLOGY | Facility: CLINIC | Age: 88
End: 2023-01-04
Payer: MEDICARE

## 2023-01-04 VITALS
WEIGHT: 135 LBS | HEIGHT: 65 IN | SYSTOLIC BLOOD PRESSURE: 112 MMHG | DIASTOLIC BLOOD PRESSURE: 56 MMHG | BODY MASS INDEX: 22.49 KG/M2

## 2023-01-04 DIAGNOSIS — I10 PRIMARY HYPERTENSION: ICD-10-CM

## 2023-01-04 DIAGNOSIS — N18.31 STAGE 3A CHRONIC KIDNEY DISEASE: Primary | ICD-10-CM

## 2023-01-04 PROCEDURE — 99999 PR PBB SHADOW E&M-EST. PATIENT-LVL III: CPT | Mod: PBBFAC,HCNC,, | Performed by: INTERNAL MEDICINE

## 2023-01-04 PROCEDURE — 3288F FALL RISK ASSESSMENT DOCD: CPT | Mod: HCNC,CPTII,S$GLB, | Performed by: INTERNAL MEDICINE

## 2023-01-04 PROCEDURE — 1101F PR PT FALLS ASSESS DOC 0-1 FALLS W/OUT INJ PAST YR: ICD-10-PCS | Mod: HCNC,CPTII,S$GLB, | Performed by: INTERNAL MEDICINE

## 2023-01-04 PROCEDURE — 1159F PR MEDICATION LIST DOCUMENTED IN MEDICAL RECORD: ICD-10-PCS | Mod: HCNC,CPTII,S$GLB, | Performed by: INTERNAL MEDICINE

## 2023-01-04 PROCEDURE — 99999 PR PBB SHADOW E&M-EST. PATIENT-LVL III: ICD-10-PCS | Mod: PBBFAC,HCNC,, | Performed by: INTERNAL MEDICINE

## 2023-01-04 PROCEDURE — 1101F PT FALLS ASSESS-DOCD LE1/YR: CPT | Mod: HCNC,CPTII,S$GLB, | Performed by: INTERNAL MEDICINE

## 2023-01-04 PROCEDURE — 1159F MED LIST DOCD IN RCRD: CPT | Mod: HCNC,CPTII,S$GLB, | Performed by: INTERNAL MEDICINE

## 2023-01-04 PROCEDURE — 99214 PR OFFICE/OUTPT VISIT, EST, LEVL IV, 30-39 MIN: ICD-10-PCS | Mod: HCNC,S$GLB,, | Performed by: INTERNAL MEDICINE

## 2023-01-04 PROCEDURE — 99214 OFFICE O/P EST MOD 30 MIN: CPT | Mod: HCNC,S$GLB,, | Performed by: INTERNAL MEDICINE

## 2023-01-04 PROCEDURE — 3288F PR FALLS RISK ASSESSMENT DOCUMENTED: ICD-10-PCS | Mod: HCNC,CPTII,S$GLB, | Performed by: INTERNAL MEDICINE

## 2023-01-04 NOTE — PROGRESS NOTES
"Subjective:       Patient ID: Maggi Fontaine is a 89 y.o. White female who presents for return patient evaluation for chronic renal failure.      She has no uremic or urinary symptoms and is in her usual state of health.  There have been no recent illnesses, hospitalizations or procedures.  She denies chronic NSAIDs.      Review of Systems   Constitutional:  Negative for appetite change, chills and fever.   HENT:  Positive for hearing loss. Negative for congestion.    Eyes:  Negative for visual disturbance.   Respiratory:  Positive for shortness of breath (occasional). Negative for cough.    Cardiovascular:  Negative for chest pain and leg swelling.   Gastrointestinal:  Negative for abdominal pain, diarrhea, nausea and vomiting.   Genitourinary:  Negative for difficulty urinating, dysuria and hematuria.   Musculoskeletal:  Positive for back pain. Negative for myalgias.   Skin:  Negative for rash.   Neurological:  Negative for headaches.   Psychiatric/Behavioral:  Negative for sleep disturbance.        The past medical, family and social histories were reviewed for this encounter.     BP (!) 112/56 (BP Location: Left arm, Patient Position: Sitting)   Ht 5' 5" (1.651 m)   Wt 61.2 kg (135 lb)   BMI 22.47 kg/m²     Objective:      Physical Exam  Vitals reviewed.   Constitutional:       General: She is not in acute distress.     Appearance: She is well-developed.   HENT:      Head: Normocephalic and atraumatic.   Eyes:      General: No scleral icterus.     Conjunctiva/sclera: Conjunctivae normal.   Neck:      Vascular: No JVD.   Cardiovascular:      Rate and Rhythm: Normal rate and regular rhythm.      Heart sounds: Normal heart sounds. No murmur heard.    No friction rub. No gallop.   Pulmonary:      Effort: Pulmonary effort is normal. No respiratory distress.      Breath sounds: Normal breath sounds. No wheezing.   Abdominal:      General: Bowel sounds are normal. There is no distension.      Palpations: Abdomen is " soft.      Tenderness: There is no abdominal tenderness.   Musculoskeletal:      Cervical back: Normal range of motion.      Right lower leg: No edema.      Left lower leg: No edema.   Skin:     General: Skin is warm and dry.      Findings: No rash.   Neurological:      Mental Status: She is alert and oriented to person, place, and time.   Psychiatric:         Mood and Affect: Mood normal.         Behavior: Behavior normal.       Assessment:       1. Stage 3a chronic kidney disease    2. Primary hypertension          Plan:   Return to clinic in 6 months.  Labs for next visit include rp pth per so.  Baseline creatinine is 1.3-1.6 since 2004.  UPC is negative.  Your blood pressure is controlled on your current medications.   She may need her metoprolol dose dropped however to avoid hypotension.  She has had an overall stable baseline since 2004.  We discussed things to avoid as well as precautions to help keep her function stable.

## 2023-01-05 ENCOUNTER — TELEPHONE (OUTPATIENT)
Dept: ADMINISTRATIVE | Facility: CLINIC | Age: 88
End: 2023-01-05
Payer: MEDICARE

## 2023-01-09 ENCOUNTER — OFFICE VISIT (OUTPATIENT)
Dept: FAMILY MEDICINE | Facility: CLINIC | Age: 88
End: 2023-01-09
Payer: MEDICARE

## 2023-01-09 VITALS
HEIGHT: 65 IN | DIASTOLIC BLOOD PRESSURE: 62 MMHG | SYSTOLIC BLOOD PRESSURE: 116 MMHG | HEART RATE: 74 BPM | BODY MASS INDEX: 23.69 KG/M2 | WEIGHT: 142.19 LBS | OXYGEN SATURATION: 98 %

## 2023-01-09 DIAGNOSIS — F41.9 ANXIETY: ICD-10-CM

## 2023-01-09 DIAGNOSIS — I77.819 ECTATIC AORTA: Chronic | ICD-10-CM

## 2023-01-09 DIAGNOSIS — E78.5 HYPERLIPIDEMIA, UNSPECIFIED HYPERLIPIDEMIA TYPE: Chronic | ICD-10-CM

## 2023-01-09 DIAGNOSIS — Z00.00 ENCOUNTER FOR PREVENTIVE HEALTH EXAMINATION: Primary | ICD-10-CM

## 2023-01-09 DIAGNOSIS — F33.1 MODERATE EPISODE OF RECURRENT MAJOR DEPRESSIVE DISORDER: ICD-10-CM

## 2023-01-09 DIAGNOSIS — I42.8 NICM (NONISCHEMIC CARDIOMYOPATHY): ICD-10-CM

## 2023-01-09 DIAGNOSIS — I70.0 AORTIC ATHEROSCLEROSIS: ICD-10-CM

## 2023-01-09 DIAGNOSIS — I10 PRIMARY HYPERTENSION: ICD-10-CM

## 2023-01-09 DIAGNOSIS — N18.31 STAGE 3A CHRONIC KIDNEY DISEASE: ICD-10-CM

## 2023-01-09 DIAGNOSIS — I48.0 PAROXYSMAL ATRIAL FIBRILLATION: ICD-10-CM

## 2023-01-09 DIAGNOSIS — Z95.0 CARDIAC PACEMAKER IN SITU: Chronic | ICD-10-CM

## 2023-01-09 PROCEDURE — 3288F PR FALLS RISK ASSESSMENT DOCUMENTED: ICD-10-PCS | Mod: HCNC,CPTII,S$GLB, | Performed by: NURSE PRACTITIONER

## 2023-01-09 PROCEDURE — 1100F PR PT FALLS ASSESS DOC 2+ FALLS/FALL W/INJURY/YR: ICD-10-PCS | Mod: HCNC,CPTII,S$GLB, | Performed by: NURSE PRACTITIONER

## 2023-01-09 PROCEDURE — 3288F FALL RISK ASSESSMENT DOCD: CPT | Mod: HCNC,CPTII,S$GLB, | Performed by: NURSE PRACTITIONER

## 2023-01-09 PROCEDURE — 99999 PR PBB SHADOW E&M-EST. PATIENT-LVL IV: CPT | Mod: PBBFAC,HCNC,, | Performed by: NURSE PRACTITIONER

## 2023-01-09 PROCEDURE — G0439 PPPS, SUBSEQ VISIT: HCPCS | Mod: HCNC,S$GLB,, | Performed by: NURSE PRACTITIONER

## 2023-01-09 PROCEDURE — 1159F MED LIST DOCD IN RCRD: CPT | Mod: HCNC,CPTII,S$GLB, | Performed by: NURSE PRACTITIONER

## 2023-01-09 PROCEDURE — 1170F FXNL STATUS ASSESSED: CPT | Mod: HCNC,CPTII,S$GLB, | Performed by: NURSE PRACTITIONER

## 2023-01-09 PROCEDURE — 99999 PR PBB SHADOW E&M-EST. PATIENT-LVL IV: ICD-10-PCS | Mod: PBBFAC,HCNC,, | Performed by: NURSE PRACTITIONER

## 2023-01-09 PROCEDURE — 1160F RVW MEDS BY RX/DR IN RCRD: CPT | Mod: HCNC,CPTII,S$GLB, | Performed by: NURSE PRACTITIONER

## 2023-01-09 PROCEDURE — 1159F PR MEDICATION LIST DOCUMENTED IN MEDICAL RECORD: ICD-10-PCS | Mod: HCNC,CPTII,S$GLB, | Performed by: NURSE PRACTITIONER

## 2023-01-09 PROCEDURE — 1100F PTFALLS ASSESS-DOCD GE2>/YR: CPT | Mod: HCNC,CPTII,S$GLB, | Performed by: NURSE PRACTITIONER

## 2023-01-09 PROCEDURE — G0439 PR MEDICARE ANNUAL WELLNESS SUBSEQUENT VISIT: ICD-10-PCS | Mod: HCNC,S$GLB,, | Performed by: NURSE PRACTITIONER

## 2023-01-09 PROCEDURE — 1170F PR FUNCTIONAL STATUS ASSESSED: ICD-10-PCS | Mod: HCNC,CPTII,S$GLB, | Performed by: NURSE PRACTITIONER

## 2023-01-09 PROCEDURE — 1160F PR REVIEW ALL MEDS BY PRESCRIBER/CLIN PHARMACIST DOCUMENTED: ICD-10-PCS | Mod: HCNC,CPTII,S$GLB, | Performed by: NURSE PRACTITIONER

## 2023-01-09 PROCEDURE — 1126F AMNT PAIN NOTED NONE PRSNT: CPT | Mod: HCNC,CPTII,S$GLB, | Performed by: NURSE PRACTITIONER

## 2023-01-09 PROCEDURE — 1126F PR PAIN SEVERITY QUANTIFIED, NO PAIN PRESENT: ICD-10-PCS | Mod: HCNC,CPTII,S$GLB, | Performed by: NURSE PRACTITIONER

## 2023-01-09 NOTE — PATIENT INSTRUCTIONS
Counseling and Referral of Other Preventative  (Italic type indicates deductible and co-insurance are waived)    Patient Name: Maggi Fontaine  Today's Date: 1/9/2023    Health Maintenance       Date Due Completion Date    Shingles Vaccine (1 of 2) Never done ---    Lipid Panel 11/22/2020 11/22/2019    TETANUS VACCINE 02/23/2029 2/23/2019        No orders of the defined types were placed in this encounter.    The following information is provided to all patients.  This information is to help you find resources for any of the problems found today that may be affecting your health:                Living healthy guide: www.[facility].[place]      Understanding Diabetes: www.diabetes.org      Eating healthy: www.cdc.gov/healthyweight      CDC home safety checklist: www.cdc.gov/steadi/patient.html      Agency on Aging: www.goea.[place]      Alcoholics anonymous (AA): www.aa.org      Physical Activity: www.vy.nih.gov/dt3gqtd      Tobacco use: www.quitwithusla.org

## 2023-01-09 NOTE — PROGRESS NOTES
"  Maggi Fontaine presented for a  Medicare AWV and comprehensive Health Risk Assessment today. The following components were reviewed and updated:    Medical history  Family History  Social history  Allergies and Current Medications  Health Risk Assessment  Health Maintenance  Care Team     ** See Completed Assessments for Annual Wellness Visit within the encounter summary.**     The following assessments were completed:  Living Situation  CAGE  Depression Screening  Timed Get Up and Go  Whisper Test  Cognitive Function Screening      Nutrition Screening  ADL Screening  PAQ Screening    Vitals:    01/09/23 1256   BP: 116/62   BP Location: Left arm   Patient Position: Sitting   BP Method: Medium (Manual)   Pulse: 74   SpO2: 98%   Weight: 64.5 kg (142 lb 3.2 oz)   Height: 5' 5" (1.651 m)     Body mass index is 23.66 kg/m².  Physical Exam  Vitals reviewed.   Constitutional:       Appearance: Normal appearance.   Cardiovascular:      Rate and Rhythm: Normal rate and regular rhythm.      Pulses: Normal pulses.      Heart sounds: Normal heart sounds.   Pulmonary:      Effort: Pulmonary effort is normal. No respiratory distress.      Breath sounds: Normal breath sounds.   Skin:     General: Skin is warm and dry.   Neurological:      Mental Status: She is alert and oriented to person, place, and time.   Psychiatric:         Mood and Affect: Mood normal.         Behavior: Behavior normal.         Judgment: Judgment normal.     Diagnoses and health risks identified today and associated recommendations/orders:    1. Encounter for preventive health examination  Reviewed and discussed health maintenance.      2. Moderate episode of recurrent major depressive disorder  Stable- continue current treatment and follow up routinely with PCP     3. NICM (nonischemic cardiomyopathy)  Stable- continue current treatment and follow up routinely with PCP and cardiology (est care with )    4. Paroxysmal atrial fibrillation  Stable- " continue current treatment and follow up routinely with PCP and cardiology (est care with )    5. Aortic atherosclerosis  Stable- continue current treatment and follow up routinely with PCP and cardiology (est care with )    6. Ectatic aorta  Stable- continue current treatment and follow up routinely with PCP and cardiology (est care with )    7. Anxiety  Stable- continue current treatment and follow up routinely with PCP     8. Hyperlipidemia, unspecified hyperlipidemia type  Stable- continue current treatment and follow up routinely with PCP and cardiology (est care with )    9. Cardiac pacemaker in situ  Stable- continue current treatment and follow up routinely with PCP and cardiology (est care with )  9  Stable- continue current treatment and follow up routinely with PCP and cardiology (est care with )    11. Stage 3a chronic kidney disease  Stable- continue current treatment and follow up routinely with PCP and nephrology (Dr. Monson)  Avoid NSAIDS and increase fluids    Review for Opioid Screening: Pt does not have Rx for Opioids  Review for Substance Use Disorders: Patient does not use substance      Provided Maggi with a 5-10 year written screening schedule and personal prevention plan. Recommendations were developed using the USPSTF age appropriate recommendations. Education, counseling, and referrals were provided as needed. After Visit Summary printed and given to patient which includes a list of additional screenings\tests needed.    I offered to discuss advanced care planning, including how to pick a person who would make decisions for you if you were unable to make them for yourself, called a health care power of , and what kind of decisions you might make such as use of life sustaining treatments such as ventilators and tube feeding when faced with a life limiting illness recorded on a living will that they will need to know. (How you want  to be cared for as you near the end of your natural life)     X Patient is interested in learning more about how to make advanced directives.  I provided them paperwork and offered to discuss this with them.  Nay Allison, NP

## 2023-01-11 ENCOUNTER — OFFICE VISIT (OUTPATIENT)
Dept: CARDIOLOGY | Facility: CLINIC | Age: 88
End: 2023-01-11
Payer: MEDICARE

## 2023-01-11 VITALS
HEIGHT: 65 IN | SYSTOLIC BLOOD PRESSURE: 125 MMHG | BODY MASS INDEX: 23.62 KG/M2 | WEIGHT: 141.75 LBS | DIASTOLIC BLOOD PRESSURE: 73 MMHG | HEART RATE: 79 BPM

## 2023-01-11 DIAGNOSIS — H90.3 SENSORINEURAL HEARING LOSS (SNHL) OF BOTH EARS: Primary | ICD-10-CM

## 2023-01-11 DIAGNOSIS — Z95.0 CARDIAC PACEMAKER IN SITU: Chronic | ICD-10-CM

## 2023-01-11 DIAGNOSIS — I48.11 LONGSTANDING PERSISTENT ATRIAL FIBRILLATION: ICD-10-CM

## 2023-01-11 DIAGNOSIS — Z91.81 RISK FOR FALLS: ICD-10-CM

## 2023-01-11 DIAGNOSIS — I77.819 ECTATIC AORTA: Chronic | ICD-10-CM

## 2023-01-11 DIAGNOSIS — I70.0 AORTIC ATHEROSCLEROSIS: Chronic | ICD-10-CM

## 2023-01-11 DIAGNOSIS — Z86.73 HISTORY OF TIA (TRANSIENT ISCHEMIC ATTACK): Chronic | ICD-10-CM

## 2023-01-11 DIAGNOSIS — E78.5 HYPERLIPIDEMIA, UNSPECIFIED HYPERLIPIDEMIA TYPE: Chronic | ICD-10-CM

## 2023-01-11 DIAGNOSIS — Z71.89 ACP (ADVANCE CARE PLANNING): ICD-10-CM

## 2023-01-11 DIAGNOSIS — I49.5 SINOATRIAL NODE DYSFUNCTION: Chronic | ICD-10-CM

## 2023-01-11 DIAGNOSIS — I25.10 NON-OCCLUSIVE CORONARY ARTERY DISEASE: Chronic | ICD-10-CM

## 2023-01-11 PROCEDURE — 1126F PR PAIN SEVERITY QUANTIFIED, NO PAIN PRESENT: ICD-10-PCS | Mod: HCNC,CPTII,S$GLB, | Performed by: INTERNAL MEDICINE

## 2023-01-11 PROCEDURE — 1159F MED LIST DOCD IN RCRD: CPT | Mod: HCNC,CPTII,S$GLB, | Performed by: INTERNAL MEDICINE

## 2023-01-11 PROCEDURE — 99999 PR PBB SHADOW E&M-EST. PATIENT-LVL III: CPT | Mod: PBBFAC,HCNC,, | Performed by: INTERNAL MEDICINE

## 2023-01-11 PROCEDURE — 3288F FALL RISK ASSESSMENT DOCD: CPT | Mod: HCNC,CPTII,S$GLB, | Performed by: INTERNAL MEDICINE

## 2023-01-11 PROCEDURE — 99999 PR PBB SHADOW E&M-EST. PATIENT-LVL III: ICD-10-PCS | Mod: PBBFAC,HCNC,, | Performed by: INTERNAL MEDICINE

## 2023-01-11 PROCEDURE — 1159F PR MEDICATION LIST DOCUMENTED IN MEDICAL RECORD: ICD-10-PCS | Mod: HCNC,CPTII,S$GLB, | Performed by: INTERNAL MEDICINE

## 2023-01-11 PROCEDURE — 99204 OFFICE O/P NEW MOD 45 MIN: CPT | Mod: HCNC,S$GLB,, | Performed by: INTERNAL MEDICINE

## 2023-01-11 PROCEDURE — 1101F PR PT FALLS ASSESS DOC 0-1 FALLS W/OUT INJ PAST YR: ICD-10-PCS | Mod: HCNC,CPTII,S$GLB, | Performed by: INTERNAL MEDICINE

## 2023-01-11 PROCEDURE — 99204 PR OFFICE/OUTPT VISIT, NEW, LEVL IV, 45-59 MIN: ICD-10-PCS | Mod: HCNC,S$GLB,, | Performed by: INTERNAL MEDICINE

## 2023-01-11 PROCEDURE — 1101F PT FALLS ASSESS-DOCD LE1/YR: CPT | Mod: HCNC,CPTII,S$GLB, | Performed by: INTERNAL MEDICINE

## 2023-01-11 PROCEDURE — 3288F PR FALLS RISK ASSESSMENT DOCUMENTED: ICD-10-PCS | Mod: HCNC,CPTII,S$GLB, | Performed by: INTERNAL MEDICINE

## 2023-01-11 PROCEDURE — 1126F AMNT PAIN NOTED NONE PRSNT: CPT | Mod: HCNC,CPTII,S$GLB, | Performed by: INTERNAL MEDICINE

## 2023-01-11 NOTE — PROGRESS NOTES
Subjective:    Patient ID:  Maggi Fontaine is a 89 y.o. female patient here for evaluation Establish Care (Former romulo pt) and Atrial Fibrillation      History of Present Illness:  Cardiology new patient evaluation.  Patient is seen in the past by electrophysiology, history of paroxysmal atrial fibrillation, permanent pacer implant.  Sick sinus syndrome.  Ongoing risk factors include hypertension dyslipidemia.  Patient does relate past history of TIA.  She is on a modified anticoagulation regime due to falls, last fall in June of last year with fractured tibia.  Echo EF has declined from 50% in 2020 to 35% noted on last echo in 2021 January.  By history she has not nonischemic cardiomyopathy with non obstructive coronary artery disease.  Left heart catheterization 2021.   Definite change in dyspnea.  No syncope/presyncope.  No edema.  No angina chest pain.     Difficulties in the past medications.  Relates panic attacks with Eliquis.  Complains of dry mouth, difficulty with her food getting stuck in her teeth with Xarelto.        Review of patient's allergies indicates:   Allergen Reactions    Amiodarone analogues      Repeated falls x4; and hair loss; so stopped.     Prozac [fluoxetine] Other (See Comments)     Nightmares    Nsaids (non-steroidal anti-inflammatory drug) Other (See Comments)     Other reaction(s): Chronic Renal Insufficiency    Penicillins Swelling    Eliquis [apixaban] Anxiety     Panic Attacks       Past Medical History:   Diagnosis Date    Acid reflux     Arthritis     At risk for falling     Atrial fibrillation     Cardiac pacemaker     Gait instability     muscle weakness    Gout     Headache(784.0)     HEARING LOSS     Hyperlipidemia     Hypertension     Hypothyroidism     Pacemaker     Polymyalgia rheumatica     remission now    Renal insufficiency     Sinusitis     SOB (shortness of breath)     Stroke     history of TIA    Urinary incontinence     West Nile encephalitis     2002     Past  Surgical History:   Procedure Laterality Date    ANGIOGRAM, CORONARY, WITH LEFT HEART CATHETERIZATION Left 2/1/2021    Procedure: Angiogram, Coronary, with Left Heart Cath;  Surgeon: Eriberto Alvarez III, MD;  Location: [facility];  Service: Cardiology;  Laterality: Left;    CARDIAC PACEMAKER PLACEMENT      CATARACT EXTRACTION EXTRACAPSULAR W/ INTRAOCULAR LENS IMPLANTATION      EYE SURGERY      HYSTERECTOMY      JOINT REPLACEMENT      anupam knees    KNEE SURGERY      PARTIAL HYSTERECTOMY      TREATMENT OF CARDIAC ARRHYTHMIA N/A 7/8/2019    Procedure: CARDIOVERSION;  Surgeon: Eriberto Alvarez III, MD;  Location: [facility];  Service: Cardiology;  Laterality: N/A;     Social History     Tobacco Use    Smoking status: Never    Smokeless tobacco: Never   Substance Use Topics    Alcohol use: Yes     Alcohol/week: 0.8 standard drinks     Types: 1 Standard drinks or equivalent per week     Comment: less than 1 drink weekly    Drug use: No        Review of Systems:    As noted in HPI in addition         REVIEW OF SYSTEMS  Review of Systems   Constitutional: Negative for decreased appetite, diaphoresis, night sweats, weight gain and weight loss.   HENT:  Negative for nosebleeds and odynophagia.    Eyes:  Negative for double vision and photophobia.   Cardiovascular:  Negative for chest pain, claudication, cyanosis, dyspnea on exertion, irregular heartbeat, leg swelling, near-syncope, orthopnea, palpitations, paroxysmal nocturnal dyspnea and syncope.   Respiratory:  Negative for cough, hemoptysis, shortness of breath and wheezing.    Hematologic/Lymphatic: Negative for adenopathy.   Skin:  Negative for flushing, skin cancer and suspicious lesions.   Musculoskeletal:  Negative for gout, myalgias and neck pain.   Gastrointestinal:  Negative for abdominal pain, heartburn, hematemesis and hematochezia.   Genitourinary:  Negative for bladder incontinence, hesitancy and nocturia.   Neurological:  Negative for focal weakness, headaches,  light-headedness and paresthesias.   Psychiatric/Behavioral:  Negative for memory loss and substance abuse.      Objective:        Vitals:    01/11/23 1421   BP: 125/73   Pulse: 79       Lab Results   Component Value Date    WBC 5.22 12/28/2022    HGB 14.4 12/28/2022    HCT 41.3 12/28/2022     12/28/2022    CHOL 135 11/22/2019    TRIG 102 11/22/2019    HDL 47 11/22/2019    ALT 22 12/28/2022    AST 36 12/28/2022     12/28/2022    K 4.9 12/28/2022     12/28/2022    CREATININE 1.45 (H) 12/28/2022    BUN 28 (H) 12/28/2022    CO2 26 12/28/2022    TSH 0.441 04/29/2022    INR 1.2 12/28/2022    GLUF 112 (H) 10/05/2005    HGBA1C 5.5 12/04/2020      CARDIOGRAM RESULTS  Results for orders placed during the hospital encounter of 01/21/21    Echo Color Flow Doppler? Yes; Bubble Contrast? No    Interpretation Summary  · Moderately decreased systolic function. The estimated ejection fraction is 35%  · The left ventricular wall motion is globally hypokinetic.  · Left ventricular diastolic dysfunction.  · Normal right ventricular size with normal right ventricular systolic function.        CURRENT/PREVIOUS VISIT EKG  Results for orders placed or performed during the hospital encounter of 12/28/22   EKG 12-lead    Collection Time: 12/28/22  6:26 PM    Narrative    Test Reason : H53.8,    Vent. Rate : 075 BPM     Atrial Rate : 000 BPM     P-R Int : 000 ms          QRS Dur : 076 ms      QT Int : 348 ms       P-R-T Axes : 000 040 007 degrees     QTc Int : 388 ms    Atrial fibrillation with frequent ventricular-paced complexes  Low voltage QRS  Cannot rule out Anterior infarct ,age undetermined  Abnormal ECG  When compared with ECG of 29-APR-2022 14:44,  Vent. rate has decreased BY  45 BPM  Confirmed by GAB PINK MD ([id number]) on 1/3/2023 1:35:26 PM    Referred By: [email or web address]   SELF           Confirmed By:GAB PINK MD     No valid procedures specified.   Results for orders placed during the hospital encounter of  05/11/22    Nuclear Stress - Cardiology Interpreted    Interpretation Summary    The EKG portion of this study is negative for ischemia.    Normal myocardial perfusion scan.    The gated perfusion images showed an ejection fraction of 40% .    Small LV cavity size.    No valid procedures specified.    PHYSICAL EXAM  CONSTITUTIONAL: Well built, well nourished in no apparent distress  NECK: no carotid bruit, no JVD  LUNGS: CTA  CHEST WALL: no tenderness,  HEART: regular rate and rhythm, S1, S2 normal, no murmur, click, rub or gallop   ABDOMEN: soft, non-tender; bowel sounds normal; no masses,  no organomegaly  EXTREMITIES: Extremities normal, no edema, no calf tenderness noted  VASCULAR EXAM: 2 PLUS UPPER AND LOWER EXT PULSES  NEURO: AAO X 3, NO ACUTE FOCAL OR LATERALIZING FINDINGS    I HAVE REVIEWED :    The vital signs, nurses notes, and all the pertinent radiology and labs.         Current Outpatient Medications   Medication Instructions    ALPRAZolam (XANAX) 0.25 MG tablet TAKE 1 TABLET BY MOUTH DAILY AS NEEDED FOR PANIC ATTACKS    ergocalciferol (ERGOCALCIFEROL) 50,000 unit Cap TAKE 1 CAPSULE EVERY WEEK    furosemide (LASIX) 20 MG tablet Take one tablet daily as needed for swelling    levothyroxine (SYNTHROID) 88 MCG tablet TAKE 1 TABLET BY MOUTH EVERY DAY BEFORE BREAKFAST    metoprolol succinate (TOPROL-XL) 25 mg, Oral, Daily    multivitamin capsule 1 capsule, Oral, Daily    pravastatin (PRAVACHOL) 40 mg, Oral, Daily    rivaroxaban (XARELTO) 15 mg, Oral, With dinner          Assessment:   Nonischemic cardiomyopathy.  Ejection 35%.  Left heart catheterization 2021.  Paroxysmal atrial fibrillation.  Sick sinus syndrome.  History of permanent pacer implant.    Intolerance in the past to apixaban.  Seen in the emergency room recently, no acute findings on CT of the head, possible repeat TIA.  Sensitivity to medications in the past.    Plan:   Continue Xarelto 15 daily, follow-up with Rich to further evaluate  complaints of possible dry mouth so elevation.  Add aspirin tri weekly 81 mg.  Patient is on Toprol-XL 25.  Continue consider low-dose ACE-inhibitor once we are comfortable with current anticoagulation disease.  Past stated above, extreme sensitivity to medications.  2 to 3 months return to clinic.    No follow-ups on file.

## 2023-02-07 DIAGNOSIS — Z00.00 ENCOUNTER FOR MEDICARE ANNUAL WELLNESS EXAM: ICD-10-CM

## 2023-02-09 DIAGNOSIS — Z00.00 ENCOUNTER FOR MEDICARE ANNUAL WELLNESS EXAM: ICD-10-CM

## 2023-02-24 ENCOUNTER — TELEPHONE (OUTPATIENT)
Dept: PRIMARY CARE CLINIC | Facility: CLINIC | Age: 88
End: 2023-02-24
Payer: MEDICARE

## 2023-02-24 RX ORDER — PRAVASTATIN SODIUM 40 MG/1
40 TABLET ORAL DAILY
Qty: 90 TABLET | Refills: 1 | Status: ON HOLD | OUTPATIENT
Start: 2023-02-24 | End: 2023-03-17 | Stop reason: HOSPADM

## 2023-02-24 NOTE — TELEPHONE ENCOUNTER
----- Message from Tasha Velazquez sent at 2/24/2023  9:45 AM CST -----  Regarding: advice  Contact: Patient  Type:  RX Refill Request    Who Called:  Patient   Refill or New Rx:  Refill  RX Name and Strength:  pravastatin (PRAVACHOL) 40 MG tablet 90 tablet 1 9/2/2022    Sig - Route: Take 1 tablet (40 mg total) by mouth once daily. - Oral   Sent to pharmacy as: pravastatin (PRAVACHOL) 40 MG tablet   E-Prescribing Status: Receipt confirmed by pharmacy (9/2/2022  3:23 PM CDT)       How is the patient currently taking it? (ex. 1XDay):  see aove   Is this a 30 day or 90 day RX:  see above   Preferred Pharmacy with phone number:    [id number] [place] [address] AT [facility] & [address] [address]  Phone: [phone] Fax: [phone]            Local or Mail Order:  Local   Ordering Provider:    Best Call Back Number:  [phone] (home)     Additional Information:  Patient stated that she is out of this medication. Patient would like to know if doctor could call in a prescription for this medication. Please contact Patient  to advise. Thanks!

## 2023-03-07 DIAGNOSIS — F41.0 PANIC ATTACKS: ICD-10-CM

## 2023-03-07 RX ORDER — ALPRAZOLAM 0.25 MG/1
TABLET ORAL
Qty: 20 TABLET | Refills: 0 | Status: SHIPPED | OUTPATIENT
Start: 2023-03-07

## 2023-03-13 NOTE — TELEPHONE ENCOUNTER
----- Message from Lydia Bryant sent at 8/16/2019  4:16 PM CDT -----  Contact: Patient  Type: Needs Medical Advice    Who Called:  Patient  Best Call Back Number:   Additional Information: Calling in regards to requesting a recommendation for a Dermatologist from the Doctor.     No

## 2023-03-15 PROBLEM — I48.0 PAF (PAROXYSMAL ATRIAL FIBRILLATION): Status: ACTIVE | Noted: 2021-08-19

## 2023-03-16 PROBLEM — I63.9 CEREBROVASCULAR ACCIDENT (CVA) DUE TO EMBOLISM: Status: ACTIVE | Noted: 2023-03-16

## 2023-03-16 PROBLEM — G60.9 IDIOPATHIC PERIPHERAL NEUROPATHY: Status: ACTIVE | Noted: 2023-03-16

## 2023-03-16 PROBLEM — I63.113 CEREBROVASCULAR ACCIDENT (CVA) DUE TO BILATERAL EMBOLISM OF VERTEBRAL ARTERIES: Status: ACTIVE | Noted: 2023-03-16

## 2023-03-20 ENCOUNTER — OUTPATIENT CASE MANAGEMENT (OUTPATIENT)
Dept: ADMINISTRATIVE | Facility: OTHER | Age: 88
End: 2023-03-20
Payer: MEDICARE

## 2023-03-20 NOTE — PROGRESS NOTES
Outpatient Care Management  Initial Patient Assessment    Patient: Maggi Fontaine  MRN: [id number]  Date of Service: 03/20/2023  Completed by: Merlene Ga RN  Referral Date: 03/15/2023  Program: High Risk  Status: Ongoing  Effective Dates: 3/28/2023 - present  Responsible Staff: Merlene Ga RN        Reason for Visit   Patient presents with    OPCM Chart Review     03/20/23    OPCM Enrollment Call     03/20/23    OPCM RN First Assessment Attempt     3/20/2023  1st attempt to complete Initial Assessment  for Outpatient Care Management, left message.  Will mail unable to assess letter.       OPCM RN Second Assessment Attempt     03/27/23 - OPCM RN spoke with Ms. Fontaine and she is currently with PT; she requests call back at 3:30 pm today.  03/27/23 - OPCM RN called back at 1532 today and Ms. Fontaine reported she was in a meeting.  OPCM RN will follow up for enrollment tomorrow.    Initial Assessment     03/28/23    Nursing Assessment     03/28/23    Plan Of Care     03/28/23       Brief Summary:  Maggi Fontaine was referred by Dr. Gusman for hyperlipidemia. Patient qualifies for program based on high risk score of 77.3%.   Active problem list, medical, surgical and social history reviewed. 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 Areas of need identified by patient include effects from stroke, mental state capacity.   Complex care plan created with patient/caregiver input. By next encounter, patient agrees to OPCM follow up and call for any questions/concerns.   Next steps:   Ms. Marquez, Ms. Fontaine's daughter, agreed for OPCM RN follow up on or around 4/11/23.  Follow up with [facility] nurse Nora re: ST orders.  Nora denied ST orders as they do not provide that service, only PT on staff in which she has orders but no evaluation has taken place yet.  Message Dr. Finnegan for ST referral which will need to be of an outside  source per [facility] nurse and see if Ms. Fontaine needs to be continued on ergocalciferol 50,000 1 cap every week as [facility] does not have that order but is on medication reconciliation.  Assess for further language or physical issues (facial drooping, slurred speech, gait imbalance).  Follow up with assisted living nurse regarding daily blood pressure results average and confirmation of anticoagulant medication compliance.

## 2023-03-20 NOTE — LETTER
March 20, 2023    Maggi Fontaine  [address] [place] [address]             [facility] [address] [facility] [address] Dear Ms. Fontaine,    I work with [facility] Outpatient Case Management Department. We received a referral to call you to discuss your medical history.These services are free of charge and are offered to [facility] patients who have recently been discharged from any of our facilities or who have complex medical conditions that may require the skill of a nurse to assist with management.         .      I attempted to reach you by telephone, but I was unsuccessful. Please call our department so that we can go over some questions with you regarding your health.    The Outpatient Case Management Department can be reached at [phone] from 8:00AM to 4:30 PM on Monday thru Friday. [facility] also has a program where a nurse is available 24/7 to answer questions or provide medical advice, their number is [phone].    Thanks,  Merlene Ga, BSN, RN, CCM [facility]  [phone]  Outpatient Care Management

## 2023-03-20 NOTE — LETTER
March 28, 2023           Dear Maggi,     Welcome to [facility]s Complex Care Management Program.  It was a pleasure talking with you today.  My name is Merlene Ga. I look forward to working with you as your .  My goal is to help you function at the healthiest and highest level possible.  You can contact me directly at [phone].    As an [facility] patient with [employer], some of the services we may be able to provide include:      Development of an individualized care plan with a Registered Nurse    Connection with a    Connection with available resources and services     Coordinate communication among your care team members    Provide coaching and education    Help you understand your doctors treatment plan   Help you obtain information about your insurance coverage.     All services provided by [facility]s Complex Care Managers and other care team members are coordinated with and communicated to your primary care team.    As part of your enrollment, you will be receiving education materials and more information about these services in your [facility] account, by phone or through the mail.  If you do not wish to participate or receive information, please contact our office at [phone].      Sincerely,    Merlene Ga, RN  [facility]   Out-patient RN Complex Care Manager

## 2023-03-22 ENCOUNTER — TELEPHONE (OUTPATIENT)
Dept: NEUROLOGY | Facility: CLINIC | Age: 88
End: 2023-03-22

## 2023-03-22 NOTE — TELEPHONE ENCOUNTER
Called to confirm the pts appt for 2:00. Pt was unaware of appt,. Called and left message for the son and the daughter call back to discuss rescheduling if they are unable to make it to the appt today.

## 2023-03-28 ENCOUNTER — PATIENT MESSAGE (OUTPATIENT)
Dept: ADMINISTRATIVE | Facility: OTHER | Age: 88
End: 2023-03-28
Payer: MEDICARE

## 2023-03-28 ENCOUNTER — TELEPHONE (OUTPATIENT)
Dept: PRIMARY CARE CLINIC | Facility: CLINIC | Age: 88
End: 2023-03-28
Payer: MEDICARE

## 2023-03-28 DIAGNOSIS — I69.328 SPEECH AND LANGUAGE DEFICIT AS LATE EFFECT OF CEREBROVASCULAR ACCIDENT (CVA): Primary | ICD-10-CM

## 2023-03-28 NOTE — TELEPHONE ENCOUNTER
Please advise on if pt should still be taking Vit D 50,000 units weekly and if you would like for me to put in a referral for speech therapy.

## 2023-03-28 NOTE — TELEPHONE ENCOUNTER
----- Message from Merlene Ga RN sent at 3/28/2023  2:53 PM CDT -----  Good afternoon,    I recently spoke with Ms. Fontaine and her daughter, Ms. Marquez today.  She is currently in [facility].  Her medications are being administered by their nursing staff.  I spoke with Nora, the nurse on duty today, and performed a medication reconciliation.  She denied having ergocalciferol 50,000 units 1 cap per week on their list.  Please reach out to Nora or the nurse on duty, for clarification if Ms. Fontaine needs to continue this.  [facility]'s contact # is [phone] - ask for nurse on duty.    In addition, [facility] has a referral order for physical therapy as they have one on staff. However, Ms. Fontaine does not have speech therapy orders which would beneficial to her recovery.  Ms. Fontaine is reporting she feels it's taking her longer to think about what she wants to say.  Please advise the nurse on duty at [facility] regarding a speech referral.  She noted the speech therapist would need to be from an outside home health since they do not have one on staff.    Thank you for your assistance.    Kind regards,    Merlene Ga, CAMIN, RN, CCM [facility]  [phone]

## 2023-03-29 NOTE — TELEPHONE ENCOUNTER
Spoke with Lilia at [facility] and she reports that Vitamin D 1250mcg is on pt's med list which is the equivalent to 50,000 units. They will continue to keep this dosage.     Regarding the speech therapy referral, Lilia requested that I reach out to pt's daughter, Ken, to see if they have a preference on which HH they would like.     Spoke with pt's daughter and pt's daughter does not have a preference.     Referral sent to [facility] in [place].     Notified pt's daughter, Ken, that [facility] would be servicing pt for [facility]

## 2023-03-29 NOTE — TELEPHONE ENCOUNTER
Called Merlene with [facility] Management and LM discussing your message.

## 2023-03-29 NOTE — TELEPHONE ENCOUNTER
Regarding the vitamin-D.  She has been on this for while so it would be very reasonable to reduce the dosage to an over-the-counter 1010-0285 units daily if they prefer.    And I am okay for her speech therapy referral.  Although I am not sure which Home health or other to enter, please let me know

## 2023-03-30 PROCEDURE — G0180 MD CERTIFICATION HHA PATIENT: HCPCS | Mod: ,,, | Performed by: FAMILY MEDICINE

## 2023-03-30 PROCEDURE — G0180 PR HOME HEALTH MD CERTIFICATION: ICD-10-PCS | Mod: ,,, | Performed by: FAMILY MEDICINE

## 2023-04-05 ENCOUNTER — OFFICE VISIT (OUTPATIENT)
Dept: PRIMARY CARE CLINIC | Facility: CLINIC | Age: 88
End: 2023-04-05
Payer: MEDICARE

## 2023-04-05 VITALS
SYSTOLIC BLOOD PRESSURE: 118 MMHG | RESPIRATION RATE: 18 BRPM | BODY MASS INDEX: 23.97 KG/M2 | OXYGEN SATURATION: 99 % | WEIGHT: 143.88 LBS | HEART RATE: 68 BPM | DIASTOLIC BLOOD PRESSURE: 70 MMHG | HEIGHT: 65 IN

## 2023-04-05 DIAGNOSIS — I10 PRIMARY HYPERTENSION: ICD-10-CM

## 2023-04-05 DIAGNOSIS — J30.0 CHRONIC VASOMOTOR RHINITIS: ICD-10-CM

## 2023-04-05 DIAGNOSIS — I63.10 CEREBROVASCULAR ACCIDENT (CVA) DUE TO EMBOLISM OF PRECEREBRAL ARTERY: Primary | ICD-10-CM

## 2023-04-05 DIAGNOSIS — J30.9 ALLERGIC RHINITIS, UNSPECIFIED SEASONALITY, UNSPECIFIED TRIGGER: ICD-10-CM

## 2023-04-05 PROCEDURE — 1101F PR PT FALLS ASSESS DOC 0-1 FALLS W/OUT INJ PAST YR: ICD-10-PCS | Mod: HCNC,CPTII,S$GLB, | Performed by: FAMILY MEDICINE

## 2023-04-05 PROCEDURE — 1160F PR REVIEW ALL MEDS BY PRESCRIBER/CLIN PHARMACIST DOCUMENTED: ICD-10-PCS | Mod: HCNC,CPTII,S$GLB, | Performed by: FAMILY MEDICINE

## 2023-04-05 PROCEDURE — 99999 PR PBB SHADOW E&M-EST. PATIENT-LVL IV: CPT | Mod: PBBFAC,HCNC,, | Performed by: FAMILY MEDICINE

## 2023-04-05 PROCEDURE — 1126F AMNT PAIN NOTED NONE PRSNT: CPT | Mod: HCNC,CPTII,S$GLB, | Performed by: FAMILY MEDICINE

## 2023-04-05 PROCEDURE — 3288F PR FALLS RISK ASSESSMENT DOCUMENTED: ICD-10-PCS | Mod: HCNC,CPTII,S$GLB, | Performed by: FAMILY MEDICINE

## 2023-04-05 PROCEDURE — 99214 OFFICE O/P EST MOD 30 MIN: CPT | Mod: HCNC,S$GLB,, | Performed by: FAMILY MEDICINE

## 2023-04-05 PROCEDURE — 1159F MED LIST DOCD IN RCRD: CPT | Mod: HCNC,CPTII,S$GLB, | Performed by: FAMILY MEDICINE

## 2023-04-05 PROCEDURE — 1160F RVW MEDS BY RX/DR IN RCRD: CPT | Mod: HCNC,CPTII,S$GLB, | Performed by: FAMILY MEDICINE

## 2023-04-05 PROCEDURE — 99999 PR PBB SHADOW E&M-EST. PATIENT-LVL IV: ICD-10-PCS | Mod: PBBFAC,HCNC,, | Performed by: FAMILY MEDICINE

## 2023-04-05 PROCEDURE — 1126F PR PAIN SEVERITY QUANTIFIED, NO PAIN PRESENT: ICD-10-PCS | Mod: HCNC,CPTII,S$GLB, | Performed by: FAMILY MEDICINE

## 2023-04-05 PROCEDURE — 1101F PT FALLS ASSESS-DOCD LE1/YR: CPT | Mod: HCNC,CPTII,S$GLB, | Performed by: FAMILY MEDICINE

## 2023-04-05 PROCEDURE — 1159F PR MEDICATION LIST DOCUMENTED IN MEDICAL RECORD: ICD-10-PCS | Mod: HCNC,CPTII,S$GLB, | Performed by: FAMILY MEDICINE

## 2023-04-05 PROCEDURE — 3288F FALL RISK ASSESSMENT DOCD: CPT | Mod: HCNC,CPTII,S$GLB, | Performed by: FAMILY MEDICINE

## 2023-04-05 PROCEDURE — 99214 PR OFFICE/OUTPT VISIT, EST, LEVL IV, 30-39 MIN: ICD-10-PCS | Mod: HCNC,S$GLB,, | Performed by: FAMILY MEDICINE

## 2023-04-05 RX ORDER — IPRATROPIUM BROMIDE 21 UG/1
2 SPRAY, METERED NASAL 3 TIMES DAILY PRN
Qty: 30 ML | Refills: 5 | Status: SHIPPED | OUTPATIENT
Start: 2023-04-05

## 2023-04-05 NOTE — PROGRESS NOTES
THIS DOCUMENT WAS MADE IN PART WITH VOICE RECOGNITION SOFTWARE.  OCCASIONALLY THIS SOFTWARE WILL MISINTERPRET WORDS OR PHRASES.      Primary Care Provider Appointment   [facility]       Patient ID: Maggi Fontaine is a 89 y.o. female.    ASSESSMENT/PLAN by Problem List:    1. Cerebrovascular accident (CVA) due to embolism of precerebral artery  Assessment & Plan:  I reviewed the hospitalization, images, and consults.  She does not appear to have any focal deficits, no trouble speaking or swallowing.  Although she does remain a fall risk.  Home Health has been consulted and she will continue to work with them.  I will see her back in two months      2. Primary hypertension  Assessment & Plan:  Stable satisfactory control continue current medications      3. Allergic rhinitis, unspecified seasonality, unspecified trigger  Assessment & Plan:  She does complain of allergy symptoms but also a chronic runny nose.  She likely has some allergies as well as nonallergic rhinitis.  I recommend that she take Claritin 10 mg once daily.  Will try Atrovent nasal spray as needed to help with rhinorrhea before eating, social situations, etc..      4. Chronic vasomotor rhinitis  Assessment & Plan:  She does complain of allergy symptoms but also a chronic runny nose.  She likely has some allergies as well as nonallergic rhinitis.  I recommend that she take Claritin 10 mg once daily.  Will try Atrovent nasal spray as needed to help with rhinorrhea before eating, social situations, etc..        Other orders  -     ipratropium (ATROVENT) 21 mcg (0.03 %) nasal spray; 2 sprays by Each Nostril route 3 (three) times daily as needed for Rhinitis.  Dispense: 30 mL; Refill: 5         Follow Up:  Two months    Subjective:     Chief Complaint   Patient presents with    Hospital Follow Up     I have reviewed the information entered by the ancillary staff regarding the chief complaint as well as the related  history.    HPI    Patient is a/an 89 y.o.  female       Hospital follow-up.  On March 15th, patient presented to the ER after awakening during the night and had difficulty moving her body.  She contacted EMS and was brought to the ER.  Initial CT did not show any acute process.  MRI of the brain suggested acute or early subacute lacunar infarcts in the medial right cerebellum and vermis and occipital lobe left.  She apparently did not have focal complaints.  Neurology and Cardiology were consulted.  Her metoprolol was held initially and then patient was advised to resume after discharge.  Her Xarelto was adjusted upon discharge.    Feet hurt at night, mild swellnig, improved with elevation on a pillow,     Runny nose, constant      For complete problem list, past medical history, surgical history, social history, etc., see appropriate section in the electronic medical record    Review of Systems   Constitutional:  Negative for fever.   HENT:  Positive for rhinorrhea. Negative for nosebleeds, postnasal drip, sinus pressure and sore throat.    Respiratory: Negative.     Gastrointestinal: Negative.    Genitourinary: Negative.    Neurological:  Positive for weakness.   Psychiatric/Behavioral: Negative.       Objective     Physical Exam  Vitals reviewed.   Constitutional:       General: She is not in acute distress.     Appearance: She is well-developed. She is not toxic-appearing or diaphoretic.   HENT:      Head: Normocephalic and atraumatic.      Mouth/Throat:      Pharynx: Oropharynx is clear. No oropharyngeal exudate.   Eyes:      General: No scleral icterus.     Conjunctiva/sclera: Conjunctivae normal.   Cardiovascular:      Rate and Rhythm: Normal rate. Rhythm irregular.      Heart sounds: Normal heart sounds. No murmur heard.     Comments: 1+ ankle  Pulmonary:      Effort: Pulmonary effort is normal. No respiratory distress.      Breath sounds: Normal breath sounds. No wheezing.   Neurological:      Mental  "Status: She is alert.      Motor: No tremor or abnormal muscle tone.      Coordination: Finger-Nose-Finger Test normal. Rapid alternating movements normal.      Comments: Ambulatory.  Mildly unsteady.  No focal deficits.     Psychiatric:         Mood and Affect: Mood normal.         Behavior: Behavior normal.     Vitals:    04/05/23 1120   BP: 118/70   BP Location: Left arm   Patient Position: Sitting   BP Method: Medium (Manual)   Pulse: 68   Resp: 18   SpO2: 99%   Weight: 65.2 kg (143 lb 13.6 oz)   Height: 5' 5" (1.651 m)     "

## 2023-04-05 NOTE — ASSESSMENT & PLAN NOTE
I reviewed the hospitalization, images, and consults.  She does not appear to have any focal deficits, no trouble speaking or swallowing.  Although she does remain a fall risk.  Home Health has been consulted and she will continue to work with them.  I will see her back in two months

## 2023-04-05 NOTE — ASSESSMENT & PLAN NOTE
She does complain of allergy symptoms but also a chronic runny nose.  She likely has some allergies as well as nonallergic rhinitis.  I recommend that she take Claritin 10 mg once daily.  Will try Atrovent nasal spray as needed to help with rhinorrhea before eating, social situations, etc..

## 2023-04-10 ENCOUNTER — TELEPHONE (OUTPATIENT)
Dept: PRIMARY CARE CLINIC | Facility: CLINIC | Age: 88
End: 2023-04-10
Payer: MEDICARE

## 2023-04-10 RX ORDER — METOPROLOL SUCCINATE 25 MG/1
25 TABLET, EXTENDED RELEASE ORAL DAILY
Qty: 90 TABLET | Refills: 1 | Status: SHIPPED | OUTPATIENT
Start: 2023-04-10 | End: 2023-09-14 | Stop reason: SDUPTHER

## 2023-04-10 NOTE — TELEPHONE ENCOUNTER
Prescription sent to [facility].  If the refill is early and previous Rx lost then it is possible the insurance will not pay for it but there is nothing I can do about that.

## 2023-04-10 NOTE — TELEPHONE ENCOUNTER
Pt's daughter, Ken, reports that she is out of metoprolol and that she needs 3 weeks worth of meds due [facility]     Please advise on how we can assist.

## 2023-04-12 ENCOUNTER — OUTPATIENT CASE MANAGEMENT (OUTPATIENT)
Dept: ADMINISTRATIVE | Facility: OTHER | Age: 88
End: 2023-04-12
Payer: MEDICARE

## 2023-04-18 ENCOUNTER — EXTERNAL HOME HEALTH (OUTPATIENT)
Dept: HOME HEALTH SERVICES | Facility: HOSPITAL | Age: 88
End: 2023-04-18
Payer: MEDICARE

## 2023-04-19 ENCOUNTER — OFFICE VISIT (OUTPATIENT)
Dept: CARDIOLOGY | Facility: CLINIC | Age: 88
End: 2023-04-19
Payer: MEDICARE

## 2023-04-19 VITALS
HEART RATE: 98 BPM | WEIGHT: 151.25 LBS | BODY MASS INDEX: 25.2 KG/M2 | DIASTOLIC BLOOD PRESSURE: 66 MMHG | SYSTOLIC BLOOD PRESSURE: 99 MMHG | HEIGHT: 65 IN

## 2023-04-19 DIAGNOSIS — I63.10 CEREBROVASCULAR ACCIDENT (CVA) DUE TO EMBOLISM OF PRECEREBRAL ARTERY: Primary | ICD-10-CM

## 2023-04-19 DIAGNOSIS — Z91.81 RISK FOR FALLS: ICD-10-CM

## 2023-04-19 DIAGNOSIS — I70.0 AORTIC ATHEROSCLEROSIS: Chronic | ICD-10-CM

## 2023-04-19 DIAGNOSIS — G60.9 IDIOPATHIC PERIPHERAL NEUROPATHY: ICD-10-CM

## 2023-04-19 DIAGNOSIS — N18.31 STAGE 3A CHRONIC KIDNEY DISEASE: ICD-10-CM

## 2023-04-19 DIAGNOSIS — R53.1 GENERAL WEAKNESS: ICD-10-CM

## 2023-04-19 DIAGNOSIS — E78.5 HYPERLIPIDEMIA, UNSPECIFIED HYPERLIPIDEMIA TYPE: Chronic | ICD-10-CM

## 2023-04-19 DIAGNOSIS — I48.0 PAF (PAROXYSMAL ATRIAL FIBRILLATION): ICD-10-CM

## 2023-04-19 DIAGNOSIS — I48.11 LONGSTANDING PERSISTENT ATRIAL FIBRILLATION: Chronic | ICD-10-CM

## 2023-04-19 PROCEDURE — 1126F AMNT PAIN NOTED NONE PRSNT: CPT | Mod: CPTII,S$GLB,, | Performed by: INTERNAL MEDICINE

## 2023-04-19 PROCEDURE — 99999 PR PBB SHADOW E&M-EST. PATIENT-LVL III: CPT | Mod: PBBFAC,,, | Performed by: INTERNAL MEDICINE

## 2023-04-19 PROCEDURE — 99214 OFFICE O/P EST MOD 30 MIN: CPT | Mod: S$GLB,,, | Performed by: INTERNAL MEDICINE

## 2023-04-19 PROCEDURE — 1160F RVW MEDS BY RX/DR IN RCRD: CPT | Mod: CPTII,S$GLB,, | Performed by: INTERNAL MEDICINE

## 2023-04-19 PROCEDURE — 1159F MED LIST DOCD IN RCRD: CPT | Mod: CPTII,S$GLB,, | Performed by: INTERNAL MEDICINE

## 2023-04-19 PROCEDURE — 1126F PR PAIN SEVERITY QUANTIFIED, NO PAIN PRESENT: ICD-10-PCS | Mod: CPTII,S$GLB,, | Performed by: INTERNAL MEDICINE

## 2023-04-19 PROCEDURE — 1159F PR MEDICATION LIST DOCUMENTED IN MEDICAL RECORD: ICD-10-PCS | Mod: CPTII,S$GLB,, | Performed by: INTERNAL MEDICINE

## 2023-04-19 PROCEDURE — 99999 PR PBB SHADOW E&M-EST. PATIENT-LVL III: ICD-10-PCS | Mod: PBBFAC,,, | Performed by: INTERNAL MEDICINE

## 2023-04-19 PROCEDURE — 1160F PR REVIEW ALL MEDS BY PRESCRIBER/CLIN PHARMACIST DOCUMENTED: ICD-10-PCS | Mod: CPTII,S$GLB,, | Performed by: INTERNAL MEDICINE

## 2023-04-19 PROCEDURE — 99214 PR OFFICE/OUTPT VISIT, EST, LEVL IV, 30-39 MIN: ICD-10-PCS | Mod: S$GLB,,, | Performed by: INTERNAL MEDICINE

## 2023-04-19 NOTE — PROGRESS NOTES
Subjective:    Patient ID:  Magig Fontaine is a 89 y.o. female patient here for evaluation Atrial Fibrillation      History of Present Illness:  Cardiology follow-up visit.  Persistent atrial fibrillation.  Last device check 04/15/2023 with persistent AFib.  History of falls, patient was on low-dose Xarelto 15mg last visit.  Interim ejection fractions have declined from 50% in 2020 to 40% noted on last echo in January 2023.  Nuclear study 2022- for ischemia.  Past left heart catheterization with nonobstructive coronary disease.  Patient being treated for nonischemic dilated cardiomyopathy.  EF decreased could be related persistent atrial fibrillation with increased ventricular response.     Patient now living in assisted care due to progressive debilitation.  Issues with multiple past medications.  Issues with allergies to past multiple cardiac medications.      Review of patient's allergies indicates:   Allergen Reactions    Amiodarone analogues      Repeated falls x4; and hair loss; so stopped.     Prozac [fluoxetine] Other (See Comments)     Nightmares    Nsaids (non-steroidal anti-inflammatory drug) Other (See Comments)     Other reaction(s): Chronic Renal Insufficiency    Penicillins Swelling    Eliquis [apixaban] Anxiety     Panic Attacks       Past Medical History:   Diagnosis Date    Acid reflux     Arthritis     At risk for falling     Atrial fibrillation     Cardiac pacemaker     Gait instability     muscle weakness    Gout     Headache(784.0)     HEARING LOSS     Hyperlipidemia     Hypertension     Hypothyroidism     Pacemaker     Polymyalgia rheumatica     remission now    Renal insufficiency     Sinusitis     SOB (shortness of breath)     Stroke     history of TIA    Urinary incontinence     West Nile encephalitis     2002     Past Surgical History:   Procedure Laterality Date    ANGIOGRAM, CORONARY, WITH LEFT HEART CATHETERIZATION Left 2/1/2021    Procedure: Angiogram, Coronary, with Left Heart Cath;   Surgeon: Eriberto Alvarez III, MD;  Location: [facility];  Service: Cardiology;  Laterality: Left;    CARDIAC PACEMAKER PLACEMENT      CATARACT EXTRACTION EXTRACAPSULAR W/ INTRAOCULAR LENS IMPLANTATION      EYE SURGERY      HYSTERECTOMY      JOINT REPLACEMENT      anupam knees    KNEE SURGERY      PARTIAL HYSTERECTOMY      TREATMENT OF CARDIAC ARRHYTHMIA N/A 7/8/2019    Procedure: CARDIOVERSION;  Surgeon: Eriberto Alvarez III, MD;  Location: [facility];  Service: Cardiology;  Laterality: N/A;     Social History     Tobacco Use    Smoking status: Never    Smokeless tobacco: Never   Substance Use Topics    Alcohol use: Yes     Alcohol/week: 0.8 standard drinks     Types: 1 Standard drinks or equivalent per week     Comment: less than 1 drink weekly    Drug use: No        Review of Systems:    As noted in HPI in addition      REVIEW OF SYSTEMS  Review of Systems   Constitutional: Negative for decreased appetite, diaphoresis, night sweats, weight gain and weight loss.   HENT:  Negative for nosebleeds and odynophagia.    Eyes:  Negative for double vision and photophobia.   Cardiovascular:  Negative for chest pain, claudication, cyanosis, dyspnea on exertion, irregular heartbeat, leg swelling, near-syncope, orthopnea, palpitations, paroxysmal nocturnal dyspnea and syncope.   Respiratory:  Negative for cough, hemoptysis, shortness of breath and wheezing.    Hematologic/Lymphatic: Negative for adenopathy.   Skin:  Negative for flushing, skin cancer and suspicious lesions.   Musculoskeletal:  Negative for gout, myalgias and neck pain.   Gastrointestinal:  Negative for abdominal pain, heartburn, hematemesis and hematochezia.   Genitourinary:  Negative for bladder incontinence, hesitancy and nocturia.   Neurological:  Negative for focal weakness, headaches, light-headedness and paresthesias.   Psychiatric/Behavioral:  Negative for memory loss and substance abuse.             Objective:        Vitals:    04/19/23 1341   BP: 99/66    Pulse: 98       Lab Results   Component Value Date    WBC 4.76 03/17/2023    HGB 13.1 03/17/2023    HCT 36.9 (L) 03/17/2023     03/17/2023    CHOL 124 03/16/2023    TRIG 80 03/16/2023    HDL 46 03/16/2023    ALT 18 03/17/2023    AST 30 03/17/2023     03/17/2023    K 4.4 03/17/2023     03/17/2023    CREATININE 1.33 03/17/2023    BUN 28 (H) 03/17/2023    CO2 26 03/17/2023    TSH 0.056 (L) 03/15/2023    INR 2.4 03/16/2023    GLUF 112 (H) 10/05/2005    HGBA1C 5.4 03/15/2023        ECHOCARDIOGRAM RESULTS  Results for orders placed during the hospital encounter of 03/15/23    Echo Saline Bubble? Yes    Interpretation Summary  · Eccentric hypertrophy and mildly decreased systolic function.  · Moderate left atrial enlargement.  · The estimated PA systolic pressure is 29 mmHg.  · The estimated ejection fraction is 40%.  · Left ventricular diastolic dysfunction.  · Normal right ventricular size with normal right ventricular systolic function.  · Mild right atrial enlargement.  · Moderate aortic regurgitation.  · Moderate tricuspid regurgitation.  · Mild-to-moderate mitral regurgitation.  · Normal central venous pressure (3 mmHg).        CURRENT/PREVIOUS VISIT EKG  Results for orders placed or performed during the hospital encounter of 03/15/23   EKG 12-lead    Collection Time: 03/15/23  6:20 AM    Narrative    Test Reason : R53.1,    Vent. Rate : 076 BPM     Atrial Rate : 000 BPM     P-R Int : 000 ms          QRS Dur : 066 ms      QT Int : 374 ms       P-R-T Axes : 000 050 -83 degrees     QTc Int : 420 ms    Atrial fibrillation with frequent ventricular-paced complexes  Low voltage QRS  Septal infarct ,age undetermined  ST and T wave abnormality, consider anterolateral ischemia  Abnormal ECG  When compared with ECG of 28-DEC-2022 18:26,  No significant change was found  Confirmed by Pool Carrion MD ([id number]) on 3/15/2023 2:24:18 PM    Referred By: [email or web address]   SELF           Confirmed By:Pool Carrion  MD     No valid procedures specified.   Results for orders placed during the hospital encounter of 05/11/22    Nuclear Stress - Cardiology Interpreted    Interpretation Summary    The EKG portion of this study is negative for ischemia.    Normal myocardial perfusion scan.    The gated perfusion images showed an ejection fraction of 40% .    Small LV cavity size.    No valid procedures specified.    PHYSICAL EXAM  CONSTITUTIONAL: Well built, well nourished in no apparent distress  NECK: no carotid bruit, no JVD  LUNGS: CTA  CHEST WALL: no tenderness,  HEART: regular rate and rhythm, S1, S2 normal, no murmur, click, rub or gallop   ABDOMEN: soft, non-tender; bowel sounds normal; no masses,  no organomegaly  EXTREMITIES: Extremities normal, no edema, no calf tenderness noted  NEURO: AAO X 3    I HAVE REVIEWED :    The vital signs, nurses notes, and all the pertinent radiology and labs.         Current Outpatient Medications   Medication Instructions    ALPRAZolam (XANAX) 0.25 MG tablet TAKE 1 TABLET BY MOUTH DAILY AS NEEDED FOR PANIC ATTACKS    ergocalciferol (ERGOCALCIFEROL) 50,000 unit Cap TAKE 1 CAPSULE EVERY WEEK    gabapentin (NEURONTIN) 300 mg, Oral, Nightly    ipratropium (ATROVENT) 21 mcg (0.03 %) nasal spray 2 sprays, Each Nostril, 3 times daily PRN    levothyroxine (SYNTHROID) 88 MCG tablet TAKE 1 TABLET BY MOUTH EVERY DAY BEFORE BREAKFAST    metoprolol succinate (TOPROL-XL) 25 mg, Oral, Daily    multivitamin capsule 1 capsule, Oral, Daily          Assessment:   Cardiomyopathy nonischemic, EF 40% by last echo with moderate MR.  01/2023.  Persistent atrial fibrillation slightly accelerated ventricular response.  Sick sinus syndrome, DDDR pacer implant.        Plan:   Toprol XL 25 mg daily,  Continue Xarelto 15 daily, aspirin every other day 81 mg.  Patient's blood pressure remains low, less than 100 systolic.  No clinical congestive heart.  With falls, difficult to titrate meds for LV systolic dysfunction.   Would continue to followed clinically.  Pacer function normal.  Follow-up 4        No follow-ups on file.

## 2023-04-20 DIAGNOSIS — I48.11 LONGSTANDING PERSISTENT ATRIAL FIBRILLATION: Primary | Chronic | ICD-10-CM

## 2023-04-20 RX ORDER — ASPIRIN 81 MG/1
81 TABLET ORAL EVERY OTHER DAY
Qty: 100 TABLET | Refills: 3 | Status: ON HOLD
Start: 2023-04-20 | End: 2023-06-06 | Stop reason: HOSPADM

## 2023-04-20 NOTE — TELEPHONE ENCOUNTER
Spoke to Fern, nurse at [facility] and confirmed Xaralto should be 15mg daily and aspirin 81mg every other day per Dr Carrion's office note.

## 2023-04-20 NOTE — TELEPHONE ENCOUNTER
----- Message from Parisa Martinez sent at 4/20/2023  2:48 PM CDT -----  Type: Needs Medical Advice  Who Called:  pt     Best Call Back Number: [phone] please ask for AL2 nurse / fax [phone]   Additional Information: assistant living is calling in regards to pt presc. Says she has medications with different dosages , one from their computer and one she already has . Also they would like to knoe it pt is supposed to take Asprin and if so she needs an order sent to their fax b/c it is not listed in her file . Please darling back and advise .

## 2023-04-27 ENCOUNTER — OUTPATIENT CASE MANAGEMENT (OUTPATIENT)
Dept: ADMINISTRATIVE | Facility: OTHER | Age: 88
End: 2023-04-27
Payer: MEDICARE

## 2023-04-27 NOTE — PROGRESS NOTES
Outpatient Care Management  Plan of Care Follow Up Visit    Patient: Maggi Fonatine  MRN: [id number]  Date of Service: 04/27/2023  Completed by: Merlene Ga RN  Referral Date: 03/15/2023    Reason for Visit   Patient presents with    OPCM RN Follow Up Call     Ken requested a return call later as they are loading up to take a trip to [place] for a baby shower.      OPCM spoke with Ken today for follow up.       Brief Summary: OPCM RN spoke with Ken today regarding Ms. Fontaine; she is doing well; her and her family are headed to [place] for a baby shower.  Next Steps:   Ken agreed for OPCM RN follow up for Ms. Fontaine on or around 5/15/23.  Discuss vision.

## 2023-05-10 ENCOUNTER — DOCUMENT SCAN (OUTPATIENT)
Dept: HOME HEALTH SERVICES | Facility: HOSPITAL | Age: 88
End: 2023-05-10
Payer: MEDICARE

## 2023-05-15 ENCOUNTER — DOCUMENT SCAN (OUTPATIENT)
Dept: HOME HEALTH SERVICES | Facility: HOSPITAL | Age: 88
End: 2023-05-15
Payer: MEDICARE

## 2023-05-17 ENCOUNTER — DOCUMENT SCAN (OUTPATIENT)
Dept: HOME HEALTH SERVICES | Facility: HOSPITAL | Age: 88
End: 2023-05-17
Payer: MEDICARE

## 2023-05-23 ENCOUNTER — TELEPHONE (OUTPATIENT)
Dept: PRIMARY CARE CLINIC | Facility: CLINIC | Age: 88
End: 2023-05-23
Payer: MEDICARE

## 2023-05-23 DIAGNOSIS — I48.11 LONGSTANDING PERSISTENT ATRIAL FIBRILLATION: Chronic | ICD-10-CM

## 2023-05-23 DIAGNOSIS — E55.9 HYPOVITAMINOSIS D: ICD-10-CM

## 2023-05-23 RX ORDER — GABAPENTIN 300 MG/1
300 CAPSULE ORAL NIGHTLY
Qty: 30 CAPSULE | Refills: 5 | Status: CANCELLED | OUTPATIENT
Start: 2023-05-23 | End: 2024-05-22

## 2023-05-23 RX ORDER — ERGOCALCIFEROL 1.25 MG/1
50000 CAPSULE ORAL
Qty: 4 CAPSULE | Refills: 2 | Status: SHIPPED | OUTPATIENT
Start: 2023-05-23 | End: 2023-09-14 | Stop reason: SDUPTHER

## 2023-05-23 RX ORDER — GABAPENTIN 300 MG/1
300 CAPSULE ORAL NIGHTLY
Qty: 30 CAPSULE | Refills: 2 | Status: SHIPPED | OUTPATIENT
Start: 2023-05-23 | End: 2023-09-14 | Stop reason: SDUPTHER

## 2023-05-23 NOTE — TELEPHONE ENCOUNTER
----- Message from Dania Wayne sent at 5/23/2023 12:07 PM CDT -----  Contact: self  Type: Needs Medical Advice  Who Called:  Patient      Pharmacy name and phone #:      [id number] - [place], [place] - [address] [place] [address] AT [facility] & [address] [address]  Phone: [phone] Fax: [phone]          Best Call Back Number: [phone]    Additional Information: Pt states she would like to speak with office regarding a refill on medication.Please call back

## 2023-05-23 NOTE — TELEPHONE ENCOUNTER
I received a refill request for vitamin-D, gabapentin, and Xarelto.      I have previously prescribed the vitamin-D but not the other two.    I am agreeable to prescribing gabapentin but need to verify that she is still taking this.  Also need to verify where this patient is now living.  If this is coming from [facility] then she may be in an assisted living facility or nursing home.    I would prefer the Xarelto be prescribed by her cardiologist if she is still seeing them.    Also there is a separate message that states the preferred pharmacy has [employer] and not [employer] so please help me sort this out.

## 2023-05-23 NOTE — TELEPHONE ENCOUNTER
----- Message from Briseyda Apodaca sent at 5/22/2023  1:52 PM CDT -----  Can the clinic reply in [facility]:           Please refill the medication(s) listed below. Please call the patient when the prescription(s) is ready for  at this phone number   Lexie Tanner , [facility] , pt resides on 2nd floor            Medication #1 gabapentin (NEURONTIN) 300 MG capsule         Medication #2 VITAMIN D2 50,000 unit capsule (Discontinued)        Medication #3 rivaroxaban (XARELTO) 15 mg Tab           Preferred Pharmacy:   [facility] [address]  [address]  [place] [address]  Phone: [phone] Fax: [phone]

## 2023-05-23 NOTE — TELEPHONE ENCOUNTER
Spoke with pt, discussed your message, pt verbalized understanding. Xarelto to be re-filled by Dr. Carrion. Pt currently residing at [facility].     Preferred pharmacy: [facility]

## 2023-05-31 ENCOUNTER — OUTPATIENT CASE MANAGEMENT (OUTPATIENT)
Dept: ADMINISTRATIVE | Facility: OTHER | Age: 88
End: 2023-05-31
Payer: MEDICARE

## 2023-05-31 PROBLEM — S72.009A FRACTURE OF NECK OF FEMUR: Status: ACTIVE | Noted: 2023-05-31

## 2023-05-31 PROBLEM — S72.044A: Status: ACTIVE | Noted: 2023-05-31

## 2023-05-31 PROBLEM — S72.009A FRACTURE OF NECK OF FEMUR: Status: RESOLVED | Noted: 2023-05-31 | Resolved: 2023-05-31

## 2023-05-31 NOTE — PROGRESS NOTES
05/31/23 After review, Ms. Fontaine currently in ER for fall and c/o hip pain.  Will continue to follow.

## 2023-06-01 ENCOUNTER — TELEPHONE (OUTPATIENT)
Dept: NEPHROLOGY | Facility: CLINIC | Age: 88
End: 2023-06-01

## 2023-06-01 PROBLEM — E87.5 HYPERKALEMIA: Status: ACTIVE | Noted: 2023-06-01

## 2023-06-01 PROBLEM — G92.9 ENCEPHALOPATHY, TOXIC: Status: ACTIVE | Noted: 2023-06-01

## 2023-06-01 NOTE — TELEPHONE ENCOUNTER
----- Message from Aidan Clinton sent at 6/1/2023  9:17 AM CDT -----  Contact: [place] from [facility] at [phone]  Type: Needs Medical Advice  Who Called:  [place]  Best Call Back Number: [phone]  Additional Information: [place] from [facility] is calling the office for a consult on the pt.

## 2023-06-05 ENCOUNTER — TELEPHONE (OUTPATIENT)
Dept: PRIMARY CARE CLINIC | Facility: CLINIC | Age: 88
End: 2023-06-05
Payer: MEDICARE

## 2023-06-05 NOTE — TELEPHONE ENCOUNTER
Spoke with pt, informed her about her appt with Dr. Briones today that we will r/s due to current hospitalization.     Called pt's daughter and had to LVM that appt would moved to 2 months out or to call if they need to be seen sooner.     It appears that pt will be going to SNF.

## 2023-06-08 ENCOUNTER — TELEPHONE (OUTPATIENT)
Dept: PRIMARY CARE CLINIC | Facility: CLINIC | Age: 88
End: 2023-06-08
Payer: MEDICARE

## 2023-06-08 ENCOUNTER — OUTPATIENT CASE MANAGEMENT (OUTPATIENT)
Dept: ADMINISTRATIVE | Facility: OTHER | Age: 88
End: 2023-06-08
Payer: MEDICARE

## 2023-06-08 NOTE — TELEPHONE ENCOUNTER
Spoke with pt's daughter, Ken, and encouraged her to speak with the nursing supervisor at [facility] to see what patient's orders are for therapy. Ken expressed concerns about the care that her mother is receiving at [facility] and reports that her mother was laying in her own urine when she got there this morning and that the linens were not changed from 0800 to 1400 today. Ken wants to know if her mom can be sent back to [facility] with Skilled Nursing to be provided there. I encouraged Ken to contact Dr. Colbert' office as I was not involved in the arrangement of her mom being set up at [facility] and I do not know what Dr. Colbert wants for his pt. Ken verbalized understanding.

## 2023-06-08 NOTE — PROGRESS NOTES
Outpatient Care Management  Plan of Care Follow Up Visit    Patient: Maggi Fontaine  MRN: [id number]  Date of Service: 06/08/2023  Completed by: Merlene Ga RN  Referral Date: 03/15/2023    Reason for Visit   Patient presents with    OPCM RN Follow Up Call       Brief Summary: Ken, Ms. Fontaine's daughter, reported Ms. Fontaine was hospitalized from 5/31/23 to 6/6/23 for a fall where she broke her right hip and is now s/p ORIF, then discharged to SNF at [facility].  She voiced her displeasure with [facility] care and wants her mother transferred back to [facility] if they have a SNF.  Emotional support provided with good listening skills.  Next Steps:   OPCM RN contacted [facility] and was informed they have independent living, assisted living and memory care; they do not have SNF.  OPCM RN called Ken back and informed her of above.  She has no idea what to do to move her mother to receive better care.  She was informed OPCM RN will reach out for guidance from .  Ms. Rogers was appreciative and is aware that it may be tomorrow before getting back to her.    OPCM RN messaged  for guidance.  Will follow up with Ken.    6/9/23 - OPCM RN followed up with Ken and informed her of [facility] guidance as follows: The pt and/or family need to talk to the admissions coordinator at the current SNF to request transfer.  Ken verbalized an understanding.  In further discussion, OPCM RN encouraged her to contact [facility] for assistance and was provided the following # to ask for case [facility] [phone].  She was informed that OPCM RN will close case due to her next level of care being SNF, encouraged her to contact [facility] again once Ms. Fontaine returns home, and to contact OPCM RN for any guidance, needs, concerns or further assistance.   She was appreciative and verbalized an understanding.

## 2023-06-08 NOTE — TELEPHONE ENCOUNTER
----- Message from Maria Esther Mendoza sent at 6/8/2023  2:50 PM CDT -----  Contact: Ken Noguera  Type:  Needs Medical Advice    Who Called:   Ken Noguera    Would the patient rather a call back or a response via [facility]?   Call back  Best Call Back Number:   [phone] - Ken Noguera    Additional Information:   States patient had surgery last Wednesday and discharged Tuesday evening and brought to [facility] for skilled nursing - states patient was evaluated yesterday and that was all that was done with her - states she only gets 45 minutes a day of various therapies and no other activity - states therapy is done at different times each day and only 45 minutes a day - states paperwork was done for [facility] but would very much appreciate if patient can be relocated to a better facility - please call Ken noguera, to advise/discuss - thank you

## 2023-06-19 PROBLEM — I63.113 CEREBROVASCULAR ACCIDENT (CVA) DUE TO BILATERAL EMBOLISM OF VERTEBRAL ARTERIES: Status: RESOLVED | Noted: 2023-03-16 | Resolved: 2023-06-19

## 2023-06-19 PROBLEM — I63.9 CEREBROVASCULAR ACCIDENT (CVA) DUE TO EMBOLISM: Status: RESOLVED | Noted: 2023-03-16 | Resolved: 2023-06-19

## 2023-08-01 ENCOUNTER — PATIENT MESSAGE (OUTPATIENT)
Dept: PRIMARY CARE CLINIC | Facility: CLINIC | Age: 88
End: 2023-08-01
Payer: MEDICARE

## 2023-08-14 ENCOUNTER — OFFICE VISIT (OUTPATIENT)
Dept: PRIMARY CARE CLINIC | Facility: CLINIC | Age: 88
End: 2023-08-14
Payer: MEDICARE

## 2023-08-14 VITALS
TEMPERATURE: 98 F | OXYGEN SATURATION: 99 % | SYSTOLIC BLOOD PRESSURE: 116 MMHG | HEART RATE: 73 BPM | BODY MASS INDEX: 23.95 KG/M2 | HEIGHT: 64 IN | WEIGHT: 140.31 LBS | RESPIRATION RATE: 18 BRPM | DIASTOLIC BLOOD PRESSURE: 68 MMHG

## 2023-08-14 DIAGNOSIS — R53.81 DECLINING FUNCTIONAL STATUS: Primary | ICD-10-CM

## 2023-08-14 PROCEDURE — 1126F PR PAIN SEVERITY QUANTIFIED, NO PAIN PRESENT: ICD-10-PCS | Mod: HCNC,CPTII,S$GLB, | Performed by: FAMILY MEDICINE

## 2023-08-14 PROCEDURE — 1159F PR MEDICATION LIST DOCUMENTED IN MEDICAL RECORD: ICD-10-PCS | Mod: HCNC,CPTII,S$GLB, | Performed by: FAMILY MEDICINE

## 2023-08-14 PROCEDURE — 3288F PR FALLS RISK ASSESSMENT DOCUMENTED: ICD-10-PCS | Mod: HCNC,CPTII,S$GLB, | Performed by: FAMILY MEDICINE

## 2023-08-14 PROCEDURE — 99214 PR OFFICE/OUTPT VISIT, EST, LEVL IV, 30-39 MIN: ICD-10-PCS | Mod: HCNC,S$GLB,, | Performed by: FAMILY MEDICINE

## 2023-08-14 PROCEDURE — 1160F PR REVIEW ALL MEDS BY PRESCRIBER/CLIN PHARMACIST DOCUMENTED: ICD-10-PCS | Mod: HCNC,CPTII,S$GLB, | Performed by: FAMILY MEDICINE

## 2023-08-14 PROCEDURE — 3288F FALL RISK ASSESSMENT DOCD: CPT | Mod: HCNC,CPTII,S$GLB, | Performed by: FAMILY MEDICINE

## 2023-08-14 PROCEDURE — 99999 PR PBB SHADOW E&M-EST. PATIENT-LVL IV: CPT | Mod: PBBFAC,HCNC,, | Performed by: FAMILY MEDICINE

## 2023-08-14 PROCEDURE — 1101F PR PT FALLS ASSESS DOC 0-1 FALLS W/OUT INJ PAST YR: ICD-10-PCS | Mod: HCNC,CPTII,S$GLB, | Performed by: FAMILY MEDICINE

## 2023-08-14 PROCEDURE — 1160F RVW MEDS BY RX/DR IN RCRD: CPT | Mod: HCNC,CPTII,S$GLB, | Performed by: FAMILY MEDICINE

## 2023-08-14 PROCEDURE — 1159F MED LIST DOCD IN RCRD: CPT | Mod: HCNC,CPTII,S$GLB, | Performed by: FAMILY MEDICINE

## 2023-08-14 PROCEDURE — 99999 PR PBB SHADOW E&M-EST. PATIENT-LVL IV: ICD-10-PCS | Mod: PBBFAC,HCNC,, | Performed by: FAMILY MEDICINE

## 2023-08-14 PROCEDURE — 99214 OFFICE O/P EST MOD 30 MIN: CPT | Mod: HCNC,S$GLB,, | Performed by: FAMILY MEDICINE

## 2023-08-14 PROCEDURE — 1158F ADVNC CARE PLAN TLK DOCD: CPT | Mod: HCNC,CPTII,S$GLB, | Performed by: FAMILY MEDICINE

## 2023-08-14 PROCEDURE — 1101F PT FALLS ASSESS-DOCD LE1/YR: CPT | Mod: HCNC,CPTII,S$GLB, | Performed by: FAMILY MEDICINE

## 2023-08-14 PROCEDURE — 1158F PR ADVANCE CARE PLANNING DISCUSS DOCUMENTED IN MEDICAL RECORD: ICD-10-PCS | Mod: HCNC,CPTII,S$GLB, | Performed by: FAMILY MEDICINE

## 2023-08-14 PROCEDURE — 1126F AMNT PAIN NOTED NONE PRSNT: CPT | Mod: HCNC,CPTII,S$GLB, | Performed by: FAMILY MEDICINE

## 2023-08-14 NOTE — ASSESSMENT & PLAN NOTE
Recent decline in functional status is multifactorial.  She did undergo a hip fracture and repair which led to some physical decline although she is back ambulating and much more active and doing fairly well though not back to baseline.  Although she is also having progressive mild memory loss.  Right now she is living in an assisted living facility.  The patient also has sitters four days a week.  The patient states she does not think she needs sitters four days a week.  I cannot fully evaluate these needs through an office visit.  From a physical standpoint she is doing better but there is still going to be progressive cognitive decline so she will need ongoing supervision but potentially less than what she has been getting over the last few weeks after the injury.  I am going to need to rely on information provided by the assisted living facility, her current therapist, as well as family.  So they will keep me informed and we can make recommendations as needed but I would prefer that she get all the help that she can.  It is my understanding that physical therapy will be stopping soon as she has plateaued and met most of her goals however if there is a decline in physical status after several weeks or few months we can always resume home therapy.

## 2023-08-14 NOTE — PROGRESS NOTES
THIS DOCUMENT WAS MADE IN PART WITH VOICE RECOGNITION SOFTWARE.  OCCASIONALLY THIS SOFTWARE WILL MISINTERPRET WORDS OR PHRASES.      Primary Care Provider Appointment   [facility]       Patient ID: Maggi Fontaine is a 89 y.o. female.    ASSESSMENT/PLAN by Problem List:    1. Declining functional status  Assessment & Plan:  Recent decline in functional status is multifactorial.  She did undergo a hip fracture and repair which led to some physical decline although she is back ambulating and much more active and doing fairly well though not back to baseline.  Although she is also having progressive mild memory loss.  Right now she is living in an assisted living facility.  The patient also has sitters four days a week.  The patient states she does not think she needs sitters four days a week.  I cannot fully evaluate these needs through an office visit.  From a physical standpoint she is doing better but there is still going to be progressive cognitive decline so she will need ongoing supervision but potentially less than what she has been getting over the last few weeks after the injury.  I am going to need to rely on information provided by the assisted living facility, her current therapist, as well as family.  So they will keep me informed and we can make recommendations as needed but I would prefer that she get all the help that she can.  It is my understanding that physical therapy will be stopping soon as she has plateaued and met most of her goals however if there is a decline in physical status after several weeks or few months we can always resume home therapy.           Follow Up:  Four weeks    Advance Care Planning     Date: 08/14/2023    Living Will  Power of   I initiated the process of voluntary advance care planning today and explained the importance of this process to the patient.  I introduced the concept of advance directives to the patient, as well. Then  the patient received detailed information about the importance of designating a Health Care Power of  (HCPOA). She was also instructed to communicate with this person about their wishes for future healthcare, should she become sick and lose decision-making capacity. The patient has previously appointed a HCPOA. After our discussion, the patient has decided to complete a HCPOA and has appointed her son, health care agent:  Kevin      She will review documents and send us copies of LW and [facility]                Subjective:     Chief Complaint   Patient presents with    Follow-up    Rash     Pt states she has rash on her right ankle that is irritated     I have reviewed the information entered by the ancillary staff regarding the chief complaint as well as the related history.    HPI    Patient is a/an 89 y.o.  female     She is here with her granddaughter, her son Kevin's daughter    Patient currently residing at [facility], had a fall and right hip fracture on May 31st.  She is doing very well and ambulating.  She does use a walker sometimes but not continuously.  Family reports that she is increasing physical activity.  They do note some gradual memory/cognitive decline.  There is some question regarding ongoing assistance at the assisted living facility.  In addition to receiving assistance with her medications and therapy, she has a sitter that comes four days a week and the patient feels that she does not need this much attention any longer.  See above for additional details on this subject today    For complete problem list, past medical history, surgical history, social history, etc., see appropriate section in the electronic medical record    Review of Systems   Constitutional:  Positive for activity change (But improving and near baseline). Negative for unexpected weight change.   Respiratory: Negative.     Cardiovascular: Negative.    Gastrointestinal: Negative.    Genitourinary: Negative.   "  Musculoskeletal:  Positive for arthralgias and gait problem (improving).   Neurological:  Positive for weakness.       Objective     Physical Exam  Vitals reviewed.   Constitutional:       General: She is not in acute distress.     Appearance: She is well-developed. She is not ill-appearing.   HENT:      Head: Normocephalic and atraumatic.   Eyes:      General: No scleral icterus.     Conjunctiva/sclera: Conjunctivae normal.   Cardiovascular:      Rate and Rhythm: Normal rate. Rhythm irregular.      Heart sounds: Normal heart sounds.   Pulmonary:      Effort: Pulmonary effort is normal. No respiratory distress.      Breath sounds: Normal breath sounds. No wheezing or rales.   Skin:     General: Skin is dry.      Findings: No rash.   Neurological:      Mental Status: She is alert.      Comments: Ambulatory.  Mildly antalgic gait.  She is fairly steady when walking straight but somewhat off balance when turning.  She was not using a walker or a cane in clinic today.   Psychiatric:         Behavior: Behavior normal.       Vitals:    08/14/23 1416   BP: 116/68   BP Location: Left arm   Patient Position: Sitting   BP Method: Medium (Manual)   Pulse: 73   Resp: 18   Temp: 97.6 °F (36.4 °C)   TempSrc: Oral   SpO2: 99%   Weight: 63.6 kg (140 lb 5.2 oz)   Height: 5' 3.5" (1.613 m)       "

## 2023-08-18 ENCOUNTER — OFFICE VISIT (OUTPATIENT)
Dept: CARDIOLOGY | Facility: CLINIC | Age: 88
End: 2023-08-18
Payer: MEDICARE

## 2023-08-18 VITALS
DIASTOLIC BLOOD PRESSURE: 65 MMHG | HEIGHT: 65 IN | HEART RATE: 75 BPM | SYSTOLIC BLOOD PRESSURE: 109 MMHG | WEIGHT: 141.56 LBS | BODY MASS INDEX: 23.58 KG/M2

## 2023-08-18 DIAGNOSIS — Z86.73 HISTORY OF TIA (TRANSIENT ISCHEMIC ATTACK): Primary | Chronic | ICD-10-CM

## 2023-08-18 DIAGNOSIS — I49.5 SINOATRIAL NODE DYSFUNCTION: Chronic | ICD-10-CM

## 2023-08-18 DIAGNOSIS — I48.11 LONGSTANDING PERSISTENT ATRIAL FIBRILLATION: Chronic | ICD-10-CM

## 2023-08-18 DIAGNOSIS — I48.0 PAF (PAROXYSMAL ATRIAL FIBRILLATION): ICD-10-CM

## 2023-08-18 DIAGNOSIS — R53.1 GENERAL WEAKNESS: ICD-10-CM

## 2023-08-18 DIAGNOSIS — E78.2 MIXED HYPERLIPIDEMIA: Chronic | ICD-10-CM

## 2023-08-18 DIAGNOSIS — I70.0 AORTIC ATHEROSCLEROSIS: Chronic | ICD-10-CM

## 2023-08-18 DIAGNOSIS — S72.044A: ICD-10-CM

## 2023-08-18 DIAGNOSIS — I25.10 NON-OCCLUSIVE CORONARY ARTERY DISEASE: Chronic | ICD-10-CM

## 2023-08-18 DIAGNOSIS — Z95.0 CARDIAC PACEMAKER IN SITU: Chronic | ICD-10-CM

## 2023-08-18 PROCEDURE — 1101F PT FALLS ASSESS-DOCD LE1/YR: CPT | Mod: HCNC,CPTII,S$GLB, | Performed by: INTERNAL MEDICINE

## 2023-08-18 PROCEDURE — 99214 PR OFFICE/OUTPT VISIT, EST, LEVL IV, 30-39 MIN: ICD-10-PCS | Mod: HCNC,S$GLB,, | Performed by: INTERNAL MEDICINE

## 2023-08-18 PROCEDURE — 99999 PR PBB SHADOW E&M-EST. PATIENT-LVL IV: ICD-10-PCS | Mod: PBBFAC,HCNC,, | Performed by: INTERNAL MEDICINE

## 2023-08-18 PROCEDURE — 3288F PR FALLS RISK ASSESSMENT DOCUMENTED: ICD-10-PCS | Mod: HCNC,CPTII,S$GLB, | Performed by: INTERNAL MEDICINE

## 2023-08-18 PROCEDURE — 1159F PR MEDICATION LIST DOCUMENTED IN MEDICAL RECORD: ICD-10-PCS | Mod: HCNC,CPTII,S$GLB, | Performed by: INTERNAL MEDICINE

## 2023-08-18 PROCEDURE — 1126F AMNT PAIN NOTED NONE PRSNT: CPT | Mod: HCNC,CPTII,S$GLB, | Performed by: INTERNAL MEDICINE

## 2023-08-18 PROCEDURE — 99214 OFFICE O/P EST MOD 30 MIN: CPT | Mod: HCNC,S$GLB,, | Performed by: INTERNAL MEDICINE

## 2023-08-18 PROCEDURE — 3288F FALL RISK ASSESSMENT DOCD: CPT | Mod: HCNC,CPTII,S$GLB, | Performed by: INTERNAL MEDICINE

## 2023-08-18 PROCEDURE — 1101F PR PT FALLS ASSESS DOC 0-1 FALLS W/OUT INJ PAST YR: ICD-10-PCS | Mod: HCNC,CPTII,S$GLB, | Performed by: INTERNAL MEDICINE

## 2023-08-18 PROCEDURE — 1159F MED LIST DOCD IN RCRD: CPT | Mod: HCNC,CPTII,S$GLB, | Performed by: INTERNAL MEDICINE

## 2023-08-18 PROCEDURE — 99999 PR PBB SHADOW E&M-EST. PATIENT-LVL IV: CPT | Mod: PBBFAC,HCNC,, | Performed by: INTERNAL MEDICINE

## 2023-08-18 PROCEDURE — 1126F PR PAIN SEVERITY QUANTIFIED, NO PAIN PRESENT: ICD-10-PCS | Mod: HCNC,CPTII,S$GLB, | Performed by: INTERNAL MEDICINE

## 2023-08-18 NOTE — PROGRESS NOTES
Subjective:    Patient ID:  Maggi Fontaine is a 89 y.o. female patient here for evaluation Follow-up      History of Present Illness:  Cardiology follow-up visit.  History of persistent atrial fibrillation, controlled response, sick sinus syndrome, DDDR pacer implant.  Recent hip fracture 2 months ago , status post successful surgery.  Remains on low-dose Xarelto 15 mg daily.  Past ischemic workup negative nuclear study 2022, past left heart catheterization with nonobstructive CAD.  EF decreased last visit at 40%.     Ambulating, no PND orthopnea.  No significant edema.  No angina.        Review of patient's allergies indicates:   Allergen Reactions    Amiodarone analogues      Repeated falls x4; and hair loss; so stopped.     Prozac [fluoxetine] Other (See Comments)     Nightmares    Nsaids (non-steroidal anti-inflammatory drug) Other (See Comments)     Other reaction(s): Chronic Renal Insufficiency    Penicillins Swelling    Eliquis [apixaban] Anxiety     Panic Attacks       Past Medical History:   Diagnosis Date    Acid reflux     Arthritis     At risk for falling     Atrial fibrillation     Cardiac pacemaker     Gait instability     muscle weakness    Gout     Headache(784.0)     HEARING LOSS     Hyperlipidemia     Hypertension     Hypothyroidism     Pacemaker     Polymyalgia rheumatica     remission now    Renal insufficiency     Sinusitis     SOB (shortness of breath)     Stroke     history of TIA    Urinary incontinence     West Nile encephalitis     2002     Past Surgical History:   Procedure Laterality Date    ANGIOGRAM, CORONARY, WITH LEFT HEART CATHETERIZATION Left 2/1/2021    Procedure: Angiogram, Coronary, with Left Heart Cath;  Surgeon: Eriberto Alvarez III, MD;  Location: [facility] CATH;  Service: Cardiology;  Laterality: Left;    CARDIAC PACEMAKER PLACEMENT      CATARACT EXTRACTION EXTRACAPSULAR W/ INTRAOCULAR LENS IMPLANTATION      EYE SURGERY      HYSTERECTOMY      JOINT REPLACEMENT      anupam knees    KNEE  SURGERY      ORIF FEMUR FRACTURE Right 5/31/2023    Procedure: ORIF, FRACTURE, FEMUR;  Surgeon: David Colbert MD;  [facility];  Service: Orthopedics;  Laterality: Right;  Femoral Neck System    PARTIAL HYSTERECTOMY      TREATMENT OF CARDIAC ARRHYTHMIA N/A 7/8/2019    Procedure: CARDIOVERSION;  Surgeon: Eriberto Alvarez III, MD;  Location: [facility];  Service: Cardiology;  Laterality: N/A;     Social History     Tobacco Use    Smoking status: Never    Smokeless tobacco: Never   Substance Use Topics    Alcohol use: Yes     Alcohol/week: 0.8 standard drinks of alcohol     Types: 1 Standard drinks or equivalent per week     Comment: less than 1 drink weekly    Drug use: No        Review of Systems:    As noted in HPI in addition      REVIEW OF SYSTEMS  Review of Systems   Constitutional: Negative for decreased appetite, diaphoresis, night sweats, weight gain and weight loss.   HENT:  Negative for nosebleeds and odynophagia.    Eyes:  Negative for double vision and photophobia.   Cardiovascular:  Negative for chest pain, claudication, cyanosis, dyspnea on exertion, irregular heartbeat, leg swelling, near-syncope, orthopnea, palpitations, paroxysmal nocturnal dyspnea and syncope.   Respiratory:  Negative for cough, hemoptysis, shortness of breath and wheezing.    Hematologic/Lymphatic: Negative for adenopathy.   Skin:  Negative for flushing, skin cancer and suspicious lesions.   Musculoskeletal:  Negative for gout, myalgias and neck pain.   Gastrointestinal:  Negative for abdominal pain, heartburn, hematemesis and hematochezia.   Genitourinary:  Negative for bladder incontinence, hesitancy and nocturia.   Neurological:  Negative for focal weakness, headaches, light-headedness and paresthesias.   Psychiatric/Behavioral:  Negative for memory loss and substance abuse.               Objective:        Vitals:    08/18/23 1049   BP: 109/65   Pulse: 75       Lab Results   Component Value Date    WBC 4.67 06/06/2023    HGB  8.5 (L) 06/06/2023    HCT 24.2 (L) 06/06/2023     06/06/2023    CHOL 124 03/16/2023    TRIG 80 03/16/2023    HDL 46 03/16/2023    ALT 18 03/17/2023    AST 30 03/17/2023     06/06/2023    K 4.3 06/06/2023     06/06/2023    CREATININE 1.32 06/06/2023    BUN 32 (H) 06/06/2023    CO2 28 06/06/2023    TSH 1.190 05/31/2023    INR 1.8 05/31/2023    GLUF 112 (H) 10/05/2005    HGBA1C 5.4 03/15/2023        ECHOCARDIOGRAM RESULTS  Results for orders placed during the hospital encounter of 03/15/23    Echo Saline Bubble? Yes    Interpretation Summary  · Eccentric hypertrophy and mildly decreased systolic function.  · Moderate left atrial enlargement.  · The estimated PA systolic pressure is 29 mmHg.  · The estimated ejection fraction is 40%.  · Left ventricular diastolic dysfunction.  · Normal right ventricular size with normal right ventricular systolic function.  · Mild right atrial enlargement.  · Moderate aortic regurgitation.  · Moderate tricuspid regurgitation.  · Mild-to-moderate mitral regurgitation.  · Normal central venous pressure (3 mmHg).        CURRENT/PREVIOUS VISIT EKG  Results for orders placed or performed during the hospital encounter of 05/31/23   EKG 12-lead    Collection Time: 05/31/23 12:36 PM    Narrative    Test Reason : Z01.818,    Vent. Rate : 093 BPM     Atrial Rate : 000 BPM     P-R Int : 000 ms          QRS Dur : 074 ms      QT Int : 352 ms       P-R-T Axes : 000 068 -73 degrees     QTc Int : 437 ms    Atrial fibrillation with occasional ventricular-paced complexes  ST and T wave abnormality, consider anterior ischemia  Abnormal ECG  When compared with ECG of 15-MAR-2023 06:20,  Vent. rate has increased BY  17 BPM  Confirmed by Abrahan Aly MD ([id number]) on 6/1/2023 10:39:34 AM    Referred By: MORRISERR   SELF           Confirmed By:Abrahan Aly MD     No valid procedures specified.   Results for orders placed during the hospital encounter of 05/11/22    Nuclear Stress -  Cardiology Interpreted    Interpretation Summary    The EKG portion of this study is negative for ischemia.    Normal myocardial perfusion scan.    The gated perfusion images showed an ejection fraction of 40% .    Small LV cavity size.    No valid procedures specified.    PHYSICAL EXAM  CONSTITUTIONAL: Well built, well nourished in no apparent distress  NECK: no carotid bruit, no JVD  LUNGS: CTA  CHEST WALL: no tenderness,  HEART:  Irregular irregular rhythm.  Variable intensity S1-S2.  ABDOMEN: soft, non-tender; bowel sounds normal; no masses,  no organomegaly  EXTREMITIES: Extremities normal, no edema, no calf tenderness noted  NEURO: AAO X 3    I HAVE REVIEWED :    The vital signs, nurses notes, and all the pertinent radiology and labs.         Current Outpatient Medications   Medication Instructions    acetaminophen (TYLENOL) 650 mg, Oral, Every 6 hours PRN    ALPRAZolam (XANAX) 0.25 MG tablet TAKE 1 TABLET BY MOUTH DAILY AS NEEDED FOR PANIC ATTACKS    bisacodyL (DULCOLAX) 10 mg, Rectal, Daily PRN    bisacodyL (DULCOLAX) 10 mg, Rectal, Daily PRN    docusate sodium (COLACE) 100 mg, Oral, Every 12 hours    ergocalciferol (ERGOCALCIFEROL) 50,000 Units, Oral, Every 7 days    gabapentin (NEURONTIN) 300 mg, Oral, Nightly    ipratropium (ATROVENT) 21 mcg (0.03 %) nasal spray 2 sprays, Each Nostril, 3 times daily PRN    levothyroxine (SYNTHROID) 88 MCG tablet TAKE 1 TABLET BY MOUTH EVERY DAY BEFORE BREAKFAST    melatonin (MELATIN) 6 mg, Oral, Nightly PRN    metoprolol succinate (TOPROL-XL) 25 mg, Oral, Daily    multivitamin capsule 1 capsule, Oral, Daily    multivitamin with folic acid 400 mcg Tab 400 mcg, Oral, Daily    polyethylene glycol (GLYCOLAX) 17 g, Oral, 2 times daily    rivaroxaban (XARELTO) 15 mg, Oral, With dinner    senna-docusate 8.6-50 mg (PERICOLACE) 8.6-50 mg per tablet 1 tablet, Oral, 2 times daily    triamcinolone acetonide 0.1% (KENALOG) 0.1 % cream Topical (Top), 2 times daily          Assessment:    Dilated cardiomyopathy, EF 40% by last echo, persistent atrial fibrillation, sick sinus syndrome, DDDR pacer implant.  Echo 01/2023.  Chronic oral anticoagulation, low-dose Xarelto 15 mg daily.  Negative past workup for ischemic heart disease.      Plan:   Meds reviewed and reconciled, continue present medications.  No clinical CHF.  Suggest yearly echo.  With history of falls and low systolic blood pressure, and hesitant to push further medications via Ace/Arb/Entresto.  Update echo, call results        No follow-ups on file.

## 2023-08-28 ENCOUNTER — CLINICAL SUPPORT (OUTPATIENT)
Dept: CARDIOLOGY | Facility: HOSPITAL | Age: 88
End: 2023-08-28
Attending: INTERNAL MEDICINE
Payer: MEDICARE

## 2023-08-28 VITALS — BODY MASS INDEX: 23.49 KG/M2 | WEIGHT: 141 LBS | HEIGHT: 65 IN

## 2023-08-28 DIAGNOSIS — I49.5 SINOATRIAL NODE DYSFUNCTION: Chronic | ICD-10-CM

## 2023-08-28 DIAGNOSIS — I25.10 NON-OCCLUSIVE CORONARY ARTERY DISEASE: Chronic | ICD-10-CM

## 2023-08-28 DIAGNOSIS — Z95.0 CARDIAC PACEMAKER IN SITU: Chronic | ICD-10-CM

## 2023-08-28 DIAGNOSIS — E78.2 MIXED HYPERLIPIDEMIA: Chronic | ICD-10-CM

## 2023-08-28 LAB
ASCENDING AORTA: 3.24 CM
AV INDEX (PROSTH): 0.87
AV MEAN GRADIENT: 2 MMHG
AV PEAK GRADIENT: 3 MMHG
AV REGURGITATION PRESSURE HALF TIME: 480.48 MS
AV VALVE AREA BY VELOCITY RATIO: 2.95 CM²
AV VALVE AREA: 3.07 CM²
AV VELOCITY RATIO: 0.84
BSA FOR ECHO PROCEDURE: 1.71 M2
CV ECHO LV RWT: 0.43 CM
DOP CALC AO PEAK VEL: 0.86 M/S
DOP CALC AO VTI: 17.6 CM
DOP CALC LVOT AREA: 3.5 CM2
DOP CALC LVOT DIAMETER: 2.12 CM
DOP CALC LVOT PEAK VEL: 0.72 M/S
DOP CALC LVOT STROKE VOLUME: 53.98 CM3
DOP CALCLVOT PEAK VEL VTI: 15.3 CM
ECHO LV POSTERIOR WALL: 0.98 CM (ref 0.6–1.1)
FRACTIONAL SHORTENING: 29 % (ref 28–44)
INTERVENTRICULAR SEPTUM: 1 CM (ref 0.6–1.1)
IVRT: 91.34 MSEC
LA MAJOR: 6.44 CM
LA MINOR: 5.4 CM
LA WIDTH: 4.6 CM
LEFT ATRIUM SIZE: 4.08 CM
LEFT ATRIUM VOLUME INDEX: 54.8 ML/M2
LEFT ATRIUM VOLUME: 93.71 CM3
LEFT INTERNAL DIMENSION IN SYSTOLE: 3.23 CM (ref 2.1–4)
LEFT VENTRICLE DIASTOLIC VOLUME INDEX: 55.1 ML/M2
LEFT VENTRICLE DIASTOLIC VOLUME: 94.22 ML
LEFT VENTRICLE MASS INDEX: 90 G/M2
LEFT VENTRICLE SYSTOLIC VOLUME INDEX: 24.5 ML/M2
LEFT VENTRICLE SYSTOLIC VOLUME: 41.9 ML
LEFT VENTRICULAR INTERNAL DIMENSION IN DIASTOLE: 4.54 CM (ref 3.5–6)
LEFT VENTRICULAR MASS: 153.35 G
LVOT MG: 1.05 MMHG
LVOT MV: 0.47 CM/S
PISA AR MAX VEL: 3.65 M/S
PISA TR MAX VEL: 2.85 M/S
RA MAJOR: 6.18 CM
RA PRESSURE ESTIMATED: 8 MMHG
RA WIDTH: 4 CM
RIGHT VENTRICULAR END-DIASTOLIC DIMENSION: 3.71 CM
RIGHT VENTRICULAR LENGTH IN DIASTOLE (APICAL 4-CHAMBER VIEW): 4.88 CM
RV MID DIAMA: 2.54 CM
RV TB RVSP: 11 MMHG
RV TISSUE DOPPLER FREE WALL SYSTOLIC VELOCITY 1 (APICAL 4 CHAMBER VIEW): 12.67 CM/S
SINUS: 3.23 CM
STJ: 2.94 CM
TDI LATERAL: 0.1 M/S
TDI SEPTAL: 0.09 M/S
TDI: 0.1 M/S
TR MAX PG: 32 MMHG
TRICUSPID ANNULAR PLANE SYSTOLIC EXCURSION: 1.53 CM
TV REST PULMONARY ARTERY PRESSURE: 40 MMHG
Z-SCORE OF LEFT VENTRICULAR DIMENSION IN END DIASTOLE: -0.45
Z-SCORE OF LEFT VENTRICULAR DIMENSION IN END SYSTOLE: 0.74

## 2023-08-28 PROCEDURE — 93306 TTE W/DOPPLER COMPLETE: CPT | Mod: 26,HCNC,, | Performed by: INTERNAL MEDICINE

## 2023-08-28 PROCEDURE — 93306 ECHO (CUPID ONLY): ICD-10-PCS | Mod: 26,HCNC,, | Performed by: INTERNAL MEDICINE

## 2023-08-28 PROCEDURE — 93306 TTE W/DOPPLER COMPLETE: CPT | Mod: HCNC,PO

## 2023-08-30 DIAGNOSIS — I48.11 LONGSTANDING PERSISTENT ATRIAL FIBRILLATION: Chronic | ICD-10-CM

## 2023-09-06 ENCOUNTER — TELEPHONE (OUTPATIENT)
Dept: PRIMARY CARE CLINIC | Facility: CLINIC | Age: 88
End: 2023-09-06
Payer: MEDICARE

## 2023-09-06 DIAGNOSIS — E03.9 HYPOTHYROIDISM, UNSPECIFIED TYPE: ICD-10-CM

## 2023-09-06 RX ORDER — LEVOTHYROXINE SODIUM 88 UG/1
TABLET ORAL
Qty: 90 TABLET | Refills: 3 | Status: SHIPPED | OUTPATIENT
Start: 2023-09-06

## 2023-09-06 NOTE — TELEPHONE ENCOUNTER
----- Message from Demetria Lee sent at 9/6/2023  2:02 PM CDT -----  Type: Needs Medical Advice  Who Called:  pt daughter, Ken  Symptoms (please be specific):  said she need to speak to the nurse--said her mother  at [place] and her mother need her thyroid med and they have been trying to contact the office to have it refilled--said they reached out to her for help to see if she can speak to the dr and get it refilled --said she did not know the name of the med but it;s for her thyroids--said she is out of her meds--please call and advise    Pharmacy name and phone #:  said please contact [facility] second floor nurse station and they can tell the office what pharmacy to send the meds to that they use--  Best Call Back Number: [phone]  Additional Information: thank you

## 2023-09-06 NOTE — TELEPHONE ENCOUNTER
----- Message from Erasto Schneider sent at 9/6/2023  3:24 PM CDT -----  Regarding: refill  Type:  RX Refill Request    Who Called: pt    Refill or New Rx:refill    RX Name and Strength:levothyroxine (SYNTHROID) 88 MCG tablet 90 tablet 1     Sig: TAKE 1 TABLET BY MOUTH EVERY DAY BEFORE BREAKFAST   Patient taking differently: Take 88 mcg by mouth before breakfast.       Sent to pharmacy as: levothyroxine (SYNTHROID) 88 MCG tablet         How is the patient currently taking it? (ex. 1XDay):see above    Is this a 30 day or 90 day RX:see above    Preferred Pharmacy with phone number:    [facility] - [facility] - [address] [place], [place] [address] [address]  Phone: [phone] Fax: [phone]        Local or Mail Order:Mail    Ordering Provider:ace        Best Call Back Number:see above    Additional Information:  Please call to discuss.

## 2023-09-14 ENCOUNTER — OFFICE VISIT (OUTPATIENT)
Dept: PRIMARY CARE CLINIC | Facility: CLINIC | Age: 88
End: 2023-09-14
Payer: MEDICARE

## 2023-09-14 VITALS
HEIGHT: 63 IN | DIASTOLIC BLOOD PRESSURE: 60 MMHG | OXYGEN SATURATION: 78 % | RESPIRATION RATE: 18 BRPM | HEART RATE: 102 BPM | SYSTOLIC BLOOD PRESSURE: 132 MMHG | TEMPERATURE: 98 F | BODY MASS INDEX: 25.6 KG/M2 | WEIGHT: 144.5 LBS

## 2023-09-14 DIAGNOSIS — E78.2 MIXED HYPERLIPIDEMIA: Chronic | ICD-10-CM

## 2023-09-14 DIAGNOSIS — I10 PRIMARY HYPERTENSION: ICD-10-CM

## 2023-09-14 DIAGNOSIS — E79.0 HYPERURICEMIA: Chronic | ICD-10-CM

## 2023-09-14 DIAGNOSIS — N18.32 STAGE 3B CHRONIC KIDNEY DISEASE: ICD-10-CM

## 2023-09-14 DIAGNOSIS — E03.4 HYPOTHYROIDISM DUE TO ACQUIRED ATROPHY OF THYROID: Chronic | ICD-10-CM

## 2023-09-14 DIAGNOSIS — E55.9 HYPOVITAMINOSIS D: ICD-10-CM

## 2023-09-14 DIAGNOSIS — G45.9 TIA (TRANSIENT ISCHEMIC ATTACK): Primary | ICD-10-CM

## 2023-09-14 DIAGNOSIS — Z79.01 LONG TERM CURRENT USE OF ANTICOAGULANT THERAPY: Chronic | ICD-10-CM

## 2023-09-14 DIAGNOSIS — I48.11 LONGSTANDING PERSISTENT ATRIAL FIBRILLATION: Chronic | ICD-10-CM

## 2023-09-14 PROCEDURE — 99214 OFFICE O/P EST MOD 30 MIN: CPT | Mod: HCNC,S$GLB,, | Performed by: FAMILY MEDICINE

## 2023-09-14 PROCEDURE — 99214 PR OFFICE/OUTPT VISIT, EST, LEVL IV, 30-39 MIN: ICD-10-PCS | Mod: HCNC,S$GLB,, | Performed by: FAMILY MEDICINE

## 2023-09-14 PROCEDURE — 99999 PR PBB SHADOW E&M-EST. PATIENT-LVL V: CPT | Mod: PBBFAC,HCNC,, | Performed by: FAMILY MEDICINE

## 2023-09-14 PROCEDURE — 1159F MED LIST DOCD IN RCRD: CPT | Mod: HCNC,CPTII,S$GLB, | Performed by: FAMILY MEDICINE

## 2023-09-14 PROCEDURE — 1101F PR PT FALLS ASSESS DOC 0-1 FALLS W/OUT INJ PAST YR: ICD-10-PCS | Mod: HCNC,CPTII,S$GLB, | Performed by: FAMILY MEDICINE

## 2023-09-14 PROCEDURE — 99999 PR PBB SHADOW E&M-EST. PATIENT-LVL V: ICD-10-PCS | Mod: PBBFAC,HCNC,, | Performed by: FAMILY MEDICINE

## 2023-09-14 PROCEDURE — 1126F AMNT PAIN NOTED NONE PRSNT: CPT | Mod: HCNC,CPTII,S$GLB, | Performed by: FAMILY MEDICINE

## 2023-09-14 PROCEDURE — 1160F PR REVIEW ALL MEDS BY PRESCRIBER/CLIN PHARMACIST DOCUMENTED: ICD-10-PCS | Mod: HCNC,CPTII,S$GLB, | Performed by: FAMILY MEDICINE

## 2023-09-14 PROCEDURE — 1101F PT FALLS ASSESS-DOCD LE1/YR: CPT | Mod: HCNC,CPTII,S$GLB, | Performed by: FAMILY MEDICINE

## 2023-09-14 PROCEDURE — 1126F PR PAIN SEVERITY QUANTIFIED, NO PAIN PRESENT: ICD-10-PCS | Mod: HCNC,CPTII,S$GLB, | Performed by: FAMILY MEDICINE

## 2023-09-14 PROCEDURE — 1159F PR MEDICATION LIST DOCUMENTED IN MEDICAL RECORD: ICD-10-PCS | Mod: HCNC,CPTII,S$GLB, | Performed by: FAMILY MEDICINE

## 2023-09-14 PROCEDURE — 1160F RVW MEDS BY RX/DR IN RCRD: CPT | Mod: HCNC,CPTII,S$GLB, | Performed by: FAMILY MEDICINE

## 2023-09-14 PROCEDURE — 3288F FALL RISK ASSESSMENT DOCD: CPT | Mod: HCNC,CPTII,S$GLB, | Performed by: FAMILY MEDICINE

## 2023-09-14 PROCEDURE — 3288F PR FALLS RISK ASSESSMENT DOCUMENTED: ICD-10-PCS | Mod: HCNC,CPTII,S$GLB, | Performed by: FAMILY MEDICINE

## 2023-09-14 RX ORDER — METOPROLOL SUCCINATE 25 MG/1
25 TABLET, EXTENDED RELEASE ORAL DAILY
Qty: 90 TABLET | Refills: 1 | Status: SHIPPED | OUTPATIENT
Start: 2023-09-14

## 2023-09-14 RX ORDER — ERGOCALCIFEROL 1.25 MG/1
50000 CAPSULE ORAL
Qty: 12 CAPSULE | Refills: 3 | Status: SHIPPED | OUTPATIENT
Start: 2023-09-16

## 2023-09-14 RX ORDER — GABAPENTIN 300 MG/1
300 CAPSULE ORAL NIGHTLY
Qty: 90 CAPSULE | Refills: 3 | Status: SHIPPED | OUTPATIENT
Start: 2023-09-14 | End: 2023-09-14 | Stop reason: SDUPTHER

## 2023-09-14 RX ORDER — METOPROLOL SUCCINATE 25 MG/1
25 TABLET, EXTENDED RELEASE ORAL DAILY
Qty: 30 TABLET | Refills: 0 | Status: SHIPPED | OUTPATIENT
Start: 2023-09-14 | End: 2024-09-13

## 2023-09-14 RX ORDER — GABAPENTIN 300 MG/1
300 CAPSULE ORAL NIGHTLY
Qty: 90 CAPSULE | Refills: 3 | Status: SHIPPED | OUTPATIENT
Start: 2023-09-14 | End: 2024-09-13

## 2023-09-14 NOTE — ASSESSMENT & PLAN NOTE
Probable TIA.  I reviewed CTA of the head and neck from March of this year.  There was no significant carotid blockage or other blockage that would warrant intervention.  She is on Xarelto for her AFib, she is not on antiplatelet therapy because the risk of bleeding on both is too high.  Looking at the risk of her continuing AFib I feel we should continue Xarelto rather than switch to an antiplatelet medication and as stated I think the risk of being on both is too great given her conditions.  She was found to be in AFib with rapid ventricular response today.  She apparently has been off of the metoprolol as there was a change in ownership of her assisted living facility and as a result a change in pharmacy and she is not been getting this so will get her back on the metoprolol today and see her back in 3-4 weeks.  If there is another episode certainly a persistent episode they should call 911 or go to the emergency room.

## 2023-09-14 NOTE — ASSESSMENT & PLAN NOTE
She is mildly tachycardic today.  She has not been on the metoprolol because of a pharmacy change at the assisted living facility as discussed above.  We will resume metoprolol today.  Continue Xarelto follow back in 3-4 weeks

## 2023-09-14 NOTE — PROGRESS NOTES
THIS DOCUMENT WAS MADE IN PART WITH VOICE RECOGNITION SOFTWARE.  OCCASIONALLY THIS SOFTWARE WILL MISINTERPRET WORDS OR PHRASES.      [facility]       Patient ID: Maggi Fontaine is a 89 y.o. female.    ASSESSMENT/PLAN by Problem List:    1. TIA (transient ischemic attack)  Assessment & Plan:  Probable TIA.  I reviewed CTA of the head and neck from March of this year.  There was no significant carotid blockage or other blockage that would warrant intervention.  She is on Xarelto for her AFib, she is not on antiplatelet therapy because the risk of bleeding on both is too high.  Looking at the risk of her continuing AFib I feel we should continue Xarelto rather than switch to an antiplatelet medication and as stated I think the risk of being on both is too great given her conditions.  She was found to be in AFib with rapid ventricular response today.  She apparently has been off of the metoprolol as there was a change in ownership of her assisted living facility and as a result a change in pharmacy and she is not been getting this so will get her back on the metoprolol today and see her back in 3-4 weeks.  If there is another episode certainly a persistent episode they should call 911 or go to the emergency room.      2. Longstanding persistent atrial fibrillation  Assessment & Plan:  She is mildly tachycardic today.  She has not been on the metoprolol because of a pharmacy change at the assisted living facility as discussed above.  We will resume metoprolol today.  Continue Xarelto follow back in 3-4 weeks      3. Long term current use of anticoagulant therapy  Overview:  She remains on Eliquis for chronic atrial fibrillation.  Managed by her cardiologist.      4. Hypothyroidism due to acquired atrophy of thyroid    5. Hyperuricemia  Overview:  Treatment complicated by her kidney disease.  This is managed by her nephrologist.      6. Mixed  hyperlipidemia    7. Primary hypertension    8. Stage 3b chronic kidney disease    9. Hypovitaminosis D  -     ergocalciferol (ERGOCALCIFEROL) 50,000 unit Cap; Take 1 capsule (50,000 Units total) by mouth every Saturday.  Dispense: 12 capsule; Refill: 3    Other orders  -     metoprolol succinate (TOPROL-XL) 25 MG 24 hr tablet; Take 1 tablet (25 mg total) by mouth once daily.  Dispense: 90 tablet; Refill: 1  -     gabapentin (NEURONTIN) 300 MG capsule; Take 1 capsule (300 mg total) by mouth every evening.  Dispense: 90 capsule; Refill: 3  -     metoprolol succinate (TOPROL-XL) 25 MG 24 hr tablet; Take 1 tablet (25 mg total) by mouth once daily.  Dispense: 30 tablet; Refill: 0        Follow Up:  3-4 weeks    Subjective:     Chief Complaint   Patient presents with    Follow-up    Rash     Pt states she has a rash on leg and face.      I have reviewed the information entered by the ancillary staff regarding the chief complaint as well as the related history.    HPI    Patient is a/an 89 y.o.  female       She is here with her daughter from [place].  She generally feels well but does report a few episodes.  Episodes where she became confused, could not recall the rules for playing cards with her friends at the assisted living facility.  There was no loss of consciousness, no convulsions or obvious seizure activity.  Apparently symptoms last a couple minutes and resolved.  Her daughter remembers an episode six or seven months ago where she was speaking but not making any sense.      For complete problem list, past medical history, surgical history, social history, etc., see appropriate section in the electronic medical record    Review of Systems   Constitutional:  Negative for unexpected weight change.   Respiratory: Negative.     Cardiovascular: Negative.    Gastrointestinal: Negative.    Genitourinary: Negative.    Musculoskeletal:  Positive for arthralgias.   Neurological:  Positive for weakness and light-headedness.  "      Objective     Physical Exam  Vitals reviewed.   Constitutional:       General: She is not in acute distress.     Appearance: She is well-developed. She is not ill-appearing.   HENT:      Head: Normocephalic and atraumatic.   Eyes:      General: No scleral icterus.     Conjunctiva/sclera: Conjunctivae normal.   Cardiovascular:      Rate and Rhythm: Tachycardia present. Rhythm irregular.      Heart sounds: Normal heart sounds.      Comments: 1+ ankle edema bilaterally  Pulmonary:      Effort: Pulmonary effort is normal. No respiratory distress.      Breath sounds: Normal breath sounds. No wheezing or rales.   Skin:     General: Skin is dry.      Findings: No rash.   Neurological:      General: No focal deficit present.      Mental Status: She is alert. Mental status is at baseline.      Cranial Nerves: No cranial nerve deficit.      Gait: Gait abnormal (Mildly antalgic).      Deep Tendon Reflexes: Reflexes normal.      Comments: Ambulatory.  Mildly antalgic gait.  She is fairly steady when walking straight but somewhat off balance when turning.  She was not using a walker or a cane in clinic today.   Psychiatric:         Behavior: Behavior normal.       Vitals:    09/14/23 0807   BP: 132/60   BP Location: Left arm   Patient Position: Sitting   BP Method: Medium (Manual)   Pulse: 102   Resp: 18   Temp: 97.8 °F (36.6 °C)   TempSrc: Oral   SpO2: (!) 78%   Weight: 65.5 kg (144 lb 8.2 oz)   Height: 5' 3" (1.6 m)     "

## 2023-10-17 ENCOUNTER — LAB VISIT (OUTPATIENT)
Dept: LAB | Facility: HOSPITAL | Age: 88
End: 2023-10-17
Attending: STUDENT IN AN ORGANIZED HEALTH CARE EDUCATION/TRAINING PROGRAM
Payer: MEDICARE

## 2023-10-17 DIAGNOSIS — E78.5 HYPERLIPEMIA: ICD-10-CM

## 2023-10-17 DIAGNOSIS — I10 ESSENTIAL HYPERTENSION, MALIGNANT: ICD-10-CM

## 2023-10-17 DIAGNOSIS — R80.1 PERSISTENT PROTEINURIA: ICD-10-CM

## 2023-10-17 DIAGNOSIS — M10.00 IDIOPATHIC GOUT: ICD-10-CM

## 2023-10-17 DIAGNOSIS — N18.32 CHRONIC KIDNEY DISEASE (CKD) STAGE G3B/A1, MODERATELY DECREASED GLOMERULAR FILTRATION RATE (GFR) BETWEEN 30-44 ML/MIN/1.73 SQUARE METER AND ALBUMINURIA CREATININE RATIO LESS THAN 30 MG/G: Primary | ICD-10-CM

## 2023-10-17 DIAGNOSIS — E55.9 AVITAMINOSIS D: ICD-10-CM

## 2023-10-17 LAB
25(OH)D3+25(OH)D2 SERPL-MCNC: 54 NG/ML (ref 30–96)
ALBUMIN SERPL BCP-MCNC: 3.6 G/DL (ref 3.5–5.2)
ANION GAP SERPL CALC-SCNC: 11 MMOL/L (ref 8–16)
BASOPHILS # BLD AUTO: 0.04 K/UL (ref 0–0.2)
BASOPHILS NFR BLD: 0.8 % (ref 0–1.9)
BUN SERPL-MCNC: 28 MG/DL (ref 10–30)
CALCIUM SERPL-MCNC: 9.7 MG/DL (ref 8.7–10.5)
CHLORIDE SERPL-SCNC: 106 MMOL/L (ref 95–110)
CO2 SERPL-SCNC: 22 MMOL/L (ref 23–29)
CREAT SERPL-MCNC: 1.8 MG/DL (ref 0.5–1.4)
DIFFERENTIAL METHOD: ABNORMAL
EOSINOPHIL # BLD AUTO: 0.2 K/UL (ref 0–0.5)
EOSINOPHIL NFR BLD: 4 % (ref 0–8)
ERYTHROCYTE [DISTWIDTH] IN BLOOD BY AUTOMATED COUNT: 18.1 % (ref 11.5–14.5)
EST. GFR  (NO RACE VARIABLE): 26.3 ML/MIN/1.73 M^2
GLUCOSE SERPL-MCNC: 68 MG/DL (ref 70–110)
HCT VFR BLD AUTO: 38.4 % (ref 37–48.5)
HGB BLD-MCNC: 13.1 G/DL (ref 12–16)
IMM GRANULOCYTES # BLD AUTO: 0.01 K/UL (ref 0–0.04)
IMM GRANULOCYTES NFR BLD AUTO: 0.2 % (ref 0–0.5)
LYMPHOCYTES # BLD AUTO: 2.4 K/UL (ref 1–4.8)
LYMPHOCYTES NFR BLD: 46.6 % (ref 18–48)
MAGNESIUM SERPL-MCNC: 2 MG/DL (ref 1.6–2.6)
MCH RBC QN AUTO: 26 PG (ref 27–31)
MCHC RBC AUTO-ENTMCNC: 34.1 G/DL (ref 32–36)
MCV RBC AUTO: 76 FL (ref 82–98)
MONOCYTES # BLD AUTO: 0.8 K/UL (ref 0.3–1)
MONOCYTES NFR BLD: 15.4 % (ref 4–15)
NEUTROPHILS # BLD AUTO: 1.7 K/UL (ref 1.8–7.7)
NEUTROPHILS NFR BLD: 33 % (ref 38–73)
NRBC BLD-RTO: 0 /100 WBC
PHOSPHATE SERPL-MCNC: 3.5 MG/DL (ref 2.7–4.5)
PLATELET # BLD AUTO: 180 K/UL (ref 150–450)
PMV BLD AUTO: ABNORMAL FL (ref 9.2–12.9)
POTASSIUM SERPL-SCNC: 5.6 MMOL/L (ref 3.5–5.1)
PTH-INTACT SERPL-MCNC: 64.7 PG/ML (ref 9–77)
RBC # BLD AUTO: 5.04 M/UL (ref 4–5.4)
SODIUM SERPL-SCNC: 139 MMOL/L (ref 136–145)
URATE SERPL-MCNC: 7.6 MG/DL (ref 2.4–5.7)
WBC # BLD AUTO: 5.21 K/UL (ref 3.9–12.7)

## 2023-10-17 PROCEDURE — 83970 ASSAY OF PARATHORMONE: CPT | Mod: HCNC | Performed by: STUDENT IN AN ORGANIZED HEALTH CARE EDUCATION/TRAINING PROGRAM

## 2023-10-17 PROCEDURE — 83735 ASSAY OF MAGNESIUM: CPT | Mod: HCNC | Performed by: STUDENT IN AN ORGANIZED HEALTH CARE EDUCATION/TRAINING PROGRAM

## 2023-10-17 PROCEDURE — 82306 VITAMIN D 25 HYDROXY: CPT | Mod: HCNC | Performed by: STUDENT IN AN ORGANIZED HEALTH CARE EDUCATION/TRAINING PROGRAM

## 2023-10-17 PROCEDURE — 80069 RENAL FUNCTION PANEL: CPT | Mod: HCNC | Performed by: STUDENT IN AN ORGANIZED HEALTH CARE EDUCATION/TRAINING PROGRAM

## 2023-10-17 PROCEDURE — 85025 COMPLETE CBC W/AUTO DIFF WBC: CPT | Mod: HCNC | Performed by: STUDENT IN AN ORGANIZED HEALTH CARE EDUCATION/TRAINING PROGRAM

## 2023-10-17 PROCEDURE — 36415 COLL VENOUS BLD VENIPUNCTURE: CPT | Mod: HCNC,PO | Performed by: STUDENT IN AN ORGANIZED HEALTH CARE EDUCATION/TRAINING PROGRAM

## 2023-10-17 PROCEDURE — 84550 ASSAY OF BLOOD/URIC ACID: CPT | Mod: HCNC | Performed by: STUDENT IN AN ORGANIZED HEALTH CARE EDUCATION/TRAINING PROGRAM

## 2023-11-17 ENCOUNTER — TELEPHONE (OUTPATIENT)
Dept: CARDIOLOGY | Facility: CLINIC | Age: 88
End: 2023-11-17
Payer: MEDICARE

## 2023-11-17 NOTE — TELEPHONE ENCOUNTER
----- Message from Kamilah Galloway, Patient Care Assistant sent at 11/17/2023 11:03 AM CST -----  Contact: Ken daughter  Ken is asking for a call back in regards to pts afib ph [phone]  thanks

## 2023-12-18 PROBLEM — G45.9 TIA (TRANSIENT ISCHEMIC ATTACK): Status: RESOLVED | Noted: 2023-09-14 | Resolved: 2023-12-18

## 2024-02-22 ENCOUNTER — LAB VISIT (OUTPATIENT)
Dept: LAB | Facility: HOSPITAL | Age: 89
End: 2024-02-22
Attending: INTERNAL MEDICINE
Payer: MEDICARE

## 2024-02-22 ENCOUNTER — OFFICE VISIT (OUTPATIENT)
Dept: CARDIOLOGY | Facility: CLINIC | Age: 89
End: 2024-02-22
Attending: INTERNAL MEDICINE
Payer: MEDICARE

## 2024-02-22 VITALS
DIASTOLIC BLOOD PRESSURE: 72 MMHG | WEIGHT: 154.31 LBS | HEART RATE: 69 BPM | BODY MASS INDEX: 27.34 KG/M2 | SYSTOLIC BLOOD PRESSURE: 127 MMHG | HEIGHT: 63 IN

## 2024-02-22 DIAGNOSIS — I70.0 AORTIC ATHEROSCLEROSIS: Chronic | ICD-10-CM

## 2024-02-22 DIAGNOSIS — N18.32 STAGE 3B CHRONIC KIDNEY DISEASE: ICD-10-CM

## 2024-02-22 DIAGNOSIS — E78.2 MIXED HYPERLIPIDEMIA: Chronic | ICD-10-CM

## 2024-02-22 DIAGNOSIS — I48.0 PAF (PAROXYSMAL ATRIAL FIBRILLATION): ICD-10-CM

## 2024-02-22 DIAGNOSIS — I42.8 NICM (NONISCHEMIC CARDIOMYOPATHY): Chronic | ICD-10-CM

## 2024-02-22 DIAGNOSIS — I70.0 AORTIC ATHEROSCLEROSIS: Primary | Chronic | ICD-10-CM

## 2024-02-22 DIAGNOSIS — I51.89 DIASTOLIC DYSFUNCTION: ICD-10-CM

## 2024-02-22 DIAGNOSIS — I48.11 LONGSTANDING PERSISTENT ATRIAL FIBRILLATION: Chronic | ICD-10-CM

## 2024-02-22 DIAGNOSIS — I25.10 NON-OCCLUSIVE CORONARY ARTERY DISEASE: Chronic | ICD-10-CM

## 2024-02-22 DIAGNOSIS — Z95.0 CARDIAC PACEMAKER IN SITU: Chronic | ICD-10-CM

## 2024-02-22 DIAGNOSIS — Z86.73 HISTORY OF TIA (TRANSIENT ISCHEMIC ATTACK): Primary | Chronic | ICD-10-CM

## 2024-02-22 LAB
ANION GAP SERPL CALC-SCNC: 8 MMOL/L (ref 8–16)
BNP SERPL-MCNC: 319 PG/ML (ref 0–99)
BUN SERPL-MCNC: 33 MG/DL (ref 10–30)
CALCIUM SERPL-MCNC: 9.6 MG/DL (ref 8.7–10.5)
CHLORIDE SERPL-SCNC: 109 MMOL/L (ref 95–110)
CO2 SERPL-SCNC: 21 MMOL/L (ref 23–29)
CREAT SERPL-MCNC: 1.4 MG/DL (ref 0.5–1.4)
EST. GFR  (NO RACE VARIABLE): 35.5 ML/MIN/1.73 M^2
GLUCOSE SERPL-MCNC: 78 MG/DL (ref 70–110)
POTASSIUM SERPL-SCNC: 5.1 MMOL/L (ref 3.5–5.1)
SODIUM SERPL-SCNC: 138 MMOL/L (ref 136–145)

## 2024-02-22 PROCEDURE — 1160F RVW MEDS BY RX/DR IN RCRD: CPT | Mod: HCNC,CPTII,S$GLB, | Performed by: INTERNAL MEDICINE

## 2024-02-22 PROCEDURE — 99999 PR PBB SHADOW E&M-EST. PATIENT-LVL III: CPT | Mod: PBBFAC,HCNC,, | Performed by: INTERNAL MEDICINE

## 2024-02-22 PROCEDURE — 99214 OFFICE O/P EST MOD 30 MIN: CPT | Mod: HCNC,S$GLB,, | Performed by: INTERNAL MEDICINE

## 2024-02-22 PROCEDURE — 1157F ADVNC CARE PLAN IN RCRD: CPT | Mod: HCNC,CPTII,S$GLB, | Performed by: INTERNAL MEDICINE

## 2024-02-22 PROCEDURE — 83880 ASSAY OF NATRIURETIC PEPTIDE: CPT | Mod: HCNC | Performed by: INTERNAL MEDICINE

## 2024-02-22 PROCEDURE — 80048 BASIC METABOLIC PNL TOTAL CA: CPT | Mod: HCNC | Performed by: INTERNAL MEDICINE

## 2024-02-22 PROCEDURE — 3288F FALL RISK ASSESSMENT DOCD: CPT | Mod: HCNC,CPTII,S$GLB, | Performed by: INTERNAL MEDICINE

## 2024-02-22 PROCEDURE — 1101F PT FALLS ASSESS-DOCD LE1/YR: CPT | Mod: HCNC,CPTII,S$GLB, | Performed by: INTERNAL MEDICINE

## 2024-02-22 PROCEDURE — 1159F MED LIST DOCD IN RCRD: CPT | Mod: HCNC,CPTII,S$GLB, | Performed by: INTERNAL MEDICINE

## 2024-02-22 PROCEDURE — 36415 COLL VENOUS BLD VENIPUNCTURE: CPT | Mod: HCNC,PO | Performed by: INTERNAL MEDICINE

## 2024-02-22 PROCEDURE — 1126F AMNT PAIN NOTED NONE PRSNT: CPT | Mod: HCNC,CPTII,S$GLB, | Performed by: INTERNAL MEDICINE

## 2024-02-22 RX ORDER — FUROSEMIDE 40 MG/1
40 TABLET ORAL EVERY OTHER DAY
Qty: 15 TABLET | Refills: 11 | Status: SHIPPED | OUTPATIENT
Start: 2024-02-22 | End: 2025-02-21

## 2024-02-22 NOTE — PROGRESS NOTES
Subjective:    Patient ID:  Maggi Fontaine is a 89 y.o. female patient here for evaluation Follow-up      History of Present Illness:  Cardiology follow-up.  Persistent atrial fibrillation, sick sinus syndrome.  DDDR pacer implant.  Patient remains on low-dose Eliquis 15 daily.  Last visit with recent if fracture resolved, no perioperative complications with hip surgery.  Prior ischemic workup with negative nuclear study in 2022, EF on echo 65% with moderate MR 08/2023.     In the last year 15 lb weight, lower extremity persistent edema.  Orthopnea.  No significant change in FUNES.  No angina.  No arrhythmia change.        Review of patient's allergies indicates:   Allergen Reactions    Amiodarone analogues      Repeated falls x4; and hair loss; so stopped.     Prozac [fluoxetine] Other (See Comments)     Nightmares    Nsaids (non-steroidal anti-inflammatory drug) Other (See Comments)     Other reaction(s): Chronic Renal Insufficiency    Penicillins Swelling    Eliquis [apixaban] Anxiety     Panic Attacks       Past Medical History:   Diagnosis Date    Acid reflux     Arthritis     At risk for falling     Atrial fibrillation     Cardiac pacemaker     Gait instability     muscle weakness    Gout     Headache(784.0)     HEARING LOSS     Hyperlipidemia     Hypertension     Hypothyroidism     Pacemaker     Polymyalgia rheumatica     remission now    Renal insufficiency     Sinusitis     SOB (shortness of breath)     Stroke     history of TIA    Urinary incontinence     West Nile encephalitis     2002     Past Surgical History:   Procedure Laterality Date    ANGIOGRAM, CORONARY, WITH LEFT HEART CATHETERIZATION Left 02/01/2021    Procedure: Angiogram, Coronary, with Left Heart Cath;  Surgeon: Eriberto Alavrez III, MD;  Location: [facility] CATH;  Service: Cardiology;  Laterality: Left;    broken hip      CARDIAC PACEMAKER PLACEMENT      CATARACT EXTRACTION EXTRACAPSULAR W/ INTRAOCULAR LENS IMPLANTATION      EYE SURGERY       HYSTERECTOMY      JOINT REPLACEMENT      anupam knees    KNEE SURGERY      ORIF FEMUR FRACTURE Right 05/31/2023    Procedure: ORIF, FRACTURE, FEMUR;  Surgeon: David Colbert MD;  [facility];  Service: Orthopedics;  Laterality: Right;  Femoral Neck System    PARTIAL HYSTERECTOMY      TREATMENT OF CARDIAC ARRHYTHMIA N/A 07/08/2019    Procedure: CARDIOVERSION;  Surgeon: Eriberto Alvarez III, MD;  Location: [facility];  Service: Cardiology;  Laterality: N/A;     Social History     Tobacco Use    Smoking status: Never    Smokeless tobacco: Never   Substance Use Topics    Alcohol use: Yes     Alcohol/week: 0.8 standard drinks of alcohol     Types: 1 Standard drinks or equivalent per week     Comment: less than 1 drink weekly    Drug use: No        Review of Systems:    As noted in HPI in addition      REVIEW OF SYSTEMS  Review of Systems   Constitutional: Negative for decreased appetite, diaphoresis, night sweats, weight gain and weight loss.   HENT:  Negative for nosebleeds and odynophagia.    Eyes:  Negative for double vision and photophobia.   Cardiovascular:  Positive for leg swelling. Negative for chest pain, claudication, cyanosis, dyspnea on exertion, irregular heartbeat, near-syncope, orthopnea, palpitations, paroxysmal nocturnal dyspnea and syncope.   Respiratory:  Negative for cough, hemoptysis, shortness of breath and wheezing.    Hematologic/Lymphatic: Negative for adenopathy.   Skin:  Negative for flushing, skin cancer and suspicious lesions.   Musculoskeletal:  Negative for gout, myalgias and neck pain.   Gastrointestinal:  Negative for abdominal pain, heartburn, hematemesis and hematochezia.   Genitourinary:  Negative for bladder incontinence, hesitancy and nocturia.   Neurological:  Negative for focal weakness, headaches, light-headedness and paresthesias.   Psychiatric/Behavioral:  Negative for memory loss and substance abuse.               Objective:        Vitals:    02/22/24 1128   BP: 127/72    Pulse: 69       Lab Results   Component Value Date    WBC 5.21 10/17/2023    HGB 13.1 10/17/2023    HCT 38.4 10/17/2023     10/17/2023    CHOL 124 03/16/2023    TRIG 80 03/16/2023    HDL 46 03/16/2023    ALT 18 03/17/2023    AST 30 03/17/2023     10/17/2023    K 5.6 (H) 10/17/2023     10/17/2023    CREATININE 1.8 (H) 10/17/2023    BUN 28 10/17/2023    CO2 22 (L) 10/17/2023    TSH 1.190 05/31/2023    INR 1.8 05/31/2023    GLUF 112 (H) 10/05/2005    HGBA1C 5.4 03/15/2023        ECHOCARDIOGRAM RESULTS  Results for orders placed in visit on 08/28/23    Echo    Interpretation Summary    Left Ventricle: The left ventricle is normal in size. Normal wall thickness. Normal wall motion. There is normal systolic function with a visually estimated ejection fraction of 60 - 65%. Normal left ventricular filling pressure.    Left Atrium: Left atrium is moderately dilated.    Right Atrium: Right atrium is moderately dilated.    Aortic Valve: There is mild to moderate aortic regurgitation.    Mitral Valve: There is moderate regurgitation.    Pulmonary Artery: There is borderline elevated pulmonary hypertension. The estimated pulmonary artery systolic pressure is 40 mmHg.    IVC/SVC: Intermediate venous pressure at 8 mmHg.        CURRENT/PREVIOUS VISIT EKG  Results for orders placed or performed during the hospital encounter of 05/31/23   EKG 12-lead    Collection Time: 05/31/23 12:36 PM    Narrative    Test Reason : Z01.818,    Vent. Rate : 093 BPM     Atrial Rate : 000 BPM     P-R Int : 000 ms          QRS Dur : 074 ms      QT Int : 352 ms       P-R-T Axes : 000 068 -73 degrees     QTc Int : 437 ms    Atrial fibrillation with occasional ventricular-paced complexes  ST and T wave abnormality, consider anterior ischemia  Abnormal ECG  When compared with ECG of 15-MAR-2023 06:20,  Vent. rate has increased BY  17 BPM  Confirmed by Abrahan Aly MD ([id number]) on 6/1/2023 10:39:34 AM    Referred By: FILI   SELF            Confirmed By:Abrahan Aly MD     No valid procedures specified.   Results for orders placed during the hospital encounter of 05/11/22    Nuclear Stress - Cardiology Interpreted    Interpretation Summary    The EKG portion of this study is negative for ischemia.    Normal myocardial perfusion scan.    The gated perfusion images showed an ejection fraction of 40% .    Small LV cavity size.    No valid procedures specified.    PHYSICAL EXAM  CONSTITUTIONAL: Well built, well nourished in no apparent distress  NECK: no carotid bruit, no JVD  LUNGS: CTA  CHEST WALL: no tenderness,  HEART: regular rate and rhythm, S1, S2 normal, no murmur, click, rub or gallop   ABDOMEN: soft, non-tender; bowel sounds normal; no masses,  no organomegaly  EXTREMITIES: Extremities normal, no edema, no calf tenderness noted  NEURO: AAO X 3    I HAVE REVIEWED :    The vital signs, nurses notes, and all the pertinent radiology and labs.         Current Outpatient Medications   Medication Instructions    acetaminophen (TYLENOL) 650 mg, Oral, Every 6 hours PRN    ALPRAZolam (XANAX) 0.25 MG tablet TAKE 1 TABLET BY MOUTH DAILY AS NEEDED FOR PANIC ATTACKS    bisacodyL (DULCOLAX) 10 mg, Rectal, Daily PRN    bisacodyL (DULCOLAX) 10 mg, Rectal, Daily PRN    docusate sodium (COLACE) 100 mg, Oral, Every 12 hours    ergocalciferol (ERGOCALCIFEROL) 50,000 Units, Oral, Every Saturday    gabapentin (NEURONTIN) 300 mg, Oral, Nightly    ipratropium (ATROVENT) 21 mcg (0.03 %) nasal spray 2 sprays, Each Nostril, 3 times daily PRN    levothyroxine (SYNTHROID) 88 MCG tablet TAKE 1 TABLET BY MOUTH EVERY DAY BEFORE BREAKFAST    melatonin (MELATIN) 6 mg, Oral, Nightly PRN    metoprolol succinate (TOPROL-XL) 25 mg, Oral, Daily    metoprolol succinate (TOPROL-XL) 25 mg, Oral, Daily    multivitamin capsule 1 capsule, Oral, Daily    multivitamin with folic acid 400 mcg Tab 400 mcg, Oral, Daily    polyethylene glycol (GLYCOLAX) 17 g, Oral, 2 times daily     rivaroxaban (XARELTO) 15 mg, Oral, With dinner    senna-docusate 8.6-50 mg (PERICOLACE) 8.6-50 mg per tablet 1 tablet, Oral, 2 times daily    triamcinolone acetonide 0.1% (KENALOG) 0.1 % cream Topical (Top), 2 times daily          Assessment:   Sick sinus syndrome.  DDDR pacer implant.  Persistent atrial fibrillation.  Chronic anticoagulation with Xarelto 15 mg daily.  Past negative noninvasive cardiac assessment for ischemia.  Echo EF 08/2023 60% with moderate MR  Known chronic kidney disease, GFR 26.3  15 lb weight gain with lower extremity edema.      Plan:   Cardiac exam hemodynamic stable.  Suspect mild volume overload due to a combination of underlying persistent atrial fibrillation, chronic kidney disease.  No definite dyspnea.    Basic metabolic profile, BNP.  Suggest Lasix 40 , 3 times a week.  Follow up again in about a month with repeat BMP        No follow-ups on file.

## 2024-03-05 ENCOUNTER — OFFICE VISIT (OUTPATIENT)
Dept: PRIMARY CARE CLINIC | Facility: CLINIC | Age: 89
End: 2024-03-05
Payer: MEDICARE

## 2024-03-05 VITALS
SYSTOLIC BLOOD PRESSURE: 132 MMHG | HEIGHT: 63 IN | OXYGEN SATURATION: 97 % | BODY MASS INDEX: 26.57 KG/M2 | WEIGHT: 149.94 LBS | RESPIRATION RATE: 18 BRPM | DIASTOLIC BLOOD PRESSURE: 72 MMHG | HEART RATE: 73 BPM | TEMPERATURE: 98 F

## 2024-03-05 DIAGNOSIS — I48.11 LONGSTANDING PERSISTENT ATRIAL FIBRILLATION: Chronic | ICD-10-CM

## 2024-03-05 DIAGNOSIS — I10 PRIMARY HYPERTENSION: ICD-10-CM

## 2024-03-05 DIAGNOSIS — I42.8 NICM (NONISCHEMIC CARDIOMYOPATHY): Primary | Chronic | ICD-10-CM

## 2024-03-05 DIAGNOSIS — E03.4 HYPOTHYROIDISM DUE TO ACQUIRED ATROPHY OF THYROID: Chronic | ICD-10-CM

## 2024-03-05 DIAGNOSIS — I49.5 SINOATRIAL NODE DYSFUNCTION: Chronic | ICD-10-CM

## 2024-03-05 DIAGNOSIS — F33.1 MODERATE EPISODE OF RECURRENT MAJOR DEPRESSIVE DISORDER: ICD-10-CM

## 2024-03-05 DIAGNOSIS — N18.32 STAGE 3B CHRONIC KIDNEY DISEASE: ICD-10-CM

## 2024-03-05 DIAGNOSIS — M10.00 IDIOPATHIC GOUT, UNSPECIFIED CHRONICITY, UNSPECIFIED SITE: ICD-10-CM

## 2024-03-05 DIAGNOSIS — I70.0 AORTIC ATHEROSCLEROSIS: Chronic | ICD-10-CM

## 2024-03-05 DIAGNOSIS — E78.2 MIXED HYPERLIPIDEMIA: Chronic | ICD-10-CM

## 2024-03-05 DIAGNOSIS — I25.10 NON-OCCLUSIVE CORONARY ARTERY DISEASE: Chronic | ICD-10-CM

## 2024-03-05 PROCEDURE — 1157F ADVNC CARE PLAN IN RCRD: CPT | Mod: HCNC,CPTII,S$GLB, | Performed by: FAMILY MEDICINE

## 2024-03-05 PROCEDURE — 1101F PT FALLS ASSESS-DOCD LE1/YR: CPT | Mod: HCNC,CPTII,S$GLB, | Performed by: FAMILY MEDICINE

## 2024-03-05 PROCEDURE — 1159F MED LIST DOCD IN RCRD: CPT | Mod: HCNC,CPTII,S$GLB, | Performed by: FAMILY MEDICINE

## 2024-03-05 PROCEDURE — 99999 PR PBB SHADOW E&M-EST. PATIENT-LVL V: CPT | Mod: PBBFAC,HCNC,, | Performed by: FAMILY MEDICINE

## 2024-03-05 PROCEDURE — 99214 OFFICE O/P EST MOD 30 MIN: CPT | Mod: HCNC,S$GLB,, | Performed by: FAMILY MEDICINE

## 2024-03-05 PROCEDURE — 1160F RVW MEDS BY RX/DR IN RCRD: CPT | Mod: HCNC,CPTII,S$GLB, | Performed by: FAMILY MEDICINE

## 2024-03-05 PROCEDURE — 1126F AMNT PAIN NOTED NONE PRSNT: CPT | Mod: HCNC,CPTII,S$GLB, | Performed by: FAMILY MEDICINE

## 2024-03-05 PROCEDURE — 3288F FALL RISK ASSESSMENT DOCD: CPT | Mod: HCNC,CPTII,S$GLB, | Performed by: FAMILY MEDICINE

## 2024-03-05 NOTE — ASSESSMENT & PLAN NOTE
Noted on previous imaging.  She does have a history of TIA and known atherosclerosis.  Previously was on pravastatin.  Although not on her current medicine list and patient does not recall if she is taking it.  Will need to verify with her assisted living facility.

## 2024-03-05 NOTE — ASSESSMENT & PLAN NOTE
Her rate is controlled today.  Rhythm is regular.  She remains on Xarelto and it was recommended to do so by Cardiology.  Although patient states she has been out of her medicine for two weeks because no one will fill it.  I can not confirm this and neither can her daughter.  Patient's memory is not always correct.  I would prefer that Cardiology manage the Xarelto but I went ahead and filled this for her today because of her urgency.  I did discuss risk and benefit of continuing this and this will be an ongoing assessment and evaluation as her condition may change

## 2024-03-05 NOTE — ASSESSMENT & PLAN NOTE
Previously on pravastatin.  Patient is uncertain whether she is still taking this.  Will confirm with the assisted living facility

## 2024-03-05 NOTE — TELEPHONE ENCOUNTER
----- Message from Abeba Miramontes sent at 3/5/2024  2:53 PM CST -----  Contact: self  Type:  RX Refill Request    Who Called: Kirstin  Refill or New Rx:  new rx   RX Name and Strength:  rivaroxaban (XARELTO) 15 mg Tab  How is the patient currently taking it? (ex. 1XDay):  as directed  Is this a 30 day or 90 day RX:  90  Preferred Pharmacy with phone number:    [facility] [place] [address] [place] [address]  Phone: [phone] Fax: [phone]  Local or Mail Order:  mail order   Ordering Provider:  Dr Finnegan  Best Call Back Number:  [phone]   Additional Information:  Thanks she is at [facility] and this is the only pharm that is used please take tangela out of her chart and thanks

## 2024-03-05 NOTE — ASSESSMENT & PLAN NOTE
She does not report any unstable or worrisome symptoms.  Activity and light around her apartment at [employer] but no significant change in activity or concern.  Continue current medication encouraged regular light activity along with healthy nutrition

## 2024-03-05 NOTE — ASSESSMENT & PLAN NOTE
Patient denies any active symptoms of depression.  She does appear to have some progressive mild cognitive function.  She is at risk for depression so will continue to monitor.  She previously discontinue medications citing side effects and blaming her anxiety and depression on side effects of other previous medications.

## 2024-03-05 NOTE — Clinical Note
Patient was seen today for office visit.  Two month follow-up was scheduled I believe.  Please also schedule labs prior to this appointment.  Orders entered

## 2024-03-06 ENCOUNTER — TELEPHONE (OUTPATIENT)
Dept: PRIMARY CARE CLINIC | Facility: CLINIC | Age: 89
End: 2024-03-06
Payer: MEDICARE

## 2024-03-06 NOTE — TELEPHONE ENCOUNTER
----- Message from Negro iFnnegan MD sent at 3/5/2024  5:48 PM CST -----  Patient was seen today for office visit.  Two month follow-up was scheduled I believe.  Please also schedule labs prior to this appointment.  Orders entered

## 2024-03-07 ENCOUNTER — TELEPHONE (OUTPATIENT)
Dept: CARDIOLOGY | Facility: CLINIC | Age: 89
End: 2024-03-07
Payer: MEDICARE

## 2024-03-07 NOTE — TELEPHONE ENCOUNTER
Lasix prescribed  Having leg cramps  Requesting for pt to take this medication every 3 days rather than every 2.  If this is okay- requesting for office to contact  [facility] to inform them of this information at [phone].   Please advise.

## 2024-03-07 NOTE — TELEPHONE ENCOUNTER
Daughter is requesting information as to what to do about the leg cramps.    Per Dr Carrion- pt to try tonic water. If cramps still persistent, contact pcp     Daughter v/u

## 2024-03-07 NOTE — TELEPHONE ENCOUNTER
----- Message from Dalila Arellano sent at 3/7/2024  9:36 AM CST -----  Regarding: call back  Type:  Needs Medical Advice    Who Called:Pt Daughter     Would the patient rather a call back or a response via [facility]? Call back    Best Call Back Number: [phone]    Additional Information: Pt is requesting a call back. Thank you

## 2024-03-28 ENCOUNTER — TELEPHONE (OUTPATIENT)
Dept: CARDIOLOGY | Facility: CLINIC | Age: 89
End: 2024-03-28
Payer: MEDICARE

## 2024-03-28 NOTE — TELEPHONE ENCOUNTER
----- Message from Yevgeniy Matta sent at 3/28/2024  4:48 PM CDT -----  Contact: Self  Type: Needs Medical Advice  Who Called:  PT    Best Call Back Number: [phone]    Additional Information: Please call pt regarding pt regarding side effect with Lasix

## 2024-04-22 RX ORDER — METOPROLOL SUCCINATE 25 MG/1
25 TABLET, EXTENDED RELEASE ORAL DAILY
Qty: 30 TABLET | Refills: 2 | Status: SHIPPED | OUTPATIENT
Start: 2024-04-22

## 2024-05-07 ENCOUNTER — OFFICE VISIT (OUTPATIENT)
Dept: PRIMARY CARE CLINIC | Facility: CLINIC | Age: 89
End: 2024-05-07
Payer: MEDICARE

## 2024-05-07 ENCOUNTER — TELEPHONE (OUTPATIENT)
Dept: FAMILY MEDICINE | Facility: CLINIC | Age: 89
End: 2024-05-07
Payer: MEDICARE

## 2024-05-07 ENCOUNTER — TELEPHONE (OUTPATIENT)
Dept: PRIMARY CARE CLINIC | Facility: CLINIC | Age: 89
End: 2024-05-07

## 2024-05-07 ENCOUNTER — LAB VISIT (OUTPATIENT)
Dept: LAB | Facility: HOSPITAL | Age: 89
End: 2024-05-07
Attending: FAMILY MEDICINE
Payer: MEDICARE

## 2024-05-07 VITALS
TEMPERATURE: 98 F | SYSTOLIC BLOOD PRESSURE: 130 MMHG | HEART RATE: 69 BPM | OXYGEN SATURATION: 100 % | RESPIRATION RATE: 18 BRPM | BODY MASS INDEX: 27.38 KG/M2 | DIASTOLIC BLOOD PRESSURE: 72 MMHG | WEIGHT: 154.56 LBS

## 2024-05-07 DIAGNOSIS — I48.11 LONGSTANDING PERSISTENT ATRIAL FIBRILLATION: Chronic | ICD-10-CM

## 2024-05-07 DIAGNOSIS — E87.5 HYPERKALEMIA: Primary | ICD-10-CM

## 2024-05-07 DIAGNOSIS — H61.23 BILATERAL IMPACTED CERUMEN: ICD-10-CM

## 2024-05-07 DIAGNOSIS — M10.00 IDIOPATHIC GOUT, UNSPECIFIED CHRONICITY, UNSPECIFIED SITE: ICD-10-CM

## 2024-05-07 DIAGNOSIS — E03.4 HYPOTHYROIDISM DUE TO ACQUIRED ATROPHY OF THYROID: Primary | Chronic | ICD-10-CM

## 2024-05-07 DIAGNOSIS — I10 PRIMARY HYPERTENSION: ICD-10-CM

## 2024-05-07 DIAGNOSIS — E78.2 MIXED HYPERLIPIDEMIA: Chronic | ICD-10-CM

## 2024-05-07 DIAGNOSIS — Z79.01 LONG TERM CURRENT USE OF ANTICOAGULANT THERAPY: Chronic | ICD-10-CM

## 2024-05-07 DIAGNOSIS — E03.4 HYPOTHYROIDISM DUE TO ACQUIRED ATROPHY OF THYROID: Chronic | ICD-10-CM

## 2024-05-07 LAB
ALBUMIN SERPL BCP-MCNC: 3.6 G/DL (ref 3.5–5.2)
ALP SERPL-CCNC: 86 U/L (ref 55–135)
ALT SERPL W/O P-5'-P-CCNC: 15 U/L (ref 10–44)
ANION GAP SERPL CALC-SCNC: 10 MMOL/L (ref 8–16)
AST SERPL-CCNC: 34 U/L (ref 10–40)
BASOPHILS # BLD AUTO: 0.06 K/UL (ref 0–0.2)
BASOPHILS NFR BLD: 1.1 % (ref 0–1.9)
BILIRUB SERPL-MCNC: 0.6 MG/DL (ref 0.1–1)
BUN SERPL-MCNC: 41 MG/DL (ref 10–30)
CALCIUM SERPL-MCNC: 9.6 MG/DL (ref 8.7–10.5)
CHLORIDE SERPL-SCNC: 106 MMOL/L (ref 95–110)
CHOLEST SERPL-MCNC: 213 MG/DL (ref 120–199)
CHOLEST/HDLC SERPL: 3.9 {RATIO} (ref 2–5)
CO2 SERPL-SCNC: 22 MMOL/L (ref 23–29)
CREAT SERPL-MCNC: 1.5 MG/DL (ref 0.5–1.4)
DIFFERENTIAL METHOD BLD: ABNORMAL
EOSINOPHIL # BLD AUTO: 0.2 K/UL (ref 0–0.5)
EOSINOPHIL NFR BLD: 3 % (ref 0–8)
ERYTHROCYTE [DISTWIDTH] IN BLOOD BY AUTOMATED COUNT: 16 % (ref 11.5–14.5)
EST. GFR  (NO RACE VARIABLE): 32.7 ML/MIN/1.73 M^2
GLUCOSE SERPL-MCNC: 74 MG/DL (ref 70–110)
HCT VFR BLD AUTO: 37.3 % (ref 37–48.5)
HDLC SERPL-MCNC: 55 MG/DL (ref 40–75)
HDLC SERPL: 25.8 % (ref 20–50)
HGB BLD-MCNC: 12.7 G/DL (ref 12–16)
IMM GRANULOCYTES # BLD AUTO: 0 K/UL (ref 0–0.04)
IMM GRANULOCYTES NFR BLD AUTO: 0 % (ref 0–0.5)
LDLC SERPL CALC-MCNC: 136.2 MG/DL (ref 63–159)
LYMPHOCYTES # BLD AUTO: 2.6 K/UL (ref 1–4.8)
LYMPHOCYTES NFR BLD: 49 % (ref 18–48)
MCH RBC QN AUTO: 27.8 PG (ref 27–31)
MCHC RBC AUTO-ENTMCNC: 34 G/DL (ref 32–36)
MCV RBC AUTO: 82 FL (ref 82–98)
MONOCYTES # BLD AUTO: 0.8 K/UL (ref 0.3–1)
MONOCYTES NFR BLD: 15.6 % (ref 4–15)
NEUTROPHILS # BLD AUTO: 1.7 K/UL (ref 1.8–7.7)
NEUTROPHILS NFR BLD: 31.3 % (ref 38–73)
NONHDLC SERPL-MCNC: 158 MG/DL
NRBC BLD-RTO: 0 /100 WBC
PLATELET # BLD AUTO: 156 K/UL (ref 150–450)
PMV BLD AUTO: 12.9 FL (ref 9.2–12.9)
POTASSIUM SERPL-SCNC: 6.7 MMOL/L (ref 3.5–5.1)
PROT SERPL-MCNC: 6.9 G/DL (ref 6–8.4)
RBC # BLD AUTO: 4.57 M/UL (ref 4–5.4)
SODIUM SERPL-SCNC: 138 MMOL/L (ref 136–145)
TRIGL SERPL-MCNC: 109 MG/DL (ref 30–150)
TSH SERPL DL<=0.005 MIU/L-ACNC: 3.5 UIU/ML (ref 0.4–4)
URATE SERPL-MCNC: 9.2 MG/DL (ref 2.4–5.7)
WBC # BLD AUTO: 5.39 K/UL (ref 3.9–12.7)

## 2024-05-07 PROCEDURE — 1160F RVW MEDS BY RX/DR IN RCRD: CPT | Mod: HCNC,CPTII,S$GLB, | Performed by: FAMILY MEDICINE

## 2024-05-07 PROCEDURE — 99214 OFFICE O/P EST MOD 30 MIN: CPT | Mod: HCNC,S$GLB,, | Performed by: FAMILY MEDICINE

## 2024-05-07 PROCEDURE — 1157F ADVNC CARE PLAN IN RCRD: CPT | Mod: HCNC,CPTII,S$GLB, | Performed by: FAMILY MEDICINE

## 2024-05-07 PROCEDURE — 85025 COMPLETE CBC W/AUTO DIFF WBC: CPT | Mod: HCNC | Performed by: FAMILY MEDICINE

## 2024-05-07 PROCEDURE — 1101F PT FALLS ASSESS-DOCD LE1/YR: CPT | Mod: HCNC,CPTII,S$GLB, | Performed by: FAMILY MEDICINE

## 2024-05-07 PROCEDURE — 84550 ASSAY OF BLOOD/URIC ACID: CPT | Mod: HCNC | Performed by: FAMILY MEDICINE

## 2024-05-07 PROCEDURE — 36415 COLL VENOUS BLD VENIPUNCTURE: CPT | Mod: HCNC,PN | Performed by: FAMILY MEDICINE

## 2024-05-07 PROCEDURE — 80061 LIPID PANEL: CPT | Mod: HCNC | Performed by: FAMILY MEDICINE

## 2024-05-07 PROCEDURE — 80053 COMPREHEN METABOLIC PANEL: CPT | Mod: HCNC | Performed by: FAMILY MEDICINE

## 2024-05-07 PROCEDURE — 84443 ASSAY THYROID STIM HORMONE: CPT | Mod: HCNC | Performed by: FAMILY MEDICINE

## 2024-05-07 PROCEDURE — 99999 PR PBB SHADOW E&M-EST. PATIENT-LVL V: CPT | Mod: PBBFAC,HCNC,, | Performed by: FAMILY MEDICINE

## 2024-05-07 PROCEDURE — 1159F MED LIST DOCD IN RCRD: CPT | Mod: HCNC,CPTII,S$GLB, | Performed by: FAMILY MEDICINE

## 2024-05-07 PROCEDURE — 3288F FALL RISK ASSESSMENT DOCD: CPT | Mod: HCNC,CPTII,S$GLB, | Performed by: FAMILY MEDICINE

## 2024-05-07 PROCEDURE — 1126F AMNT PAIN NOTED NONE PRSNT: CPT | Mod: HCNC,CPTII,S$GLB, | Performed by: FAMILY MEDICINE

## 2024-05-07 NOTE — PROGRESS NOTES
THIS DOCUMENT WAS MADE IN PART WITH VOICE RECOGNITION SOFTWARE.  OCCASIONALLY THIS SOFTWARE WILL MISINTERPRET WORDS OR PHRASES.      Primary Care Provider Appointment   [facility] [place]       Patient ID: Maggi Fontaine is a 89 y.o. female.    ASSESSMENT/PLAN by Problem List:  Problem List Items Addressed This Visit       Hypothyroidism - Primary (Chronic)     ***?off pravastatin  Missed labs    Follow Up:  ***    *** minutes of total time spent on the encounter, time includes face to face time, and some or all of the following: review of chart, lab, imaging, consultant notes, ER, hospital, documentation, care coordination, etc.    Health Maintenance         Date Due Completion Date    Shingles Vaccine (1 of 2) Never done ---    RSV Vaccine (Age 60+ and Pregnant patients) (1 - 1-dose 60+ series) Never done ---    COVID-19 Vaccine (5 - 2023-24 season) 09/01/2023 10/14/2022    Lipid Panel 03/16/2024 3/16/2023    Influenza Vaccine (Season Ended) 09/01/2024 10/14/2022    TETANUS VACCINE 05/31/2033 5/31/2023            ***Worry score    Advance Care Planning     Date: 05/07/2024  {ACP:[id number]}             Subjective:     Chief Complaint   Patient presents with    Follow-up    Cerumen Impaction     Pt states she needs her left ear flushed.      I have reviewed the information entered by the ancillary staff regarding the chief complaint as well as the related history.    HPI    Patient is a/an 89 y.o.  female     ***    For complete problem list, past medical history, surgical history, social history, etc., see appropriate section in the electronic medical record    Review of Systems    Objective     Physical Exam  Vitals:    05/07/24 1020   BP: 130/72   BP Location: Right arm   Patient Position: Sitting   BP Method: Medium (Manual)   Pulse: 69   Resp: 18   Temp: 97.9 °F (36.6 °C)   TempSrc: Oral   SpO2: 100%   Weight: 70.1 kg (154 lb 8.7 oz)       RECENT LABS:    Lab Results   Component Value  Date    WBC 5.21 10/17/2023    HGB 13.1 10/17/2023    HCT 38.4 10/17/2023     10/17/2023    CHOL 124 03/16/2023    TRIG 80 03/16/2023    HDL 46 03/16/2023    ALT 18 03/17/2023    AST 30 03/17/2023     02/22/2024    K 5.1 02/22/2024     02/22/2024    CREATININE 1.4 02/22/2024    BUN 33 (H) 02/22/2024    CO2 21 (L) 02/22/2024    TSH 1.190 05/31/2023    INR 1.8 05/31/2023    GLUF 112 (H) 10/05/2005    HGBA1C 5.4 03/15/2023       Results for orders placed or performed during the hospital encounter of 04/13/24   Cardiac device check - Remote   Result Value Ref Range    Device Type Pacemaker     AF [place] % 100     RV Paccing % 55 %     *Note: Due to a large number of results and/or encounters for the requested time period, some results have not been displayed. A complete set of results can be found in Results Review.

## 2024-05-07 NOTE — PROGRESS NOTES
[facility] Care Provider Appointment   [facility]       Patient ID: Maggi Fontaine is a 89 y.o. female.    ASSESSMENT/PLAN by Problem List:    1. Hypothyroidism due to acquired atrophy of thyroid  Assessment & Plan:  Reschedule labs as patient was not able to make that appointment.  Continue current medications for now.      2. Longstanding persistent atrial fibrillation  Assessment & Plan:  As stated last time, I refilled the Xarelto.  I prefer that she continue to discuss this with Cardiology during her next appointment.      3. Long term current use of anticoagulant therapy  Overview:  She remains on Eliquis for chronic atrial fibrillation.  Managed by her cardiologist.      4. Idiopathic gout, unspecified chronicity, unspecified site  Assessment & Plan:  No recent episodes of gout reported.  Uric acid level pending with current labs      5. Bilateral impacted cerumen  Comments:  Minimal wax bilaterally.  Cleaned today without difficulty.         Follow Up:  Three months      Subjective:     Chief Complaint   Patient presents with    Follow-up    Cerumen Impaction     Pt states she needs her left ear flushed.      I have reviewed the information entered by the ancillary staff regarding the chief complaint as well as the related history.    HPI    Patient is a/an 89 y.o.  female     Routine follow-up.  She is here with her daughter.  She has been doing well but reports that her ear doctor told me to tell you the clean out my ears because they are full of wax and is affecting my hearing    Otherwise well.  See above for additional topics addressed today    For complete problem list, past medical history, surgical history, social history, etc., see appropriate section in the electronic medical record    Review of Systems   HENT:  Positive for hearing loss.    Respiratory: Negative.     Cardiovascular: Negative.    Gastrointestinal: Negative.    Genitourinary: Negative.     Musculoskeletal: Negative.        Objective     Physical Exam  Vitals reviewed.   Constitutional:       General: She is not in acute distress.     Appearance: She is well-developed. She is not ill-appearing.   HENT:      Head: Normocephalic and atraumatic.      Ears:      Comments: Minimal cerumen in both ears, this was cleaned.  Both tympanic membranes were normal.  Canals otherwise normal.     Nose: Nose normal. No congestion.      Mouth/Throat:      Pharynx: Oropharynx is clear. No oropharyngeal exudate.   Eyes:      General: No scleral icterus.     Conjunctiva/sclera: Conjunctivae normal.   Cardiovascular:      Rate and Rhythm: Normal rate. Rhythm irregular.      Heart sounds: Normal heart sounds.      Comments: 1+ ankle edema bilaterally  Pulmonary:      Effort: Pulmonary effort is normal. No respiratory distress.      Breath sounds: Normal breath sounds. No wheezing or rales.   Skin:     General: Skin is dry.      Findings: No rash.   Neurological:      General: No focal deficit present.      Mental Status: She is alert. Mental status is at baseline.      Gait: Gait abnormal (Mildly antalgic).   Psychiatric:         Behavior: Behavior normal.       Vitals:    05/07/24 1020   BP: 130/72   BP Location: Right arm   Patient Position: Sitting   BP Method: Medium (Manual)   Pulse: 69   Resp: 18   Temp: 97.9 °F (36.6 °C)   TempSrc: Oral   SpO2: 100%   Weight: 70.1 kg (154 lb 8.7 oz)           THIS DOCUMENT WAS MADE IN PART WITH VOICE RECOGNITION SOFTWARE.  OCCASIONALLY THIS SOFTWARE WILL MISINTERPRET WORDS OR PHRASES.

## 2024-05-07 NOTE — ASSESSMENT & PLAN NOTE
As stated last time, I refilled the Xarelto.  I prefer that she continue to discuss this with Cardiology during her next appointment.

## 2024-05-07 NOTE — ASSESSMENT & PLAN NOTE
Reschedule labs as patient was not able to make that appointment.  Continue current medications for now.

## 2024-05-08 ENCOUNTER — LAB VISIT (OUTPATIENT)
Dept: LAB | Facility: HOSPITAL | Age: 89
End: 2024-05-08
Attending: FAMILY MEDICINE
Payer: MEDICARE

## 2024-05-08 DIAGNOSIS — E87.5 HYPERKALEMIA: ICD-10-CM

## 2024-05-08 LAB
ANION GAP SERPL CALC-SCNC: 12 MMOL/L (ref 8–16)
BUN SERPL-MCNC: 41 MG/DL (ref 10–30)
CALCIUM SERPL-MCNC: 9.7 MG/DL (ref 8.7–10.5)
CHLORIDE SERPL-SCNC: 108 MMOL/L (ref 95–110)
CO2 SERPL-SCNC: 23 MMOL/L (ref 23–29)
CREAT SERPL-MCNC: 1.6 MG/DL (ref 0.5–1.4)
EST. GFR  (NO RACE VARIABLE): 30 ML/MIN/1.73 M^2
GLUCOSE SERPL-MCNC: 90 MG/DL (ref 70–110)
POTASSIUM SERPL-SCNC: 5.1 MMOL/L (ref 3.5–5.1)
SODIUM SERPL-SCNC: 143 MMOL/L (ref 136–145)

## 2024-05-08 PROCEDURE — 80048 BASIC METABOLIC PNL TOTAL CA: CPT | Mod: HCNC,PO | Performed by: FAMILY MEDICINE

## 2024-05-08 PROCEDURE — 36415 COLL VENOUS BLD VENIPUNCTURE: CPT | Mod: HCNC,PO | Performed by: FAMILY MEDICINE

## 2024-05-08 NOTE — TELEPHONE ENCOUNTER
Called pt's daughter, Ken, and LM requesting a call back as her mom will need STAT labs this morning.    Called pt and she reports that she is planning on going tomorrow morning for labs.  Additionally, pt reports concerns about duplicate therapy being listed on her AVS from yesterday's appointment.      Called pt's son and LM to call back as his mom will need STAT labs.    Called pt's daughter in law and LM that pt would need STAT labs today.    Pt's daughter called back and reports that she will bring her mom to the lab today if her brother is unable to bring pt.     Called pt back and cleaned up her medication profile.  Discontinued medications that were meds given to her in the hospital and prescribed by Dr. Colbert.  Pt no longer taking Acetaminophen, Colace, Pericolace, Melatonin, MVI, and Glycolax.  Pt aware that she is going to get labs drawn today.

## 2024-05-08 NOTE — TELEPHONE ENCOUNTER
Spoke with pt's daughter, discussed your message in its entirety, she verbalized understanding.  She reports that her mom is not taking any potassium supplements, but that she will clarify with the nurse to make sure that her mom is not taking a potassium supplement and she will let the nurse know that pt is to follow a potassium restricted diet.  Advised on foods high in potassium such as bananas, tomatoes, potatoes, avocados, kiwis, etc.  Informed pt's daughter to contact us if she needs a note to be sent over to [facility], she verbalized understanding.

## 2024-05-08 NOTE — TELEPHONE ENCOUNTER
Got call for critical potassium of 6.7. sample moderately hemolyzed. Secure chatted with Dr. Finnegan. He will handle since he knows the family well.

## 2024-05-08 NOTE — TELEPHONE ENCOUNTER
Mi, see phone note regarding potassium.  Check on her in the morning.  unless they brought her to the emergency room last night have her repeat a stat BMP ASAP this morning.  Note that they have to go to the [facility] on [facility] in order for this to be run stat.  Other locations in [place] or [place] will simply collect the specimen and then later send it to the [employer] [facility].

## 2024-05-08 NOTE — TELEPHONE ENCOUNTER
Please thank the patient and or family for coming in today to check the potassium stat.  It has improved but it is at the upper limit of normal so we do have to place her on a restricted potassium diet.  I would recommend that the family go through her medications and supplements again to verify that she has not mistakenly taking any potassium supplements.  And they will have to help her focus on a low-potassium diet.

## 2024-05-08 NOTE — TELEPHONE ENCOUNTER
I spoke with the on-call physician who received a report of a critical potassium at 6.7.  I agreed to contact the family to discuss further.  I called her daughter Ken.  Explained the result.  Also explained that there was mentioned of hemolysis which can cause a false elevation in the potassium.  However there is a certain degree of uncertainty.  I did see her earlier today and she was clinically normal, she was having no worrisome signs or symptoms and has had no other recent concerns.  I advised Ken to call her and check on her again this evening if there is anything unusual to definitely take her to the emergency room where they can repeat this immediately.  Also to make certain she has not been taking any potassium supplements.  If she is taking any type of extra potassium then I would want this checked immediately.  Otherwise if she is feeling well then will have this checked 1st thing in the morning.  Again I mentioned to Ken that there is some degree of uncertainty about this test and if there is anything unusual it is always safe to bring her to the emergency room where they can repeat a potassium level immediately.  She acknowledged understanding, will check on her mom, if unusual signs or symptoms take her to the ER otherwise expect or call 1st thing in the morning to schedule repeat.

## 2024-05-14 ENCOUNTER — TELEPHONE (OUTPATIENT)
Dept: PRIMARY CARE CLINIC | Facility: CLINIC | Age: 89
End: 2024-05-14
Payer: MEDICARE

## 2024-05-14 NOTE — TELEPHONE ENCOUNTER
Pt went to have a pedicure today and was told by the manicurist that she has an infection in her toe.  Pt scheduled to see Tami tomorrow at 1520.

## 2024-05-14 NOTE — TELEPHONE ENCOUNTER
----- Message from Bonny Bowles sent at 5/14/2024 12:41 PM CDT -----  Regarding: sooner apt  Contact: patient  Type:  Sooner Appointment Request    Caller is requesting a sooner appointment.  Caller declined first available appointment listed below.  Caller will not accept being placed on the waitlist and is requesting a message be sent to doctor.    Name of Caller:  patient   When is the first available appointment?    Symptoms:  infection in toe  Would the patient rather a call back or a response via [facility]?   Best Call Back Number:  [phone]    Additional Information:  seeking to be seen today thanks

## 2024-05-20 ENCOUNTER — TELEPHONE (OUTPATIENT)
Dept: PRIMARY CARE CLINIC | Facility: CLINIC | Age: 89
End: 2024-05-20
Payer: MEDICARE

## 2024-05-21 ENCOUNTER — OFFICE VISIT (OUTPATIENT)
Dept: PRIMARY CARE CLINIC | Facility: CLINIC | Age: 89
End: 2024-05-21
Payer: MEDICARE

## 2024-05-21 VITALS
DIASTOLIC BLOOD PRESSURE: 66 MMHG | HEART RATE: 109 BPM | WEIGHT: 156.19 LBS | SYSTOLIC BLOOD PRESSURE: 126 MMHG | OXYGEN SATURATION: 95 % | BODY MASS INDEX: 27.67 KG/M2 | RESPIRATION RATE: 18 BRPM | TEMPERATURE: 98 F

## 2024-05-21 DIAGNOSIS — B35.1 ONYCHOMYCOSIS: Primary | ICD-10-CM

## 2024-05-21 DIAGNOSIS — E87.5 HYPERKALEMIA: ICD-10-CM

## 2024-05-21 PROCEDURE — 99999 PR PBB SHADOW E&M-EST. PATIENT-LVL IV: CPT | Mod: PBBFAC,HCNC,, | Performed by: FAMILY MEDICINE

## 2024-05-21 PROCEDURE — 1160F RVW MEDS BY RX/DR IN RCRD: CPT | Mod: HCNC,CPTII,S$GLB, | Performed by: FAMILY MEDICINE

## 2024-05-21 PROCEDURE — 1126F AMNT PAIN NOTED NONE PRSNT: CPT | Mod: HCNC,CPTII,S$GLB, | Performed by: FAMILY MEDICINE

## 2024-05-21 PROCEDURE — 3288F FALL RISK ASSESSMENT DOCD: CPT | Mod: HCNC,CPTII,S$GLB, | Performed by: FAMILY MEDICINE

## 2024-05-21 PROCEDURE — 1101F PT FALLS ASSESS-DOCD LE1/YR: CPT | Mod: HCNC,CPTII,S$GLB, | Performed by: FAMILY MEDICINE

## 2024-05-21 PROCEDURE — 1157F ADVNC CARE PLAN IN RCRD: CPT | Mod: HCNC,CPTII,S$GLB, | Performed by: FAMILY MEDICINE

## 2024-05-21 PROCEDURE — 99213 OFFICE O/P EST LOW 20 MIN: CPT | Mod: HCNC,S$GLB,, | Performed by: FAMILY MEDICINE

## 2024-05-21 PROCEDURE — 1159F MED LIST DOCD IN RCRD: CPT | Mod: HCNC,CPTII,S$GLB, | Performed by: FAMILY MEDICINE

## 2024-05-21 RX ORDER — CICLOPIROX 80 MG/ML
SOLUTION TOPICAL NIGHTLY
Qty: 6.6 ML | Refills: 5 | Status: SHIPPED | OUTPATIENT
Start: 2024-05-21 | End: 2024-05-22

## 2024-05-21 NOTE — ASSESSMENT & PLAN NOTE
Initial potassium of 6.7 with hemolysis.  Repeat was 5.1.  We did discuss a lower potassium diet and adequate water intake.  I have also sent written instructions to her assisted living facility to place her on a low potassium/renal diet.  I am not sure how closely they can manage this though.  So I did discuss with the patient and her daughter today some other general topics of a lower potassium diet.

## 2024-05-21 NOTE — ASSESSMENT & PLAN NOTE
Onychomycosis left great toenail.  No surrounding skin problems cellulitis or ingrowing components.  We discussed treatment options, limitations.  I do not recommend oral medications given her other medications, age, and hepatic risk.  Discussed topical options can help but take time.  She is interested in trying Penlac.  If she starts to have difficulty such as pain, ingrowing nail, or other problems then consider podiatry.

## 2024-05-21 NOTE — PROGRESS NOTES
Primary Care Provider Appointment   [facility] [place]       Patient ID: Maggi Fontaine is a 89 y.o. female.    ASSESSMENT/PLAN by Problem List:    1. Onychomycosis  Assessment & Plan:  Onychomycosis left great toenail.  No surrounding skin problems cellulitis or ingrowing components.  We discussed treatment options, limitations.  I do not recommend oral medications given her other medications, age, and hepatic risk.  Discussed topical options can help but take time.  She is interested in trying Penlac.  If she starts to have difficulty such as pain, ingrowing nail, or other problems then consider podiatry.      2. Hyperkalemia  Assessment & Plan:  Initial potassium of 6.7 with hemolysis.  Repeat was 5.1.  We did discuss a lower potassium diet and adequate water intake.  I have also sent written instructions to her assisted living facility to place her on a low potassium/renal diet.  I am not sure how closely they can manage this though.  So I did discuss with the patient and her daughter today some other general topics of a lower potassium diet.      Other orders  -     ciclopirox (PENLAC) 8 % Soln; Apply topically nightly.  Dispense: 6.6 mL; Refill: 5         Follow Up:  Two months as scheduled    Subjective:     Chief Complaint   Patient presents with    Nail Problem     Pt states she has an infected toenail.      I have reviewed the information entered by the ancillary staff regarding the chief complaint as well as the related history.    HPI    Patient is a/an 89 y.o.  female     Urgent care visit today scheduled because of concern about an infected toenail.  Patient had herpetic care and was informed she may have an infection her nail.  Prior to this she was unaware, no pain, discomfort or other concerns.      Also recent concerns about hyperkalemia.  See above for details    For complete problem list, past medical history, surgical history, social history, etc., see appropriate  section in the electronic medical record    Review of Systems   Respiratory: Negative.  Negative for shortness of breath.    Cardiovascular: Negative.  Negative for chest pain and leg swelling.   Gastrointestinal: Negative.    Genitourinary: Negative.    Musculoskeletal: Negative.        Objective     Physical Exam  Vitals reviewed.   Constitutional:       General: She is not in acute distress.     Appearance: She is well-developed. She is not diaphoretic.   HENT:      Head: Normocephalic and atraumatic.   Eyes:      General: No scleral icterus.  Pulmonary:      Effort: Pulmonary effort is normal. No respiratory distress.   Skin:     Comments: Left 1st toenail is thickened and discolored.  Surrounding skin appears intact without concern.   Neurological:      Mental Status: She is alert and oriented to person, place, and time.      Comments: Ambulatory   Psychiatric:         Mood and Affect: Mood normal.         Behavior: Behavior normal.       Vitals:    05/21/24 1011   BP: 126/66   BP Location: Right arm   Patient Position: Sitting   BP Method: Medium (Manual)   Pulse: 109   Resp: 18   Temp: 98.1 °F (36.7 °C)   TempSrc: Oral   SpO2: 95%   Weight: 70.8 kg (156 lb 3.1 oz)           THIS DOCUMENT WAS MADE IN PART WITH VOICE RECOGNITION SOFTWARE.  OCCASIONALLY THIS SOFTWARE WILL MISINTERPRET WORDS OR PHRASES.

## 2024-05-22 RX ORDER — CICLOPIROX 80 MG/ML
SOLUTION TOPICAL
Qty: 6.6 ML | Refills: 5 | Status: SHIPPED | OUTPATIENT
Start: 2024-05-22

## 2024-06-08 NOTE — TELEPHONE ENCOUNTER
Tried to reach pt. No answer, left msg to call back.    Contacting to inform pt of results and recommendations.     stretcher

## 2024-07-15 ENCOUNTER — TELEPHONE (OUTPATIENT)
Dept: PRIMARY CARE CLINIC | Facility: CLINIC | Age: 89
End: 2024-07-15
Payer: MEDICARE

## 2024-07-15 NOTE — TELEPHONE ENCOUNTER
Spoke with pt, discussed your message in its entirety, pt verbalized understanding.  Pt reports that she thinks that she was misdiagnosed (suspects that she hit on her toe and that she will continue the medication for now.  Encouraged pt to come in for a sooner appt if she would like and she will have to coordinate this with her daughter for transportation.

## 2024-07-15 NOTE — TELEPHONE ENCOUNTER
Let her know that I recommend continuing treatment.  It generally takes six or 12 months.  If she is having a good response then perhaps sooner but at least continue it until I see her back otherwise the risk of recurrence will increase.

## 2024-07-15 NOTE — TELEPHONE ENCOUNTER
----- Message from Kannan Herbert sent at 7/15/2024  9:39 AM CDT -----  Regarding: return call  Contact: patient  Type:  Patient Returning Call    Who Called:patient  Who Left Message for Patient:office nurse  Does the patient know what this is regarding?:please call  Would the patient rather a call back or a response via [facility]?   Best Call Back Number:[phone]  Additional Information:

## 2024-07-15 NOTE — TELEPHONE ENCOUNTER
Spoke with pt, she reports that the nurse at [facility] informed her that her toenail fungus is healed and that she no longer needs to apply Penlac daily.  Informed pt that she has only been using this nail polish for less than 2 months and that treatment can take up to one year.  Inquired if pt could continue to use the nail polish for another month as she sees Dr. Briones next month.    Please advise.

## 2024-08-12 ENCOUNTER — TELEPHONE (OUTPATIENT)
Dept: CARDIOLOGY | Facility: CLINIC | Age: 89
End: 2024-08-12
Payer: MEDICARE

## 2024-08-12 RX ORDER — METOPROLOL SUCCINATE 25 MG/1
TABLET, EXTENDED RELEASE ORAL
Qty: 30 TABLET | Refills: 5 | Status: SHIPPED | OUTPATIENT
Start: 2024-08-12

## 2024-08-12 NOTE — TELEPHONE ENCOUNTER
Pt scheduled to have extractions, bone graft  Local anesthetic  Pt taking xarelto   Last office visit: 02/22/24     [facility]   P: [phone]  F: [phone]

## 2024-08-13 ENCOUNTER — OFFICE VISIT (OUTPATIENT)
Dept: PRIMARY CARE CLINIC | Facility: CLINIC | Age: 89
End: 2024-08-13
Payer: MEDICARE

## 2024-08-13 VITALS
WEIGHT: 161.69 LBS | HEIGHT: 63 IN | BODY MASS INDEX: 28.65 KG/M2 | DIASTOLIC BLOOD PRESSURE: 60 MMHG | SYSTOLIC BLOOD PRESSURE: 122 MMHG | TEMPERATURE: 98 F | HEART RATE: 84 BPM | OXYGEN SATURATION: 96 %

## 2024-08-13 DIAGNOSIS — R60.0 LOWER EXTREMITY EDEMA: ICD-10-CM

## 2024-08-13 DIAGNOSIS — I10 PRIMARY HYPERTENSION: Primary | ICD-10-CM

## 2024-08-13 DIAGNOSIS — I48.11 LONGSTANDING PERSISTENT ATRIAL FIBRILLATION: Chronic | ICD-10-CM

## 2024-08-13 PROBLEM — G92.9 ENCEPHALOPATHY, TOXIC: Status: RESOLVED | Noted: 2023-06-01 | Resolved: 2024-08-13

## 2024-08-13 PROBLEM — I51.89 DIASTOLIC DYSFUNCTION: Chronic | Status: ACTIVE | Noted: 2022-05-16

## 2024-08-13 PROBLEM — S72.044A: Status: RESOLVED | Noted: 2023-05-31 | Resolved: 2024-08-13

## 2024-08-13 PROCEDURE — 1160F RVW MEDS BY RX/DR IN RCRD: CPT | Mod: HCNC,CPTII,S$GLB, | Performed by: FAMILY MEDICINE

## 2024-08-13 PROCEDURE — 1123F ACP DISCUSS/DSCN MKR DOCD: CPT | Mod: HCNC,CPTII,S$GLB, | Performed by: FAMILY MEDICINE

## 2024-08-13 PROCEDURE — 99214 OFFICE O/P EST MOD 30 MIN: CPT | Mod: HCNC,S$GLB,, | Performed by: FAMILY MEDICINE

## 2024-08-13 PROCEDURE — 1159F MED LIST DOCD IN RCRD: CPT | Mod: HCNC,CPTII,S$GLB, | Performed by: FAMILY MEDICINE

## 2024-08-13 PROCEDURE — 3288F FALL RISK ASSESSMENT DOCD: CPT | Mod: HCNC,CPTII,S$GLB, | Performed by: FAMILY MEDICINE

## 2024-08-13 PROCEDURE — 99999 PR PBB SHADOW E&M-EST. PATIENT-LVL IV: CPT | Mod: PBBFAC,HCNC,, | Performed by: FAMILY MEDICINE

## 2024-08-13 PROCEDURE — 1126F AMNT PAIN NOTED NONE PRSNT: CPT | Mod: HCNC,CPTII,S$GLB, | Performed by: FAMILY MEDICINE

## 2024-08-13 PROCEDURE — 1101F PT FALLS ASSESS-DOCD LE1/YR: CPT | Mod: HCNC,CPTII,S$GLB, | Performed by: FAMILY MEDICINE

## 2024-08-13 NOTE — PROGRESS NOTES
Primary Care Provider Appointment   [facility]       Patient ID: Maggi Fontaine is a 89 y.o. female.    ASSESSMENT/PLAN by Problem List:    1. Primary hypertension  Assessment & Plan:  Blood pressure remains stable and satisfactory.  Continue current medications.      2. Lower extremity edema  Assessment & Plan:  She has mild bilateral lower extremity edema.  We did discuss low-sodium diet.  Elevation.  Consider compression.  Lungs are clear, no sign of CHF.  Suspect dependent edema and venous insufficiency, but she will monitor      3. Longstanding persistent atrial fibrillation  Assessment & Plan:  She remains in AFib but her pulse remains less than 100.  She remains on Xarelto.  She has an upcoming dental procedure, her cardiologist has already responded and I reviewed her recommendations regarding holding Xarelto for two days prior and resuming the following day after the procedure           Follow Up:  Three months  Advance Care Planning     Date: 08/13/2024    Power of   I initiated the process of voluntary advance care planning today and explained the importance of this process to the patient.  I introduced the concept of advance directives to the patient, as well. Then the patient received detailed information about the importance of designating a Health Care Power of  (HCPOA). She was also instructed to communicate with this person about their wishes for future healthcare, should she become sick and lose decision-making capacity. The patient has previously appointed a HCPOA. After our discussion, the patient has decided to complete a HCPOA and has appointed her son, health care agent:  Renetta  & health care agent number:  see chart . I encouraged her to communicate with this person about their wishes for future healthcare, should she become sick and lose decision-making capacity.      Reviewed previous documents on file.  Must search under media  section             Subjective:     Chief Complaint   Patient presents with    Follow-up     3 month follow up     I have reviewed the information entered by the ancillary staff regarding the chief complaint as well as the related history.    HPI    Patient is a/an 89 y.o.  female     She returns for a routine follow-up.  She has been doing well.  She does have some questions about an upcoming dental procedure.  She already reached out to Cardiology and I reviewed their recommendations and response.  Blood pressure has been good.    For complete problem list, past medical history, surgical history, social history, etc., see appropriate section in the electronic medical record    Review of Systems   Constitutional:  Negative for chills and fever.   HENT:  Negative for trouble swallowing.    Respiratory: Negative.     Cardiovascular: Negative.    Gastrointestinal: Negative.    Genitourinary: Negative.    Musculoskeletal: Negative.        Objective     Physical Exam  Vitals reviewed.   Constitutional:       General: She is not in acute distress.     Appearance: She is well-developed. She is not ill-appearing.   HENT:      Head: Normocephalic and atraumatic.      Nose: Nose normal. No congestion.      Mouth/Throat:      Pharynx: Oropharynx is clear.      Comments: Broken tooth noted on the upper right no surrounding erythema or purulent drainage.  Eyes:      General: No scleral icterus.     Conjunctiva/sclera: Conjunctivae normal.   Cardiovascular:      Rate and Rhythm: Normal rate. Rhythm irregular.      Heart sounds: Normal heart sounds.      Comments: 1+ ankle edema bilaterally  Pulmonary:      Effort: Pulmonary effort is normal. No respiratory distress.      Breath sounds: Normal breath sounds. No wheezing or rales.   Skin:     General: Skin is dry.      Findings: No rash.   Neurological:      General: No focal deficit present.      Mental Status: She is alert. Mental status is at baseline.      Gait: Gait abnormal  "(Mildly antalgic).   Psychiatric:         Behavior: Behavior normal.       Vitals:    08/13/24 1020   BP: 122/60   BP Location: Left arm   Patient Position: Sitting   BP Method: Medium (Manual)   Pulse: 84   Temp: 98 °F (36.7 °C)   SpO2: 96%   Weight: 73.4 kg (161 lb 11.3 oz)   Height: 5' 3" (1.6 m)           THIS DOCUMENT WAS MADE IN PART WITH VOICE RECOGNITION SOFTWARE.  OCCASIONALLY THIS SOFTWARE WILL MISINTERPRET WORDS OR PHRASES.    "

## 2024-08-13 NOTE — ASSESSMENT & PLAN NOTE
She remains in AFib but her pulse remains less than 100.  She remains on Xarelto.  She has an upcoming dental procedure, her cardiologist has already responded and I reviewed her recommendations regarding holding Xarelto for two days prior and resuming the following day after the procedure

## 2024-08-13 NOTE — ASSESSMENT & PLAN NOTE
She has mild bilateral lower extremity edema.  We did discuss low-sodium diet.  Elevation.  Consider compression.  Lungs are clear, no sign of CHF.  Suspect dependent edema and venous insufficiency, but she will monitor

## 2024-08-21 DIAGNOSIS — E03.9 HYPOTHYROIDISM, UNSPECIFIED TYPE: ICD-10-CM

## 2024-08-21 RX ORDER — LEVOTHYROXINE SODIUM 88 UG/1
TABLET ORAL
Qty: 90 TABLET | Refills: 1 | Status: SHIPPED | OUTPATIENT
Start: 2024-08-21

## 2024-08-29 DIAGNOSIS — E55.9 HYPOVITAMINOSIS D: ICD-10-CM

## 2024-08-29 DIAGNOSIS — I48.11 LONGSTANDING PERSISTENT ATRIAL FIBRILLATION: Chronic | ICD-10-CM

## 2024-08-29 RX ORDER — GABAPENTIN 300 MG/1
300 CAPSULE ORAL NIGHTLY
Qty: 90 CAPSULE | Refills: 1 | Status: SHIPPED | OUTPATIENT
Start: 2024-08-29 | End: 2025-02-25

## 2024-08-29 RX ORDER — LORATADINE 10 MG/1
10 TABLET ORAL DAILY
Qty: 90 TABLET | Refills: 1 | Status: SHIPPED | OUTPATIENT
Start: 2024-08-29 | End: 2025-02-25

## 2024-08-29 RX ORDER — ERGOCALCIFEROL 1.25 MG/1
50000 CAPSULE ORAL
Qty: 13 CAPSULE | Refills: 1 | Status: SHIPPED | OUTPATIENT
Start: 2024-08-31

## 2024-08-29 NOTE — TELEPHONE ENCOUNTER
----- Message from Marilu Hoyt sent at 8/29/2024  1:47 PM CDT -----  Type:  Needs Medical Advice    Who Called: [facility]     Pharmacy name and phone #:  [facility] [address] [facility] - [address]  [address] [place] [address]  Phone: [phone] Fax: [phone]  Hours: Not open 24 hours      Would the patient rather a call back or a response via [facility]? Call   Best Call Back Number: [phone]  Additional Information: gabapentin (NEURONTIN) 300 MG capsule refill request

## 2024-08-29 NOTE — TELEPHONE ENCOUNTER
----- Message from Parisa Fuchs sent at 8/29/2024  1:38 PM CDT -----  Regarding: Refill Request  Type: RX Refill Request      [facility]     Refill or New Rx: refill       RX Name and Strength: Vit D 3,   Disp Refills Start End CONNOR  rivaroxaban (XARELTO) 15 mg Tab         [place]         Preferred Pharmacy with phone number:  [facility] - [place]  [address] [place]    Would the patient rather a call back or a response via [facility]? Call       Best Call Back Number: .[phone]   no fever and no chills.

## 2024-09-03 ENCOUNTER — TELEPHONE (OUTPATIENT)
Dept: PRIMARY CARE CLINIC | Facility: CLINIC | Age: 89
End: 2024-09-03
Payer: MEDICARE

## 2024-09-03 NOTE — TELEPHONE ENCOUNTER
----- Message from Ara Littlejohn sent at 8/31/2024  9:45 AM CDT -----  Contact: pt  Type: Needs Medical Advice         Who Called: PT  Best Call Back Number:[phone]  Additional Information: Requesting a call back regarding Pt needs office to call her about her medication. Pt feels she needs to stop the furosemide (LASIX) 40 MG tablet. Pt is asking for a call at 9am due to she has another Dr appt.        Please Advise- Thank you
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
## 2024-09-05 NOTE — TELEPHONE ENCOUNTER
Spoke to pt and informed her okay to stop Lasix per Dr Carrion. Spoke to nurse at [facility] and also faxed order over to [phone]

## 2024-09-09 ENCOUNTER — TELEPHONE (OUTPATIENT)
Dept: PRIMARY CARE CLINIC | Facility: CLINIC | Age: 89
End: 2024-09-09
Payer: MEDICARE

## 2024-09-09 NOTE — TELEPHONE ENCOUNTER
----- Message from Fiorella Pereyra sent at 9/9/2024  3:01 PM CDT -----  Type:  Needs Medical Advice    Who Called: Wei bernal/ [facility]  Symptoms (please be specific):  How long has patient had these symptoms:    Pharmacy name and phone #:    Would the patient rather a call back or a response via [facility]? call back  Best Call Back Number: [phone]  Additional Information: You need an order to d/c a rx  Please advise  Thanks

## 2024-09-09 NOTE — TELEPHONE ENCOUNTER
Spoke with Wei at [facility] and she is requesting an order for pt to [facility].  Informed her that pt reported back on 5/8/24 that she was no longer taking it.  Advised her that I would fax the documentation over stating that medication was discontinued.     Fax number: [phone]

## 2024-09-10 ENCOUNTER — TELEPHONE (OUTPATIENT)
Dept: CARDIOLOGY | Facility: CLINIC | Age: 89
End: 2024-09-10
Payer: MEDICARE

## 2024-09-10 DIAGNOSIS — R60.0 LOWER EXTREMITY EDEMA: Primary | ICD-10-CM

## 2024-09-10 RX ORDER — FUROSEMIDE 40 MG/1
40 TABLET ORAL
Qty: 15 TABLET | Refills: 11 | Status: SHIPPED | OUTPATIENT
Start: 2024-09-10 | End: 2025-09-10

## 2024-09-10 NOTE — TELEPHONE ENCOUNTER
"Fax received from [facility].   "Resident has an order to take Furosemide 20mg PO every other day. Resident has been refusing med for the past week and requesting to have it discontinued. May it be changed to PRN daily for increased swelling?"    Fax: [phone]  "

## 2024-09-26 DIAGNOSIS — I48.11 LONGSTANDING PERSISTENT ATRIAL FIBRILLATION: Chronic | ICD-10-CM

## 2024-10-02 ENCOUNTER — TELEPHONE (OUTPATIENT)
Dept: PRIMARY CARE CLINIC | Facility: CLINIC | Age: 89
End: 2024-10-02
Payer: MEDICARE

## 2024-10-02 NOTE — TELEPHONE ENCOUNTER
----- Message from Dasia sent at 10/2/2024 10:33 AM CDT -----  Regarding: podiatry referral  [phone] - call back ; Kristin (caregiver called in) but her name is not on file as Patient Contact or In Involvement of care; she stated that when she took  [facility] the worker point out there is something on her heel and toe. Caregiver is requesting referral for Podiatry advise we will reach to the number we have on file as Patient contact and she stated to ask the son to reach out to her so she can make the appointment.          Dasia

## 2024-10-02 NOTE — TELEPHONE ENCOUNTER
Spoke with pt regarding her concerns.  Pt unable to come in this week.  Pt scheduled on 10/8 at 1120.

## 2024-10-08 ENCOUNTER — OFFICE VISIT (OUTPATIENT)
Dept: PRIMARY CARE CLINIC | Facility: CLINIC | Age: 89
End: 2024-10-08
Payer: MEDICARE

## 2024-10-08 VITALS
SYSTOLIC BLOOD PRESSURE: 118 MMHG | HEART RATE: 75 BPM | WEIGHT: 165.44 LBS | OXYGEN SATURATION: 99 % | BODY MASS INDEX: 29.31 KG/M2 | HEIGHT: 63 IN | DIASTOLIC BLOOD PRESSURE: 68 MMHG

## 2024-10-08 DIAGNOSIS — R60.0 LOWER EXTREMITY EDEMA: ICD-10-CM

## 2024-10-08 DIAGNOSIS — L84 HEEL CALLUS: Primary | ICD-10-CM

## 2024-10-08 DIAGNOSIS — B35.1 ONYCHOMYCOSIS: ICD-10-CM

## 2024-10-08 PROCEDURE — 1126F AMNT PAIN NOTED NONE PRSNT: CPT | Mod: HCNC,CPTII,S$GLB, | Performed by: PHYSICIAN ASSISTANT

## 2024-10-08 PROCEDURE — 1157F ADVNC CARE PLAN IN RCRD: CPT | Mod: HCNC,CPTII,S$GLB, | Performed by: PHYSICIAN ASSISTANT

## 2024-10-08 PROCEDURE — 3288F FALL RISK ASSESSMENT DOCD: CPT | Mod: HCNC,CPTII,S$GLB, | Performed by: PHYSICIAN ASSISTANT

## 2024-10-08 PROCEDURE — 1101F PT FALLS ASSESS-DOCD LE1/YR: CPT | Mod: HCNC,CPTII,S$GLB, | Performed by: PHYSICIAN ASSISTANT

## 2024-10-08 PROCEDURE — 1159F MED LIST DOCD IN RCRD: CPT | Mod: HCNC,CPTII,S$GLB, | Performed by: PHYSICIAN ASSISTANT

## 2024-10-08 PROCEDURE — 99999 PR PBB SHADOW E&M-EST. PATIENT-LVL V: CPT | Mod: PBBFAC,HCNC,, | Performed by: PHYSICIAN ASSISTANT

## 2024-10-08 PROCEDURE — 99215 OFFICE O/P EST HI 40 MIN: CPT | Mod: HCNC,S$GLB,, | Performed by: PHYSICIAN ASSISTANT

## 2024-10-08 RX ORDER — FOLIC ACID 0.4 MG
400 TABLET ORAL DAILY
COMMUNITY

## 2024-10-08 RX ORDER — HYDROCODONE BITARTRATE AND ACETAMINOPHEN 7.5; 325 MG/1; MG/1
1 TABLET ORAL EVERY 6 HOURS PRN
COMMUNITY

## 2024-10-08 RX ORDER — MULTIVITAMIN
1 TABLET ORAL DAILY
COMMUNITY

## 2024-10-08 RX ORDER — DOCUSATE SODIUM 100 MG/1
100 CAPSULE, LIQUID FILLED ORAL 2 TIMES DAILY PRN
COMMUNITY

## 2024-10-08 NOTE — ASSESSMENT & PLAN NOTE
Lungs clear to auscultation bilaterally.  Patient states she does not have any shortness a breath.  Discussed risks versus benefits of Lasix.  She does not have to take the Lasix every day.  If she is going to be out and about to withhold the Lasix but if she will be at home and leg swelling increases she should take the Lasix.  Contact clinic with any worsening of the swelling, erythema or shortness a breath.  We will continue to follow.

## 2024-10-08 NOTE — PROGRESS NOTES
Primary Care Provider Appointment   [facility] [place]       Patient ID: Maggi Fontaine is a 89 y.o. female.    ASSESSMENT/PLAN by Problem List:    1. Heel callus  Comments:  Moisturize feet as often as possible.  Referral for Podiatry placed.  Orders:  -     Ambulatory referral/consult to Podiatry; Future; Expected date: 10/15/2024    2. Onychomycosis  Comments:  Keep nails trimmed short to avoid trauma.  Defer any further recommendations to Podiatry    3. Lower extremity edema  Assessment & Plan:  Lungs clear to auscultation bilaterally.  Patient states she does not have any shortness a breath.  Discussed risks versus benefits of Lasix.  She does not have to take the Lasix every day.  If she is going to be out and about to withhold the Lasix but if she will be at home and leg swelling increases she should take the Lasix.  Contact clinic with any worsening of the swelling, erythema or shortness a breath.  We will continue to follow.           Follow Up:  As scheduled    My total time spent on this encounter was 40 minutes which included  the following activities: preparing to see the patient, performing a medically appropriate and/or evaluation, counseling and educating the patient and family/caregiver, ordering medications, tests, or procedures, referring and communicating with other healthcare providers, documenting clinical information in the electronic or other health record, and independently interpreting results. This time is independent and non-overlapping.          Subjective:     Chief Complaint   Patient presents with    Nail Problem     Patient went to get a pedicure and was told there could be an infection under her right great toe, since it was tender during pedicure.    Foot Injury     Patient was told she had a callous under her right heel, which patient said pains her somtimes when she walks.     I have reviewed the information entered by the ancillary staff regarding  the chief complaint as well as the related history.    89-year-old female presents for evaluation foot issues.  Patient had a pedicure approximately 1 week ago.  She had some tenderness at the medial aspect of right great toenail and so  express concern for infection.  Patient denies any trauma to the foot.  Pedicures also noticed thick callus on the bottom of her right heel.  This does cause patient has some pain when walking.    Patient also mentions that the  noticed some swelling of the right lower extremity.  Patient is supposed to be taking Lasix p.r.n. for swelling of the legs or weight gain.  She states she does not like to take her Lasix because she then feels dehydrated and has to urinate a lot.  Patient denies any current shortness a breath.        Patient is a/an 89 y.o.  female       For complete problem list, past medical history, surgical history, social history, etc., see appropriate section in the electronic medical record    Review of Systems   Respiratory:  Negative for cough and shortness of breath.    Cardiovascular:  Positive for leg swelling. Negative for chest pain.   Skin:  Negative for color change.        Callus bottom right foot   All other systems reviewed and are negative.      Objective     Physical Exam  Vitals and nursing note reviewed.   Constitutional:       General: She is not in acute distress.     Appearance: Normal appearance. She is not ill-appearing.   HENT:      Head: Normocephalic and atraumatic.      Right Ear: External ear normal.      Left Ear: External ear normal.   Eyes:      General:         Right eye: No discharge.         Left eye: No discharge.      Extraocular Movements: Extraocular movements intact.      Conjunctiva/sclera: Conjunctivae normal.   Pulmonary:      Effort: Pulmonary effort is normal. No respiratory distress.      Breath sounds: No wheezing, rhonchi or rales.   Musculoskeletal:      Right lower le+ Edema present.   Skin:      "General: Skin is warm and dry.      Findings: No erythema.      Comments: No erythema of right great toe.  No evidence of paronychia.  There is a very thick callus to the bottom of right heel.  Onychomycosis bilateral feet.   Neurological:      Mental Status: She is alert.   Psychiatric:         Mood and Affect: Mood normal.         Behavior: Behavior normal.         Thought Content: Thought content normal.         Judgment: Judgment normal.       Vitals:    10/08/24 1017   BP: 118/68   BP Location: Left arm   Patient Position: Sitting   Pulse: 75   SpO2: 99%   Weight: 75 kg (165 lb 7.3 oz)   Height: 5' 3" (1.6 m)           THIS DOCUMENT WAS MADE IN PART WITH VOICE RECOGNITION SOFTWARE.  OCCASIONALLY THIS SOFTWARE WILL MISINTERPRET WORDS OR PHRASES.  "

## 2024-10-17 DIAGNOSIS — E03.9 HYPOTHYROIDISM, UNSPECIFIED TYPE: ICD-10-CM

## 2024-10-17 RX ORDER — LEVOTHYROXINE SODIUM 88 UG/1
TABLET ORAL
Qty: 30 TABLET | Refills: 0 | Status: SHIPPED | OUTPATIENT
Start: 2024-10-17

## 2024-10-29 ENCOUNTER — OFFICE VISIT (OUTPATIENT)
Dept: PRIMARY CARE CLINIC | Facility: CLINIC | Age: 89
End: 2024-10-29
Payer: MEDICARE

## 2024-10-29 VITALS
SYSTOLIC BLOOD PRESSURE: 114 MMHG | BODY MASS INDEX: 28.95 KG/M2 | OXYGEN SATURATION: 98 % | HEART RATE: 67 BPM | HEIGHT: 63 IN | WEIGHT: 163.38 LBS | DIASTOLIC BLOOD PRESSURE: 72 MMHG

## 2024-10-29 DIAGNOSIS — I10 PRIMARY HYPERTENSION: ICD-10-CM

## 2024-10-29 DIAGNOSIS — E03.9 HYPOTHYROIDISM, UNSPECIFIED TYPE: Primary | ICD-10-CM

## 2024-10-29 DIAGNOSIS — E78.2 MIXED HYPERLIPIDEMIA: ICD-10-CM

## 2024-10-29 DIAGNOSIS — M10.00 IDIOPATHIC GOUT, UNSPECIFIED CHRONICITY, UNSPECIFIED SITE: ICD-10-CM

## 2024-10-29 PROCEDURE — 1126F AMNT PAIN NOTED NONE PRSNT: CPT | Mod: HCNC,CPTII,S$GLB, | Performed by: FAMILY MEDICINE

## 2024-10-29 PROCEDURE — 1159F MED LIST DOCD IN RCRD: CPT | Mod: HCNC,CPTII,S$GLB, | Performed by: FAMILY MEDICINE

## 2024-10-29 PROCEDURE — 1160F RVW MEDS BY RX/DR IN RCRD: CPT | Mod: HCNC,CPTII,S$GLB, | Performed by: FAMILY MEDICINE

## 2024-10-29 PROCEDURE — 1157F ADVNC CARE PLAN IN RCRD: CPT | Mod: HCNC,CPTII,S$GLB, | Performed by: FAMILY MEDICINE

## 2024-10-29 PROCEDURE — 99999 PR PBB SHADOW E&M-EST. PATIENT-LVL IV: CPT | Mod: PBBFAC,HCNC,, | Performed by: FAMILY MEDICINE

## 2024-10-29 PROCEDURE — 99214 OFFICE O/P EST MOD 30 MIN: CPT | Mod: HCNC,S$GLB,, | Performed by: FAMILY MEDICINE

## 2024-10-29 PROCEDURE — 3288F FALL RISK ASSESSMENT DOCD: CPT | Mod: HCNC,CPTII,S$GLB, | Performed by: FAMILY MEDICINE

## 2024-10-29 PROCEDURE — 1101F PT FALLS ASSESS-DOCD LE1/YR: CPT | Mod: HCNC,CPTII,S$GLB, | Performed by: FAMILY MEDICINE

## 2024-11-05 ENCOUNTER — TELEPHONE (OUTPATIENT)
Dept: PRIMARY CARE CLINIC | Facility: CLINIC | Age: 89
End: 2024-11-05
Payer: MEDICARE

## 2024-11-06 NOTE — TELEPHONE ENCOUNTER
Spoke with pt, she refused a hospital f/u appt for her recent fall as she only has transportation on Tuesdays around 11 am.  No med changes were made in the ED.  Advised pt to call us if she is able to come in for a HFU appt, pt verbalized understanding.

## 2024-11-19 DIAGNOSIS — E03.9 HYPOTHYROIDISM, UNSPECIFIED TYPE: ICD-10-CM

## 2024-11-19 RX ORDER — LEVOTHYROXINE SODIUM 88 UG/1
TABLET ORAL
Qty: 90 TABLET | Refills: 1 | Status: SHIPPED | OUTPATIENT
Start: 2024-11-19

## 2024-11-26 ENCOUNTER — LAB VISIT (OUTPATIENT)
Dept: LAB | Facility: HOSPITAL | Age: 89
End: 2024-11-26
Attending: FAMILY MEDICINE
Payer: MEDICARE

## 2024-11-26 DIAGNOSIS — E03.9 HYPOTHYROIDISM, UNSPECIFIED TYPE: ICD-10-CM

## 2024-11-26 DIAGNOSIS — M10.00 IDIOPATHIC GOUT, UNSPECIFIED CHRONICITY, UNSPECIFIED SITE: ICD-10-CM

## 2024-11-26 DIAGNOSIS — I10 PRIMARY HYPERTENSION: ICD-10-CM

## 2024-11-26 DIAGNOSIS — E78.2 MIXED HYPERLIPIDEMIA: ICD-10-CM

## 2024-11-26 LAB
ALBUMIN SERPL BCP-MCNC: 3.3 G/DL (ref 3.5–5.2)
ALP SERPL-CCNC: 91 U/L (ref 40–150)
ALT SERPL W/O P-5'-P-CCNC: 11 U/L (ref 10–44)
ANION GAP SERPL CALC-SCNC: 9 MMOL/L (ref 8–16)
AST SERPL-CCNC: 22 U/L (ref 10–40)
BASOPHILS # BLD AUTO: 0.06 K/UL (ref 0–0.2)
BASOPHILS NFR BLD: 1.2 % (ref 0–1.9)
BILIRUB SERPL-MCNC: 0.4 MG/DL (ref 0.1–1)
BUN SERPL-MCNC: 32 MG/DL (ref 10–30)
CALCIUM SERPL-MCNC: 9.4 MG/DL (ref 8.7–10.5)
CHLORIDE SERPL-SCNC: 111 MMOL/L (ref 95–110)
CHOLEST SERPL-MCNC: 199 MG/DL (ref 120–199)
CHOLEST/HDLC SERPL: 4.3 {RATIO} (ref 2–5)
CO2 SERPL-SCNC: 23 MMOL/L (ref 23–29)
CREAT SERPL-MCNC: 1.6 MG/DL (ref 0.5–1.4)
DIFFERENTIAL METHOD BLD: ABNORMAL
EOSINOPHIL # BLD AUTO: 0.2 K/UL (ref 0–0.5)
EOSINOPHIL NFR BLD: 3.6 % (ref 0–8)
ERYTHROCYTE [DISTWIDTH] IN BLOOD BY AUTOMATED COUNT: 15.9 % (ref 11.5–14.5)
EST. GFR  (NO RACE VARIABLE): 30.1 ML/MIN/1.73 M^2
GLUCOSE SERPL-MCNC: 102 MG/DL (ref 70–110)
HCT VFR BLD AUTO: 36.2 % (ref 37–48.5)
HDLC SERPL-MCNC: 46 MG/DL (ref 40–75)
HDLC SERPL: 23.1 % (ref 20–50)
HGB BLD-MCNC: 12.6 G/DL (ref 12–16)
IMM GRANULOCYTES # BLD AUTO: 0.01 K/UL (ref 0–0.04)
IMM GRANULOCYTES NFR BLD AUTO: 0.2 % (ref 0–0.5)
LDLC SERPL CALC-MCNC: 125.4 MG/DL (ref 63–159)
LYMPHOCYTES # BLD AUTO: 2.5 K/UL (ref 1–4.8)
LYMPHOCYTES NFR BLD: 50.3 % (ref 18–48)
MCH RBC QN AUTO: 27.6 PG (ref 27–31)
MCHC RBC AUTO-ENTMCNC: 34.8 G/DL (ref 32–36)
MCV RBC AUTO: 79 FL (ref 82–98)
MONOCYTES # BLD AUTO: 0.9 K/UL (ref 0.3–1)
MONOCYTES NFR BLD: 17 % (ref 4–15)
NEUTROPHILS # BLD AUTO: 1.4 K/UL (ref 1.8–7.7)
NEUTROPHILS NFR BLD: 27.7 % (ref 38–73)
NONHDLC SERPL-MCNC: 153 MG/DL
NRBC BLD-RTO: 0 /100 WBC
PLATELET # BLD AUTO: 193 K/UL (ref 150–450)
PMV BLD AUTO: 13 FL (ref 9.2–12.9)
POTASSIUM SERPL-SCNC: 4.5 MMOL/L (ref 3.5–5.1)
PROT SERPL-MCNC: 6.5 G/DL (ref 6–8.4)
RBC # BLD AUTO: 4.56 M/UL (ref 4–5.4)
SODIUM SERPL-SCNC: 143 MMOL/L (ref 136–145)
T4 FREE SERPL-MCNC: 0.95 NG/DL (ref 0.71–1.51)
TRIGL SERPL-MCNC: 138 MG/DL (ref 30–150)
TSH SERPL DL<=0.005 MIU/L-ACNC: 6.26 UIU/ML (ref 0.4–4)
URATE SERPL-MCNC: 8.5 MG/DL (ref 2.4–5.7)
WBC # BLD AUTO: 4.99 K/UL (ref 3.9–12.7)

## 2024-11-26 PROCEDURE — 84550 ASSAY OF BLOOD/URIC ACID: CPT | Mod: HCNC | Performed by: FAMILY MEDICINE

## 2024-11-26 PROCEDURE — 84443 ASSAY THYROID STIM HORMONE: CPT | Mod: HCNC | Performed by: FAMILY MEDICINE

## 2024-11-26 PROCEDURE — 85025 COMPLETE CBC W/AUTO DIFF WBC: CPT | Mod: HCNC | Performed by: FAMILY MEDICINE

## 2024-11-26 PROCEDURE — 80053 COMPREHEN METABOLIC PANEL: CPT | Mod: HCNC | Performed by: FAMILY MEDICINE

## 2024-11-26 PROCEDURE — 80061 LIPID PANEL: CPT | Mod: HCNC | Performed by: FAMILY MEDICINE

## 2024-11-26 PROCEDURE — 84439 ASSAY OF FREE THYROXINE: CPT | Mod: HCNC | Performed by: FAMILY MEDICINE

## 2024-11-26 PROCEDURE — 36415 COLL VENOUS BLD VENIPUNCTURE: CPT | Mod: HCNC,PN | Performed by: FAMILY MEDICINE

## 2025-01-30 DIAGNOSIS — Z00.00 ENCOUNTER FOR MEDICARE ANNUAL WELLNESS EXAM: ICD-10-CM

## 2025-02-11 ENCOUNTER — OFFICE VISIT (OUTPATIENT)
Dept: PRIMARY CARE CLINIC | Facility: CLINIC | Age: OVER 89
End: 2025-02-11
Payer: MEDICARE

## 2025-02-11 ENCOUNTER — HOSPITAL ENCOUNTER (OUTPATIENT)
Dept: RADIOLOGY | Facility: HOSPITAL | Age: OVER 89
Discharge: HOME | End: 2025-02-11
Attending: FAMILY MEDICINE
Payer: MEDICARE

## 2025-02-11 ENCOUNTER — TELEPHONE (OUTPATIENT)
Dept: ORTHOPEDICS | Facility: CLINIC | Age: OVER 89
End: 2025-02-11
Payer: MEDICARE

## 2025-02-11 VITALS
WEIGHT: 162.81 LBS | OXYGEN SATURATION: 96 % | DIASTOLIC BLOOD PRESSURE: 64 MMHG | BODY MASS INDEX: 28.85 KG/M2 | HEIGHT: 63 IN | SYSTOLIC BLOOD PRESSURE: 126 MMHG | HEART RATE: 94 BPM

## 2025-02-11 DIAGNOSIS — N18.32 STAGE 3B CHRONIC KIDNEY DISEASE: ICD-10-CM

## 2025-02-11 DIAGNOSIS — E03.4 HYPOTHYROIDISM DUE TO ACQUIRED ATROPHY OF THYROID: ICD-10-CM

## 2025-02-11 DIAGNOSIS — B35.3 TINEA PEDIS OF LEFT FOOT: ICD-10-CM

## 2025-02-11 DIAGNOSIS — I10 PRIMARY HYPERTENSION: Primary | ICD-10-CM

## 2025-02-11 DIAGNOSIS — M79.675 PAIN OF TOE OF LEFT FOOT: ICD-10-CM

## 2025-02-11 DIAGNOSIS — I42.8 NICM (NONISCHEMIC CARDIOMYOPATHY): ICD-10-CM

## 2025-02-11 DIAGNOSIS — I48.0 PAF (PAROXYSMAL ATRIAL FIBRILLATION): ICD-10-CM

## 2025-02-11 DIAGNOSIS — M10.00 IDIOPATHIC GOUT, UNSPECIFIED CHRONICITY, UNSPECIFIED SITE: ICD-10-CM

## 2025-02-11 PROCEDURE — 1101F PT FALLS ASSESS-DOCD LE1/YR: CPT | Mod: HCNC,CPTII,S$GLB, | Performed by: FAMILY MEDICINE

## 2025-02-11 PROCEDURE — 99999 PR PBB SHADOW E&M-EST. PATIENT-LVL V: CPT | Mod: PBBFAC,HCNC,, | Performed by: FAMILY MEDICINE

## 2025-02-11 PROCEDURE — 1123F ACP DISCUSS/DSCN MKR DOCD: CPT | Mod: HCNC,CPTII,S$GLB, | Performed by: FAMILY MEDICINE

## 2025-02-11 PROCEDURE — 3288F FALL RISK ASSESSMENT DOCD: CPT | Mod: HCNC,CPTII,S$GLB, | Performed by: FAMILY MEDICINE

## 2025-02-11 PROCEDURE — 1159F MED LIST DOCD IN RCRD: CPT | Mod: HCNC,CPTII,S$GLB, | Performed by: FAMILY MEDICINE

## 2025-02-11 PROCEDURE — 99214 OFFICE O/P EST MOD 30 MIN: CPT | Mod: HCNC,S$GLB,, | Performed by: FAMILY MEDICINE

## 2025-02-11 PROCEDURE — 1125F AMNT PAIN NOTED PAIN PRSNT: CPT | Mod: HCNC,CPTII,S$GLB, | Performed by: FAMILY MEDICINE

## 2025-02-11 PROCEDURE — 73630 X-RAY EXAM OF FOOT: CPT | Mod: TC,HCNC,FY,PO,LT

## 2025-02-11 PROCEDURE — 1160F RVW MEDS BY RX/DR IN RCRD: CPT | Mod: HCNC,CPTII,S$GLB, | Performed by: FAMILY MEDICINE

## 2025-02-11 PROCEDURE — 73630 X-RAY EXAM OF FOOT: CPT | Mod: 26,HCNC,LT, | Performed by: RADIOLOGY

## 2025-02-11 RX ORDER — KETOCONAZOLE 20 MG/G
CREAM TOPICAL
Qty: 30 G | Refills: 2 | Status: SHIPPED | OUTPATIENT
Start: 2025-02-11

## 2025-02-11 NOTE — PROGRESS NOTES
Primary Care Provider Appointment   [facility] [place]       Patient ID: Maggi Fontaine is a 89 y.o. female.    ASSESSMENT/PLAN by Problem List:    Assessment & Plan    IMPRESSION:  - Assessed chronic kidney disease, stage 3B; patient appears stable without uremic or urinary symptoms, has not follow with Nephrology in a few years  - Evaluated hypertension; condition stable  - left foot pain, 5th phalanx.  She reports remote history of injury but persistent pain for six-months  - Reviewed current medications; all deemed safe for patient's kidney function  - Observed signs of athlete's foot; incidental finding not related to toe pain  - Assessed eye itching; no signs of infection or drainage observed    PAF (PAROXYSMAL ATRIAL FIBRILLATION):  - Continue current atrial fibrillation medication (anticoagulant).  - rhythm remains irregular but rate is controlled.      She appears asymptomatic.  She does remain on Xarelto.  Clinically she is stable    STAGE 3B CHRONIC KIDNEY DISEASE:  - Explained age-related decline in kidney function and its implications for medication management and health monitoring.  - Advised the patient to maintain adequate hydration, limit sodium intake, and avoid regular use of ibuprofen, naproxen, and similar NSAIDs due to potential renal effects.  - Monitor the patient's blood pressure and certain electrolytes more closely due to mild kidney disease.  Check labs, consider referral back to reestablish with Nephrology    HYPERTENSION:  - Continue current antihypertensive medications and monitor blood pressure regularly; current readings are satisfactory.  - Acknowledge hypertension as a chronic condition requiring ongoing evaluation and management.    TOE PAIN:  - Order x-ray of the painful toe, preferably on a Tuesday at [facility] or [place] location.  - Refer the patient to a podiatrist for evaluation after x-ray results,   - history of gout although I do  not believe this explains the chronic pain she has had in the left foot for six-months although her memory may not be completely reliable.  Will check uric acid level.    OCULAR PRURITUS:  - Discussed potential causes, including allergies and dry eye syndrome.  - Examined eyes, finding no signs of infection, discharge, or conjunctival erythema.  - Instruct patient to apply warm compresses to eyes for 1 minute each, 2 times daily.  - Recommend OTC antihistamine eye drops (e.g., Pataday, Patenol, or Optivar) and lubricating eye drops as needed.  - Suggest ophthalmology consultation if symptoms persist.    TINEA PEDIS:  - Observed signs of tinea pedis on the patient's foot, noting desquamation and erythema.  - This is an incidental finding unrelated to the patient's toe pain.  - Prescribed topical antifungal cream for treatment.  Ketoconazole cream    LABS:  - Complete labs, including thyroid check, before the next appointment.  - Schedule labs on the same day as the podiatry appointment if possible.    FOLLOW UP:  - Follow up in 2-3 months, no later than 3 months.         CODING, ORDERS, AND ADDITIONAL DOCUMENTATION RELATED TO THIS ENCOUNTER:  (note that the diagnoses and clinical summaries above were generated with the assistance of [employer] software and represents my assessment and plan.  This software does not always generate the precise nor most specific diagnosis codes.  Therefore the specific diagnosis codes and orders for billing purposes are detailed below along with any additional documentation if needed)      1. Primary hypertension  -     Comprehensive Metabolic Panel; Future; Expected date: 02/11/2025  -     CBC Auto Differential; Future; Expected date: 02/11/2025  -     Lipid Panel; Future; Expected date: 02/11/2025    2. Stage 3b chronic kidney disease  -     IRON AND TIBC; Future; Expected date: 02/11/2025  -     FERRITIN; Future; Expected date: 02/11/2025  -     PHOSPHORUS; Future; Expected  date: 02/11/2025    3. Pain of toe of left foot  -     X-Ray Foot Complete 3 view Left; Future; Expected date: 02/11/2025  -     Ambulatory referral/consult to Podiatry; Future; Expected date: 02/18/2025    4. Idiopathic gout, unspecified chronicity, unspecified site  -     Uric Acid; Future; Expected date: 02/11/2025    5. Hypothyroidism due to acquired atrophy of thyroid  -     TSH; Future; Expected date: 02/11/2025    6. NICM (nonischemic cardiomyopathy)  Overview:  1/21/2021  2D echocardiogram  Moderately decreased systolic function. The estimated ejection fraction is 35%  The left ventricular wall motion is globally hypokinetic.  Left ventricular diastolic dysfunction.  Normal right ventricular size with normal right ventricular systolic function.      7. PAF (paroxysmal atrial fibrillation)         Follow Up:  Three months    Advance Care Planning     Date: 02/11/2025    Power of   I initiated the process of voluntary advance care planning today and explained the importance of this process to the patient.  I introduced the concept of advance directives to the patient, as well. Then the patient received detailed information about the importance of designating a Health Care Power of  (HCPOA). She was also instructed to communicate with this person about their wishes for future healthcare, should she become sick and lose decision-making capacity. The patient has previously appointed a HCPOA. After our discussion, the patient has decided to complete a HCPOA and has appointed her son, health care agent:  Kevin  & health care agent number:  See chart . I encouraged her to communicate with this person about their wishes for future healthcare, should she become sick and lose decision-making capacity.    Reviewed previous discussions.  Patient reports she has completed the paperwork.  We have yet to receive a copy so will periodically remind her  A total of n/a min was spent on advance care planning,  goals of care discussion, emotional support, formulating and communicating prognosis and exploring burden/benefit of various approaches of treatment. This discussion occurred on a fully voluntary basis with the verbal consent of the patient and/or family.             Subjective:       Toe Injury  Associated symptoms include arthralgias. Pertinent negatives include no chills.   Follow-up  Associated symptoms include arthralgias. Pertinent negatives include no chills.       Patient is a/an 89 y.o.  female     For complete problem list, past medical history, surgical history, social history, etc., see appropriate section in the electronic medical record    History of Present Illness    CHIEF COMPLAINT:  Ms. Fontaine presents today for follow-up of hypertension and other conditions.    HYPERTENSION:  She has a history of hypertension which is currently stable and satisfactory on current medication regimen.    CARDIOVASCULAR:  She has a history of atrial fibrillation and is currently on a anticoagulant as prescribed by her cardiologist.    CHRONIC KIDNEY DISEASE:  She has chronic kidney disease, stage 3B, previously followed by Dr. Callahan but now managed in current clinic. She denies any uremic or urinary symptoms.    MUSCULOSKELETAL:  She reports left toe pain ongoing for over 6 months that occurs at night and disrupts sleep. Pain began after bumping toe on bed rollers. A podiatrist at [facility] suggested the toe might be broken. She describes constant, severe pain without cyclical patterns and has difficulty walking long distances.    OPHTHALMOLOGIC:  She reports constant eye itching and denies use of mascara or other eye cosmetics.      ROS:  Genitourinary: -frequency, -urgency  Musculoskeletal: +joint pain       Review of Systems   Constitutional:  Negative for chills and unexpected weight change.   HENT: Negative.     Respiratory:  Negative for shortness of breath and wheezing.    Cardiovascular: Negative.   "  Gastrointestinal: Negative.    Genitourinary: Negative.    Musculoskeletal:  Positive for arthralgias and gait problem.   Psychiatric/Behavioral:  Positive for decreased concentration.        Objective     Physical Exam  Vitals reviewed.   Constitutional:       General: She is not in acute distress.     Appearance: She is well-developed. She is not ill-appearing.   HENT:      Head: Normocephalic and atraumatic.   Eyes:      General: No scleral icterus.     Conjunctiva/sclera: Conjunctivae normal.   Cardiovascular:      Rate and Rhythm: Normal rate. Rhythm irregular.      Heart sounds: Normal heart sounds.      Comments: Trace ankle edema  Pulmonary:      Effort: Pulmonary effort is normal. No respiratory distress.      Breath sounds: Normal breath sounds. No wheezing or rales.   Musculoskeletal:      Comments: Left foot does reveal mild swelling of the 5th phalanx and some mild discomfort with movement and manipulation.  There is no redness or warmth.   Skin:     General: Skin is dry.      Findings: No rash.      Comments: Left foot there is some mild skin flaking light erythema between the lateral toes   Neurological:      General: No focal deficit present.      Mental Status: She is alert. Mental status is at baseline.      Gait: Gait abnormal (Mildly antalgic).   Psychiatric:         Behavior: Behavior normal.       Vitals:    02/11/25 1048   BP: 126/64   BP Location: Left arm   Patient Position: Sitting   Pulse: 94   SpO2: 96%   Weight: 73.9 kg (162 lb 13 oz)   Height: 5' 3" (1.6 m)     Physical Exam          This note was generated with the assistance of ambient listening technology. Verbal consent was obtained by the patient and accompanying visitor(s) for the recording of patient appointment to facilitate this note. I attest to having reviewed and edited the generated note for accuracy, though some syntax or spelling errors may persist. Please contact the author of this note for any clarification.  Parts of " the note were also generated with voice recognition software.  Occasionally this software will misinterpreted words or phrases

## 2025-02-11 NOTE — ASSESSMENT & PLAN NOTE
Clinically she appears stable.  There is no sign of decompensation or active congestive heart failure.  We will continue to monitor

## 2025-02-11 NOTE — TELEPHONE ENCOUNTER
Called the patient's  to reschedule appointment. There were no sooner available appointments and patient was placed on the wait list.

## 2025-02-11 NOTE — TELEPHONE ENCOUNTER
----- Message from [employer] sent at 2/11/2025  1:03 PM CST -----  Type:  Sooner Apoointment Request    Caller is requesting a sooner appointment.  Caller declined first available appointment listed below.  Caller will not accept being placed on the waitlist and is requesting a message be sent to doctor.  Name of Caller: Kristin caregiver  When is the first available appointment?4/2  Symptoms:Pain of toe of left foot  Would the patient rather a call back or a response via [facility]? call back/  Best Call Back Number:[phone]  Additional Information:catias tues appt 10:30-11   Thanks

## 2025-02-17 ENCOUNTER — TELEPHONE (OUTPATIENT)
Dept: PRIMARY CARE CLINIC | Facility: CLINIC | Age: OVER 89
End: 2025-02-17
Payer: MEDICARE

## 2025-02-17 DIAGNOSIS — M79.673 PAIN OF FOOT, UNSPECIFIED LATERALITY: Primary | ICD-10-CM

## 2025-02-17 RX ORDER — PREDNISONE 20 MG/1
40 TABLET ORAL DAILY
Qty: 10 TABLET | Refills: 0 | Status: SHIPPED | OUTPATIENT
Start: 2025-02-17 | End: 2025-02-22

## 2025-02-17 NOTE — TELEPHONE ENCOUNTER
Called pt and left a detailed message.  Pt already scheduled with Podiatry.  Advised that pt can call us back if she would like to be scheduled sooner.

## 2025-02-17 NOTE — TELEPHONE ENCOUNTER
I sent in a prescription for prednisone in case this is gout.  As long as she is not having any fever or other worrisome signs.  I also entered a referral to Podiatry.

## 2025-02-17 NOTE — TELEPHONE ENCOUNTER
Patient's family called to report the patient is still having left foot pain and that it is waking her up from her sleep. Her facility is asking for pain medication to be sent [facility]. The also ask if the patient needs a follow up visit to address the foot again.

## 2025-02-18 ENCOUNTER — TELEPHONE (OUTPATIENT)
Dept: PRIMARY CARE CLINIC | Facility: CLINIC | Age: OVER 89
End: 2025-02-18
Payer: MEDICARE

## 2025-02-18 NOTE — TELEPHONE ENCOUNTER
Spoke with pt's son, advised that Rx of Prednisone was sent into [facility] yesterday.  Pt would like a sooner appt with Podiatry, but the earliest that she can be seen in [place] is in April.  Provided pt's son with the name of an external podiatrist, Dr. Black, and he will see about getting pt in for a sooner appt.    [General Appearance - Well Developed] : well developed [General Appearance - Well Nourished] : well nourished [Normal Appearance] : normal appearance [Well Groomed] : well groomed [General Appearance - In No Acute Distress] : no acute distress [Abdomen Soft] : soft [Abdomen Tenderness] : non-tender [Costovertebral Angle Tenderness] : no ~M costovertebral angle tenderness [Urethral Meatus] : meatus normal [Urinary Bladder Findings] : the bladder was normal on palpation [Scrotum] : the scrotum was normal [Testes Mass (___cm)] : there were no testicular masses [No Prostate Nodules] : no prostate nodules [FreeTextEntry1] : Left varicocele grade 3.  Pain trigger point located in the left distal vas. Mild to moderate atrophy of the left testicle [Edema] : no peripheral edema [] : no respiratory distress [Respiration, Rhythm And Depth] : normal respiratory rhythm and effort [Exaggerated Use Of Accessory Muscles For Inspiration] : no accessory muscle use [Oriented To Time, Place, And Person] : oriented to person, place, and time [Affect] : the affect was normal [Mood] : the mood was normal [Not Anxious] : not anxious [Normal Station and Gait] : the gait and station were normal for the patient's age [No Focal Deficits] : no focal deficits [No Palpable Adenopathy] : no palpable adenopathy

## 2025-02-18 NOTE — TELEPHONE ENCOUNTER
----- Message from Dasia sent at 2/18/2025 11:09 AM CST -----  Regarding: pt advice - foot concern  [phone] - daughter in law called stating if we can call he  Kevin and explained everything to him what was done and what was needed to be done.Dasia

## 2025-02-24 DIAGNOSIS — E55.9 HYPOVITAMINOSIS D: ICD-10-CM

## 2025-02-24 RX ORDER — ERGOCALCIFEROL 1.25 MG/1
50000 CAPSULE ORAL
Qty: 13 CAPSULE | Refills: 3 | Status: SHIPPED | OUTPATIENT
Start: 2025-02-24

## 2025-02-24 NOTE — TELEPHONE ENCOUNTER
----- Message from Jessica sent at 2/21/2025  4:28 PM CST -----  Type:  RX Refill RequestWho Called:  [facility]:  NewRX Name and Strength:  Vitamin D2 1.25 MG or 50,000 unitsHow is the patient currently taking it? (ex. 1XDay):  as directedIs this a 30 day or 90 day RX:  90Preferred Pharmacy with phone number:  [facility]  [place] [address] [id number] [facility] [place] [address]Ndnzo: [phone] Fax: [phone] Call Back Number:  [phone] Information: Myrna is calling in regards to her Vitamin D2 needs a new Rx to be filled. Pharmacy has faxed a request twice. Pt is completely out. Please call back and advise. Thanks!

## 2025-02-25 ENCOUNTER — OFFICE VISIT (OUTPATIENT)
Dept: PRIMARY CARE CLINIC | Facility: CLINIC | Age: OVER 89
End: 2025-02-25
Payer: MEDICARE

## 2025-02-25 VITALS
OXYGEN SATURATION: 97 % | SYSTOLIC BLOOD PRESSURE: 118 MMHG | WEIGHT: 162.56 LBS | DIASTOLIC BLOOD PRESSURE: 58 MMHG | BODY MASS INDEX: 28.8 KG/M2 | HEART RATE: 95 BPM
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
## 2025-02-25 DIAGNOSIS — Z09 HOSPITAL DISCHARGE FOLLOW-UP: Primary | ICD-10-CM

## 2025-02-25 DIAGNOSIS — R10.11 RUQ PAIN: ICD-10-CM

## 2025-02-25 DIAGNOSIS — E87.5 HYPERKALEMIA: ICD-10-CM

## 2025-02-25 PROCEDURE — 1160F RVW MEDS BY RX/DR IN RCRD: CPT | Mod: HCNC,CPTII,S$GLB, | Performed by: PHYSICIAN ASSISTANT

## 2025-02-25 PROCEDURE — 1101F PT FALLS ASSESS-DOCD LE1/YR: CPT | Mod: HCNC,CPTII,S$GLB, | Performed by: PHYSICIAN ASSISTANT

## 2025-02-25 PROCEDURE — 3288F FALL RISK ASSESSMENT DOCD: CPT | Mod: HCNC,CPTII,S$GLB, | Performed by: PHYSICIAN ASSISTANT

## 2025-02-25 PROCEDURE — 99999 PR PBB SHADOW E&M-EST. PATIENT-LVL III: CPT | Mod: PBBFAC,HCNC,, | Performed by: PHYSICIAN ASSISTANT

## 2025-02-25 PROCEDURE — 1157F ADVNC CARE PLAN IN RCRD: CPT | Mod: HCNC,CPTII,S$GLB, | Performed by: PHYSICIAN ASSISTANT

## 2025-02-25 PROCEDURE — 1126F AMNT PAIN NOTED NONE PRSNT: CPT | Mod: HCNC,CPTII,S$GLB, | Performed by: PHYSICIAN ASSISTANT

## 2025-02-25 PROCEDURE — 1159F MED LIST DOCD IN RCRD: CPT | Mod: HCNC,CPTII,S$GLB, | Performed by: PHYSICIAN ASSISTANT

## 2025-02-25 PROCEDURE — 99215 OFFICE O/P EST HI 40 MIN: CPT | Mod: HCNC,S$GLB,, | Performed by: PHYSICIAN ASSISTANT

## 2025-02-25 RX ORDER — CLINDAMYCIN HYDROCHLORIDE 150 MG/1
150 CAPSULE ORAL 4 TIMES DAILY
COMMUNITY
Start: 2025-02-18

## 2025-02-25 NOTE — PROGRESS NOTES
Primary Care Provider Appointment   [facility]       Patient ID: Maggi Fontaine is a 89 y.o. female.    ASSESSMENT/PLAN by Problem List:    1. Hospital discharge follow-up    2. RUQ pain    3. Hyperkalemia    4. Fall, initial encounter       Assessment & Plan    IMPRESSION:  - Reviewed ER workup for RUQ pain: CT, ultrasound, chest XR, labs including troponins  - Considered gastritis vs. gallbladder pathology; gallbladder wall thickened but no stones or duct dilation  - Assessed recent fall and head bump; no signs of intracranial bleeding on exam  - Evaluated elevated potassium levels; not likely due to medications  - Ruled out swallowed tooth fragment as cause of pain  - Determined back pain likely musculoskeletal based on exacerbation with movement    HYPERKALEMIA:  - Avoid high-potassium foods (e.g., avocados, kiwis, tomatoes) for about a week.  - Increase water intake.    GALLBLADDER DISORDER:  - Monitor for pain after consuming fatty foods.  -Discussed that although her back pain is likely musculoskeletal, the gallbladder can occasionally have pain that radiates to the back. It is important to not if she has any pain or nausea following meals with higher fat contents.     HEAD INJURY:  -Patient refused to go to the ED after fall.   -We discussed that with being on Xarelto, head injuries must be taken seriously.  -While we can not make her go to the ED with a head injury, it is recommended.  -Discussed that she can always call the office first to see if it would be appropriate to evaluate here if we can.         Follow Up:  As scheduled    @[email or web address]@        Subjective:     Chief Complaint   Patient presents with    Follow-up     I have reviewed the information entered by the ancillary staff regarding the chief complaint as well as the related history.    HPI    Patient is a/an 89 y.o.  female     History of Present Illness    CHIEF COMPLAINT:  Maggi lara  "for follow-up after a recent emergency room visit for abdominal pain. History comes from chart review, but also patient who has memory issues. She is also here with her aide Kristin who is able to contribute some information.    HPI:  Maggi was evaluated at the emergency room on February 20th for abdominal pain. The pain was located in the right upper quadrant and had started the day before, with some nausea. The pain subsequently extended under her arm and around to her back.    After the ER visit, Maggi fell the next day at home while attempting to get out of her recliner. She hit her head on the edge of a table, resulting in a small lump to her head, but declined to return to the hospital, describing it as a minor fall.    Maggi reports ongoing back pain on the right, which she believes might be a pulled muscle. The pain is intermittent and exacerbated by movement. This morning around 6 AM, she felt pain in her back and requested "pain medication".     She denies any major headaches associated with the fall. She denies losing consciousness. She denies any dizziness, vision changes, new weaknesses, wrist pain associated with the fall.    Maggi is currently taking antibiotics every 6 hours, which she believes may be related to a tooth extraction she had a few weeks ago.     Maggi has a history of atrial fibrillation and has a pacemaker. Her heart rate fluctuates frequently. Maggi denies current nausea or rash.    Maggi is concerned that all of her symptoms on the right are from a tooth that was accidentally swallowed at the dentist office and is "caught on something".         ROS:  Head: -headaches  Cardiovascular: +palpitations  Gastrointestinal: +abdominal pain, +nausea  Musculoskeletal: +back pain  Integumentary: -rash          For complete problem list, past medical history, surgical history, social history, etc., see appropriate section in the electronic medical record      Objective     Physical Exam  Vitals " reviewed.   Constitutional:       General: She is not in acute distress.     Appearance: Normal appearance. She is not toxic-appearing.   HENT:      Head:      Comments: Small hematoma to left side of scalp. No overlying abrasion.     Right Ear: External ear normal.      Left Ear: External ear normal.      Nose: Nose normal.   Eyes:      General:         Right eye: No discharge.         Left eye: No discharge.      Extraocular Movements: Extraocular movements intact.      Conjunctiva/sclera: Conjunctivae normal.      Pupils: Pupils are equal, round, and reactive to light.   Cardiovascular:      Rate and Rhythm: Normal rate.   Pulmonary:      Effort: Pulmonary effort is normal. No respiratory distress.   Skin:     General: Skin is warm and dry.      Coloration: Skin is not jaundiced or pale.      Findings: No rash.   Neurological:      Mental Status: She is alert.       Vitals:    02/25/25 1050   BP: (!) 118/58   BP Location: Right arm   Patient Position: Sitting   Pulse: 95   SpO2: 97%   Weight: 73.8 kg (162 lb 9.4 oz)       This note was generated with the assistance of ambient listening technology. Verbal consent was obtained by the patient and accompanying visitor(s) for the recording of patient appointment to facilitate this note. I attest to having reviewed and edited the generated note for accuracy, though some syntax or spelling errors may persist. Please contact the author of this note for any clarification.       THIS DOCUMENT WAS MADE IN PART WITH VOICE RECOGNITION SOFTWARE.  OCCASIONALLY THIS SOFTWARE WILL MISINTERPRET WORDS OR PHRASES.    My total time spent on this encounter was 51 minutes which included the following activities: preparing to see the patient, performing a medically appropriate and/or evaluation, counseling and educating the patient and family/caregiver, ordering medications, tests, or procedures, referring and communicating with other healthcare providers, documenting clinical  information in the electronic or other health record, and independently interpreting results. This time is independent and non-overlapping.

## 2025-03-11 ENCOUNTER — LAB VISIT (OUTPATIENT)
Dept: LAB | Facility: HOSPITAL | Age: OVER 89
End: 2025-03-11
Attending: FAMILY MEDICINE
Payer: MEDICARE

## 2025-03-11 DIAGNOSIS — M10.00 IDIOPATHIC GOUT, UNSPECIFIED CHRONICITY, UNSPECIFIED SITE: ICD-10-CM

## 2025-03-11 DIAGNOSIS — I48.11 LONGSTANDING PERSISTENT ATRIAL FIBRILLATION: Chronic | ICD-10-CM

## 2025-03-11 DIAGNOSIS — E03.4 HYPOTHYROIDISM DUE TO ACQUIRED ATROPHY OF THYROID: ICD-10-CM

## 2025-03-11 DIAGNOSIS — N18.32 STAGE 3B CHRONIC KIDNEY DISEASE: ICD-10-CM

## 2025-03-11 DIAGNOSIS — I10 PRIMARY HYPERTENSION: ICD-10-CM

## 2025-03-11 LAB
BASOPHILS # BLD AUTO: 0.05 K/UL (ref 0–0.2)
BASOPHILS NFR BLD: 1.1 % (ref 0–1.9)
DIFFERENTIAL METHOD BLD: ABNORMAL
EOSINOPHIL # BLD AUTO: 0.3 K/UL (ref 0–0.5)
EOSINOPHIL NFR BLD: 5.7 % (ref 0–8)
ERYTHROCYTE [DISTWIDTH] IN BLOOD BY AUTOMATED COUNT: 16.7 % (ref 11.5–14.5)
HCT VFR BLD AUTO: 35.1 % (ref 37–48.5)
HGB BLD-MCNC: 12.4 G/DL (ref 12–16)
IMM GRANULOCYTES # BLD AUTO: 0.01 K/UL (ref 0–0.04)
IMM GRANULOCYTES NFR BLD AUTO: 0.2 % (ref 0–0.5)
LYMPHOCYTES # BLD AUTO: 2.2 K/UL (ref 1–4.8)
LYMPHOCYTES NFR BLD: 47.1 % (ref 18–48)
MCH RBC QN AUTO: 27.7 PG (ref 27–31)
MCHC RBC AUTO-ENTMCNC: 35.3 G/DL (ref 32–36)
MCV RBC AUTO: 78 FL (ref 82–98)
MONOCYTES # BLD AUTO: 0.8 K/UL (ref 0.3–1)
MONOCYTES NFR BLD: 18.4 % (ref 4–15)
NEUTROPHILS # BLD AUTO: 1.3 K/UL (ref 1.8–7.7)
NEUTROPHILS NFR BLD: 27.5 % (ref 38–73)
NRBC BLD-RTO: 0 /100 WBC
PLATELET # BLD AUTO: 174 K/UL (ref 150–450)
PMV BLD AUTO: 12.3 FL (ref 9.2–12.9)
RBC # BLD AUTO: 4.48 M/UL (ref 4–5.4)
WBC # BLD AUTO: 4.56 K/UL (ref 3.9–12.7)

## 2025-03-11 PROCEDURE — 82728 ASSAY OF FERRITIN: CPT | Mod: HCNC | Performed by: FAMILY MEDICINE

## 2025-03-11 PROCEDURE — 83540 ASSAY OF IRON: CPT | Mod: HCNC | Performed by: FAMILY MEDICINE

## 2025-03-11 PROCEDURE — 80061 LIPID PANEL: CPT | Mod: HCNC | Performed by: FAMILY MEDICINE

## 2025-03-11 PROCEDURE — 36415 COLL VENOUS BLD VENIPUNCTURE: CPT | Mod: HCNC,PO | Performed by: FAMILY MEDICINE

## 2025-03-11 PROCEDURE — 84443 ASSAY THYROID STIM HORMONE: CPT | Mod: HCNC | Performed by: FAMILY MEDICINE

## 2025-03-11 PROCEDURE — 84550 ASSAY OF BLOOD/URIC ACID: CPT | Mod: HCNC | Performed by: FAMILY MEDICINE

## 2025-03-11 PROCEDURE — 80053 COMPREHEN METABOLIC PANEL: CPT | Mod: HCNC | Performed by: FAMILY MEDICINE

## 2025-03-11 PROCEDURE — 84439 ASSAY OF FREE THYROXINE: CPT | Mod: HCNC | Performed by: FAMILY MEDICINE

## 2025-03-11 PROCEDURE — 85025 COMPLETE CBC W/AUTO DIFF WBC: CPT | Mod: HCNC | Performed by: FAMILY MEDICINE

## 2025-03-11 PROCEDURE — 84100 ASSAY OF PHOSPHORUS: CPT | Mod: HCNC | Performed by: FAMILY MEDICINE

## 2025-03-11 RX ORDER — GABAPENTIN 300 MG/1
300 CAPSULE ORAL NIGHTLY
Qty: 90 CAPSULE | Refills: 0 | Status: SHIPPED | OUTPATIENT
Start: 2025-03-11 | End: 2025-06-09

## 2025-03-11 NOTE — TELEPHONE ENCOUNTER
----- Message from [employer] sent at 3/11/2025  3:03 PM CDT -----  Type:  RX Refill RequestWho Called: Chelly bernal/ Living FacilityRefill or New Rx:refilRX Name and Strength:gabapentin (NEURONTIN) 300 MG capsule ()rivaroxaban (XARELTO) 15 mg Tab How is the patient currently taking it? (ex. 1XDay):as rx'dIs this a 30 day or 90 day RX:90Preferred Pharmacy with phone number:[facility] [place] - [id number] [facility] [place] [address]Pjfur: [phone] Fax: [phone] or Mail Order:localOrdering Provider:sameWould the patient rather a call back or a response via See[facility]? Call/Best Call Back Number:[phone] Information: Pt is out of rxPlease callThanks

## 2025-03-12 ENCOUNTER — TELEPHONE (OUTPATIENT)
Dept: PRIMARY CARE CLINIC | Facility: CLINIC | Age: OVER 89
End: 2025-03-12
Payer: MEDICARE

## 2025-03-12 ENCOUNTER — RESULTS FOLLOW-UP (OUTPATIENT)
Dept: PRIMARY CARE CLINIC | Facility: CLINIC | Age: OVER 89
End: 2025-03-12

## 2025-03-12 LAB
ALBUMIN SERPL BCP-MCNC: 3.3 G/DL (ref 3.5–5.2)
ALP SERPL-CCNC: 96 U/L (ref 40–150)
ALT SERPL W/O P-5'-P-CCNC: 10 U/L (ref 10–44)
ANION GAP SERPL CALC-SCNC: 7 MMOL/L (ref 8–16)
AST SERPL-CCNC: 23 U/L (ref 10–40)
BILIRUB SERPL-MCNC: 0.6 MG/DL (ref 0.1–1)
BUN SERPL-MCNC: 18 MG/DL (ref 10–30)
CALCIUM SERPL-MCNC: 9.4 MG/DL (ref 8.7–10.5)
CHLORIDE SERPL-SCNC: 109 MMOL/L (ref 95–110)
CHOLEST SERPL-MCNC: 180 MG/DL (ref 120–199)
CHOLEST/HDLC SERPL: 4 {RATIO} (ref 2–5)
CO2 SERPL-SCNC: 24 MMOL/L (ref 23–29)
CREAT SERPL-MCNC: 1.3 MG/DL (ref 0.5–1.4)
EST. GFR  (NO RACE VARIABLE): 38.6 ML/MIN/1.73 M^2
FERRITIN SERPL-MCNC: 30 NG/ML (ref 20–300)
GLUCOSE SERPL-MCNC: 82 MG/DL (ref 70–110)
HDLC SERPL-MCNC: 45 MG/DL (ref 40–75)
HDLC SERPL: 25 % (ref 20–50)
IRON SERPL-MCNC: 60 UG/DL (ref 30–160)
LDLC SERPL CALC-MCNC: 112.2 MG/DL (ref 63–159)
NONHDLC SERPL-MCNC: 135 MG/DL
PHOSPHATE SERPL-MCNC: 3 MG/DL (ref 2.7–4.5)
POTASSIUM SERPL-SCNC: 5.1 MMOL/L (ref 3.5–5.1)
PROT SERPL-MCNC: 6.3 G/DL (ref 6–8.4)
SATURATED IRON: 16 % (ref 20–50)
SODIUM SERPL-SCNC: 140 MMOL/L (ref 136–145)
T4 FREE SERPL-MCNC: 0.99 NG/DL (ref 0.71–1.51)
TOTAL IRON BINDING CAPACITY: 369 UG/DL (ref 250–450)
TRANSFERRIN SERPL-MCNC: 249 MG/DL (ref 200–375)
TRIGL SERPL-MCNC: 114 MG/DL (ref 30–150)
TSH SERPL DL<=0.005 MIU/L-ACNC: 10.08 UIU/ML (ref 0.4–4)
URATE SERPL-MCNC: 7.9 MG/DL (ref 2.4–5.7)

## 2025-03-12 NOTE — TELEPHONE ENCOUNTER
Please call with lab results.  TSH has been gradually increasing.  If she has been consistently taking her levothyroxine daily and as instructed we can consider a dosage increase.  She occasionally misses doses or does not take on an empty stomach then I would recommend focusing on this 1st     Uric acid is mildly elevated.  This does place her at some risk for gout.  If she is doing well we can discuss this at her next visit if she has been having any episodes then I will need to speak with her sooner.

## 2025-03-12 NOTE — TELEPHONE ENCOUNTER
Spoke with pt's son, Kevin, discussed your message, he verbalized understanding.  Kevin reports that the leg swelling has gotten so much better, but the pt doesn't like the fact that she goes to the restroom so frequently.  Kevin suspects that the pt is taking furosemide daily and does not necessarily think that pt needs this medication daily.      Called [facility] and  for the nurse to call back to update me if pt is taking her Levothyroxine correctly and how frequently pt is taking furosemide.

## 2025-03-12 NOTE — TELEPHONE ENCOUNTER
[facility]  I called her back and left a message to call me back.

## 2025-03-13 NOTE — TELEPHONE ENCOUNTER
Myrna called back and reports that sometimes that she is not taking Levothyroxine on an empty stomach.  She states that they will start giving pt Levothyroxine at least 30 minutes before she eats as she has been taking it with her morning meds at breakfast.  She reports that pt is only taking furosemide as needed.

## 2025-03-14 DIAGNOSIS — E03.9 HYPOTHYROIDISM, UNSPECIFIED TYPE: ICD-10-CM

## 2025-03-14 RX ORDER — LEVOTHYROXINE SODIUM 88 UG/1
TABLET ORAL
Qty: 90 TABLET | Refills: 1 | Status: SHIPPED | OUTPATIENT
Start: 2025-03-14

## 2025-03-14 RX ORDER — METOPROLOL SUCCINATE 25 MG/1
25 TABLET, EXTENDED RELEASE ORAL DAILY
Qty: 90 TABLET | Refills: 1 | Status: SHIPPED | OUTPATIENT
Start: 2025-03-14

## 2025-03-14 NOTE — TELEPHONE ENCOUNTER
----- Message from Briseyda sent at 3/14/2025  9:36 AM CDT -----  Type:  Needs Medical AdviceWho Called: nurse Symptoms (please be specific):  How long has patient had these symptoms:  Pharm[facility] and phone #:  [facility] [place] [address]: [phone] Fax: [phone] the patient rather a call back or a response via [facility]? callBe Call Back Number:[phone] Information: states the pharmacy has been trying to get in touch with the dr , and the dr has not been responding this meds are  metoprolol succinate (TOPROL-XL) 25 MG 24 hr tablet,  levothyroxine (SYNTHROID) 88 MCG tabletPlease call back to advise. Thanks!

## 2025-03-18 RX ORDER — KETOCONAZOLE 20 MG/G
CREAM TOPICAL
Qty: 30 G | Refills: 2 | Status: SHIPPED | OUTPATIENT
Start: 2025-03-18

## 2025-03-18 NOTE — TELEPHONE ENCOUNTER
Spoke to [facility], they need a detailed rx sent to them for the cream. They are applying it every day. Pent medication for .

## 2025-03-18 NOTE — TELEPHONE ENCOUNTER
----- Message from Briseyda sent at 3/18/2025  3:13 PM CDT -----  Contact: abilio  Type:  Needs Medical AdviceWho Called: margawanaSymptoms (please be specific):  How long has patient had these symptoms:  Pharmacy name and phone #:  Would the patient rather a call back or a response via [facility]? callBest Call Back Number: .[phone] Information:   Please call back to advise. Thanks!

## 2025-04-29 RX ORDER — METOPROLOL SUCCINATE 25 MG/1
25 TABLET, EXTENDED RELEASE ORAL DAILY
Qty: 90 TABLET | Refills: 1 | Status: SHIPPED | OUTPATIENT
Start: 2025-04-29

## 2025-05-06 RX ORDER — KETOCONAZOLE 20 MG/G
CREAM TOPICAL
Qty: 60 G | Refills: 0 | Status: SHIPPED | OUTPATIENT
Start: 2025-05-06

## 2025-05-20 ENCOUNTER — OFFICE VISIT (OUTPATIENT)
Dept: PRIMARY CARE CLINIC | Facility: CLINIC | Age: OVER 89
End: 2025-05-20
Payer: MEDICARE

## 2025-05-20 VITALS
DIASTOLIC BLOOD PRESSURE: 62 MMHG | WEIGHT: 161.38 LBS | BODY MASS INDEX: 28.59 KG/M2 | HEIGHT: 63 IN | HEART RATE: 80 BPM | OXYGEN SATURATION: 97 % | SYSTOLIC BLOOD PRESSURE: 122 MMHG

## 2025-05-20 DIAGNOSIS — E03.4 HYPOTHYROIDISM DUE TO ACQUIRED ATROPHY OF THYROID: Primary | Chronic | ICD-10-CM

## 2025-05-20 DIAGNOSIS — F33.1 MODERATE EPISODE OF RECURRENT MAJOR DEPRESSIVE DISORDER: ICD-10-CM

## 2025-05-20 DIAGNOSIS — N18.32 STAGE 3B CHRONIC KIDNEY DISEASE: ICD-10-CM

## 2025-05-20 DIAGNOSIS — I10 PRIMARY HYPERTENSION: ICD-10-CM

## 2025-05-20 DIAGNOSIS — M10.00 IDIOPATHIC GOUT, UNSPECIFIED CHRONICITY, UNSPECIFIED SITE: ICD-10-CM

## 2025-05-20 PROCEDURE — 1126F AMNT PAIN NOTED NONE PRSNT: CPT | Mod: CPTII,HCNC,S$GLB, | Performed by: FAMILY MEDICINE

## 2025-05-20 PROCEDURE — 99214 OFFICE O/P EST MOD 30 MIN: CPT | Mod: HCNC,S$GLB,, | Performed by: FAMILY MEDICINE

## 2025-05-20 PROCEDURE — 1157F ADVNC CARE PLAN IN RCRD: CPT | Mod: CPTII,HCNC,S$GLB, | Performed by: FAMILY MEDICINE

## 2025-05-20 PROCEDURE — 1159F MED LIST DOCD IN RCRD: CPT | Mod: CPTII,HCNC,S$GLB, | Performed by: FAMILY MEDICINE

## 2025-05-20 PROCEDURE — 1160F RVW MEDS BY RX/DR IN RCRD: CPT | Mod: CPTII,HCNC,S$GLB, | Performed by: FAMILY MEDICINE

## 2025-05-20 PROCEDURE — 99999 PR PBB SHADOW E&M-EST. PATIENT-LVL III: CPT | Mod: PBBFAC,HCNC,, | Performed by: FAMILY MEDICINE

## 2025-05-20 PROCEDURE — 1101F PT FALLS ASSESS-DOCD LE1/YR: CPT | Mod: CPTII,HCNC,S$GLB, | Performed by: FAMILY MEDICINE

## 2025-05-20 PROCEDURE — 3288F FALL RISK ASSESSMENT DOCD: CPT | Mod: CPTII,HCNC,S$GLB, | Performed by: FAMILY MEDICINE

## 2025-05-20 RX ORDER — SENNOSIDES 8.6 MG
1 TABLET ORAL
COMMUNITY
Start: 2025-04-14

## 2025-05-20 NOTE — ASSESSMENT & PLAN NOTE
Currently she appears to be doing well she is not on any medication at this point.  She still has episodes of mood changes she is at risk for worsening depression because of her dementia, but functioning well so will continue to monitor without any new medications at this time

## 2025-05-20 NOTE — PROGRESS NOTES
Primary Care Provider Appointment   [facility]       Patient ID: Maggi Fontaine is a 89 y.o. female.    ASSESSMENT/PLAN by Problem List:    Assessment & Plan    IMPRESSION:  - Recent TSH elevation possibly affected by incorrect administration of her medicine.  - HTN stable and under satisfactory control.  - Chronic renal disease stage 3B fairly stable; noted history of potassium elevations.  - Chronic a-fib rate well-controlled.    ATRIAL FIBRILLATION:  - Explained atrial fibrillation and its effect on heart rate variability.  - Monitored the patient's chronic condition, noting heart rate is irregular but well controlled and not tachycardic.  She will continue to follow with Cardiology    HYPERTENSION:  - Monitored the patient's hypertension, which appears stable and under satisfactory control with very good blood pressure readings today.    CHRONIC KIDNEY DISEASE:  - Monitored chronic kidney disease stage 3B, which is fairly stable, though patient has had some elevations in potassium in the past.  - Ms. Fontaine takes Furosemide but not daily and denies any potassium supplements.  - Advised the patient to follow a reasonably lower potassium diet.  - Assessed that nephrology consultation may be necessary.    HYPOTHYROIDISM:  - Monitored recent increase in TSH, noting the patient had not been taking medication on an empty stomach as prescribed.  - Emphasized the importance of taking levothyroxine first thing in the morning on an empty stomach, 30 minutes before other medicines and breakfast for optimal absorption.  I have written orders for her assisted living facility to administer the medicine at least 30 minutes before other medicines as well as rectal  - Ordered thyroid function test to be repeated in a months    SNORING:  Patient is concerned about snoring she states one morning while sleeping she was aware of herself snoring and had noticed some palpitations at the time she  does not really have other signs or symptoms to suggest sleep apnea recommend that they monitor symptoms, did not feel the need for a sleep study at this time but can consider if things change.          CODING, ORDERS, AND ADDITIONAL DOCUMENTATION RELATED TO THIS ENCOUNTER:  (note that the diagnoses and clinical summaries above were generated with the assistance of [employer] software and represents my assessment and plan.  This software does not always generate the precise nor most specific diagnosis codes.  Therefore the specific diagnosis codes and orders for billing purposes are detailed below along with any additional documentation if needed)      1. Hypothyroidism due to acquired atrophy of thyroid  -     TSH; Future; Expected date: 05/20/2025    2. Primary hypertension  -     Lipid Panel; Future; Expected date: 05/20/2025  -     Comprehensive Metabolic Panel; Future; Expected date: 05/20/2025  -     CBC Auto Differential; Future; Expected date: 05/20/2025    3. Stage 3b chronic kidney disease    4. Idiopathic gout, unspecified chronicity, unspecified site  -     Uric Acid; Future; Expected date: 05/20/2025    5. Moderate episode of recurrent major depressive disorder  Assessment & Plan:  Currently she appears to be doing well she is not on any medication at this point.  She still has episodes of mood changes she is at risk for worsening depression because of her dementia, but functioning well so will continue to monitor without any new medications at this time             Follow Up:  Three months    Subjective:       Follow-up  Associated symptoms include arthralgias. Pertinent negatives include no chills.       Patient is a/an 89 y.o.  female     For complete problem list, past medical history, surgical history, social history, etc., see appropriate section in the electronic medical record    History of Present Illness    CHIEF COMPLAINT:  Ms. Fontaine presents today for follow up of multiple chronic  conditions    CARDIOVASCULAR:  She has chronic atrial fibrillation with irregular but not excessively fast heart rate, currently asymptomatic. She reports palpitations concurrent with snoring episodes. Her hypertension is stable and well-controlled.    CHRONIC KIDNEY DISEASE:  She has chronic kidney disease stage 3B with history of elevated potassium levels. She takes Lasix as needed but not daily and denies taking potassium supplements.    HYPOTHYROIDISM:  She reports recent increase in TSH levels. She takes thyroid medication with other morning medications instead of on an empty stomach as prescribed. She currently takes five pills in the morning.    SLEEP:  She reports concerns about snoring after waking up while still being alert.    GASTROINTESTINAL:  She reports history of stomach issues several months ago requiring emergency room visit and specialist follow-up, now resolved without recurrence of symptoms.    LOWER EXTREMITY:  She experiences mild ankle and foot edema. She reports minimal improvement in cracking heel despite using prescribed medication for past 2 years.      ROS:  Cardiovascular: +palpitations, +lower extremity edema, +snoring  Skin: +skin cracking       Review of Systems   Constitutional:  Negative for chills and unexpected weight change.   HENT: Negative.     Respiratory:  Negative for shortness of breath and wheezing.    Cardiovascular: Negative.    Gastrointestinal: Negative.    Genitourinary: Negative.    Musculoskeletal:  Positive for arthralgias and gait problem.   Psychiatric/Behavioral:  Negative for dysphoric mood. The patient is not nervous/anxious.        Objective     Physical Exam  Vitals reviewed.   Constitutional:       General: She is not in acute distress.     Appearance: She is well-developed. She is not ill-appearing.   HENT:      Head: Normocephalic and atraumatic.   Eyes:      General: No scleral icterus.     Conjunctiva/sclera: Conjunctivae normal.   Cardiovascular:       "Rate and Rhythm: Normal rate. Rhythm irregular.      Heart sounds: Murmur heard.      Comments: 1+ foot and ankle edema  Pulmonary:      Effort: Pulmonary effort is normal. No respiratory distress.      Breath sounds: Normal breath sounds. No wheezing or rales.   Skin:     General: Skin is dry.      Findings: No rash.   Neurological:      General: No focal deficit present.      Mental Status: She is alert. Mental status is at baseline.      Gait: Gait abnormal (Mildly antalgic).   Psychiatric:         Behavior: Behavior normal.       Vitals:    05/20/25 1010   BP: 122/62   Patient Position: Sitting   Pulse: 80   SpO2: 97%   Weight: 73.2 kg (161 lb 6 oz)   Height: 5' 3" (1.6 m)     Physical Exam                This note was generated with the assistance of ambient listening technology. Verbal consent was obtained by the patient and accompanying visitor(s) for the recording of patient appointment to facilitate this note. I attest to having reviewed and edited the generated note for accuracy, though some syntax or spelling errors may persist. Please contact the author of this note for any clarification.  Parts of the note were also generated with voice recognition software.  Occasionally this software will misinterpreted words or phrases    "

## 2025-05-22 DIAGNOSIS — I48.11 LONGSTANDING PERSISTENT ATRIAL FIBRILLATION: Chronic | ICD-10-CM

## 2025-06-04 ENCOUNTER — TELEPHONE (OUTPATIENT)
Dept: PRIMARY CARE CLINIC | Facility: CLINIC | Age: OVER 89
End: 2025-06-04
Payer: MEDICARE

## 2025-06-17 RX ORDER — GABAPENTIN 300 MG/1
300 CAPSULE ORAL NIGHTLY
Qty: 90 CAPSULE | Refills: 0 | Status: SHIPPED | OUTPATIENT
Start: 2025-06-17 | End: 2025-09-15

## 2025-07-21 DIAGNOSIS — G60.9 IDIOPATHIC PERIPHERAL NEUROPATHY: Primary | ICD-10-CM

## 2025-07-21 RX ORDER — GABAPENTIN 300 MG/1
300 CAPSULE ORAL NIGHTLY
Qty: 30 CAPSULE | Refills: 2 | Status: SHIPPED | OUTPATIENT
Start: 2025-07-21 | End: 2025-10-19

## 2025-08-12 ENCOUNTER — LAB VISIT (OUTPATIENT)
Dept: LAB | Facility: HOSPITAL | Age: OVER 89
End: 2025-08-12
Attending: FAMILY MEDICINE
Payer: MEDICARE

## 2025-08-12 DIAGNOSIS — M10.00 IDIOPATHIC GOUT, UNSPECIFIED CHRONICITY, UNSPECIFIED SITE: ICD-10-CM

## 2025-08-12 DIAGNOSIS — E03.4 HYPOTHYROIDISM DUE TO ACQUIRED ATROPHY OF THYROID: ICD-10-CM

## 2025-08-12 DIAGNOSIS — I10 PRIMARY HYPERTENSION: ICD-10-CM

## 2025-08-12 LAB
ABSOLUTE EOSINOPHIL (OHS): 0.16 K/UL
ABSOLUTE MONOCYTE (OHS): 0.76 K/UL (ref 0.3–1)
ABSOLUTE NEUTROPHIL COUNT (OHS): 1.2 K/UL (ref 1.8–7.7)
ALBUMIN SERPL BCP-MCNC: 3.4 G/DL (ref 3.5–5.2)
ALP SERPL-CCNC: 91 UNIT/L (ref 40–150)
ALT SERPL W/O P-5'-P-CCNC: 12 UNIT/L (ref 0–55)
ANION GAP (OHS): 9 MMOL/L (ref 8–16)
AST SERPL-CCNC: 25 UNIT/L (ref 0–50)
BASOPHILS # BLD AUTO: 0.07 K/UL
BASOPHILS NFR BLD AUTO: 1.6 %
BILIRUB SERPL-MCNC: 0.7 MG/DL (ref 0.1–1)
BUN SERPL-MCNC: 25 MG/DL (ref 10–30)
CALCIUM SERPL-MCNC: 9.2 MG/DL (ref 8.7–10.5)
CHLORIDE SERPL-SCNC: 108 MMOL/L (ref 95–110)
CHOLEST SERPL-MCNC: 160 MG/DL (ref 120–199)
CHOLEST/HDLC SERPL: 3.6 {RATIO} (ref 2–5)
CO2 SERPL-SCNC: 24 MMOL/L (ref 23–29)
CREAT SERPL-MCNC: 1.7 MG/DL (ref 0.5–1.4)
ERYTHROCYTE [DISTWIDTH] IN BLOOD BY AUTOMATED COUNT: 16.8 % (ref 11.5–14.5)
GFR SERPLBLD CREATININE-BSD FMLA CKD-EPI: 28 ML/MIN/1.73/M2
GLUCOSE SERPL-MCNC: 89 MG/DL (ref 70–110)
HCT VFR BLD AUTO: 34.5 % (ref 37–48.5)
HDLC SERPL-MCNC: 45 MG/DL (ref 40–75)
HDLC SERPL: 28.1 % (ref 20–50)
HGB BLD-MCNC: 11.8 GM/DL (ref 12–16)
IMM GRANULOCYTES # BLD AUTO: 0.01 K/UL (ref 0–0.04)
IMM GRANULOCYTES NFR BLD AUTO: 0.2 % (ref 0–0.5)
LDLC SERPL CALC-MCNC: 97.8 MG/DL (ref 63–159)
LYMPHOCYTES # BLD AUTO: 2.27 K/UL (ref 1–4.8)
MCH RBC QN AUTO: 27.1 PG (ref 27–31)
MCHC RBC AUTO-ENTMCNC: 34.2 G/DL (ref 32–36)
MCV RBC AUTO: 79 FL (ref 82–98)
NONHDLC SERPL-MCNC: 115 MG/DL
NUCLEATED RBC (/100WBC) (OHS): 0 /100 WBC
PLATELET # BLD AUTO: 162 K/UL (ref 150–450)
PMV BLD AUTO: 13 FL (ref 9.2–12.9)
POTASSIUM SERPL-SCNC: 4.6 MMOL/L (ref 3.5–5.1)
PROT SERPL-MCNC: 6.5 GM/DL (ref 6–8.4)
RBC # BLD AUTO: 4.35 M/UL (ref 4–5.4)
RELATIVE EOSINOPHIL (OHS): 3.6 %
RELATIVE LYMPHOCYTE (OHS): 50.8 % (ref 18–48)
RELATIVE MONOCYTE (OHS): 17 % (ref 4–15)
RELATIVE NEUTROPHIL (OHS): 26.8 % (ref 38–73)
SODIUM SERPL-SCNC: 141 MMOL/L (ref 136–145)
T4 FREE SERPL-MCNC: 1.14 NG/DL (ref 0.71–1.51)
TRIGL SERPL-MCNC: 86 MG/DL (ref 30–150)
TSH SERPL-ACNC: 4.04 UIU/ML (ref 0.4–4)
URATE SERPL-MCNC: 8.6 MG/DL (ref 2.4–5.7)
WBC # BLD AUTO: 4.47 K/UL (ref 3.9–12.7)

## 2025-08-12 PROCEDURE — 84550 ASSAY OF BLOOD/URIC ACID: CPT | Mod: HCNC

## 2025-08-12 PROCEDURE — 85025 COMPLETE CBC W/AUTO DIFF WBC: CPT | Mod: HCNC

## 2025-08-12 PROCEDURE — 80061 LIPID PANEL: CPT | Mod: HCNC

## 2025-08-12 PROCEDURE — 84155 ASSAY OF PROTEIN SERUM: CPT | Mod: HCNC

## 2025-08-12 PROCEDURE — 84443 ASSAY THYROID STIM HORMONE: CPT | Mod: HCNC

## 2025-08-12 PROCEDURE — 84439 ASSAY OF FREE THYROXINE: CPT | Mod: HCNC

## 2025-08-12 PROCEDURE — 36415 COLL VENOUS BLD VENIPUNCTURE: CPT | Mod: HCNC,PN

## 2025-08-26 ENCOUNTER — OFFICE VISIT (OUTPATIENT)
Dept: PRIMARY CARE CLINIC | Facility: CLINIC | Age: OVER 89
End: 2025-08-26
Payer: MEDICARE

## 2025-08-26 VITALS
DIASTOLIC BLOOD PRESSURE: 78 MMHG | WEIGHT: 156.94 LBS | HEIGHT: 63 IN | HEART RATE: 82 BPM | SYSTOLIC BLOOD PRESSURE: 130 MMHG | BODY MASS INDEX: 27.81 KG/M2

## 2025-08-26 DIAGNOSIS — I10 PRIMARY HYPERTENSION: Primary | ICD-10-CM

## 2025-08-26 DIAGNOSIS — N18.32 STAGE 3B CHRONIC KIDNEY DISEASE: ICD-10-CM

## 2025-08-26 DIAGNOSIS — I25.10 NON-OCCLUSIVE CORONARY ARTERY DISEASE: Chronic | ICD-10-CM

## 2025-08-26 DIAGNOSIS — E79.0 HYPERURICEMIA: Chronic | ICD-10-CM

## 2025-08-26 DIAGNOSIS — Z79.01 LONG TERM CURRENT USE OF ANTICOAGULANT THERAPY: Chronic | ICD-10-CM

## 2025-08-26 DIAGNOSIS — I42.8 NICM (NONISCHEMIC CARDIOMYOPATHY): Chronic | ICD-10-CM

## 2025-08-26 DIAGNOSIS — I48.11 LONGSTANDING PERSISTENT ATRIAL FIBRILLATION: Chronic | ICD-10-CM

## 2025-08-26 DIAGNOSIS — E03.4 HYPOTHYROIDISM DUE TO ACQUIRED ATROPHY OF THYROID: Chronic | ICD-10-CM

## 2025-08-26 PROCEDURE — 99999 PR PBB SHADOW E&M-EST. PATIENT-LVL IV: CPT | Mod: PBBFAC,HCNC,, | Performed by: FAMILY MEDICINE
